# Patient Record
Sex: FEMALE | Race: WHITE | NOT HISPANIC OR LATINO | Employment: OTHER | URBAN - METROPOLITAN AREA
[De-identification: names, ages, dates, MRNs, and addresses within clinical notes are randomized per-mention and may not be internally consistent; named-entity substitution may affect disease eponyms.]

---

## 2023-04-18 ENCOUNTER — TELEPHONE (OUTPATIENT)
Dept: PRIMARY CARE | Facility: CLINIC | Age: 78
End: 2023-04-18
Payer: MEDICARE

## 2023-04-18 NOTE — TELEPHONE ENCOUNTER
Called and spoke to Sherri Hanson.  She stated she is seeing a different pcp now.  She will not be returning.

## 2023-05-15 DIAGNOSIS — I10 BENIGN ESSENTIAL HTN: Primary | ICD-10-CM

## 2023-05-15 RX ORDER — LISINOPRIL 20 MG/1
TABLET ORAL
Qty: 90 TABLET | Refills: 3 | Status: SHIPPED | OUTPATIENT
Start: 2023-05-15

## 2023-07-20 ENCOUNTER — PATIENT OUTREACH (OUTPATIENT)
Dept: CARE COORDINATION | Facility: CLINIC | Age: 78
End: 2023-07-20
Payer: MEDICARE

## 2023-08-07 DIAGNOSIS — F32.1 CURRENT MODERATE EPISODE OF MAJOR DEPRESSIVE DISORDER, UNSPECIFIED WHETHER RECURRENT (MULTI): Primary | ICD-10-CM

## 2023-08-20 RX ORDER — PAROXETINE 10 MG/1
10 TABLET, FILM COATED ORAL DAILY
Qty: 30 TABLET | Refills: 0 | Status: SHIPPED | OUTPATIENT
Start: 2023-08-20 | End: 2023-10-06 | Stop reason: SDUPTHER

## 2023-08-21 PROBLEM — M54.2 NECK PAIN: Status: ACTIVE | Noted: 2023-08-21

## 2023-08-21 PROBLEM — K21.9 ESOPHAGEAL REFLUX: Status: ACTIVE | Noted: 2023-08-21

## 2023-08-21 PROBLEM — E78.00 HYPERCHOLESTEROLEMIA: Status: ACTIVE | Noted: 2023-08-21

## 2023-08-21 PROBLEM — G47.00 INSOMNIA: Status: ACTIVE | Noted: 2023-08-21

## 2023-08-21 PROBLEM — M45.9 ANKYLOSING SPONDYLITIS (MULTI): Status: ACTIVE | Noted: 2023-08-21

## 2023-08-21 PROBLEM — K86.81 EXOCRINE PANCREATIC INSUFFICIENCY (HHS-HCC): Status: ACTIVE | Noted: 2023-08-21

## 2023-08-21 PROBLEM — I10 ESSENTIAL HYPERTENSION: Status: ACTIVE | Noted: 2023-08-21

## 2023-08-21 PROBLEM — M19.90 OSTEOARTHRITIS: Status: ACTIVE | Noted: 2023-08-21

## 2023-08-21 PROBLEM — I35.1 AORTIC REGURGITATION: Status: ACTIVE | Noted: 2023-08-21

## 2023-08-21 PROBLEM — D62 ANEMIA DUE TO ACUTE BLOOD LOSS: Status: ACTIVE | Noted: 2023-08-21

## 2023-08-21 PROBLEM — R73.9 HYPERGLYCEMIA: Status: ACTIVE | Noted: 2023-08-21

## 2023-08-21 PROBLEM — C44.90 SKIN CANCER: Status: ACTIVE | Noted: 2023-08-21

## 2023-08-21 PROBLEM — M54.12 CERVICAL RADICULOPATHY: Status: ACTIVE | Noted: 2023-08-21

## 2023-08-21 PROBLEM — R51.9 HEADACHE, CHRONIC DAILY: Status: ACTIVE | Noted: 2023-08-21

## 2023-08-21 PROBLEM — N39.0 UTI (URINARY TRACT INFECTION): Status: ACTIVE | Noted: 2023-08-21

## 2023-08-21 PROBLEM — E78.1 HYPERTRIGLYCERIDEMIA: Status: ACTIVE | Noted: 2023-08-21

## 2023-08-21 PROBLEM — I25.10 ARTERIOSCLEROSIS OF CORONARY ARTERY: Status: ACTIVE | Noted: 2023-08-21

## 2023-08-21 PROBLEM — E11.9 TYPE 2 DIABETES MELLITUS WITHOUT COMPLICATION (MULTI): Status: ACTIVE | Noted: 2023-08-21

## 2023-08-21 PROBLEM — D64.9 ANEMIA: Status: ACTIVE | Noted: 2023-08-21

## 2023-08-21 PROBLEM — N20.0 KIDNEY STONE: Status: ACTIVE | Noted: 2023-08-21

## 2023-08-21 PROBLEM — H81.11 BENIGN PAROXYSMAL POSITIONAL VERTIGO OF RIGHT EAR: Status: ACTIVE | Noted: 2023-08-21

## 2023-08-21 PROBLEM — F32.A DEPRESSION: Status: ACTIVE | Noted: 2023-08-21

## 2023-08-21 PROBLEM — F41.9 ANXIETY: Status: ACTIVE | Noted: 2023-08-21

## 2023-08-21 PROBLEM — M17.12 ARTHRITIS OF KNEE, LEFT: Status: ACTIVE | Noted: 2023-08-21

## 2023-08-21 PROBLEM — R03.0 ELEVATED BLOOD PRESSURE READING WITHOUT DIAGNOSIS OF HYPERTENSION: Status: ACTIVE | Noted: 2023-08-21

## 2023-08-21 PROBLEM — M25.511 PAIN IN RIGHT SHOULDER: Status: ACTIVE | Noted: 2023-08-21

## 2023-08-21 PROBLEM — M71.22 SYNOVIAL CYST OF LEFT POPLITEAL SPACE: Status: ACTIVE | Noted: 2023-08-21

## 2023-08-21 PROBLEM — U07.1 COVID-19: Status: ACTIVE | Noted: 2023-08-21

## 2023-08-21 PROBLEM — M54.16 LUMBAR RADICULOPATHY: Status: ACTIVE | Noted: 2023-08-21

## 2023-08-21 PROBLEM — M79.10 MUSCLE PAIN: Status: ACTIVE | Noted: 2023-08-21

## 2023-08-21 PROBLEM — T14.8XXA MUSCLE STRAIN: Status: ACTIVE | Noted: 2023-08-21

## 2023-08-21 PROBLEM — I72.8 SPLENIC ARTERY ANEURYSM (CMS-HCC): Status: ACTIVE | Noted: 2023-08-21

## 2023-08-21 PROBLEM — R92.8 ABNORMAL MAMMOGRAM: Status: ACTIVE | Noted: 2023-08-21

## 2023-08-21 RX ORDER — LISINOPRIL 10 MG/1
10 TABLET ORAL DAILY
COMMUNITY
End: 2023-10-06 | Stop reason: ALTCHOICE

## 2023-08-21 RX ORDER — BLOOD-GLUCOSE METER
EACH MISCELLANEOUS 3 TIMES DAILY
COMMUNITY

## 2023-08-21 RX ORDER — OXYCODONE AND ACETAMINOPHEN 5; 325 MG/1; MG/1
1 TABLET ORAL 4 TIMES DAILY PRN
COMMUNITY
Start: 2023-07-08 | End: 2023-10-06 | Stop reason: ALTCHOICE

## 2023-08-21 RX ORDER — FOLIC ACID 1 MG/1
1 TABLET ORAL DAILY
COMMUNITY
Start: 2021-12-07 | End: 2024-02-09 | Stop reason: ALTCHOICE

## 2023-08-21 RX ORDER — VIT C/E/ZN/COPPR/LUTEIN/ZEAXAN 250MG-90MG
1 CAPSULE ORAL DAILY
COMMUNITY
End: 2024-02-09 | Stop reason: ALTCHOICE

## 2023-08-21 RX ORDER — LANOLIN ALCOHOL/MO/W.PET/CERES
1 CREAM (GRAM) TOPICAL DAILY
COMMUNITY

## 2023-08-21 RX ORDER — HYDROCODONE BITARTRATE AND ACETAMINOPHEN 5; 325 MG/1; MG/1
1 TABLET ORAL
COMMUNITY
Start: 2023-02-17 | End: 2024-02-09 | Stop reason: ALTCHOICE

## 2023-08-21 RX ORDER — MIRTAZAPINE 15 MG/1
15 TABLET, FILM COATED ORAL NIGHTLY
COMMUNITY
End: 2023-10-02 | Stop reason: SDUPTHER

## 2023-08-21 RX ORDER — ZINC GLUCONATE 50 MG
1 TABLET ORAL DAILY
COMMUNITY

## 2023-08-21 RX ORDER — FLUTICASONE PROPIONATE 50 MCG
1 SPRAY, SUSPENSION (ML) NASAL 2 TIMES DAILY
COMMUNITY
Start: 2021-05-04

## 2023-08-21 RX ORDER — LANCETS 33 GAUGE
EACH MISCELLANEOUS 3 TIMES DAILY
COMMUNITY
Start: 2023-07-21

## 2023-08-21 RX ORDER — TRAZODONE HYDROCHLORIDE 50 MG/1
1-2 TABLET ORAL NIGHTLY
COMMUNITY
Start: 2022-07-11 | End: 2024-02-09 | Stop reason: ALTCHOICE

## 2023-08-21 RX ORDER — ASCORBIC ACID 500 MG
TABLET ORAL
COMMUNITY
Start: 2024-02-27

## 2023-08-21 RX ORDER — ALBUTEROL SULFATE 90 UG/1
2 AEROSOL, METERED RESPIRATORY (INHALATION) EVERY 6 HOURS PRN
COMMUNITY
Start: 2022-07-23 | End: 2023-10-06 | Stop reason: ALTCHOICE

## 2023-08-21 RX ORDER — MELOXICAM 15 MG/1
15 TABLET ORAL DAILY
COMMUNITY
Start: 2020-01-16 | End: 2023-10-06 | Stop reason: ALTCHOICE

## 2023-08-21 RX ORDER — METFORMIN HYDROCHLORIDE 500 MG/1
1 TABLET ORAL DAILY
COMMUNITY

## 2023-09-12 LAB
ALANINE AMINOTRANSFERASE (SGPT) (U/L) IN SER/PLAS: 44 U/L (ref 7–45)
ALBUMIN (G/DL) IN SER/PLAS: 4.5 G/DL (ref 3.4–5)
ALBUMIN (MG/L) IN URINE: 315.7 MG/L
ALBUMIN/CREATININE (UG/MG) IN URINE: 326.1 UG/MG CRT (ref 0–30)
ALKALINE PHOSPHATASE (U/L) IN SER/PLAS: 84 U/L (ref 33–136)
ANION GAP IN SER/PLAS: 17 MMOL/L (ref 10–20)
APPEARANCE, URINE: ABNORMAL
ASPARTATE AMINOTRANSFERASE (SGOT) (U/L) IN SER/PLAS: 24 U/L (ref 9–39)
BACTERIA, URINE: ABNORMAL /HPF
BASOPHILS (10*3/UL) IN BLOOD BY AUTOMATED COUNT: 0.04 X10E9/L (ref 0–0.1)
BASOPHILS/100 LEUKOCYTES IN BLOOD BY AUTOMATED COUNT: 0.7 % (ref 0–2)
BILIRUBIN TOTAL (MG/DL) IN SER/PLAS: 0.5 MG/DL (ref 0–1.2)
BILIRUBIN, URINE: NEGATIVE
BLOOD, URINE: ABNORMAL
C REACTIVE PROTEIN (MG/L) IN SER/PLAS BY HIGH SENSIT: 3.8 MG/L
CALCIUM (MG/DL) IN SER/PLAS: 9.9 MG/DL (ref 8.6–10.6)
CARBON DIOXIDE, TOTAL (MMOL/L) IN SER/PLAS: 29 MMOL/L (ref 21–32)
CHLORIDE (MMOL/L) IN SER/PLAS: 103 MMOL/L (ref 98–107)
CHOLESTEROL (MG/DL) IN SER/PLAS: 191 MG/DL (ref 0–199)
CHOLESTEROL IN HDL (MG/DL) IN SER/PLAS: 37.9 MG/DL
CHOLESTEROL/HDL RATIO: 5
COLOR, URINE: YELLOW
CREATININE (MG/DL) IN SER/PLAS: 0.8 MG/DL (ref 0.5–1.05)
CREATININE (MG/DL) IN URINE: 96.8 MG/DL (ref 20–320)
EOSINOPHILS (10*3/UL) IN BLOOD BY AUTOMATED COUNT: 0.16 X10E9/L (ref 0–0.4)
EOSINOPHILS/100 LEUKOCYTES IN BLOOD BY AUTOMATED COUNT: 2.8 % (ref 0–6)
ERYTHROCYTE DISTRIBUTION WIDTH (RATIO) BY AUTOMATED COUNT: 14.1 % (ref 11.5–14.5)
ERYTHROCYTE MEAN CORPUSCULAR HEMOGLOBIN CONCENTRATION (G/DL) BY AUTOMATED: 34.3 G/DL (ref 32–36)
ERYTHROCYTE MEAN CORPUSCULAR VOLUME (FL) BY AUTOMATED COUNT: 99 FL (ref 80–100)
ERYTHROCYTES (10*6/UL) IN BLOOD BY AUTOMATED COUNT: 3.91 X10E12/L (ref 4–5.2)
ESTIMATED AVERAGE GLUCOSE FOR HBA1C: 134 MG/DL
FIBRIN D-DIMER (NG/ML FEU) IN PLATELET POOR PLASMA: 2670 NG/ML FEU
GFR FEMALE: 75 ML/MIN/1.73M2
GLUCOSE (MG/DL) IN SER/PLAS: 101 MG/DL (ref 74–99)
GLUCOSE, URINE: NEGATIVE MG/DL
HEMATOCRIT (%) IN BLOOD BY AUTOMATED COUNT: 38.8 % (ref 36–46)
HEMOGLOBIN (G/DL) IN BLOOD: 13.3 G/DL (ref 12–16)
HEMOGLOBIN A1C/HEMOGLOBIN TOTAL IN BLOOD: 6.3 %
HEPATITIS B VIRUS SURFACE AG PRESENCE IN SERUM: NONREACTIVE
HEPATITIS C VIRUS AB PRESENCE IN SERUM: NONREACTIVE
IMMATURE GRANULOCYTES/100 LEUKOCYTES IN BLOOD BY AUTOMATED COUNT: 0.9 % (ref 0–0.9)
KETONES, URINE: NEGATIVE MG/DL
LDL: 74 MG/DL (ref 0–99)
LEUKOCYTE ESTERASE, URINE: ABNORMAL
LEUKOCYTES (10*3/UL) IN BLOOD BY AUTOMATED COUNT: 5.7 X10E9/L (ref 4.4–11.3)
LYMPHOCYTES (10*3/UL) IN BLOOD BY AUTOMATED COUNT: 2.44 X10E9/L (ref 0.8–3)
LYMPHOCYTES/100 LEUKOCYTES IN BLOOD BY AUTOMATED COUNT: 43.2 % (ref 13–44)
MONOCYTES (10*3/UL) IN BLOOD BY AUTOMATED COUNT: 0.38 X10E9/L (ref 0.05–0.8)
MONOCYTES/100 LEUKOCYTES IN BLOOD BY AUTOMATED COUNT: 6.7 % (ref 2–10)
MUCUS, URINE: ABNORMAL /LPF
NEUTROPHILS (10*3/UL) IN BLOOD BY AUTOMATED COUNT: 2.58 X10E9/L (ref 1.6–5.5)
NEUTROPHILS/100 LEUKOCYTES IN BLOOD BY AUTOMATED COUNT: 45.7 % (ref 40–80)
NITRITE, URINE: NEGATIVE
NON HDL CHOLESTEROL: 153 MG/DL
NRBC (PER 100 WBCS) BY AUTOMATED COUNT: 0 /100 WBC (ref 0–0)
PH, URINE: 5 (ref 5–8)
PLATELETS (10*3/UL) IN BLOOD AUTOMATED COUNT: 199 X10E9/L (ref 150–450)
POTASSIUM (MMOL/L) IN SER/PLAS: 5.5 MMOL/L (ref 3.5–5.3)
PROTEIN TOTAL: 6.9 G/DL (ref 6.4–8.2)
PROTEIN, URINE: ABNORMAL MG/DL
RBC, URINE: 37 /HPF (ref 0–5)
RENAL EPITHELIAL CELLS, URINE: <1 /HPF
SODIUM (MMOL/L) IN SER/PLAS: 143 MMOL/L (ref 136–145)
SPECIFIC GRAVITY, URINE: 1.02 (ref 1–1.03)
SQUAMOUS EPITHELIAL CELLS, URINE: 8 /HPF
TRIGLYCERIDE (MG/DL) IN SER/PLAS: 394 MG/DL (ref 0–149)
UREA NITROGEN (MG/DL) IN SER/PLAS: 28 MG/DL (ref 6–23)
UROBILINOGEN, URINE: <2 MG/DL (ref 0–1.9)
VLDL: 79 MG/DL (ref 0–40)
WBC, URINE: 149 /HPF (ref 0–5)

## 2023-10-02 DIAGNOSIS — F32.5 MAJOR DEPRESSIVE DISORDER IN FULL REMISSION, UNSPECIFIED WHETHER RECURRENT (CMS-HCC): Primary | ICD-10-CM

## 2023-10-02 RX ORDER — MIRTAZAPINE 15 MG/1
15 TABLET, FILM COATED ORAL NIGHTLY
Qty: 90 TABLET | Refills: 1 | Status: SHIPPED | OUTPATIENT
Start: 2023-10-02 | End: 2024-02-09 | Stop reason: ALTCHOICE

## 2023-10-06 ENCOUNTER — OFFICE VISIT (OUTPATIENT)
Dept: PRIMARY CARE | Facility: CLINIC | Age: 78
End: 2023-10-06
Payer: MEDICARE

## 2023-10-06 VITALS
SYSTOLIC BLOOD PRESSURE: 100 MMHG | DIASTOLIC BLOOD PRESSURE: 72 MMHG | HEART RATE: 70 BPM | BODY MASS INDEX: 27.82 KG/M2 | HEIGHT: 63 IN | WEIGHT: 157 LBS | RESPIRATION RATE: 18 BRPM | OXYGEN SATURATION: 93 %

## 2023-10-06 DIAGNOSIS — F32.1 CURRENT MODERATE EPISODE OF MAJOR DEPRESSIVE DISORDER, UNSPECIFIED WHETHER RECURRENT (MULTI): ICD-10-CM

## 2023-10-06 DIAGNOSIS — I10 ESSENTIAL HYPERTENSION: Primary | ICD-10-CM

## 2023-10-06 DIAGNOSIS — E11.9 CONTROLLED TYPE 2 DIABETES MELLITUS WITHOUT COMPLICATION, WITHOUT LONG-TERM CURRENT USE OF INSULIN (MULTI): ICD-10-CM

## 2023-10-06 DIAGNOSIS — E11.9 TYPE 2 DIABETES MELLITUS WITHOUT COMPLICATION, WITHOUT LONG-TERM CURRENT USE OF INSULIN (MULTI): ICD-10-CM

## 2023-10-06 LAB — POC HEMOGLOBIN A1C: 6.7 % (ref 4.2–6.5)

## 2023-10-06 PROCEDURE — 1159F MED LIST DOCD IN RCRD: CPT | Performed by: FAMILY MEDICINE

## 2023-10-06 PROCEDURE — 99214 OFFICE O/P EST MOD 30 MIN: CPT | Performed by: FAMILY MEDICINE

## 2023-10-06 PROCEDURE — 83036 HEMOGLOBIN GLYCOSYLATED A1C: CPT | Performed by: FAMILY MEDICINE

## 2023-10-06 PROCEDURE — 3078F DIAST BP <80 MM HG: CPT | Performed by: FAMILY MEDICINE

## 2023-10-06 PROCEDURE — 1126F AMNT PAIN NOTED NONE PRSNT: CPT | Performed by: FAMILY MEDICINE

## 2023-10-06 PROCEDURE — 1036F TOBACCO NON-USER: CPT | Performed by: FAMILY MEDICINE

## 2023-10-06 PROCEDURE — 3074F SYST BP LT 130 MM HG: CPT | Performed by: FAMILY MEDICINE

## 2023-10-06 PROCEDURE — 1160F RVW MEDS BY RX/DR IN RCRD: CPT | Performed by: FAMILY MEDICINE

## 2023-10-06 RX ORDER — PIOGLITAZONEHYDROCHLORIDE 15 MG/1
15 TABLET ORAL DAILY
Qty: 90 TABLET | Refills: 3 | Status: SHIPPED | OUTPATIENT
Start: 2023-10-06 | End: 2024-03-12 | Stop reason: SDUPTHER

## 2023-10-06 RX ORDER — PAROXETINE 10 MG/1
10 TABLET, FILM COATED ORAL DAILY
Qty: 90 TABLET | Refills: 3 | Status: SHIPPED | OUTPATIENT
Start: 2023-10-06 | End: 2024-01-15 | Stop reason: SDUPTHER

## 2023-10-06 ASSESSMENT — PATIENT HEALTH QUESTIONNAIRE - PHQ9
SUM OF ALL RESPONSES TO PHQ9 QUESTIONS 1 & 2: 0
2. FEELING DOWN, DEPRESSED OR HOPELESS: NOT AT ALL
1. LITTLE INTEREST OR PLEASURE IN DOING THINGS: NOT AT ALL

## 2023-10-06 ASSESSMENT — PAIN SCALES - GENERAL: PAINLEVEL: 0-NO PAIN

## 2023-10-06 NOTE — PROGRESS NOTES
Subjective     This is a 79 y/o WF here for f/u NIDDM, HTN, and elevated cholesterol. SHe denies CP, SOB, or edema. SHe is watching carbs. Her BS has been in the 120s. SHe does not check BP at home. SHe sees the eye doctor, kenny Reis. She saw [facility] and she had labs that show her D-dimer is elevated.        Active Ambulatory Problems     Diagnosis Date Noted    Abnormal mammogram 08/21/2023    Anemia 08/21/2023    Anemia due to acute blood loss 08/21/2023    Ankylosing spondylitis (CMS/HCC) 08/21/2023    Anxiety 08/21/2023    Aortic regurgitation 08/21/2023    Arteriosclerosis of coronary artery 08/21/2023    Arthritis of knee, left 08/21/2023    Essential hypertension 08/21/2023    Depression 08/21/2023    Elevated blood pressure reading without diagnosis of hypertension 08/21/2023    Esophageal reflux 08/21/2023    Exocrine pancreatic insufficiency 08/21/2023    Headache, chronic daily 08/21/2023    Kidney stone 08/21/2023    Insomnia 08/21/2023    Hypertriglyceridemia 08/21/2023    Hypercholesterolemia 08/21/2023    Muscle pain 08/21/2023    Muscle strain 08/21/2023    Pain in right shoulder 08/21/2023    Osteoarthritis 08/21/2023    Neck pain 08/21/2023    Skin cancer 08/21/2023    Lumbar radiculopathy 08/21/2023    Cervical radiculopathy 08/21/2023    Splenic artery aneurysm (CMS/HCC) 08/21/2023    Synovial cyst of left popliteal space 08/21/2023    Type 2 diabetes mellitus without complication (CMS/[facility]) 08/21/2023    Hyperglycemia 08/21/2023    Benign paroxysmal positional vertigo of right ear 08/21/2023     Resolved Ambulatory Problems     Diagnosis Date Noted    No Resolved Ambulatory Problems     Past Medical History:   Diagnosis Date    Body mass index (BMI) 27.0-27.9, adult 12/07/2021    Effusion, left knee 02/11/2019    Irritable bowel syndrome with diarrhea 01/16/2020    Other conditions influencing health status 06/08/2020    Other hemorrhoids 06/28/2017    Other nonspecific abnormal  "finding of lung field 2019    Pain in left knee 2019    Pain in unspecified knee 2019    Personal history of other diseases of the circulatory system 2017    Personal history of other diseases of the musculoskeletal system and connective tissue 2015    Personal history of other specified conditions 2020    Personal history of other specified conditions 2021    Personal history of other specified conditions 2014    Personal history of other specified conditions 2013    Unspecified injury of muscle(s) and tendon(s) of the rotator cuff of right shoulder, initial encounter 10/11/2016       Past Surgical History:   Procedure Laterality Date    HAND SURGERY  2014    Hand Surgery                                                                                                                                                          MR HEAD ANGIO WO IV CONTRAST  4/15/2023    MR HEAD ANGIO WO IV CONTRAST GEA MRI    MR NECK ANGIO WO IV CONTRAST  4/15/2023    MR NECK ANGIO WO IV CONTRAST GE[facility] MRI    OTHER SURGICAL HISTORY  2022    Shoulder replacement       No relevant family history has been documented for this patient.    She indicated that her mother is . She indicated that her father is .      Social History     Tobacco Use   Smoking Status Never   Smokeless Tobacco Never           Vitals:    10/06/23 1208   BP: 100/72   Pulse: 70   Resp: 18   SpO2: 93%    Body mass index is 27.81 kg/m².    Vitals:    10/06/23 1208   BP: 100/72   Pulse: 70   Resp: 18   SpO2: 93%   Weight: 71.2 kg (157 lb)   Height: 1.6 m (5' 3\")   PainSc: 0-No pain     Body mass index is 27.81 kg/m².    Objective   Lungs: CTA  Heart RRR  Abd: NABS, soft, NT  Barefoot extremities - no c,c,e, good pulses, no foot abnormalities      Assessment/Plan   Diagnoses and all orders for this visit:  Essential hypertension  Type 2 diabetes mellitus without complication, without long-term " current use of insulin (CMS/[facility])  -     POCT glycosylated hemoglobin (Hb A1C) manually resulted  Depression, unspecified depression type  Current moderate episode of major depressive disorder, unspecified whether recurrent (CMS/[facility])  -     PARoxetine (Paxil) 10 mg tablet; Take 1 tablet (10 mg) by mouth once daily.  Controlled type 2 diabetes mellitus without complication, without long-term current use of insulin (CMS/[facility])  -     pioglitazone (Actos) 15 mg tablet; Take 1 tablet (15 mg) by mouth once daily.  Other orders  -     Follow Up In Primary Care - Established; Future       Lab Results   Component Value Date    HGBA1C 6.7 (A) 10/06/2023

## 2023-10-08 PROBLEM — I25.10 ARTERIOSCLEROSIS OF CORONARY ARTERY: Status: RESOLVED | Noted: 2023-08-21 | Resolved: 2023-10-08

## 2023-10-08 PROBLEM — R73.9 HYPERGLYCEMIA: Status: RESOLVED | Noted: 2023-08-21 | Resolved: 2023-10-08

## 2023-10-08 PROBLEM — R03.0 ELEVATED BLOOD PRESSURE READING WITHOUT DIAGNOSIS OF HYPERTENSION: Status: RESOLVED | Noted: 2023-08-21 | Resolved: 2023-10-08

## 2023-11-07 ENCOUNTER — OFFICE VISIT (OUTPATIENT)
Dept: ORTHOPEDIC SURGERY | Facility: CLINIC | Age: 78
End: 2023-11-07
Payer: MEDICARE

## 2023-11-07 ENCOUNTER — HOSPITAL ENCOUNTER (OUTPATIENT)
Dept: RADIOLOGY | Facility: HOSPITAL | Age: 78
Discharge: HOME | End: 2023-11-07
Payer: MEDICARE

## 2023-11-07 DIAGNOSIS — M17.12 ARTHRITIS OF KNEE, LEFT: Primary | ICD-10-CM

## 2023-11-07 DIAGNOSIS — M17.12 ARTHRITIS OF KNEE, LEFT: ICD-10-CM

## 2023-11-07 PROCEDURE — 1160F RVW MEDS BY RX/DR IN RCRD: CPT | Performed by: ORTHOPAEDIC SURGERY

## 2023-11-07 PROCEDURE — 20610 DRAIN/INJ JOINT/BURSA W/O US: CPT | Performed by: ORTHOPAEDIC SURGERY

## 2023-11-07 PROCEDURE — 1159F MED LIST DOCD IN RCRD: CPT | Performed by: ORTHOPAEDIC SURGERY

## 2023-11-07 PROCEDURE — 3074F SYST BP LT 130 MM HG: CPT | Performed by: ORTHOPAEDIC SURGERY

## 2023-11-07 PROCEDURE — 73564 X-RAY EXAM KNEE 4 OR MORE: CPT | Mod: LT,FY

## 2023-11-07 PROCEDURE — 3078F DIAST BP <80 MM HG: CPT | Performed by: ORTHOPAEDIC SURGERY

## 2023-11-07 PROCEDURE — 73564 X-RAY EXAM KNEE 4 OR MORE: CPT | Mod: LEFT SIDE | Performed by: RADIOLOGY

## 2023-11-07 PROCEDURE — 99204 OFFICE O/P NEW MOD 45 MIN: CPT | Performed by: ORTHOPAEDIC SURGERY

## 2023-11-07 PROCEDURE — 1126F AMNT PAIN NOTED NONE PRSNT: CPT | Performed by: ORTHOPAEDIC SURGERY

## 2023-11-07 PROCEDURE — 1036F TOBACCO NON-USER: CPT | Performed by: ORTHOPAEDIC SURGERY

## 2023-11-07 NOTE — PROGRESS NOTES
This is a consultation from Dr. Rosita Chandler MD for   Chief Complaint   Patient presents with    Left Knee - Pain       This is a 78 y.o. female who presents for evaluation of left knee pain.  Patient states has had left knee pain for several years, she had left knee arthroscopy about 4 years ago.  She did well for a while but then her pain came back and gradually got worse.  She complains of sharp stable pain over the medial and anterior knee worse with walking proving with rest.  She has had injections in the past nothing recently.  She takes over-the-counter ibuprofen which is helpful.  No numbness or tingling no fevers no chills no shooting pain down the leg.  Her chronic issues been exacerbated last 1 to 2 months    Physical Exam    There has been no interval change in this patient's past medical, surgical, medications, allergies, family history or social history since the most recent visit to a provider within our department. 14 point review of systems was performed, reviewed, and negative except for pertinent positives documented in the history of present illness.     Constitutional: well developed, well nourished female in no acute distress  Psychiatric: normal mood, appropriate affect  Eyes: sclera anicteric  HENT: normocephalic/atraumatic  CV: regular rate and rhythm   Respiratory: non labored breathing  Integumentary: no rash  Neurological: moves all extremities    Left knee exam: skin intact no lacerations or abrations.  1+ effusion.  Tender medial joint line. negative log roll negative patellar grind. ROM 0-120. stable to varus and valgus stress at 0 and 30 degrees. negative lachman negative posterior drawer negative danny. 5/5 ehl/fhl/gs/ta. silt s/s/sp/dp/t. 2+ dp/pt        Xrays were ordered by me, they were reviewed and independently interpreted by me today, they show severe degenerative disease bone-on-bone arthritis the medial compartment and patellofemoral compartment    Procedure  Note:    Diagnosis: left knee arthritis  Procedure: left knee injection    Verbal consent was obtained from the patient, risks benefits and alternatives were discussed. We discussed the risks and benefits and potential morbidity related to the treatment, and to the prescription medication administered in the injectionA timeout was performed, and the correct patient and site of injection were identified and verified. The lateral side of the knee was sterilized with Betadine, and anesthetized with ethyl chloride spray. The left knee was injected with 1ml kenalog and 4ml lidocaine in the usual fashion. A sterile Band-Aid was placed. The patient tolerated the procedure well with no immediate complications. Standard post injection precautions and instructions were given.        Procedures      Impression/Plan: This is a 78 y.o. female with left knee arthritis.  I had an in depth discussion with the patient regarding treatment options for arthritis and their relative risks and benefits. We reviewed surgical and nonsurgical option for treatment. Treatments include anti inflammatory medications, physical therapy, weight loss, activity modification, use of assistive devices, injection therapies. We discussed current prescriptions and risks and benefits of continuation of prescription medication as apporpriate. We discussed that arthritis is often progressive over time, an in end stage arthritis surgical interventions can be considered, including arthroplasty. All questions were answered and the patient voiced their understanding.  We will see her back.    BMI Readings from Last 1 Encounters:   10/06/23 27.81 kg/m²      Lab Results   Component Value Date    CREATININE 0.80 09/12/2023     Tobacco Use: Low Risk  (11/7/2023)    Patient History     Smoking Tobacco Use: Never     Smokeless Tobacco Use: Never     Passive Exposure: Not on file      MELD 3.0: 11 at 4/16/2023  5:06 AM  MELD-Na: 8 at 4/16/2023  5:06 AM  Calculated  "from:  Serum Creatinine: 0.75 mg/dL (Using min of 1 mg/dL) at 4/16/2023  5:06 AM  Serum Sodium: 134 mmol/L at 4/16/2023  5:06 AM  Total Bilirubin: 0.9 mg/dL (Using min of 1 mg/dL) at 4/15/2023  1:33 PM  Serum Albumin: 3.8 g/dL (Using max of 3.5 g/dL) at 4/16/2023  5:06 AM  INR(ratio): 1.2 at 4/15/2023  1:33 PM  Age at listing (hypothetical): 77 years  Sex: Female at 4/16/2023  5:06 AM       Lab Results   Component Value Date    HGBA1C 6.7 (A) 10/06/2023     No results found for: \"STAPHMRSASCR\"  "

## 2023-11-07 NOTE — LETTER
November 7, 2023     Rosita Chandler MD  [address]  [address]  [facility] [address]    Patient: Sherri Najera   YOB: 1945   Date of Visit: 11/7/2023       Dear Dr. Rosita Chandler MD:    Thank you for referring Sherri Najera to me for evaluation. Below are my notes for this consultation.  If you have questions, please do not hesitate to call me. I look forward to following your patient along with you.       Sincerely,     Isaac Dumont MD      CC: No Recipients  ______________________________________________________________________________________    This is a consultation from Dr. Rosita Chandler MD for   Chief Complaint   Patient presents with   • Left Knee - Pain       This is a 78 y.o. female who presents for evaluation of left knee pain.  Patient states has had left knee pain for several years, she had left knee arthroscopy about 4 years ago.  She did well for a while but then her pain came back and gradually got worse.  She complains of sharp stable pain over the medial and anterior knee worse with walking proving with rest.  She has had injections in the past nothing recently.  She takes over-the-counter ibuprofen which is helpful.  No numbness or tingling no fevers no chills no shooting pain down the leg.  Her chronic issues been exacerbated last 1 to 2 months    Physical Exam    There has been no interval change in this patient's past medical, surgical, medications, allergies, family history or social history since the most recent visit to a provider within our department. 14 point review of systems was performed, reviewed, and negative except for pertinent positives documented in the history of present illness.     Constitutional: well developed, well nourished female in no acute distress  Psychiatric: normal mood, appropriate affect  Eyes: sclera anicteric  HENT: normocephalic/atraumatic  CV: regular rate and rhythm   Respiratory: non labored breathing  Integumentary: no  rash  Neurological: moves all extremities    Left knee exam: skin intact no lacerations or abrations.  1+ effusion.  Tender medial joint line. negative log roll negative patellar grind. ROM 0-120. stable to varus and valgus stress at 0 and 30 degrees. negative lachman negative posterior drawer negative danny. 5/5 ehl/fhl/gs/ta. silt s/s/sp/dp/t. 2+ dp/pt        Xrays were ordered by me, they were reviewed and independently interpreted by me today, they show severe degenerative disease bone-on-bone arthritis the medial compartment and patellofemoral compartment    Procedure Note:    Diagnosis: left knee arthritis  Procedure: left knee injection    Verbal consent was obtained from the patient, risks benefits and alternatives were discussed. We discussed the risks and benefits and potential morbidity related to the treatment, and to the prescription medication administered in the injectionA timeout was performed, and the correct patient and site of injection were identified and verified. The lateral side of the knee was sterilized with Betadine, and anesthetized with ethyl chloride spray. The left knee was injected with 1ml kenalog and 4ml lidocaine in the usual fashion. A sterile Band-Aid was placed. The patient tolerated the procedure well with no immediate complications. Standard post injection precautions and instructions were given.        Procedures      Impression/Plan: This is a 78 y.o. female with left knee arthritis.  I had an in depth discussion with the patient regarding treatment options for arthritis and their relative risks and benefits. We reviewed surgical and nonsurgical option for treatment. Treatments include anti inflammatory medications, physical therapy, weight loss, activity modification, use of assistive devices, injection therapies. We discussed current prescriptions and risks and benefits of continuation of prescription medication as apporpriate. We discussed that arthritis is often  "progressive over time, an in end stage arthritis surgical interventions can be considered, including arthroplasty. All questions were answered and the patient voiced their understanding.  We will see her back.    BMI Readings from Last 1 Encounters:   10/06/23 27.81 kg/m²      Lab Results   Component Value Date    CREATININE 0.80 09/12/2023     Tobacco Use: Low Risk  (11/7/2023)    Patient History    • Smoking Tobacco Use: Never    • Smokeless Tobacco Use: Never    • Passive Exposure: Not on file      MELD 3.0: 11 at 4/16/2023  5:06 AM  MELD-Na: 8 at 4/16/2023  5:06 AM  Calculated from:  Serum Creatinine: 0.75 mg/dL (Using min of 1 mg/dL) at 4/16/2023  5:06 AM  Serum Sodium: 134 mmol/L at 4/16/2023  5:06 AM  Total Bilirubin: 0.9 mg/dL (Using min of 1 mg/dL) at 4/15/2023  1:33 PM  Serum Albumin: 3.8 g/dL (Using max of 3.5 g/dL) at 4/16/2023  5:06 AM  INR(ratio): 1.2 at 4/15/2023  1:33 PM  Age at listing (hypothetical): 77 years  Sex: Female at 4/16/2023  5:06 AM       Lab Results   Component Value Date    HGBA1C 6.7 (A) 10/06/2023     No results found for: \"STAPHMRSASCR\""

## 2023-11-21 ENCOUNTER — OFFICE VISIT (OUTPATIENT)
Dept: ORTHOPEDIC SURGERY | Facility: CLINIC | Age: 78
End: 2023-11-21
Payer: MEDICARE

## 2023-11-21 DIAGNOSIS — M17.11 OSTEOARTHRITIS OF RIGHT KNEE, UNSPECIFIED OSTEOARTHRITIS TYPE: ICD-10-CM

## 2023-11-21 PROCEDURE — 1036F TOBACCO NON-USER: CPT

## 2023-11-21 PROCEDURE — 1159F MED LIST DOCD IN RCRD: CPT

## 2023-11-21 PROCEDURE — 1160F RVW MEDS BY RX/DR IN RCRD: CPT

## 2023-11-21 PROCEDURE — 99212 OFFICE O/P EST SF 10 MIN: CPT

## 2023-11-21 PROCEDURE — 1126F AMNT PAIN NOTED NONE PRSNT: CPT

## 2023-11-21 ASSESSMENT — PAIN - FUNCTIONAL ASSESSMENT: PAIN_FUNCTIONAL_ASSESSMENT: 0-10

## 2023-11-21 ASSESSMENT — PAIN SCALES - GENERAL: PAINLEVEL_OUTOF10: 2

## 2023-11-21 NOTE — PROGRESS NOTES
HPI  Sherri Najera is a 78 y.o. female in office today for follow up of side: left knee pain.  she had an injection with Dr Dumont 2 weeks ago.  Injection only helped for a few day.  She is now having issues with the knee giving out and buckling.  She has been taking ibuprofen, she has a walker at home as well.      Physical Exam  Constitutional: well developed, well nourished female in no acute distress  Psychiatric: normal mood, appropriate affect  Eyes: sclera anicteric  HENT: normocephalic/atraumatic  CV: regular rate and rhythm   Respiratory: non labored breathing  Integumentary: no rash  Neurological: moves all extremities    Left Knee Exam     Tenderness   The patient is experiencing tenderness in the medial joint line and lateral joint line (posterior knee).    Range of Motion   Extension:  0   Flexion:  120     Tests   Varus: negative Valgus: negative    Other   Swelling: none            Imaging/Lab:  X-rays were taken 11/7/23 which were reviewed by myself and read by radiology and show advanced left knee osteoarthritis, worst medially and progressive since prior imaging from 2019. No acute fractures of dislocation    Assessment  Assessment: left knee osteoarthritis    Plan  Plan:  History, physical exam, and imaging were reviewed with patient. Again discussion with the patient regarding treatment options for arthritis and their relative risks and benefits. We reviewed surgical and nonsurgical option for treatment. Treatments include anti inflammatory medications, physical therapy, weight loss, activity modification, use of assistive devices, injection therapies, and bracing for the instability.  Since she got an injection 2 weeks ago, it will be 2-3 months before she can have the knee replaced.  Although she is not happy about it, realizes options are limited and none of them are a immediate fix.  Follow Up: Patient to schedule with Dr Dumont and discuss knee replacement.    All questions were answered  for the patient prior to end of exam and patient addressed their understanding.    [place] PA-C  11/21/23

## 2023-12-08 ENCOUNTER — OFFICE VISIT (OUTPATIENT)
Dept: ORTHOPEDIC SURGERY | Facility: CLINIC | Age: 78
End: 2023-12-08
Payer: MEDICARE

## 2023-12-08 DIAGNOSIS — M17.12 ARTHRITIS OF KNEE, LEFT: Primary | ICD-10-CM

## 2023-12-08 PROCEDURE — 1126F AMNT PAIN NOTED NONE PRSNT: CPT | Performed by: ORTHOPAEDIC SURGERY

## 2023-12-08 PROCEDURE — 99215 OFFICE O/P EST HI 40 MIN: CPT | Performed by: ORTHOPAEDIC SURGERY

## 2023-12-08 PROCEDURE — 1036F TOBACCO NON-USER: CPT | Performed by: ORTHOPAEDIC SURGERY

## 2023-12-08 PROCEDURE — 1160F RVW MEDS BY RX/DR IN RCRD: CPT | Performed by: ORTHOPAEDIC SURGERY

## 2023-12-08 PROCEDURE — 1159F MED LIST DOCD IN RCRD: CPT | Performed by: ORTHOPAEDIC SURGERY

## 2023-12-08 NOTE — PROGRESS NOTES
This is a consultation from Dr. Rosita Chandler MD for   Chief Complaint   Patient presents with    Left Knee - Pain     SURGERY CONSULT        This is a 78 y.o. female who presents for follow-up for her left knee.  Patient had injection several months ago, it gave her some relief but her pain returned and now it is getting much worse.  She had a twisting injury to her knee which significantly exacerbated.  She is experience severe exacerbation of the last several weeks including increasing pain and instability of the left knee sharp pain over the medial and anterior knee.  It is getting more difficult for her to walk.  She had extensive trial of nonsurgical management occluding use of anti-inflammatories physical therapy activity modification use of assistive devices cortisone injections.  Despite that she has severe and worsening pain which impacts her quality of life and activities of daily living she like to consider total knee    Physical Exam    There has been no interval change in this patient's past medical, surgical, medications, allergies, family history or social history since the most recent visit to a provider within our department. 14 point review of systems was performed, reviewed, and negative except for pertinent positives documented in the history of present illness.     Constitutional: well developed, well nourished female in no acute distress  Psychiatric: normal mood, appropriate affect  Eyes: sclera anicteric  HENT: normocephalic/atraumatic  CV: regular rate and rhythm   Respiratory: non labored breathing  Integumentary: no rash  Neurological: moves all extremities    Left knee exam: skin intact no lacerations or abrations.  1+ effusion.  Tender medial lateral joint line. negative log roll negative patellar grind. ROM 5-100 stable to varus and valgus stress at 0 and 30 degrees. negative lachman negative posterior drawer negative dnany. 5/5 ehl/fhl/gs/ta. silt s/s/sp/dp/t. 2+  dp/pt        Xrays were ordered by me, they were reviewed and independently interpreted by me today, they show severe degenerative disease bone-on-bone arthritis subchondral sclerosis osteophyte formation Kellgren-Blair grade 4    Procedures      Impression/Plan: This is a 78 y.o. female with severe end-stage degenerative disease left knee that is failed nonoperative management.  Patient elected to proceed with left total knee.    I had an extensive discussion with the patient regarding her condition and possible treatment options. Nonsurgical treatment for left knee arthritis includes activity modification, weight loss, use of a cane or other assistive device, pain medications and nonsteroidal anti-inflammatory medications, joint injections, and physical therapy. The patient has attempted non-surgical treatment for this condition for greater than 6 months and it has failed. We discussed the risks benefits and alternatives of total knee arthroplasty. Benefits of joint replacement include: Relief of pain, improvement of function, and improved quality of life. Alternatives include observation and watchful waiting, and the nonsurgical modalities noted above.   We discussed the risks of complications as well as the risks of morbidity and mortality related to surgical treatment with total joint replacement. We reviewed the fact that total joint replacement is major elective surgery with significant associated surgical and procedural risk factors as detailed below.   Risks include: Pain, blood loss, damage to nearby anatomical structures including but not limited to nerves or blood vessels muscles tendons and bone, failure surgery to ameliorate symptoms, persistence of pain surrounding the affected joint, mechanical failure of the prosthesis including but not limited to loosening of the prosthesis from bone ,breakage of the prosthesis and dislocation of the prosthesis, change in length or appearance of a limb,  infection possibly necessitating further surgery, removal of the prosthesis, or limb amputation, the need for additional surgery for other reasons, blood clots, amputation, and death. No guarantees were implied or given.  All questions were answered and the patient voiced their understanding.   Discussed with the patient that during total knee arthroplasty prosthetic resurfacing of the patella may or may not be performed at the discretion of thesurgeon during surgery. In the event that prosthetic patellar resurfacing is not performed, nonprosthetic arthroplasty will be performed with removal of bone spurs, circumferential synovectomy and electrocautery. Patient was informed that anterior knee pain and patellofemoral crepitus can potentially occur after knee surgery with or without prosthetic patellar resurfacing.  A complete set of surgical instructions was given to the patient. The patient was educated regarding preoperative nutrition, preparation of their home for postoperative rehabilitation, choosing a care partner, medical and dental clearance, the cessation of medications that can cause bleeding or presenting to risk for infection, chlorhexidine bath, nasal swab and decontamination, post operative follow up, and need for medical equipment. Patient was also educated regarding the possibility of same day surgery and related criteria and protocols. The patient will attend our joint class further education, and thereafter they will return for a preoperative visit. A presurgery education booklet was also given to the patient.     surgical plan: Left total knee  implants: DePuy  approach: Standard  DVT ppx aspirin  drugs to stop none  allergy to abx none  post operative abx: ancef +/- vanc (per protocol)  special clearance needed none    BMI Readings from Last 1 Encounters:   10/06/23 27.81 kg/m²      Lab Results   Component Value Date    CREATININE 0.80 09/12/2023     Tobacco Use: Low Risk  (11/21/2023)    Patient  "History     Smoking Tobacco Use: Never     Smokeless Tobacco Use: Never     Passive Exposure: Not on file      MELD 3.0: 11 at 4/16/2023  5:06 AM  MELD-Na: 8 at 4/16/2023  5:06 AM  Calculated from:  Serum Creatinine: 0.75 mg/dL (Using min of 1 mg/dL) at 4/16/2023  5:06 AM  Serum Sodium: 134 mmol/L at 4/16/2023  5:06 AM  Total Bilirubin: 0.9 mg/dL (Using min of 1 mg/dL) at 4/15/2023  1:33 PM  Serum Albumin: 3.8 g/dL (Using max of 3.5 g/dL) at 4/16/2023  5:06 AM  INR(ratio): 1.2 at 4/15/2023  1:33 PM  Age at listing (hypothetical): 77 years  Sex: Female at 4/16/2023  5:06 AM       Lab Results   Component Value Date    HGBA1C 6.7 (A) 10/06/2023     No results found for: \"STAPHMRSASCR\"  "

## 2023-12-12 RX ORDER — TRIAMCINOLONE ACETONIDE 40 MG/ML
40 INJECTION, SUSPENSION INTRA-ARTICULAR; INTRAMUSCULAR ONCE
Status: COMPLETED | OUTPATIENT
Start: 2023-12-12 | End: 2023-12-12

## 2023-12-12 RX ADMIN — TRIAMCINOLONE ACETONIDE 40 MG: 40 INJECTION, SUSPENSION INTRA-ARTICULAR; INTRAMUSCULAR at 12:46

## 2023-12-14 NOTE — PROGRESS NOTES
Patient did not receive the knee brace that was ordered for her, she stated that it was to much for her to wear

## 2024-01-08 ENCOUNTER — PREP FOR PROCEDURE (OUTPATIENT)
Dept: ORTHOPEDIC SURGERY | Facility: CLINIC | Age: 79
End: 2024-01-08
Payer: MEDICARE

## 2024-01-08 ENCOUNTER — TELEPHONE (OUTPATIENT)
Dept: ORTHOPEDIC SURGERY | Facility: CLINIC | Age: 79
End: 2024-01-08
Payer: MEDICARE

## 2024-01-08 DIAGNOSIS — M17.12 ARTHRITIS OF LEFT KNEE: ICD-10-CM

## 2024-01-08 NOTE — TELEPHONE ENCOUNTER
2/26/24 lt tka  Patient is suppose to be scheduled for surgery and she's not able to schedule PAT because she is not on the schedule yet.

## 2024-01-11 PROBLEM — M17.12 ARTHRITIS OF LEFT KNEE: Status: ACTIVE | Noted: 2024-01-08

## 2024-01-12 ENCOUNTER — APPOINTMENT (OUTPATIENT)
Dept: PRIMARY CARE | Facility: CLINIC | Age: 79
End: 2024-01-12
Payer: MEDICARE

## 2024-01-15 DIAGNOSIS — F32.1 CURRENT MODERATE EPISODE OF MAJOR DEPRESSIVE DISORDER, UNSPECIFIED WHETHER RECURRENT (MULTI): ICD-10-CM

## 2024-01-15 RX ORDER — PAROXETINE 10 MG/1
10 TABLET, FILM COATED ORAL DAILY
Qty: 90 TABLET | Refills: 0 | Status: SHIPPED | OUTPATIENT
Start: 2024-01-15 | End: 2024-04-23 | Stop reason: WASHOUT

## 2024-01-15 NOTE — TELEPHONE ENCOUNTER
90 d supply     Refill:   PARoxetine (Paxil) 10 mg tablet Take 1 tablet (10 mg) by mouth once daily.     Pharm: [facility]

## 2024-01-15 NOTE — TELEPHONE ENCOUNTER
Requested Prescriptions     Pending Prescriptions Disp Refills    PARoxetine (Paxil) 10 mg tablet 90 tablet 3     Sig: Take 1 tablet (10 mg) by mouth once daily.     Tim ariza is [facility]

## 2024-01-19 NOTE — TELEPHONE ENCOUNTER
Sherri called as she had requested for her medication refill be sent to [facility] not [facility]. She is asking for this to be corrected.

## 2024-01-19 NOTE — TELEPHONE ENCOUNTER
PATIENT INFORMED THAT WHEN LOOKED UP [facility] IS [facility]. THIS WAS ALSO NOTED IN ORIGINAL REFILL MESSAGE.

## 2024-02-07 ENCOUNTER — TELEPHONE (OUTPATIENT)
Dept: PRIMARY CARE | Facility: CLINIC | Age: 79
End: 2024-02-07
Payer: MEDICARE

## 2024-02-07 DIAGNOSIS — E78.5 HYPERLIPIDEMIA, UNSPECIFIED HYPERLIPIDEMIA TYPE: ICD-10-CM

## 2024-02-07 RX ORDER — ATORVASTATIN CALCIUM 80 MG/1
80 TABLET, FILM COATED ORAL DAILY
Qty: 30 TABLET | Refills: 0 | Status: ON HOLD | OUTPATIENT
Start: 2024-02-07 | End: 2024-02-27

## 2024-02-07 NOTE — TELEPHONE ENCOUNTER
Refill:  atorvastatin (Lipitor) 80 mg tablet TAKE 1 TABLET DAILY       Pharm:   Mail away - [facility]

## 2024-02-08 ENCOUNTER — EDUCATION (OUTPATIENT)
Dept: ORTHOPEDIC SURGERY | Facility: CLINIC | Age: 79
End: 2024-02-08
Payer: MEDICARE

## 2024-02-08 NOTE — GROUP NOTE
In addition to the group class activities, Sherri Najera had the following lab work completed:  No orders of the defined types were placed in this encounter.      A new History and Physical was not completed.    This class lasted approximately 1 hour and had 11 participants. The nurse instructor covered the following topics:  Overview of joint replacement surgery.  Instructions for at-home preparation for surgery (included below).  Expectations before and after surgery including hospital stay.  Discharge planning and home care options.  Physical therapy overview and review of at-home exercises.    Information for Surgery or Hospital Stay  For morning surgery, do not eat or drink after midnight. This includes water, mints, and chewing gum.  For afternoon surgery, you may have a clear liquid breakfast before 6 A.M. Clear liquids are anything you can see through, like apple juice, cranberry juice, tea or black coffee without cream. Do not eat or drink after 6 A.M. This includes water.  Wear loose fitting clothes--something that can be slipped on and off easily over a dressing.  Bring a walker or crutches with you to the hospital on the day of surgery.  Please inform the office if you have any symptoms of illness or fever prior to surgery.  You need to have transportation home when discharged, as you will be medicated.  STOP any aspirin or anti-inflammatory products (Aleve, Advil, etc.) 5-7 days before surgery.  You may use Tylenol or Extra Strength Tylenol.  STOP any vitamins or herbal products 10 days before surgery.

## 2024-02-09 ENCOUNTER — PRE-ADMISSION TESTING (OUTPATIENT)
Dept: PREADMISSION TESTING | Facility: HOSPITAL | Age: 79
End: 2024-02-09
Payer: MEDICARE

## 2024-02-09 VITALS
OXYGEN SATURATION: 95 % | HEIGHT: 62 IN | RESPIRATION RATE: 18 BRPM | BODY MASS INDEX: 29.01 KG/M2 | WEIGHT: 157.63 LBS | HEART RATE: 88 BPM | SYSTOLIC BLOOD PRESSURE: 115 MMHG | DIASTOLIC BLOOD PRESSURE: 72 MMHG | TEMPERATURE: 97 F

## 2024-02-09 DIAGNOSIS — M17.12 ARTHRITIS OF LEFT KNEE: ICD-10-CM

## 2024-02-09 DIAGNOSIS — Z01.818 PREOPERATIVE CLEARANCE: Primary | ICD-10-CM

## 2024-02-09 DIAGNOSIS — R79.9 ABNORMAL FINDING OF BLOOD CHEMISTRY, UNSPECIFIED: ICD-10-CM

## 2024-02-09 LAB
ANION GAP SERPL CALC-SCNC: 13 MMOL/L (ref 10–20)
APPEARANCE UR: ABNORMAL
BACTERIA #/AREA URNS AUTO: ABNORMAL /HPF
BASOPHILS # BLD AUTO: 0.03 X10*3/UL (ref 0–0.1)
BASOPHILS NFR BLD AUTO: 0.5 %
BILIRUB UR STRIP.AUTO-MCNC: NEGATIVE MG/DL
BUN SERPL-MCNC: 22 MG/DL (ref 6–23)
CALCIUM SERPL-MCNC: 9.7 MG/DL (ref 8.6–10.3)
CHLORIDE SERPL-SCNC: 106 MMOL/L (ref 98–107)
CO2 SERPL-SCNC: 30 MMOL/L (ref 21–32)
COLOR UR: YELLOW
CREAT SERPL-MCNC: 0.63 MG/DL (ref 0.5–1.05)
EGFRCR SERPLBLD CKD-EPI 2021: >90 ML/MIN/1.73M*2
EOSINOPHIL # BLD AUTO: 0.18 X10*3/UL (ref 0–0.4)
EOSINOPHIL NFR BLD AUTO: 3.1 %
ERYTHROCYTE [DISTWIDTH] IN BLOOD BY AUTOMATED COUNT: 13.4 % (ref 11.5–14.5)
GLUCOSE SERPL-MCNC: 110 MG/DL (ref 74–99)
GLUCOSE UR STRIP.AUTO-MCNC: NEGATIVE MG/DL
GRAN CASTS #/AREA UR COMP ASSIST: ABNORMAL /LPF
HCT VFR BLD AUTO: 38.2 % (ref 36–46)
HGB BLD-MCNC: 12.1 G/DL (ref 12–16)
HOLD SPECIMEN: NORMAL
HYALINE CASTS #/AREA URNS AUTO: ABNORMAL /LPF
IMM GRANULOCYTES # BLD AUTO: 0.02 X10*3/UL (ref 0–0.5)
IMM GRANULOCYTES NFR BLD AUTO: 0.3 % (ref 0–0.9)
KETONES UR STRIP.AUTO-MCNC: NEGATIVE MG/DL
LEUKOCYTE ESTERASE UR QL STRIP.AUTO: ABNORMAL
LYMPHOCYTES # BLD AUTO: 2.27 X10*3/UL (ref 0.8–3)
LYMPHOCYTES NFR BLD AUTO: 38.7 %
MCH RBC QN AUTO: 29.4 PG (ref 26–34)
MCHC RBC AUTO-ENTMCNC: 31.7 G/DL (ref 32–36)
MCV RBC AUTO: 93 FL (ref 80–100)
MONOCYTES # BLD AUTO: 0.49 X10*3/UL (ref 0.05–0.8)
MONOCYTES NFR BLD AUTO: 8.3 %
MUCOUS THREADS #/AREA URNS AUTO: ABNORMAL /LPF
NEUTROPHILS # BLD AUTO: 2.88 X10*3/UL (ref 1.6–5.5)
NEUTROPHILS NFR BLD AUTO: 49.1 %
NITRITE UR QL STRIP.AUTO: NEGATIVE
NRBC BLD-RTO: 0 /100 WBCS (ref 0–0)
PH UR STRIP.AUTO: 5 [PH]
PLATELET # BLD AUTO: 210 X10*3/UL (ref 150–450)
POTASSIUM SERPL-SCNC: 4.5 MMOL/L (ref 3.5–5.3)
PROT UR STRIP.AUTO-MCNC: ABNORMAL MG/DL
RBC # BLD AUTO: 4.12 X10*6/UL (ref 4–5.2)
RBC # UR STRIP.AUTO: ABNORMAL /UL
RBC #/AREA URNS AUTO: >20 /HPF
SODIUM SERPL-SCNC: 144 MMOL/L (ref 136–145)
SP GR UR STRIP.AUTO: 1.02
SQUAMOUS #/AREA URNS AUTO: ABNORMAL /HPF
UROBILINOGEN UR STRIP.AUTO-MCNC: <2 MG/DL
WBC # BLD AUTO: 5.9 X10*3/UL (ref 4.4–11.3)
WBC #/AREA URNS AUTO: >50 /HPF

## 2024-02-09 PROCEDURE — 93010 ELECTROCARDIOGRAM REPORT: CPT | Performed by: INTERNAL MEDICINE

## 2024-02-09 PROCEDURE — 81003 URINALYSIS AUTO W/O SCOPE: CPT

## 2024-02-09 PROCEDURE — 83036 HEMOGLOBIN GLYCOSYLATED A1C: CPT | Mod: GEALAB

## 2024-02-09 PROCEDURE — 87081 CULTURE SCREEN ONLY: CPT | Mod: 59,GEALAB

## 2024-02-09 PROCEDURE — 87086 URINE CULTURE/COLONY COUNT: CPT | Mod: GEALAB

## 2024-02-09 PROCEDURE — 84132 ASSAY OF SERUM POTASSIUM: CPT

## 2024-02-09 PROCEDURE — 99205 OFFICE O/P NEW HI 60 MIN: CPT | Performed by: NURSE PRACTITIONER

## 2024-02-09 PROCEDURE — 93005 ELECTROCARDIOGRAM TRACING: CPT

## 2024-02-09 PROCEDURE — 36415 COLL VENOUS BLD VENIPUNCTURE: CPT

## 2024-02-09 PROCEDURE — 85025 COMPLETE CBC W/AUTO DIFF WBC: CPT

## 2024-02-09 RX ORDER — MIRTAZAPINE 15 MG/1
1 TABLET, FILM COATED ORAL NIGHTLY
COMMUNITY
End: 2024-03-19 | Stop reason: SDUPTHER

## 2024-02-09 RX ORDER — BUTYROSPERMUM PARKII(SHEA BUTTER), SIMMONDSIA CHINENSIS (JOJOBA) SEED OIL, ALOE BARBADENSIS LEAF EXTRACT .01; 1; 3.5 G/100G; G/100G; G/100G
1 LIQUID TOPICAL 2 TIMES DAILY
COMMUNITY

## 2024-02-09 ASSESSMENT — PAIN SCALES - GENERAL: PAINLEVEL_OUTOF10: 4

## 2024-02-09 ASSESSMENT — CHADS2 SCORE
HYPERTENSION: YES
DIABETES: YES
CHADS2 SCORE: 3
AGE GREATER THAN OR EQUAL TO 75: YES
CHF: NO
PRIOR STROKE OR TIA OR THROMBOEMBOLISM: NO

## 2024-02-09 ASSESSMENT — LIFESTYLE VARIABLES: SMOKING_STATUS: NONSMOKER

## 2024-02-09 ASSESSMENT — ENCOUNTER SYMPTOMS
SINUS CONGESTION: 1
CONSTITUTIONAL NEGATIVE: 1
CARDIOVASCULAR NEGATIVE: 1
NECK NEGATIVE: 1
RESPIRATORY NEGATIVE: 1
NEUROLOGICAL NEGATIVE: 1
ENDOCRINE NEGATIVE: 1
GASTROINTESTINAL NEGATIVE: 1

## 2024-02-09 ASSESSMENT — PAIN - FUNCTIONAL ASSESSMENT: PAIN_FUNCTIONAL_ASSESSMENT: 0-10

## 2024-02-09 NOTE — PREPROCEDURE INSTRUCTIONS
Medication List            Accurate as of February 9, 2024 10:11 AM. Always use your most recent med list.                atorvastatin 80 mg tablet  Commonly known as: Lipitor  Take 1 tablet (80 mg) by mouth once daily.  Medication Adjustments for Surgery: Take morning of surgery with sip of water, no other fluids     Flonase Allergy Relief 50 mcg/actuation nasal spray  Generic drug: fluticasone  Medication Adjustments for Surgery: Take morning of surgery with sip of water, no other fluids     lisinopril 20 mg tablet  TAKE 1 TABLET DAILY AS DIRECTED  Medication Adjustments for Surgery: Other (Comment)  Notes to patient: Do not take day of surgery     metFORMIN 500 mg tablet  Commonly known as: Glucophage  Medication Adjustments for Surgery: Other (Comment)  Notes to patient: Do not take day of surgery     mirtazapine 15 mg tablet  Commonly known as: Remeron  Medication Adjustments for Surgery: Continue until night before surgery     MULTIVITAMIN ORAL  Medication Adjustments for Surgery: Stop 7 days before surgery     OneTouch Delica Plus Lancet 33 gauge misc  Generic drug: lancets  Medication Adjustments for Surgery: Other (Comment)     OneTouch Verio test strips strip  Generic drug: blood sugar diagnostic  Medication Adjustments for Surgery: Other (Comment)     PARoxetine 10 mg tablet  Commonly known as: Paxil  Take 1 tablet (10 mg) by mouth once daily.  Medication Adjustments for Surgery: Take morning of surgery with sip of water, no other fluids     pioglitazone 15 mg tablet  Commonly known as: Actos  Take 1 tablet (15 mg) by mouth once daily.  Medication Adjustments for Surgery: Other (Comment)  Notes to patient: Do not take day of surgery     saccharomyces boulardii 250 mg capsule  Commonly known as: Florastor     Vitamin B-12 1,000 mcg tablet  Generic drug: cyanocobalamin  Medication Adjustments for Surgery: Stop 7 days before surgery     Vitamin C 500 mg tablet  Generic drug: ascorbic acid  Medication  Adjustments for Surgery: Stop 7 days before surgery     zinc gluconate 50 mg tablet  Medication Adjustments for Surgery: Stop 7 days before surgery              SURGERY PRE-OPERATIVE INSTRUCTIONS    *You will receive a phone call the day before your procedure  after 2pm, (or the Friday before your surgery if scheduled on a Monday.) Generally the hospital will be calling you with this information after that time.    *You are not to eat after midnight the night before the surgery. You may have 8oz of a clear liquid up until 2 hours prior to arriving to the hospital. The exception is with medications you were instructed to take day of surgery.    *You may take tylenol for pain/discomfort as needed.     *Stop taking all aspirin products, ibuprofen (motrin/advil), naproxen (aleve/naprosyn) for one week prior to surgery.    *Stop taking all vitamins and supplements one week prior to surgery.     *You should not have alcoholic beverages for 24 hours before surgery.     *You should not smoke 24 hours prior to surgery.     *To help prevent surgical infections bathe/shower with Dial soap the evening before surgery.    *You can wear deodorant but no lotion, powder, or perfume/cologne. You should remove all make-up and nail polish at home.    *If you wear glasses, please bring a case for the glasses with you.    *You will be asked to remove dentures and contacts.     *Please leave all valuables at home.    *You should wear loose, comfortable clothing that will accommodate bandages and/or casts.    *You should notify your doctor of any change in your condition (fever, cold, rash, etc). Surgery may need to be re-scheduled until a time you are in better health.    *A responsible adult is required to accompany you to and from the hospital if you are receiving anesthesia or a sedative. Patients are not permitted to drive for 24 hours after anesthesia.     *You can use the [place] parking if you wish.     *If you have any further  questions please call [facility] [phone].    CHG BODY WASH INSTRUCTIONS    *Begin using your CHG soap five days prior to your scheduled surgery.  Allow the CHG soap to sit on skin for 3 minutes. Do not wash with regular soap after you have used the CHG soap. Pat yourself dry with a clean, fresh towel.    *Wash your face with normal soap and water. Apply the CHG solution to a clean, wet washcloth. Firmly lather your entire body from the neck down. Do not use on your face.     *Do not apply powders, deodorants, or lotions after using CHG wash.    *Dress in clean, freshly laundered night clothes.    *Be sure to sleep with clean, freshly laundered sheets.     *Be aware CHG wash may cause stains on fabrics. Rinse your washcloth and other linens that come in contact with CHG completely. Use non-chlorine detergents to launder items used.     *The morning of surgery is the fifth day, repeat the CHG wash and wear fresh laundered clothes.     *If you have any questions about the CHG soap, call [phone].       MOUTHRINSE INSTRUCTIONS    *CHG oral rinse is used to kill a bacteria in the mouth known as Staphylococcus aureus. This reduces the risks of surgical site infections.     *Using dental rinse: use the CHG oral rinse after you brush your teeth the night before and the morning of the surgery. Follow all directions on your prescription label.     *Use 1 capful (15ml), swish and gargle for at least 30 seconds. Do not swallow. Spit rinse out.     *Do not rinse mouth with water, eat or drink after using CHG mouth rinse.     *Possible side effects: CHG rinse will stick to plaque on teeth. Brush and floss just before use. Teeth brushing will help to avoid staining of plaque during use.    *Any questions, please call [phone].                    NPO Instructions:        Additional Instructions:

## 2024-02-09 NOTE — H&P (VIEW-ONLY)
CPM/PAT Evaluation       Name: Sherri Najera (Sherri Najera)  /Age: 1945/78 y.o.     Visit Type:   In-Person       Chief Complaint: Left Knee Osteoarthritis    HPI  Patient is a 78-year-old female with a past medical history significant for HTN, HLD, hearing loss wears hearing aids, diabetes type 2, GERD, nephrolithiasis, IBS, anxiety, depression, and left knee osteoarthritis.  Patient is being evaluated in [facility] in anticipation of a left total knee arthroscopy plasty with Dr. Dumont on 2024.  Past Medical History:   Diagnosis Date    Aneurysm, splenic artery (CMS/HCC)     Ankylosing spondylitis (CMS/HCC)     Anxiety     Arthritis     Body mass index (BMI) 27.0-27.9, adult 2021    BMI 27.0-27.9,adult    BPPV (benign paroxysmal positional vertigo)     Cervical radiculitis     Depression     Diabetes mellitus (CMS/HCC)     Effusion, left knee 2019    Effusion of left knee    Exocrine pancreatic insufficiency     GERD (gastroesophageal reflux disease)     Hearing aid worn     HL (hearing loss)     HLD (hyperlipidemia)     Hypertension     Irritable bowel syndrome with diarrhea 2020    Irritable bowel syndrome with diarrhea    Kidney stone     Nephrolithiasis     Other conditions influencing health status 2020    History of chronic diarrhea    Other hemorrhoids 2017    Internal hemorrhoid    Other nonspecific abnormal finding of lung field 2019    Ground glass opacity present on imaging of lung    Pain in left knee 2019    Left knee pain    Pain in unspecified knee 2019    Knee pain    Personal history of other diseases of the circulatory system 2017    History of hypertension    Personal history of other diseases of the musculoskeletal system and connective tissue 2015    History of low back pain    Personal history of other specified conditions 2020    History of fatigue    Personal history of other specified conditions 2021     History of diarrhea    Personal history of other specified conditions 03/07/2014    History of chest pain    Personal history of other specified conditions 01/31/2013    History of headache    Skin cancer     Unspecified injury of muscle(s) and tendon(s) of the rotator cuff of right shoulder, initial encounter 10/11/2016    Injury of right rotator cuff    Wears glasses        Past Surgical History:   Procedure Laterality Date    CARPAL TUNNEL RELEASE Bilateral     HAND SURGERY Left 03/07/2014    Hand Surgery                                                                                                                                                          MR HEAD ANGIO WO IV CONTRAST  04/15/2023    MR HEAD ANGIO WO IV CONTRAST [facility] MRI    MR NECK ANGIO WO IV CONTRAST  04/15/2023    MR NECK ANGIO WO IV CONTRAST [facility] MRI    OTHER SURGICAL HISTORY Bilateral 07/11/2022    Shoulder replacement       Patient Sexual activity questions deferred to the physician.    Family History   Problem Relation Name Age of Onset    Diabetes Mother      Asthma Mother      Other (CRF) Mother      Heart failure Father         Allergies   Allergen Reactions    Fenofibrate Unknown    Sulfa (Sulfonamide Antibiotics) Rash       Prior to Admission medications    Medication Sig Start Date End Date Taking? Authorizing Provider   ascorbic acid (Vitamin C) 500 mg tablet as directed Orally   Yes Historical Provider, MD   atorvastatin (Lipitor) 80 mg tablet Take 1 tablet (80 mg) by mouth once daily. 2/7/24  Yes José Miguel Mena DO   cyanocobalamin (Vitamin B-12) 1,000 mcg tablet Take 1 tablet (1,000 mcg) by mouth once daily.   Yes Historical Provider, MD   fluticasone (Flonase Allergy Relief) 50 mcg/actuation nasal spray Administer 1 spray into each nostril 2 times a day. 5/4/21  Yes Historical Provider, MD   lisinopril 20 mg tablet TAKE 1 TABLET DAILY AS DIRECTED 5/15/23  Yes Sue Zarate MD   metFORMIN (Glucophage) 500 mg tablet Take 1 tablet  (500 mg) by mouth once daily. TAKE WITH A MEAL   Yes Historical Provider, MD   mirtazapine (Remeron) 15 mg tablet Take 1 tablet (15 mg) by mouth once daily at bedtime.   Yes Historical Provider, MD   MULTIVITAMIN ORAL Take 1 tablet by mouth once daily.   Yes Historical Provider, MD   OneTouch Delica Plus Lancet 33 gauge misc 3 times a day. 7/21/23  Yes Historical Provider, MD   OneTouch Verio test strips strip 3 times a day. BEFORE MEALS AND AT BEDTIME. - USE 1 STRIP TO CHECK BLOOD SUGAR.   Yes Historical Provider, MD   PARoxetine (Paxil) 10 mg tablet Take 1 tablet (10 mg) by mouth once daily. 1/15/24 1/14/25 Yes José Miguel Mena,    pioglitazone (Actos) 15 mg tablet Take 1 tablet (15 mg) by mouth once daily. 10/6/23  Yes Rosita Chandler MD   saccharomyces boulardii (Florastor) 250 mg capsule Take 1 capsule (250 mg) by mouth 2 times a day.   Yes Historical Provider, MD   zinc gluconate 50 mg tablet Take 1 tablet (50 mg) by mouth once daily.   Yes Historical Provider, MD   atorvastatin (Lipitor) 80 mg tablet TAKE 1 TABLET DAILY 3/29/23 2/7/24  Sue Zarate MD   cholecalciferol (Vitamin D-3) 25 MCG (1000 UT) capsule Take 1 capsule (25 mcg) by mouth once daily.  2/9/24  Historical Provider, MD   folic acid (Folvite) 1 mg tablet Take 1 tablet (1 mg) by mouth once daily. 12/7/21 2/9/24  Historical Provider, MD   HYDROcodone-acetaminophen (Norco) 5-325 mg tablet Take 1 tablet by mouth. TAKE EVERY 4-6 HOURS PRN 2/17/23 2/9/24  Historical Provider, MD   mirtazapine (Remeron) 15 mg tablet Take 1 tablet (15 mg) by mouth once daily at bedtime. 10/2/23 2/9/24  Rosita Chandler MD   traZODone (Desyrel) 50 mg tablet Take 1-2 tablets ( mg) by mouth once daily at bedtime. 7/11/22 2/9/24  Historical Provider, MD [facility] ROS:   Constitutional:   neg    Neuro/Psych:   neg    Eyes:    use of corrective lenses  Ears:    hearing loss   hearing aides  Nose:    sinus congestion  Mouth:   neg    Throat:   neg    Neck:   neg     Cardio:   neg    Respiratory:   neg    Endocrine:   neg    GI:   neg    :   neg    Musculoskeletal:    Knee pain 5/10  Hematologic:   neg    Skin:  neg        Physical Exam  Vitals reviewed.   HENT:      Head: Normocephalic.      Nose: Nose normal.      Mouth/Throat:      Mouth: Mucous membranes are moist.   Eyes:      Pupils: Pupils are equal, round, and reactive to light.   Cardiovascular:      Rate and Rhythm: Normal rate.      Pulses: Normal pulses.      Heart sounds: Murmur heard.   Pulmonary:      Effort: Pulmonary effort is normal.   Abdominal:      Palpations: Abdomen is soft.   Musculoskeletal:      Cervical back: Normal range of motion.      Comments: Generalized arthritis pain   Skin:     General: Skin is warm.   Neurological:      General: No focal deficit present.      Mental Status: She is alert.   Psychiatric:         Mood and Affect: Mood normal.          Airway    Visit Vitals  /72   Pulse 88   Temp 36.1 °C (97 °F)   Resp 18       DASI Risk Score    No data to display       Caprini DVT Assessment      Flowsheet Row Most Recent Value   DVT Score 7   Current Status Major surgery planned, including arthroscopic and laproscopic (1-2 hours)   History Prior major surgery   Age Over 75 years   BMI 30 or less          Modified Frailty Index      Flowsheet Row Most Recent Value   Modified Frailty Index Calculator .1818          CHADS2 Stroke Risk  Current as of 28 minutes ago        N/A 3 - 100%: High Risk   2 - 3%: Medium Risk   0 - 2%: Low Risk     Last Change: N/A          This score determines the patient's risk of having a stroke if the patient has atrial fibrillation.        This score is not applicable to this patient. Components are not calculated.          Revised Cardiac Risk Index      Flowsheet Row Most Recent Value   Revised Cardiac Risk Calculator 0          Apfel Simplified Score      Flowsheet Row Most Recent Value   Apfel Simplified Score Calculator 3          Risk Analysis Index  Results This Encounter    No data found in the last 1 encounters.       Stop Bang Score      Flowsheet Row Most Recent Value   Do you snore loudly? 1   Do you often feel tired or fatigued after your sleep? 0   Has anyone ever observed you stop breathing in your sleep? 0   Do you have or are you being treated for high blood pressure? 1   Recent BMI (Calculated) 27.8   Is BMI greater than 35 kg/m2? 0=No   Age older than 50 years old? 1=Yes   Is your neck circumference greater than 17 inches (Male) or 16 inches (Female)? 0   Gender - Male 0=No   STOP-BANG Total Score 3            Assessment and Plan:     Anesthesia:  The patient denies problems with anesthesia in the past such as PONV, prolonged sedation, awareness, dental damage, aspiration, cardiac arrest, difficult intubation, or unexpected hospital admissions.     Neuro:   The patient has no neurological diagnoses, however, the patient is at increased risk for postoperative delirium secondary to age 65 or older. The patient is at increased risk for perioperative stroke secondary to hypertension , increased age, hyperlipidemia, female gender, general anesthesia.    HEENT/Airway  No diagnoses, significant findings on chart review, clinical presentation, or evaluation.    Cardiovascular  The patient is scheduled for non-cardiac surgery associated with elevated risk.  The patient has no major cardiac contraindications to non- cardiac surgery.  RCRI  The patient meets 0-1 RCRI criteria and therefore has a less than 1% risk of major adverse cardiac complications.  METS  The patient's functional capacity capacity is greater than 4 METS.  EKG  The patient has no EKG or echocardiographic changes concerning for myocardial ischemia.   Heart Failure  The patient has no known history of heart failure.  Additionally, the patient reports no symptoms of heart failure and demonstrates no signs of heart failure.  Hypertension Evaluation  The patient has a known history of  hypertension that is controlled.  Patient's hypertension is most consistent with stage 1.  Heart Rhythm Evaluation  The patient has no history of arrhythmias.  Heart Valve Evaluation  The patient has no known history of valvular heart disease.  However, the patient has symptoms or physical exam findings consistent with valvular heart disease.  An echocardiogram is ordered for further characterization.  CARDS EVAL  The patient is not followed by cardiology.  The patient has a 30-day risk for MACE of 1 predictor, 6.0% risk for cardiac death, nonfatal myocardial infarction, and nonfatal cardiac arrest.  SUSANNE score which indicates a 0.2% risk of intraoperative or 30-day postoperative.    Pulmonary   The patient is at increased risk of perioperative pulmonary complications secondary to advanced age greater than 60.  The patient has a stop bang score of 3, which places patient at intermediate risk for having JUSTINA.    ARISCAT 19, low, 1.6% risk of in-hospital postoperative pulmonary complications  PRODIGY 12, intermediate risk of respiratory depression episode.    Hematology  No diagnoses or significant findings on chart review or clinical presentation and evaluation.  Antiplatelet management   The patient is not currently receiving antiplatelet therapy.  Anticoagulation management  The patient is not currently receiving anticoagulation therapy.  Caprini score 7, high risk of perioperative VTE  Transfusion Evaluation  N/A    Gastrointestinal  The patient has diagnoses or significant findings on chart review or clinical presentation and evaluation significant for GERD.  Eat 10- 0,  self-perceived oropharyngeal dysphagia scale (0-40)     Genitourinary  The patient has diagnoses or significant findings on chart review or clinical presentation and evaluation significant for nephrolithiasis    Renal  No renal diagnoses or significant findings on chart review or clinical presentation and evaluation. The patient has specific risk  factors associated with increased risk of perioperative renal complications due to age greater than 55, hypertension, diabetes mellitus.    Musculoskeletal  The patient has diagnoses or significant findings on chart review or clinical presentation and evaluation significant for osteoarthritis    Endocrine  Diabetes Evaluation  The patient has history of diabetes mellitus controlled by medication  Thyroid Disease Evaluation  The patient has no history of thyroid disease.    ID  No diagnoses or significant findings on chart review or clinical presentation and evaluation., MRSA screening obtained. Prescriptions given for Hibiclens and Peridex.    -Preoperative medication instructions were provided and reviewed with the patient.  Any additional testing or evaluation was explained to the patient.  NPO Instructions were discussed, and the patient's questions were answered prior to conclusion of this encounter     Pt was referred to [facility] for preop evaluation.  Pt was evaluated by Leticia Franco ([facility]) pt ok to proceed with surgery from cardiac standpoint.

## 2024-02-10 LAB
EST. AVERAGE GLUCOSE BLD GHB EST-MCNC: 128 MG/DL
HBA1C MFR BLD: 6.1 %

## 2024-02-11 LAB
BACTERIA UR CULT: NO GROWTH
STAPHYLOCOCCUS SPEC CULT: NORMAL

## 2024-02-12 ENCOUNTER — APPOINTMENT (OUTPATIENT)
Dept: PREADMISSION TESTING | Facility: HOSPITAL | Age: 79
End: 2024-02-12
Payer: MEDICARE

## 2024-02-14 LAB
ATRIAL RATE: 83 BPM
P AXIS: 59 DEGREES
P OFFSET: 192 MS
P ONSET: 140 MS
PR INTERVAL: 154 MS
Q ONSET: 217 MS
QRS COUNT: 13 BEATS
QRS DURATION: 78 MS
QT INTERVAL: 352 MS
QTC CALCULATION(BAZETT): 413 MS
QTC FREDERICIA: 392 MS
R AXIS: -15 DEGREES
T AXIS: 58 DEGREES
T OFFSET: 393 MS
VENTRICULAR RATE: 83 BPM

## 2024-02-15 RX ORDER — CHLORHEXIDINE GLUCONATE ORAL RINSE 1.2 MG/ML
15 SOLUTION DENTAL DAILY
Qty: 473 ML | Refills: 0 | Status: SHIPPED | OUTPATIENT
Start: 2024-02-15 | End: 2024-02-27 | Stop reason: HOSPADM

## 2024-02-16 ENCOUNTER — OFFICE VISIT (OUTPATIENT)
Dept: ORTHOPEDIC SURGERY | Facility: CLINIC | Age: 79
End: 2024-02-16
Payer: MEDICARE

## 2024-02-16 ENCOUNTER — HOSPITAL ENCOUNTER (OUTPATIENT)
Dept: RADIOLOGY | Facility: HOSPITAL | Age: 79
Discharge: HOME | End: 2024-02-16
Payer: MEDICARE

## 2024-02-16 DIAGNOSIS — M17.12 ARTHRITIS OF KNEE, LEFT: ICD-10-CM

## 2024-02-16 DIAGNOSIS — M17.12 ARTHRITIS OF KNEE, LEFT: Primary | ICD-10-CM

## 2024-02-16 PROCEDURE — 99213 OFFICE O/P EST LOW 20 MIN: CPT | Performed by: ORTHOPAEDIC SURGERY

## 2024-02-16 PROCEDURE — 1160F RVW MEDS BY RX/DR IN RCRD: CPT | Performed by: ORTHOPAEDIC SURGERY

## 2024-02-16 PROCEDURE — 77073 BONE LENGTH STUDIES: CPT

## 2024-02-16 PROCEDURE — 1126F AMNT PAIN NOTED NONE PRSNT: CPT | Performed by: ORTHOPAEDIC SURGERY

## 2024-02-16 PROCEDURE — 1036F TOBACCO NON-USER: CPT | Performed by: ORTHOPAEDIC SURGERY

## 2024-02-16 PROCEDURE — 1159F MED LIST DOCD IN RCRD: CPT | Performed by: ORTHOPAEDIC SURGERY

## 2024-02-16 NOTE — PROGRESS NOTES
Chief Complaint   Patient presents with    Left Knee - Follow-up     PRE OP LT TKA         This is a 78 y.o. year old female who presents for preoperative visit for her left total knee.  Patient has severe degenerative disease of the affected joint. The patient complains of severe pain in the area, the pain is gradually getting worse. She has failed extensive nonsurgical treatment including anti-inflammatories physical therapy use of assistive devices activity modification cortisone injections. Despite this interventions the patient is worsening pain which impacts her quality of life and activities of daily living and they would like to proceed with joint replacement.    Physical Exam    There has been no interval change in this patient's past medical, surgical, medications, allergies, family history or social history since the most recent visit to a provider within our department. 14 point review of systems was performed, reviewed, and negative except for pertinent positives documented in the history of present illness.     Constitutional: well developed, well nourished female in no acute distress  Psychiatric: normal mood, appropriate affect  Eyes: sclera anicteric  HENT: normocephalic/atraumatic  CV: regular rate and rhythm   Respiratory: non labored breathing  Integumentary: no rash  Neurological: moves all extremities    Pre-Admission Testing on 02/09/2024   Component Date Value Ref Range Status    Staph/MRSA Screen Culture 02/09/2024 No Staphylococcus aureus isolated   Final    Ventricular Rate 02/09/2024 83  BPM Final    Atrial Rate 02/09/2024 83  BPM Final    NH Interval 02/09/2024 154  ms Final    QRS Duration 02/09/2024 78  ms Final    QT Interval 02/09/2024 352  ms Final    QTC Calculation(Bazett) 02/09/2024 413  ms Final    P Axis 02/09/2024 59  degrees Final    R Axis 02/09/2024 -15  degrees Final    T Axis 02/09/2024 58  degrees Final    QRS Count 02/09/2024 13  beats Final    Q Onset 02/09/2024 217   ms Final    P Onset 02/09/2024 140  ms Final    P Offset 02/09/2024 192  ms Final    T Offset 02/09/2024 393  ms Final    QTC Fredericia 02/09/2024 392  ms Final    WBC 02/09/2024 5.9  4.4 - 11.3 x10*3/uL Final    nRBC 02/09/2024 0.0  0.0 - 0.0 /100 WBCs Final    RBC 02/09/2024 4.12  4.00 - 5.20 x10*6/uL Final    Hemoglobin 02/09/2024 12.1  12.0 - 16.0 g/dL Final    Hematocrit 02/09/2024 38.2  36.0 - 46.0 % Final    MCV 02/09/2024 93  80 - 100 fL Final    MCH 02/09/2024 29.4  26.0 - 34.0 pg Final    MCHC 02/09/2024 31.7 (L)  32.0 - 36.0 g/dL Final    RDW 02/09/2024 13.4  11.5 - 14.5 % Final    Platelets 02/09/2024 210  150 - 450 x10*3/uL Final    Neutrophils % 02/09/2024 49.1  40.0 - 80.0 % Final    Immature Granulocytes %, Automated 02/09/2024 0.3  0.0 - 0.9 % Final    Immature Granulocyte Count (IG) includes promyelocytes, myelocytes and metamyelocytes but does not include bands. Percent differential counts (%) should be interpreted in the context of the absolute cell counts (cells/UL).    Lymphocytes % 02/09/2024 38.7  13.0 - 44.0 % Final    Monocytes % 02/09/2024 8.3  2.0 - 10.0 % Final    Eosinophils % 02/09/2024 3.1  0.0 - 6.0 % Final    Basophils % 02/09/2024 0.5  0.0 - 2.0 % Final    Neutrophils Absolute 02/09/2024 2.88  1.60 - 5.50 x10*3/uL Final    Percent differential counts (%) should be interpreted in the context of the absolute cell counts (cells/uL).    Immature Granulocytes Absolute, Au* 02/09/2024 0.02  0.00 - 0.50 x10*3/uL Final    Lymphocytes Absolute 02/09/2024 2.27  0.80 - 3.00 x10*3/uL Final    Monocytes Absolute 02/09/2024 0.49  0.05 - 0.80 x10*3/uL Final    Eosinophils Absolute 02/09/2024 0.18  0.00 - 0.40 x10*3/uL Final    Basophils Absolute 02/09/2024 0.03  0.00 - 0.10 x10*3/uL Final    Glucose 02/09/2024 110 (H)  74 - 99 mg/dL Final    Sodium 02/09/2024 144  136 - 145 mmol/L Final    Potassium 02/09/2024 4.5  3.5 - 5.3 mmol/L Final    Chloride 02/09/2024 106  98 - 107 mmol/L Final     Bicarbonate 02/09/2024 30  21 - 32 mmol/L Final    Anion Gap 02/09/2024 13  10 - 20 mmol/L Final    Urea Nitrogen 02/09/2024 22  6 - 23 mg/dL Final    Creatinine 02/09/2024 0.63  0.50 - 1.05 mg/dL Final    eGFR 02/09/2024 >90  >60 mL/min/1.73m*2 Final    Calculations of estimated GFR are performed using the 2021 CKD-EPI Study Refit equation without the race variable for the IDMS-Traceable creatinine methods.  https://jasn.asnjournals.org/content/early/2021/09/22/ASN.8357494952    Calcium 02/09/2024 9.7  8.6 - 10.3 mg/dL Final    Hemoglobin A1C 02/09/2024 6.1 (H)  see below % Final    Estimated Average Glucose 02/09/2024 128  Not Established mg/dL Final    Color, Urine 02/09/2024 Yellow  Straw, Yellow Final    Appearance, Urine 02/09/2024 Hazy (N)  Clear Final    Specific Gravity, Urine 02/09/2024 1.021  1.005 - 1.035 Final    pH, Urine 02/09/2024 5.0  5.0, 5.5, 6.0, 6.5, 7.0, 7.5, 8.0 Final    Protein, Urine 02/09/2024 100 (2+) (N)  NEGATIVE mg/dL Final    Glucose, Urine 02/09/2024 NEGATIVE  NEGATIVE mg/dL Final    Blood, Urine 02/09/2024 MODERATE (2+) (A)  NEGATIVE Final    Ketones, Urine 02/09/2024 NEGATIVE  NEGATIVE mg/dL Final    Bilirubin, Urine 02/09/2024 NEGATIVE  NEGATIVE Final    Urobilinogen, Urine 02/09/2024 <2.0  <2.0 mg/dL Final    Nitrite, Urine 02/09/2024 NEGATIVE  NEGATIVE Final    Leukocyte Esterase, Urine 02/09/2024 MODERATE (2+) (A)  NEGATIVE Final    Extra Tube 02/09/2024 Hold for add-ons.   Final    Auto resulted.    WBC, Urine 02/09/2024 >50 (A)  1-5, NONE /HPF Final    RBC, Urine 02/09/2024 >20 (A)  NONE, 1-2, 3-5 /HPF Final    Squamous Epithelial Cells, Urine 02/09/2024 1-9 (SPARSE)  Reference range not established. /HPF Final    Bacteria, Urine 02/09/2024 1+ (A)  NONE SEEN /HPF Final    Mucus, Urine 02/09/2024 2+  Reference range not established. /LPF Final    Hyaline Casts, Urine 02/09/2024 1+ (A)  NONE /LPF Final    Fine Granular Casts, Urine 02/09/2024 1+ (A)  NONE /LPF Final    Urine  Culture 02/09/2024 No growth   Final         Left knee exam: skin intact no lacerations or abrations.  1+ effusion.  Tender medial joint line. negative log roll negative patellar grind. ROM 0-120. stable to varus and valgus stress at 0 and 30 degrees. negative lachman negative posterior drawer negative danny. 5/5 ehl/fhl/gs/ta. silt s/s/sp/dp/t. 2+ dp/pt        Preoperative labs reviewed, no findings which would preclude surgery    Impression plan: This is a 78 y.o. yo femalewith severe end-stage degenerative disease of the left knee that has failed nonoperative management.  Once again I discussed with the patient in detail the risks benefits and alternatives of total joint replacement. For the full details of that discussion see my previous note. The patient has obtained appropriate medical and dental clearance, and her labs have been reviewed. We will plan to proceed with surgery.    BMI Readings from Last 1 Encounters:   02/09/24 28.83 kg/m²     Lab Results   Component Value Date    CREATININE 0.63 02/09/2024     Tobacco Use: Low Risk  (2/16/2024)    Patient History     Smoking Tobacco Use: Never     Smokeless Tobacco Use: Never     Passive Exposure: Not on file      MELD 3.0: 11 at 4/16/2023  5:06 AM  MELD-Na: 8 at 4/16/2023  5:06 AM  Calculated from:  Serum Creatinine: 0.75 mg/dL (Using min of 1 mg/dL) at 4/16/2023  5:06 AM  Serum Sodium: 134 mmol/L at 4/16/2023  5:06 AM  Total Bilirubin: 0.9 mg/dL (Using min of 1 mg/dL) at 4/15/2023  1:33 PM  Serum Albumin: 3.8 g/dL (Using max of 3.5 g/dL) at 4/16/2023  5:06 AM  INR(ratio): 1.2 at 4/15/2023  1:33 PM  Age at listing (hypothetical): 77 years  Sex: Female at 4/16/2023  5:06 AM       Lab Results   Component Value Date    HGBA1C 6.1 (H) 02/09/2024     Lab Results   Component Value Date    STAPHMRSASCR No Staphylococcus aureus isolated 02/09/2024

## 2024-02-16 NOTE — H&P (VIEW-ONLY)
Chief Complaint   Patient presents with    Left Knee - Follow-up     PRE OP LT TKA         This is a 78 y.o. year old female who presents for preoperative visit for her left total knee.  Patient has severe degenerative disease of the affected joint. The patient complains of severe pain in the area, the pain is gradually getting worse. She has failed extensive nonsurgical treatment including anti-inflammatories physical therapy use of assistive devices activity modification cortisone injections. Despite this interventions the patient is worsening pain which impacts her quality of life and activities of daily living and they would like to proceed with joint replacement.    Physical Exam    There has been no interval change in this patient's past medical, surgical, medications, allergies, family history or social history since the most recent visit to a provider within our department. 14 point review of systems was performed, reviewed, and negative except for pertinent positives documented in the history of present illness.     Constitutional: well developed, well nourished female in no acute distress  Psychiatric: normal mood, appropriate affect  Eyes: sclera anicteric  HENT: normocephalic/atraumatic  CV: regular rate and rhythm   Respiratory: non labored breathing  Integumentary: no rash  Neurological: moves all extremities    Pre-Admission Testing on 02/09/2024   Component Date Value Ref Range Status    Staph/MRSA Screen Culture 02/09/2024 No Staphylococcus aureus isolated   Final    Ventricular Rate 02/09/2024 83  BPM Final    Atrial Rate 02/09/2024 83  BPM Final    VA Interval 02/09/2024 154  ms Final    QRS Duration 02/09/2024 78  ms Final    QT Interval 02/09/2024 352  ms Final    QTC Calculation(Bazett) 02/09/2024 413  ms Final    P Axis 02/09/2024 59  degrees Final    R Axis 02/09/2024 -15  degrees Final    T Axis 02/09/2024 58  degrees Final    QRS Count 02/09/2024 13  beats Final    Q Onset 02/09/2024 217   ms Final    P Onset 02/09/2024 140  ms Final    P Offset 02/09/2024 192  ms Final    T Offset 02/09/2024 393  ms Final    QTC Fredericia 02/09/2024 392  ms Final    WBC 02/09/2024 5.9  4.4 - 11.3 x10*3/uL Final    nRBC 02/09/2024 0.0  0.0 - 0.0 /100 WBCs Final    RBC 02/09/2024 4.12  4.00 - 5.20 x10*6/uL Final    Hemoglobin 02/09/2024 12.1  12.0 - 16.0 g/dL Final    Hematocrit 02/09/2024 38.2  36.0 - 46.0 % Final    MCV 02/09/2024 93  80 - 100 fL Final    MCH 02/09/2024 29.4  26.0 - 34.0 pg Final    MCHC 02/09/2024 31.7 (L)  32.0 - 36.0 g/dL Final    RDW 02/09/2024 13.4  11.5 - 14.5 % Final    Platelets 02/09/2024 210  150 - 450 x10*3/uL Final    Neutrophils % 02/09/2024 49.1  40.0 - 80.0 % Final    Immature Granulocytes %, Automated 02/09/2024 0.3  0.0 - 0.9 % Final    Immature Granulocyte Count (IG) includes promyelocytes, myelocytes and metamyelocytes but does not include bands. Percent differential counts (%) should be interpreted in the context of the absolute cell counts (cells/UL).    Lymphocytes % 02/09/2024 38.7  13.0 - 44.0 % Final    Monocytes % 02/09/2024 8.3  2.0 - 10.0 % Final    Eosinophils % 02/09/2024 3.1  0.0 - 6.0 % Final    Basophils % 02/09/2024 0.5  0.0 - 2.0 % Final    Neutrophils Absolute 02/09/2024 2.88  1.60 - 5.50 x10*3/uL Final    Percent differential counts (%) should be interpreted in the context of the absolute cell counts (cells/uL).    Immature Granulocytes Absolute, Au* 02/09/2024 0.02  0.00 - 0.50 x10*3/uL Final    Lymphocytes Absolute 02/09/2024 2.27  0.80 - 3.00 x10*3/uL Final    Monocytes Absolute 02/09/2024 0.49  0.05 - 0.80 x10*3/uL Final    Eosinophils Absolute 02/09/2024 0.18  0.00 - 0.40 x10*3/uL Final    Basophils Absolute 02/09/2024 0.03  0.00 - 0.10 x10*3/uL Final    Glucose 02/09/2024 110 (H)  74 - 99 mg/dL Final    Sodium 02/09/2024 144  136 - 145 mmol/L Final    Potassium 02/09/2024 4.5  3.5 - 5.3 mmol/L Final    Chloride 02/09/2024 106  98 - 107 mmol/L Final     Bicarbonate 02/09/2024 30  21 - 32 mmol/L Final    Anion Gap 02/09/2024 13  10 - 20 mmol/L Final    Urea Nitrogen 02/09/2024 22  6 - 23 mg/dL Final    Creatinine 02/09/2024 0.63  0.50 - 1.05 mg/dL Final    eGFR 02/09/2024 >90  >60 mL/min/1.73m*2 Final    Calculations of estimated GFR are performed using the 2021 CKD-EPI Study Refit equation without the race variable for the IDMS-Traceable creatinine methods.  https://jasn.asnjournals.org/content/early/2021/09/22/ASN.0277227784    Calcium 02/09/2024 9.7  8.6 - 10.3 mg/dL Final    Hemoglobin A1C 02/09/2024 6.1 (H)  see below % Final    Estimated Average Glucose 02/09/2024 128  Not Established mg/dL Final    Color, Urine 02/09/2024 Yellow  Straw, Yellow Final    Appearance, Urine 02/09/2024 Hazy (N)  Clear Final    Specific Gravity, Urine 02/09/2024 1.021  1.005 - 1.035 Final    pH, Urine 02/09/2024 5.0  5.0, 5.5, 6.0, 6.5, 7.0, 7.5, 8.0 Final    Protein, Urine 02/09/2024 100 (2+) (N)  NEGATIVE mg/dL Final    Glucose, Urine 02/09/2024 NEGATIVE  NEGATIVE mg/dL Final    Blood, Urine 02/09/2024 MODERATE (2+) (A)  NEGATIVE Final    Ketones, Urine 02/09/2024 NEGATIVE  NEGATIVE mg/dL Final    Bilirubin, Urine 02/09/2024 NEGATIVE  NEGATIVE Final    Urobilinogen, Urine 02/09/2024 <2.0  <2.0 mg/dL Final    Nitrite, Urine 02/09/2024 NEGATIVE  NEGATIVE Final    Leukocyte Esterase, Urine 02/09/2024 MODERATE (2+) (A)  NEGATIVE Final    Extra Tube 02/09/2024 Hold for add-ons.   Final    Auto resulted.    WBC, Urine 02/09/2024 >50 (A)  1-5, NONE /HPF Final    RBC, Urine 02/09/2024 >20 (A)  NONE, 1-2, 3-5 /HPF Final    Squamous Epithelial Cells, Urine 02/09/2024 1-9 (SPARSE)  Reference range not established. /HPF Final    Bacteria, Urine 02/09/2024 1+ (A)  NONE SEEN /HPF Final    Mucus, Urine 02/09/2024 2+  Reference range not established. /LPF Final    Hyaline Casts, Urine 02/09/2024 1+ (A)  NONE /LPF Final    Fine Granular Casts, Urine 02/09/2024 1+ (A)  NONE /LPF Final    Urine  Culture 02/09/2024 No growth   Final         Left knee exam: skin intact no lacerations or abrations.  1+ effusion.  Tender medial joint line. negative log roll negative patellar grind. ROM 0-120. stable to varus and valgus stress at 0 and 30 degrees. negative lachman negative posterior drawer negative danny. 5/5 ehl/fhl/gs/ta. silt s/s/sp/dp/t. 2+ dp/pt        Preoperative labs reviewed, no findings which would preclude surgery    Impression plan: This is a 78 y.o. yo femalewith severe end-stage degenerative disease of the left knee that has failed nonoperative management.  Once again I discussed with the patient in detail the risks benefits and alternatives of total joint replacement. For the full details of that discussion see my previous note. The patient has obtained appropriate medical and dental clearance, and her labs have been reviewed. We will plan to proceed with surgery.    BMI Readings from Last 1 Encounters:   02/09/24 28.83 kg/m²     Lab Results   Component Value Date    CREATININE 0.63 02/09/2024     Tobacco Use: Low Risk  (2/16/2024)    Patient History     Smoking Tobacco Use: Never     Smokeless Tobacco Use: Never     Passive Exposure: Not on file      MELD 3.0: 11 at 4/16/2023  5:06 AM  MELD-Na: 8 at 4/16/2023  5:06 AM  Calculated from:  Serum Creatinine: 0.75 mg/dL (Using min of 1 mg/dL) at 4/16/2023  5:06 AM  Serum Sodium: 134 mmol/L at 4/16/2023  5:06 AM  Total Bilirubin: 0.9 mg/dL (Using min of 1 mg/dL) at 4/15/2023  1:33 PM  Serum Albumin: 3.8 g/dL (Using max of 3.5 g/dL) at 4/16/2023  5:06 AM  INR(ratio): 1.2 at 4/15/2023  1:33 PM  Age at listing (hypothetical): 77 years  Sex: Female at 4/16/2023  5:06 AM       Lab Results   Component Value Date    HGBA1C 6.1 (H) 02/09/2024     Lab Results   Component Value Date    STAPHMRSASCR No Staphylococcus aureus isolated 02/09/2024

## 2024-02-21 ENCOUNTER — ANESTHESIA EVENT (OUTPATIENT)
Dept: OPERATING ROOM | Facility: HOSPITAL | Age: 79
End: 2024-02-21
Payer: MEDICARE

## 2024-02-22 ENCOUNTER — OFFICE VISIT (OUTPATIENT)
Dept: CARDIOLOGY | Facility: HOSPITAL | Age: 79
End: 2024-02-22
Payer: MEDICARE

## 2024-02-22 VITALS
DIASTOLIC BLOOD PRESSURE: 73 MMHG | SYSTOLIC BLOOD PRESSURE: 147 MMHG | HEART RATE: 84 BPM | OXYGEN SATURATION: 95 % | WEIGHT: 154.98 LBS | BODY MASS INDEX: 28.35 KG/M2

## 2024-02-22 DIAGNOSIS — Z01.818 PRE-OPERATIVE CLEARANCE: Primary | ICD-10-CM

## 2024-02-22 PROCEDURE — 3078F DIAST BP <80 MM HG: CPT | Performed by: NURSE PRACTITIONER

## 2024-02-22 PROCEDURE — 1160F RVW MEDS BY RX/DR IN RCRD: CPT | Performed by: NURSE PRACTITIONER

## 2024-02-22 PROCEDURE — 99204 OFFICE O/P NEW MOD 45 MIN: CPT | Performed by: NURSE PRACTITIONER

## 2024-02-22 PROCEDURE — 3077F SYST BP >= 140 MM HG: CPT | Performed by: NURSE PRACTITIONER

## 2024-02-22 PROCEDURE — 99214 OFFICE O/P EST MOD 30 MIN: CPT | Performed by: NURSE PRACTITIONER

## 2024-02-22 PROCEDURE — 1126F AMNT PAIN NOTED NONE PRSNT: CPT | Performed by: NURSE PRACTITIONER

## 2024-02-22 PROCEDURE — 1159F MED LIST DOCD IN RCRD: CPT | Performed by: NURSE PRACTITIONER

## 2024-02-22 PROCEDURE — 1036F TOBACCO NON-USER: CPT | Performed by: NURSE PRACTITIONER

## 2024-02-22 RX ORDER — CEFAZOLIN SODIUM 2 G/50ML
2 SOLUTION INTRAVENOUS ONCE
Status: CANCELLED | OUTPATIENT
Start: 2024-02-22 | End: 2024-02-22

## 2024-02-22 RX ORDER — TRANEXAMIC ACID 100 MG/ML
1000 INJECTION, SOLUTION INTRAVENOUS ONCE
Status: CANCELLED | OUTPATIENT
Start: 2024-02-22 | End: 2024-02-22

## 2024-02-22 ASSESSMENT — ENCOUNTER SYMPTOMS: DEPRESSION: 0

## 2024-02-22 NOTE — PROGRESS NOTES
Referred by Dr. Dumont for Pre-op Clearance     History Of Present Illness:    Sherri Najera is a 78 y.o. female from home with h/o type 2 DM, hypertension, hld, IBS, nephrolithiasis, depression, and anxiety presenting today to the [facility] for pre-operative cardiac evaluation prior to upcoming left knee replacement surgery with Dr. Dumont. She denies having symptoms of chest pain or pressure, SOB, or dizziness.  She is able to climb a flight of stairs without concerning anginal symptoms.       Past Medical History:  She has a past medical history of Aneurysm, splenic artery (CMS/HCC), Ankylosing spondylitis (CMS/HCC), Anxiety, Arthritis, Body mass index (BMI) 27.0-27.9, adult (12/07/2021), BPPV (benign paroxysmal positional vertigo), Cervical radiculitis, Depression, Diabetes mellitus (CMS/HCC), Effusion, left knee (02/11/2019), Exocrine pancreatic insufficiency, GERD (gastroesophageal reflux disease), Hearing aid worn, HL (hearing loss), HLD (hyperlipidemia), Hypertension, Irritable bowel syndrome with diarrhea (01/16/2020), Kidney stone, Nephrolithiasis, Other conditions influencing health status (06/08/2020), Other hemorrhoids (06/28/2017), Other nonspecific abnormal finding of lung field (03/26/2019), Pain in left knee (04/30/2019), Pain in unspecified knee (02/11/2019), Personal history of other diseases of the circulatory system (08/12/2017), Personal history of other diseases of the musculoskeletal system and connective tissue (05/21/2015), Personal history of other specified conditions (11/03/2020), Personal history of other specified conditions (05/04/2021), Personal history of other specified conditions (03/07/2014), Personal history of other specified conditions (01/31/2013), Skin cancer, Unspecified injury of muscle(s) and tendon(s) of the rotator cuff of right shoulder, initial encounter (10/11/2016), and Wears glasses.    Past Surgical History:  She has a past  surgical history that includes Hand surgery (Left, 03/07/2014); Other surgical history (Bilateral, 07/11/2022); MR angio head wo IV contrast (04/15/2023); MR angio neck wo IV contrast (04/15/2023); and Carpal tunnel release (Bilateral).      Social History:  She reports that she has never smoked. She has never used smokeless tobacco. She reports that she does not drink alcohol and does not use drugs.    Family History:  Family History   Problem Relation Name Age of Onset    Diabetes Mother      Asthma Mother      Other (CRF) Mother      Heart failure Father          Allergies:  Fenofibrate and Sulfa (sulfonamide antibiotics)    Outpatient Medications:  Current Outpatient Medications   Medication Instructions    ascorbic acid (Vitamin C) 500 mg tablet as directed Orally    atorvastatin (LIPITOR) 80 mg, oral, Daily    chlorhexidine (Peridex) 0.12 % solution 15 mL, Mouth/Throat, Daily, Swish and spit one capful the night before surgery and morning  of surgery    cyanocobalamin (Vitamin B-12) 1,000 mcg tablet 1 tablet, oral, Daily    fluticasone (Flonase Allergy Relief) 50 mcg/actuation nasal spray 1 spray, Each Nostril, 2 times daily    lisinopril 20 mg tablet TAKE 1 TABLET DAILY AS DIRECTED    metFORMIN (GLUCOPHAGE) 500 mg, oral, Daily, TAKE WITH A MEAL     mirtazapine (REMERON) 15 mg, oral, Nightly    MULTIVITAMIN ORAL 1 tablet, oral, Daily    OneTouch Delica Plus Lancet 33 gauge misc 3 times daily    OneTouch Verio test strips strip 3 times daily, BEFORE MEALS AND AT BEDTIME. - USE 1 STRIP TO CHECK BLOOD SUGAR.     PARoxetine (PAXIL) 10 mg, oral, Daily    pioglitazone (ACTOS) 15 mg, oral, Daily    saccharomyces boulardii (FLORASTOR) 250 mg, oral, 2 times daily    zinc gluconate 50 mg tablet 1 tablet, oral, Daily        Last Recorded Vitals:  Vitals:    02/22/24 0906   BP: 147/73   Pulse: 84   SpO2: 95%   Weight: 70.3 kg (154 lb 15.7 oz)       Physical Exam:  Constitutional: Pleasant, Awake/Alert/Oriented to person  place and time. No distress  Head: Atraumatic, Normocephalic  Eyes: EOMI. IONA  Neck: Enlarged neck circumference, No JVD  Cardiovascular: Regular rate and rhythm, S1, S2. No extra heart sounds or murmurs  Respiratory: Clear to auscultation bilaterally. No wheezing, rales or rhonchi. Good chest wall expansion  Abdomen: Soft, Nontender, Obese. Bowel sounds appreciated  Musculoskeletal: ROM intact. Muscle strength grossly intact upper and lower extremities 5/5.   Neurological: CNII-XII intact. Sensation grossly intact  Extremities: Warm and dry. No acute rashes and lesions  Psychiatric: Appropriate mood and affect         Last Labs:  CBC -  Lab Results   Component Value Date    WBC 5.9 02/09/2024    HGB 12.1 02/09/2024    HCT 38.2 02/09/2024    MCV 93 02/09/2024     02/09/2024       CMP -  Lab Results   Component Value Date    CALCIUM 9.7 02/09/2024    PHOS 3.3 04/16/2023    PROT 6.9 09/12/2023    ALBUMIN 4.5 09/12/2023    AST 24 09/12/2023    ALT 44 09/12/2023    ALKPHOS 84 09/12/2023    BILITOT 0.5 09/12/2023       LIPID PANEL -   Lab Results   Component Value Date    CHOL 191 09/12/2023    TRIG 394 (H) 09/12/2023    HDL 37.9 (A) 09/12/2023    CHHDL 5.0 09/12/2023    LDLF 74 09/12/2023    VLDL 79 (H) 09/12/2023    NHDL 153 09/12/2023       RENAL FUNCTION PANEL -   Lab Results   Component Value Date    GLUCOSE 110 (H) 02/09/2024     02/09/2024    K 4.5 02/09/2024     02/09/2024    CO2 30 02/09/2024    ANIONGAP 13 02/09/2024    BUN 22 02/09/2024    CREATININE 0.63 02/09/2024    CALCIUM 9.7 02/09/2024    PHOS 3.3 04/16/2023    ALBUMIN 4.5 09/12/2023        Lab Results   Component Value Date    BNP 57 04/16/2023    HGBA1C 6.1 (H) 02/09/2024    HGBA1C 6.7 (A) 10/06/2023       Last Cardiology Tests:  ECG:  ECG 12 Lead 02/09/2024    NSR, HR 83bpm    Cardiac Imaging:  3/26/2019 CCS:  IMPRESSION:  Coronary artery calcium score of 0.     Partially visualized ground-glass opacity in the right lung  as  described.    Lab review: I have personally reviewed the laboratory result(s)   Diagnostic review: I have personally reviewed the result(s) of the CCS and ECG     Assessment/Plan   Very pleasant 78 y.o. female from home with h/o type 2 DM, hypertension, hld, IBS, nephrolithiasis, depression, and anxiety presenting today to the [facility] for pre-operative cardiac evaluation prior to upcoming left knee replacement surgery with Dr. Dumont.    Plan:  -May proceed to the OR with mild risk for perioperative cardiovascular event.   -Follow up with PCP and with cardiology as needed in the future.       ANJANA Murcia-CNP

## 2024-02-23 ENCOUNTER — TELEPHONE (OUTPATIENT)
Dept: INPATIENT UNIT | Facility: HOSPITAL | Age: 79
End: 2024-02-23
Payer: MEDICARE

## 2024-02-26 ENCOUNTER — APPOINTMENT (OUTPATIENT)
Dept: RADIOLOGY | Facility: HOSPITAL | Age: 79
End: 2024-02-26
Payer: MEDICARE

## 2024-02-26 ENCOUNTER — HOME HEALTH ADMISSION (OUTPATIENT)
Dept: HOME HEALTH SERVICES | Facility: HOME HEALTH | Age: 79
End: 2024-02-26
Payer: MEDICARE

## 2024-02-26 ENCOUNTER — HOSPITAL ENCOUNTER (OUTPATIENT)
Facility: HOSPITAL | Age: 79
Discharge: HOME HEALTH | End: 2024-02-27
Attending: ORTHOPAEDIC SURGERY | Admitting: ORTHOPAEDIC SURGERY
Payer: MEDICARE

## 2024-02-26 ENCOUNTER — ANESTHESIA (OUTPATIENT)
Dept: OPERATING ROOM | Facility: HOSPITAL | Age: 79
End: 2024-02-26
Payer: MEDICARE

## 2024-02-26 DIAGNOSIS — M17.12 OSTEOARTHRITIS OF LEFT KNEE, UNSPECIFIED OSTEOARTHRITIS TYPE: Primary | ICD-10-CM

## 2024-02-26 DIAGNOSIS — M17.12 ARTHRITIS OF LEFT KNEE: ICD-10-CM

## 2024-02-26 LAB
GLUCOSE BLD MANUAL STRIP-MCNC: 185 MG/DL (ref 74–99)
GLUCOSE BLD MANUAL STRIP-MCNC: 193 MG/DL (ref 74–99)
GLUCOSE BLD MANUAL STRIP-MCNC: 98 MG/DL (ref 74–99)

## 2024-02-26 PROCEDURE — 2720000007 HC OR 272 NO HCPCS: Performed by: ORTHOPAEDIC SURGERY

## 2024-02-26 PROCEDURE — 96365 THER/PROPH/DIAG IV INF INIT: CPT | Mod: 59

## 2024-02-26 PROCEDURE — 7100000002 HC RECOVERY ROOM TIME - EACH INCREMENTAL 1 MINUTE: Performed by: ORTHOPAEDIC SURGERY

## 2024-02-26 PROCEDURE — 2500000004 HC RX 250 GENERAL PHARMACY W/ HCPCS (ALT 636 FOR OP/ED): Performed by: ORTHOPAEDIC SURGERY

## 2024-02-26 PROCEDURE — 73560 X-RAY EXAM OF KNEE 1 OR 2: CPT | Mod: LEFT SIDE | Performed by: RADIOLOGY

## 2024-02-26 PROCEDURE — 2500000005 HC RX 250 GENERAL PHARMACY W/O HCPCS: Performed by: NURSE ANESTHETIST, CERTIFIED REGISTERED

## 2024-02-26 PROCEDURE — 27447 TOTAL KNEE ARTHROPLASTY: CPT | Performed by: ORTHOPAEDIC SURGERY

## 2024-02-26 PROCEDURE — A27447 PR TOTAL KNEE ARTHROPLASTY: Performed by: NURSE ANESTHETIST, CERTIFIED REGISTERED

## 2024-02-26 PROCEDURE — A4217 STERILE WATER/SALINE, 500 ML: HCPCS | Performed by: ORTHOPAEDIC SURGERY

## 2024-02-26 PROCEDURE — 88311 DECALCIFY TISSUE: CPT | Performed by: PATHOLOGY

## 2024-02-26 PROCEDURE — 7100000011 HC EXTENDED STAY RECOVERY HOURLY - NURSING UNIT

## 2024-02-26 PROCEDURE — A4216 STERILE WATER/SALINE, 10 ML: HCPCS | Performed by: ORTHOPAEDIC SURGERY

## 2024-02-26 PROCEDURE — 3600000018 HC OR TIME - INITIAL BASE CHARGE - PROCEDURE LEVEL SIX: Performed by: ORTHOPAEDIC SURGERY

## 2024-02-26 PROCEDURE — 2500000004 HC RX 250 GENERAL PHARMACY W/ HCPCS (ALT 636 FOR OP/ED): Performed by: NURSE ANESTHETIST, CERTIFIED REGISTERED

## 2024-02-26 PROCEDURE — C1776 JOINT DEVICE (IMPLANTABLE): HCPCS | Performed by: ORTHOPAEDIC SURGERY

## 2024-02-26 PROCEDURE — 2500000004 HC RX 250 GENERAL PHARMACY W/ HCPCS (ALT 636 FOR OP/ED): Performed by: ANESTHESIOLOGY

## 2024-02-26 PROCEDURE — 82947 ASSAY GLUCOSE BLOOD QUANT: CPT

## 2024-02-26 PROCEDURE — 2500000001 HC RX 250 WO HCPCS SELF ADMINISTERED DRUGS (ALT 637 FOR MEDICARE OP): Performed by: NURSE ANESTHETIST, CERTIFIED REGISTERED

## 2024-02-26 PROCEDURE — 3700000002 HC GENERAL ANESTHESIA TIME - EACH INCREMENTAL 1 MINUTE: Performed by: ORTHOPAEDIC SURGERY

## 2024-02-26 PROCEDURE — 3700000001 HC GENERAL ANESTHESIA TIME - INITIAL BASE CHARGE: Performed by: ORTHOPAEDIC SURGERY

## 2024-02-26 PROCEDURE — 88305 TISSUE EXAM BY PATHOLOGIST: CPT | Performed by: PATHOLOGY

## 2024-02-26 PROCEDURE — 88305 TISSUE EXAM BY PATHOLOGIST: CPT | Mod: TC,GEALAB | Performed by: ORTHOPAEDIC SURGERY

## 2024-02-26 PROCEDURE — 7100000001 HC RECOVERY ROOM TIME - INITIAL BASE CHARGE: Performed by: ORTHOPAEDIC SURGERY

## 2024-02-26 PROCEDURE — 73560 X-RAY EXAM OF KNEE 1 OR 2: CPT | Mod: LT

## 2024-02-26 PROCEDURE — 2500000001 HC RX 250 WO HCPCS SELF ADMINISTERED DRUGS (ALT 637 FOR MEDICARE OP): Performed by: NURSE PRACTITIONER

## 2024-02-26 PROCEDURE — C1713 ANCHOR/SCREW BN/BN,TIS/BN: HCPCS | Performed by: ORTHOPAEDIC SURGERY

## 2024-02-26 PROCEDURE — 2500000005 HC RX 250 GENERAL PHARMACY W/O HCPCS: Performed by: NURSE PRACTITIONER

## 2024-02-26 PROCEDURE — 64447 NJX AA&/STRD FEMORAL NRV IMG: CPT | Performed by: NURSE ANESTHETIST, CERTIFIED REGISTERED

## 2024-02-26 PROCEDURE — 99222 1ST HOSP IP/OBS MODERATE 55: CPT | Performed by: INTERNAL MEDICINE

## 2024-02-26 PROCEDURE — 2780000003 HC OR 278 NO HCPCS: Performed by: ORTHOPAEDIC SURGERY

## 2024-02-26 PROCEDURE — 2500000004 HC RX 250 GENERAL PHARMACY W/ HCPCS (ALT 636 FOR OP/ED): Performed by: NURSE PRACTITIONER

## 2024-02-26 PROCEDURE — 3600000017 HC OR TIME - EACH INCREMENTAL 1 MINUTE - PROCEDURE LEVEL SIX: Performed by: ORTHOPAEDIC SURGERY

## 2024-02-26 DEVICE — ATTUNE KNEE SYSTEM FEMORAL POSTERIOR STABILIZED SIZE 5 LEFT CEMENTED
Type: IMPLANTABLE DEVICE | Site: KNEE | Status: FUNCTIONAL
Brand: ATTUNE

## 2024-02-26 DEVICE — CEMENT, BONE, SIMPLEX P, RADIOPAQUE, FULL DOSE, 40 GM: Type: IMPLANTABLE DEVICE | Site: KNEE | Status: FUNCTIONAL

## 2024-02-26 DEVICE — ATTUNE KNEE SYSTEM TIBIAL BASE ROTATING PLATFORM SIZE 4 CEMENTED
Type: IMPLANTABLE DEVICE | Site: KNEE | Status: FUNCTIONAL
Brand: ATTUNE

## 2024-02-26 DEVICE — ATTUNE KNEE SYSTEM TIBIAL INSERT ROTATING PLATFORM POSTERIOR STABILIZED 5 6MM AOX
Type: IMPLANTABLE DEVICE | Site: KNEE | Status: FUNCTIONAL
Brand: ATTUNE

## 2024-02-26 RX ORDER — NAPROXEN 500 MG/1
500 TABLET ORAL
Qty: 60 TABLET | Refills: 0 | Status: SHIPPED | OUTPATIENT
Start: 2024-02-26 | End: 2024-03-28

## 2024-02-26 RX ORDER — DOCUSATE SODIUM 100 MG/1
100 CAPSULE, LIQUID FILLED ORAL 2 TIMES DAILY
Qty: 20 CAPSULE | Refills: 0 | Status: SHIPPED | OUTPATIENT
Start: 2024-02-26 | End: 2024-03-08

## 2024-02-26 RX ORDER — MORPHINE SULFATE 10 MG/ML
INJECTION, SOLUTION INTRAMUSCULAR; INTRAVENOUS AS NEEDED
Status: DISCONTINUED | OUTPATIENT
Start: 2024-02-26 | End: 2024-02-26 | Stop reason: HOSPADM

## 2024-02-26 RX ORDER — CEFAZOLIN SODIUM 2 G/100ML
2 INJECTION, SOLUTION INTRAVENOUS EVERY 8 HOURS
Status: DISPENSED | OUTPATIENT
Start: 2024-02-26 | End: 2024-02-27

## 2024-02-26 RX ORDER — ONDANSETRON HYDROCHLORIDE 2 MG/ML
4 INJECTION, SOLUTION INTRAVENOUS ONCE AS NEEDED
Status: DISCONTINUED | OUTPATIENT
Start: 2024-02-26 | End: 2024-02-26 | Stop reason: HOSPADM

## 2024-02-26 RX ORDER — SODIUM CHLORIDE, SODIUM LACTATE, POTASSIUM CHLORIDE, CALCIUM CHLORIDE 600; 310; 30; 20 MG/100ML; MG/100ML; MG/100ML; MG/100ML
100 INJECTION, SOLUTION INTRAVENOUS CONTINUOUS
Status: DISCONTINUED | OUTPATIENT
Start: 2024-02-26 | End: 2024-02-27 | Stop reason: HOSPADM

## 2024-02-26 RX ORDER — OXYCODONE HYDROCHLORIDE 5 MG/1
5 TABLET ORAL EVERY 4 HOURS PRN
Status: DISCONTINUED | OUTPATIENT
Start: 2024-02-26 | End: 2024-02-26 | Stop reason: HOSPADM

## 2024-02-26 RX ORDER — OXYCODONE HYDROCHLORIDE 5 MG/1
5 TABLET ORAL EVERY 4 HOURS PRN
Status: DISCONTINUED | OUTPATIENT
Start: 2024-02-26 | End: 2024-02-27 | Stop reason: HOSPADM

## 2024-02-26 RX ORDER — ASPIRIN 81 MG/1
81 TABLET ORAL 2 TIMES DAILY
Status: DISCONTINUED | OUTPATIENT
Start: 2024-02-27 | End: 2024-02-27 | Stop reason: HOSPADM

## 2024-02-26 RX ORDER — ATORVASTATIN CALCIUM 80 MG/1
80 TABLET, FILM COATED ORAL DAILY
Status: DISCONTINUED | OUTPATIENT
Start: 2024-02-27 | End: 2024-02-27 | Stop reason: HOSPADM

## 2024-02-26 RX ORDER — NAPROXEN SODIUM 220 MG/1
81 TABLET, FILM COATED ORAL 2 TIMES DAILY
Qty: 56 TABLET | Refills: 0 | Status: SHIPPED | OUTPATIENT
Start: 2024-02-26 | End: 2024-03-26

## 2024-02-26 RX ORDER — DOCUSATE SODIUM 100 MG/1
100 CAPSULE, LIQUID FILLED ORAL 2 TIMES DAILY
Status: DISCONTINUED | OUTPATIENT
Start: 2024-02-26 | End: 2024-02-27 | Stop reason: HOSPADM

## 2024-02-26 RX ORDER — FLUTICASONE PROPIONATE 50 MCG
1 SPRAY, SUSPENSION (ML) NASAL 2 TIMES DAILY
Status: DISCONTINUED | OUTPATIENT
Start: 2024-02-26 | End: 2024-02-27 | Stop reason: HOSPADM

## 2024-02-26 RX ORDER — PROPOFOL 10 MG/ML
INJECTION, EMULSION INTRAVENOUS AS NEEDED
Status: DISCONTINUED | OUTPATIENT
Start: 2024-02-26 | End: 2024-02-27

## 2024-02-26 RX ORDER — ONDANSETRON 4 MG/1
4 TABLET, FILM COATED ORAL EVERY 8 HOURS PRN
Status: DISCONTINUED | OUTPATIENT
Start: 2024-02-26 | End: 2024-02-27 | Stop reason: HOSPADM

## 2024-02-26 RX ORDER — LISINOPRIL 20 MG/1
20 TABLET ORAL DAILY
Status: DISCONTINUED | OUTPATIENT
Start: 2024-02-27 | End: 2024-02-27 | Stop reason: HOSPADM

## 2024-02-26 RX ORDER — DEXAMETHASONE SODIUM PHOSPHATE 4 MG/ML
INJECTION, SOLUTION INTRA-ARTICULAR; INTRALESIONAL; INTRAMUSCULAR; INTRAVENOUS; SOFT TISSUE AS NEEDED
Status: DISCONTINUED | OUTPATIENT
Start: 2024-02-26 | End: 2024-02-27

## 2024-02-26 RX ORDER — METFORMIN HYDROCHLORIDE 500 MG/1
500 TABLET ORAL DAILY
Status: DISCONTINUED | OUTPATIENT
Start: 2024-02-27 | End: 2024-02-27 | Stop reason: HOSPADM

## 2024-02-26 RX ORDER — FENTANYL CITRATE 50 UG/ML
INJECTION, SOLUTION INTRAMUSCULAR; INTRAVENOUS AS NEEDED
Status: DISCONTINUED | OUTPATIENT
Start: 2024-02-26 | End: 2024-02-27

## 2024-02-26 RX ORDER — OXYCODONE HYDROCHLORIDE 10 MG/1
10 TABLET ORAL EVERY 4 HOURS PRN
Status: DISCONTINUED | OUTPATIENT
Start: 2024-02-26 | End: 2024-02-27 | Stop reason: HOSPADM

## 2024-02-26 RX ORDER — PIOGLITAZONEHYDROCHLORIDE 30 MG/1
15 TABLET ORAL DAILY
Status: DISCONTINUED | OUTPATIENT
Start: 2024-02-27 | End: 2024-02-27 | Stop reason: HOSPADM

## 2024-02-26 RX ORDER — MIDAZOLAM HYDROCHLORIDE 1 MG/ML
INJECTION INTRAMUSCULAR; INTRAVENOUS AS NEEDED
Status: DISCONTINUED | OUTPATIENT
Start: 2024-02-26 | End: 2024-02-27

## 2024-02-26 RX ORDER — POLYETHYLENE GLYCOL 3350 17 G/17G
17 POWDER, FOR SOLUTION ORAL DAILY
Status: DISCONTINUED | OUTPATIENT
Start: 2024-02-26 | End: 2024-02-27 | Stop reason: HOSPADM

## 2024-02-26 RX ORDER — ONDANSETRON HYDROCHLORIDE 2 MG/ML
4 INJECTION, SOLUTION INTRAVENOUS EVERY 8 HOURS PRN
Status: DISCONTINUED | OUTPATIENT
Start: 2024-02-26 | End: 2024-02-27 | Stop reason: HOSPADM

## 2024-02-26 RX ORDER — PROPOFOL 10 MG/ML
INJECTION, EMULSION INTRAVENOUS CONTINUOUS PRN
Status: DISCONTINUED | OUTPATIENT
Start: 2024-02-26 | End: 2024-02-27

## 2024-02-26 RX ORDER — CELECOXIB 400 MG/1
400 CAPSULE ORAL ONCE
Status: COMPLETED | OUTPATIENT
Start: 2024-02-26 | End: 2024-02-26

## 2024-02-26 RX ORDER — MIRTAZAPINE 15 MG/1
15 TABLET, FILM COATED ORAL NIGHTLY PRN
Status: DISCONTINUED | OUTPATIENT
Start: 2024-02-26 | End: 2024-02-27 | Stop reason: HOSPADM

## 2024-02-26 RX ORDER — ACETAMINOPHEN 325 MG/1
975 TABLET ORAL ONCE
Status: COMPLETED | OUTPATIENT
Start: 2024-02-26 | End: 2024-02-26

## 2024-02-26 RX ORDER — TRANEXAMIC ACID 10 MG/ML
INJECTION, SOLUTION INTRAVENOUS AS NEEDED
Status: DISCONTINUED | OUTPATIENT
Start: 2024-02-26 | End: 2024-02-27

## 2024-02-26 RX ORDER — NALOXONE HYDROCHLORIDE 0.4 MG/ML
0.2 INJECTION, SOLUTION INTRAMUSCULAR; INTRAVENOUS; SUBCUTANEOUS EVERY 5 MIN PRN
Status: DISCONTINUED | OUTPATIENT
Start: 2024-02-26 | End: 2024-02-27 | Stop reason: HOSPADM

## 2024-02-26 RX ORDER — ESMOLOL HYDROCHLORIDE 10 MG/ML
INJECTION INTRAVENOUS AS NEEDED
Status: DISCONTINUED | OUTPATIENT
Start: 2024-02-26 | End: 2024-02-27

## 2024-02-26 RX ORDER — CEFADROXIL 500 MG/1
500 CAPSULE ORAL 2 TIMES DAILY
Qty: 14 CAPSULE | Refills: 0 | Status: SHIPPED | OUTPATIENT
Start: 2024-02-26 | End: 2024-03-05

## 2024-02-26 RX ORDER — ROPIVACAINE HYDROCHLORIDE 5 MG/ML
INJECTION, SOLUTION EPIDURAL; INFILTRATION; PERINEURAL AS NEEDED
Status: DISCONTINUED | OUTPATIENT
Start: 2024-02-26 | End: 2024-02-26 | Stop reason: HOSPADM

## 2024-02-26 RX ORDER — SODIUM CHLORIDE 9 MG/ML
INJECTION, SOLUTION INTRAMUSCULAR; INTRAVENOUS; SUBCUTANEOUS AS NEEDED
Status: DISCONTINUED | OUTPATIENT
Start: 2024-02-26 | End: 2024-02-26 | Stop reason: HOSPADM

## 2024-02-26 RX ORDER — ROCURONIUM BROMIDE 10 MG/ML
INJECTION, SOLUTION INTRAVENOUS AS NEEDED
Status: DISCONTINUED | OUTPATIENT
Start: 2024-02-26 | End: 2024-02-27

## 2024-02-26 RX ORDER — PAROXETINE HYDROCHLORIDE 20 MG/1
10 TABLET, FILM COATED ORAL DAILY
Status: DISCONTINUED | OUTPATIENT
Start: 2024-02-27 | End: 2024-02-27 | Stop reason: HOSPADM

## 2024-02-26 RX ORDER — OXYCODONE AND ACETAMINOPHEN 5; 325 MG/1; MG/1
1 TABLET ORAL EVERY 4 HOURS PRN
Qty: 36 TABLET | Refills: 0 | Status: SHIPPED | OUTPATIENT
Start: 2024-02-26 | End: 2024-03-04

## 2024-02-26 RX ORDER — KETOROLAC TROMETHAMINE 30 MG/ML
INJECTION, SOLUTION INTRAMUSCULAR; INTRAVENOUS AS NEEDED
Status: DISCONTINUED | OUTPATIENT
Start: 2024-02-26 | End: 2024-02-26 | Stop reason: HOSPADM

## 2024-02-26 RX ORDER — SODIUM CHLORIDE, SODIUM LACTATE, POTASSIUM CHLORIDE, CALCIUM CHLORIDE 600; 310; 30; 20 MG/100ML; MG/100ML; MG/100ML; MG/100ML
100 INJECTION, SOLUTION INTRAVENOUS CONTINUOUS
Status: DISCONTINUED | OUTPATIENT
Start: 2024-02-26 | End: 2024-02-26 | Stop reason: HOSPADM

## 2024-02-26 RX ORDER — HYDROMORPHONE HYDROCHLORIDE 2 MG/ML
INJECTION, SOLUTION INTRAMUSCULAR; INTRAVENOUS; SUBCUTANEOUS AS NEEDED
Status: DISCONTINUED | OUTPATIENT
Start: 2024-02-26 | End: 2024-02-27

## 2024-02-26 RX ORDER — EPINEPHRINE 1 MG/ML
INJECTION INTRAMUSCULAR; INTRAVENOUS; SUBCUTANEOUS AS NEEDED
Status: DISCONTINUED | OUTPATIENT
Start: 2024-02-26 | End: 2024-02-26 | Stop reason: HOSPADM

## 2024-02-26 RX ORDER — ACETAMINOPHEN 325 MG/1
650 TABLET ORAL EVERY 6 HOURS SCHEDULED
Status: DISCONTINUED | OUTPATIENT
Start: 2024-02-26 | End: 2024-02-27 | Stop reason: HOSPADM

## 2024-02-26 RX ORDER — SODIUM CHLORIDE 0.9 G/100ML
IRRIGANT IRRIGATION AS NEEDED
Status: DISCONTINUED | OUTPATIENT
Start: 2024-02-26 | End: 2024-02-26 | Stop reason: HOSPADM

## 2024-02-26 RX ORDER — ONDANSETRON HYDROCHLORIDE 2 MG/ML
INJECTION, SOLUTION INTRAVENOUS AS NEEDED
Status: DISCONTINUED | OUTPATIENT
Start: 2024-02-26 | End: 2024-02-27

## 2024-02-26 RX ORDER — LIDOCAINE HYDROCHLORIDE 40 MG/ML
SOLUTION TOPICAL AS NEEDED
Status: DISCONTINUED | OUTPATIENT
Start: 2024-02-26 | End: 2024-02-27

## 2024-02-26 RX ORDER — LABETALOL HYDROCHLORIDE 5 MG/ML
INJECTION, SOLUTION INTRAVENOUS AS NEEDED
Status: DISCONTINUED | OUTPATIENT
Start: 2024-02-26 | End: 2024-02-27

## 2024-02-26 RX ORDER — LIDOCAINE HYDROCHLORIDE 10 MG/ML
INJECTION, SOLUTION EPIDURAL; INFILTRATION; INTRACAUDAL; PERINEURAL AS NEEDED
Status: DISCONTINUED | OUTPATIENT
Start: 2024-02-26 | End: 2024-02-27

## 2024-02-26 RX ORDER — DROPERIDOL 2.5 MG/ML
0.62 INJECTION, SOLUTION INTRAMUSCULAR; INTRAVENOUS ONCE AS NEEDED
Status: DISCONTINUED | OUTPATIENT
Start: 2024-02-26 | End: 2024-02-26 | Stop reason: HOSPADM

## 2024-02-26 RX ORDER — CEFAZOLIN 1 G/1
INJECTION, POWDER, FOR SOLUTION INTRAVENOUS AS NEEDED
Status: DISCONTINUED | OUTPATIENT
Start: 2024-02-26 | End: 2024-02-27

## 2024-02-26 RX ADMIN — LABETALOL HYDROCHLORIDE 5 MG: 5 INJECTION, SOLUTION INTRAVENOUS at 16:00

## 2024-02-26 RX ADMIN — HYDROMORPHONE HYDROCHLORIDE 0.4 MG: 2 INJECTION, SOLUTION INTRAMUSCULAR; INTRAVENOUS; SUBCUTANEOUS at 15:21

## 2024-02-26 RX ADMIN — ONDANSETRON 4 MG: 2 INJECTION INTRAMUSCULAR; INTRAVENOUS at 16:13

## 2024-02-26 RX ADMIN — ROCURONIUM BROMIDE 20 MG: 10 INJECTION, SOLUTION INTRAVENOUS at 14:58

## 2024-02-26 RX ADMIN — DEXAMETHASONE SODIUM PHOSPHATE 4 MG: 4 INJECTION INTRA-ARTICULAR; INTRALESIONAL; INTRAMUSCULAR; INTRAVENOUS; SOFT TISSUE at 14:49

## 2024-02-26 RX ADMIN — SODIUM CHLORIDE, POTASSIUM CHLORIDE, SODIUM LACTATE AND CALCIUM CHLORIDE 100 ML/HR: 600; 310; 30; 20 INJECTION, SOLUTION INTRAVENOUS at 12:14

## 2024-02-26 RX ADMIN — SODIUM CHLORIDE, POTASSIUM CHLORIDE, SODIUM LACTATE AND CALCIUM CHLORIDE: 600; 310; 30; 20 INJECTION, SOLUTION INTRAVENOUS at 15:53

## 2024-02-26 RX ADMIN — MIRTAZAPINE 15 MG: 15 TABLET, FILM COATED ORAL at 21:47

## 2024-02-26 RX ADMIN — ROCURONIUM BROMIDE 50 MG: 10 INJECTION, SOLUTION INTRAVENOUS at 14:35

## 2024-02-26 RX ADMIN — ESMOLOL HYDROCHLORIDE 50 MG: 100 INJECTION, SOLUTION INTRAVENOUS at 15:02

## 2024-02-26 RX ADMIN — PROPOFOL 25 MCG/KG/MIN: 10 INJECTION, EMULSION INTRAVENOUS at 14:50

## 2024-02-26 RX ADMIN — CEFAZOLIN 2 G: 1 INJECTION, POWDER, FOR SOLUTION INTRAMUSCULAR; INTRAVENOUS at 14:23

## 2024-02-26 RX ADMIN — TRANEXAMIC ACID 1000 MG: 10 INJECTION, SOLUTION INTRAVENOUS at 15:53

## 2024-02-26 RX ADMIN — LABETALOL HYDROCHLORIDE 10 MG: 5 INJECTION, SOLUTION INTRAVENOUS at 16:37

## 2024-02-26 RX ADMIN — POVIDONE-IODINE 1 APPLICATION: 5 SOLUTION TOPICAL at 12:19

## 2024-02-26 RX ADMIN — FENTANYL CITRATE 50 MCG: 50 INJECTION, SOLUTION INTRAMUSCULAR; INTRAVENOUS at 14:35

## 2024-02-26 RX ADMIN — LIDOCAINE HYDROCHLORIDE 4 ML: 40 SOLUTION TOPICAL at 14:43

## 2024-02-26 RX ADMIN — OXYCODONE HYDROCHLORIDE 10 MG: 10 TABLET ORAL at 20:28

## 2024-02-26 RX ADMIN — HYDROMORPHONE HYDROCHLORIDE 0.25 MG: 1 INJECTION, SOLUTION INTRAMUSCULAR; INTRAVENOUS; SUBCUTANEOUS at 17:20

## 2024-02-26 RX ADMIN — DOCUSATE SODIUM 100 MG: 100 CAPSULE, LIQUID FILLED ORAL at 21:41

## 2024-02-26 RX ADMIN — PROPOFOL 150 MG: 10 INJECTION, EMULSION INTRAVENOUS at 14:35

## 2024-02-26 RX ADMIN — LABETALOL HYDROCHLORIDE 5 MG: 5 INJECTION, SOLUTION INTRAVENOUS at 16:07

## 2024-02-26 RX ADMIN — SODIUM CHLORIDE, POTASSIUM CHLORIDE, SODIUM LACTATE AND CALCIUM CHLORIDE 100 ML/HR: 600; 310; 30; 20 INJECTION, SOLUTION INTRAVENOUS at 20:00

## 2024-02-26 RX ADMIN — HYDROMORPHONE HYDROCHLORIDE 0.25 MG: 1 INJECTION, SOLUTION INTRAMUSCULAR; INTRAVENOUS; SUBCUTANEOUS at 17:47

## 2024-02-26 RX ADMIN — FENTANYL CITRATE 50 MCG: 50 INJECTION, SOLUTION INTRAMUSCULAR; INTRAVENOUS at 14:13

## 2024-02-26 RX ADMIN — TRANEXAMIC ACID 1000 MG: 10 INJECTION, SOLUTION INTRAVENOUS at 14:46

## 2024-02-26 RX ADMIN — LIDOCAINE HYDROCHLORIDE 50 MG: 10 INJECTION, SOLUTION EPIDURAL; INFILTRATION; INTRACAUDAL; PERINEURAL at 14:35

## 2024-02-26 RX ADMIN — ACETAMINOPHEN 975 MG: 325 TABLET ORAL at 12:16

## 2024-02-26 RX ADMIN — MIDAZOLAM HYDROCHLORIDE 1 MG: 1 INJECTION, SOLUTION INTRAMUSCULAR; INTRAVENOUS at 14:10

## 2024-02-26 RX ADMIN — CELECOXIB 400 MG: 400 CAPSULE ORAL at 12:16

## 2024-02-26 RX ADMIN — SUGAMMADEX 200 MG: 100 INJECTION, SOLUTION INTRAVENOUS at 16:23

## 2024-02-26 RX ADMIN — ACETAMINOPHEN 650 MG: 325 TABLET ORAL at 21:45

## 2024-02-26 RX ADMIN — HYDROMORPHONE HYDROCHLORIDE 0.5 MG: 1 INJECTION, SOLUTION INTRAMUSCULAR; INTRAVENOUS; SUBCUTANEOUS at 18:09

## 2024-02-26 RX ADMIN — HYDROMORPHONE HYDROCHLORIDE 0.4 MG: 2 INJECTION, SOLUTION INTRAMUSCULAR; INTRAVENOUS; SUBCUTANEOUS at 15:02

## 2024-02-26 RX ADMIN — CEFAZOLIN SODIUM 2 G: 2 INJECTION, SOLUTION INTRAVENOUS at 21:40

## 2024-02-26 SDOH — HEALTH STABILITY: MENTAL HEALTH: CURRENT SMOKER: 0

## 2024-02-26 SDOH — SOCIAL STABILITY: SOCIAL INSECURITY: ARE THERE ANY APPARENT SIGNS OF INJURIES/BEHAVIORS THAT COULD BE RELATED TO ABUSE/NEGLECT?: NO

## 2024-02-26 SDOH — SOCIAL STABILITY: SOCIAL INSECURITY: WERE YOU ABLE TO COMPLETE ALL THE BEHAVIORAL HEALTH SCREENINGS?: YES

## 2024-02-26 SDOH — SOCIAL STABILITY: SOCIAL INSECURITY: DO YOU FEEL ANYONE HAS EXPLOITED OR TAKEN ADVANTAGE OF YOU FINANCIALLY OR OF YOUR PERSONAL PROPERTY?: NO

## 2024-02-26 SDOH — SOCIAL STABILITY: SOCIAL INSECURITY: DO YOU FEEL UNSAFE GOING BACK TO THE PLACE WHERE YOU ARE LIVING?: NO

## 2024-02-26 SDOH — SOCIAL STABILITY: SOCIAL INSECURITY: HAVE YOU HAD THOUGHTS OF HARMING ANYONE ELSE?: NO

## 2024-02-26 SDOH — SOCIAL STABILITY: SOCIAL INSECURITY: DOES ANYONE TRY TO KEEP YOU FROM HAVING/CONTACTING OTHER FRIENDS OR DOING THINGS OUTSIDE YOUR HOME?: NO

## 2024-02-26 SDOH — SOCIAL STABILITY: SOCIAL INSECURITY: ABUSE: ADULT

## 2024-02-26 SDOH — SOCIAL STABILITY: SOCIAL INSECURITY: ARE YOU OR HAVE YOU BEEN THREATENED OR ABUSED PHYSICALLY, EMOTIONALLY, OR SEXUALLY BY ANYONE?: NO

## 2024-02-26 SDOH — SOCIAL STABILITY: SOCIAL INSECURITY: HAS ANYONE EVER THREATENED TO HURT YOUR FAMILY OR YOUR PETS?: NO

## 2024-02-26 ASSESSMENT — ACTIVITIES OF DAILY LIVING (ADL)
BATHING: INDEPENDENT
PATIENT'S MEMORY ADEQUATE TO SAFELY COMPLETE DAILY ACTIVITIES?: YES
ADEQUATE_TO_COMPLETE_ADL: YES
WALKS IN HOME: INDEPENDENT
LACK_OF_TRANSPORTATION: NO
TOILETING: INDEPENDENT
HEARING - LEFT EAR: FUNCTIONAL
GROOMING: INDEPENDENT
FEEDING YOURSELF: INDEPENDENT
HEARING - RIGHT EAR: FUNCTIONAL
JUDGMENT_ADEQUATE_SAFELY_COMPLETE_DAILY_ACTIVITIES: YES
DRESSING YOURSELF: INDEPENDENT

## 2024-02-26 ASSESSMENT — COGNITIVE AND FUNCTIONAL STATUS - GENERAL
DAILY ACTIVITIY SCORE: 23
DAILY ACTIVITIY SCORE: 23
MOVING TO AND FROM BED TO CHAIR: A LITTLE
MOBILITY SCORE: 18
MOBILITY SCORE: 18
MOVING FROM LYING ON BACK TO SITTING ON SIDE OF FLAT BED WITH BEDRAILS: A LITTLE
TURNING FROM BACK TO SIDE WHILE IN FLAT BAD: A LITTLE
DRESSING REGULAR LOWER BODY CLOTHING: A LITTLE
CLIMB 3 TO 5 STEPS WITH RAILING: A LITTLE
MOVING TO AND FROM BED TO CHAIR: A LITTLE
STANDING UP FROM CHAIR USING ARMS: A LITTLE
CLIMB 3 TO 5 STEPS WITH RAILING: A LITTLE
WALKING IN HOSPITAL ROOM: A LITTLE
WALKING IN HOSPITAL ROOM: A LITTLE
DRESSING REGULAR LOWER BODY CLOTHING: A LITTLE
PATIENT BASELINE BEDBOUND: NO
TURNING FROM BACK TO SIDE WHILE IN FLAT BAD: A LITTLE
STANDING UP FROM CHAIR USING ARMS: A LITTLE
MOVING FROM LYING ON BACK TO SITTING ON SIDE OF FLAT BED WITH BEDRAILS: A LITTLE

## 2024-02-26 ASSESSMENT — PAIN SCALES - GENERAL
PAINLEVEL_OUTOF10: 3
PAINLEVEL_OUTOF10: 5 - MODERATE PAIN
PAINLEVEL_OUTOF10: 3
PAINLEVEL_OUTOF10: 7
PAINLEVEL_OUTOF10: 7

## 2024-02-26 ASSESSMENT — LIFESTYLE VARIABLES
HOW MANY STANDARD DRINKS CONTAINING ALCOHOL DO YOU HAVE ON A TYPICAL DAY: 1 OR 2
AUDIT-C TOTAL SCORE: 1
AUDIT-C TOTAL SCORE: 1
SKIP TO QUESTIONS 9-10: 1
HOW OFTEN DO YOU HAVE A DRINK CONTAINING ALCOHOL: MONTHLY OR LESS
HOW OFTEN DO YOU HAVE 6 OR MORE DRINKS ON ONE OCCASION: NEVER

## 2024-02-26 ASSESSMENT — PAIN - FUNCTIONAL ASSESSMENT
PAIN_FUNCTIONAL_ASSESSMENT: 0-10
PAIN_FUNCTIONAL_ASSESSMENT: UNABLE TO SELF-REPORT
PAIN_FUNCTIONAL_ASSESSMENT: 0-10

## 2024-02-26 ASSESSMENT — PAIN DESCRIPTION - LOCATION: LOCATION: KNEE

## 2024-02-26 ASSESSMENT — COLUMBIA-SUICIDE SEVERITY RATING SCALE - C-SSRS
6. HAVE YOU EVER DONE ANYTHING, STARTED TO DO ANYTHING, OR PREPARED TO DO ANYTHING TO END YOUR LIFE?: NO
2. HAVE YOU ACTUALLY HAD ANY THOUGHTS OF KILLING YOURSELF?: NO
1. IN THE PAST MONTH, HAVE YOU WISHED YOU WERE DEAD OR WISHED YOU COULD GO TO SLEEP AND NOT WAKE UP?: NO

## 2024-02-26 ASSESSMENT — PAIN DESCRIPTION - ORIENTATION: ORIENTATION: RIGHT

## 2024-02-26 ASSESSMENT — PAIN DESCRIPTION - DESCRIPTORS: DESCRIPTORS: ACHING

## 2024-02-26 NOTE — DISCHARGE INSTR - ACTIVITY
Light activity. Frequent rest with leg elevated and Ice. Continue exercises to increase range of motion. Work on getting the knee straight.   Total Knee Replacement Precautions for 6 weeks: NO running, NO jumping, NO squatting, NO kneeling, NO twisting, NO crossing on operative knee.   Continue to wear compression stockings everyday. May take stockings off at night to sleep but reapply each morning for the next 2 weeks until seen by your surgeon at your follow up appt.   Do not remove Mepilex AG dressing from the knee  until follow up visit in 2 weeks with surgeon. If dressing gets saturated and leaks notify the surgeon.   Call the office if having increased redness, pain, swelling or drainage at incision or if you have fever or chills.

## 2024-02-26 NOTE — ANESTHESIA PROCEDURE NOTES
Airway  Date/Time: 2/26/2024 2:42 PM  Urgency: elective    Airway not difficult    Staffing  Performed: BALA   Authorized by: CECILIO Coker    Performed by: CECILIO Coker  Patient location during procedure: OR    Indications and Patient Condition  Indications for airway management: anesthesia  Spontaneous Ventilation: absent  Sedation level: deep  Preoxygenated: yes  Patient position: sniffing  Mask difficulty assessment: 1 - vent by mask    Final Airway Details  Final airway type: endotracheal airway      Successful airway: ETT  Cuffed: yes   Successful intubation technique: video laryngoscopy  Facilitating devices/methods: intubating stylet  Endotracheal tube insertion site: oral  Blade size: #3  ETT size (mm): 7.0  Cormack-Lehane Classification: grade I - full view of glottis  Number of attempts at approach: 1

## 2024-02-26 NOTE — BRIEF OP NOTE
Date: 2024  OR Location: [facility] OR    Name: Sherri Najera, : 1945, Age: 78 y.o., MRN: [id number], Sex: female    Diagnosis  Pre-op Diagnosis     * Arthritis of left knee [M17.12] Post-op Diagnosis     * Arthritis of left knee [M17.12]     Procedures  ARTHROPLASTY TOTAL KNEE  82732 - [place] ARTHRP KNE CONDYLE&PLATU MEDIAL&LAT COMPARTMENTS      Surgeons      * Isaac Dumont - Primary    Resident/Fellow/Other Assistant:  Surgeon(s) and Role:    Procedure Summary  Anesthesia: General  ASA: II  Anesthesia Staff: CRNA: Stefan Garvin, APRN-CRNA; Sanya Denise APRN-CRNA  SRNA: May Kvngbinh  Estimated Blood Loss: 50mL  Intra-op Medications:   Administrations occurring from 1330 to 1530 on 24:   Medication Name Total Dose   ropivacaine (Naropin) injection 30 mL   EPINEPHrine (Adrenalin) injection 0.2 mg   sodium chloride (PF) 0.9% solution 20 mL   ketorolac (Toradol) injection 30 mg   morphine injection 5 mg   sodium chloride 0.9 % irrigation solution 3,000 mL              Anesthesia Record               Intraprocedure I/O Totals          Intake    Tranexamic Acid 0.00 mL    The total shown is the total volume documented since Anesthesia Start was filed.    Propofol Drip 0.00 mL    The total shown is the total volume documented since Anesthesia Start was filed.    lactated Ringer's infusion 1000.00 mL    Total Intake 1000 mL          Specimen:   ID Type Source Tests Collected by Time   1 : KNEE CONTENTS Tissue KNEE ARTHROPLASTY LEFT SURGICAL PATHOLOGY EXAM Isaac Dumont MD 2024 1508        Staff:   Circulator: Alba Yates RN  Relief Scrub: Missy Fields  Scrub Person: Blu Ramirez          Findings: L knee OA    Complications:  None; patient tolerated the procedure well.     Disposition: PACU - hemodynamically stable.  Condition: stable  Specimens Collected:   ID Type Source Tests Collected by Time   1 : KNEE CONTENTS Tissue KNEE ARTHROPLASTY LEFT SURGICAL PATHOLOGY EXAM Isaac HINOJOSA  MD Tim 2/26/2024 1508     Attending Attestation:     Isaac Dumont  Phone Number: [phone]

## 2024-02-26 NOTE — OP NOTE
ARTHROPLASTY TOTAL KNEE (L) Operative Note     Date: 2024  OR Location: [facility] OR    Name: Sherri Najera, : 1945, Age: 78 y.o., MRN: [id number], Sex: female    Diagnosis  Pre-op Diagnosis     * Arthritis of left knee [M17.12] Post-op Diagnosis     * Arthritis of left knee [M17.12]     Procedures  ARTHROPLASTY TOTAL KNEE  97666 - [place] ARTHRP KNE CONDYLE&PLATU MEDIAL&LAT COMPARTMENTS      Surgeons      * Isaac Dumont - Primary    Resident/Fellow/Other Assistant:  Surgeon(s) and Role:    Procedure Summary  Anesthesia: General  ASA: II  Anesthesia Staff: CRNA: Stefan Garvin, APRN-CRNA; Sanya Denise APRN-CRNA  SRNA: May Schmitt  Estimated Blood Loss: 50 mL  Intra-op Medications:   Administrations occurring from 1330 to 1530 on 24:   Medication Name Total Dose   ropivacaine (Naropin) injection 30 mL   EPINEPHrine (Adrenalin) injection 0.2 mg   sodium chloride (PF) 0.9% solution 20 mL   ketorolac (Toradol) injection 30 mg   morphine injection 5 mg   sodium chloride 0.9 % irrigation solution 3,000 mL              Anesthesia Record               Intraprocedure I/O Totals          Intake    Tranexamic Acid 0.00 mL    The total shown is the total volume documented since Anesthesia Start was filed.    Propofol Drip 0.00 mL    The total shown is the total volume documented since Anesthesia Start was filed.    lactated Ringer's infusion 1000.00 mL    Total Intake 1000 mL          Specimen:   ID Type Source Tests Collected by Time   1 : KNEE CONTENTS Tissue KNEE ARTHROPLASTY LEFT SURGICAL PATHOLOGY EXAM Isaac Dumont MD 2024 1508        Staff:   Circulator: Alba Yates RN  Relief Scrub: Missy Fields  Scrub Person: Blu Ramirez         Drains and/or Catheters: * None in log *    Tourniquet Times:   * Missing tourniquet times found for documented tourniquets in lo *     Implants:  Implants       Type Name Action Serial No.      Implant CEMENT, BONE, SIMPLEX P, RADIOPAQUE, FULL  DOSE, 40 GM - [id number] Implanted      Implant CEMENT, BONE, SIMPLEX P, RADIOPAQUE, FULL DOSE, 40 GM - [id number] Implanted      Joint Knee TIBAL BASE, ATTUNE, ROTATING PLATFORM, BERNARD, SZ 4 - [id number] Implanted      Joint Knee FEMORAL, ATTUNE PS, BERNARD, SZ 5, LT - [id number] Implanted      Joint Knee INSERT, ATTUNE PS RP, SZ 5, 6MM - [id number] Implanted               Findings: severe djd    Indications: Sherri Najera is an 78 y.o. female who is having surgery for Arthritis of left knee [M17.12].     The patient was seen in the preoperative area. The risks, benefits, complications, treatment options, non-operative alternatives, expected recovery and outcomes were discussed with the patient. The possibilities of reaction to medication, pulmonary aspiration, injury to surrounding structures, bleeding, recurrent infection, the need for additional procedures, failure to diagnose a condition, and creating a complication requiring transfusion or operation were discussed with the patient. The patient concurred with the proposed plan, giving informed consent.  The site of surgery was properly noted/marked if necessary per policy. The patient has been actively warmed in preoperative area. Preoperative antibiotics have been ordered and given within 1 hours of incision. Venous thrombosis prophylaxis have been ordered including bilateral sequential compression devices and chemical prophylaxis    Procedure Details: Statement of medical necessity:    This is a 78 y.o. female with severe degenerative disease of the knee that has failed non operative management. the risks, benefits and alternatives of total hip arthroplasty were discussed with the patient and they signed informed consent.       Description of procedure:   The patient was brought to the operating room and placed on the operating table in the supine position. All bony prominences were well padded. Appropriate preoperative antibiotics were administered. Anesthesia was  induced by the anesthesia team and a time out was performed. The patient was identified by name and medical record number and the laterality and the site of surgery were confirmed by all present.     Under pneumatic tourniquet control, a longitudinal anterior skin incision was made with medial parapatellar arthrotomy. Hemostasis was obtained with Bovie electrocautery. Comprehensive exposure the knee was carried out for total knee arthroplasty. The patella was subluxed laterally and the knee placed in a flexed position.      Using intramedullary alignment, the distal femur was cut in a 5° valgus position. The appropriate-sized femoral component was selected based upon intraoperative measurements of the sagittal dimension of the femur. The distal femoral cutting guide was placed perpendicular to Whitesides line and parallel with the transepicondylar axis, with  3° of external rotation based upon the posterior condylar axis. The distal femur was then finished to accept a posterior stabilized prosthesis.     Attention was then directed to the proximal tibia. Meniscal remnants were excised. Using extramedullary alignment, the proximal tibia was cut perpendicular to its longitudinal axis with a 3° posterior slope. Osteophytes were excised from the posterior femur, medial and lateral femur, and circumferentially about the tibia to avoid soft tissue impingement. The posterior capsule, medial lateral soft tissue structures were injected using combination of Duramorph, Ropivicaine, toradol and epinephrine.Flexion and extension gaps were assessed.     Flexion and extension gaps were equal and rectangular. No ligamentous release was necessary for appropriate balance.     Trial components were placed. Knee achieved full extension and flexion with excellent varus and valgus stability throughout range of motion. The patella tracked centrally. Tibial component rotation was marked, and the tibia finished in the usual fashion to  accept an appropriately sized tibial component.     bone surfaces prepared with pulsatile saline irrigation. Final implants were inserted using Simplex cement. Cement was pressurized and excess cement was cleared. Cement was further pressurized with a trial articular surface in place while the knee was in full extension.     The patella was assessed and its surfaces judged appropriate for non prosthetic patellar arthroplasty. Non prosthetic patellar arthroplasty was performed while cement was hardening, with excision of synovium from the periphery of the patella and circumferential electrocautery and excision of ostephytes.      After hardening of cement, mechanics of the knee were again assessed. The final tibial articular surface trial was accepted for final implant sizing.     After irrigation, the definitive tibial articular surface was inserted into the tibial tray and locking mechanism engaged.     Joint was irrigated with dilute betadaine solution, followed by normal saline.   The arthrotomy was reapproximated using #1 poly and #2 quill sutures. Subcutaneous layer was closed with 2-0 poly, subcuticular layer with 3-0 v-lock, then perineo mesh and glue dressing was placed followed by mepilex, cast padding, and ace wrap.     Patient was awoken from anesthesia and brought to the pacu in stable position.     Post operative plan: patient may bear weight as tolerated, anterior hip precautions, antibiotics and dvt prophylaxis per protocol, standard post operative supportive care    Statement of staff presence: I was present and scrubbed for all critical portions of the procedure and was immediately available during closing      Complications:  None; patient tolerated the procedure well.    Disposition: PACU - hemodynamically stable.  Condition: stable         Additional Details: none    Attending Attestation: I was present and scrubbed for the key portions of the procedure.    Isaac Dumont  Phone Number:  [phone]

## 2024-02-26 NOTE — ANESTHESIA PREPROCEDURE EVALUATION
Patient: Sherri Hanson Stone    Procedure Information       Date/Time: 02/26/24 1330    Procedure: ARTHROPLASTY TOTAL KNEE (Left: Knee)    Location: [facility] / [facility]    Surgeons: Isaac Dumont MD            Relevant Problems   Anesthesia   (-) PONV (postoperative nausea and vomiting)      Cardiovascular   (+) Aortic regurgitation   (+) Essential hypertension   (+) Hypercholesterolemia   (+) Hypertriglyceridemia      Endocrine   (+) Controlled diabetes mellitus type II without complication (CMS/HCC)      GI   (+) Esophageal reflux      /Renal   (+) Kidney stone      Neuro/Psych   (+) Anxiety   (+) Cervical radiculopathy   (+) Depression   (+) Headache, chronic daily   (+) Lumbar radiculopathy      Hematology   (+) Anemia   (+) Anemia due to acute blood loss      Musculoskeletal   (+) Osteoarthritis      Other   (+) Ankylosing spondylitis (CMS/HCC)   (+) Arthritis of knee, left   (+) Arthritis of left knee       Clinical information reviewed:   Tobacco  Allergies  Meds   Med Hx  Surg Hx  OB Status  Fam Hx  Soc   Hx        NPO Detail:  NPO/Void Status  Date of Last Liquid: 02/25/24  Time of Last Liquid: 2100  Date of Last Solid: 02/25/24  Time of Last Solid: 2100  Last Intake Type: Clear fluids         Physical Exam    Airway  Mallampati: II  TM distance: >3 FB  Neck ROM: full     Cardiovascular - normal exam     Dental    Pulmonary - normal exam     Abdominal            Anesthesia Plan    History of general anesthesia?: yes  History of complications of general anesthesia?: no    ASA 2     regional, MAC and spinal     The patient is not a current smoker.    intravenous induction   Anesthetic plan and risks discussed with patient.  Use of blood products discussed with patient who.    Plan discussed with CRNA.

## 2024-02-26 NOTE — ANESTHESIA PROCEDURE NOTES
Peripheral Block    Patient location during procedure: pre-op  Start time: 2/26/2024 12:50 PM  End time: 2/26/2024 1:01 PM  Reason for block: at surgeon's request and post-op pain management  Staffing  Performed: attending   Authorized by: Rickie Willis DO    Performed by: Rickie Willis DO  Preanesthetic Checklist  Completed: patient identified, IV checked, site marked, risks and benefits discussed, surgical consent, monitors and equipment checked, pre-op evaluation and timeout performed   Timeout performed at:   Peripheral Block  Patient position: laying flat  Prep: ChloraPrep  Patient monitoring: heart rate  Block type: adductor canal  Injection technique: single-shot  Guidance: ultrasound guided  Local infiltration: ropivacaine  Needle  Needle type: pencil-tip   Needle gauge: 22 G  Needle localization: ultrasound guidance  Assessment  Injection assessment: local visualized surrounding nerve on ultrasound, incremental injection, no paresthesia on injection and negative aspiration for heme  Paresthesia pain: immediately resolved  Heart rate change: no  Slow fractionated injection: yes

## 2024-02-27 ENCOUNTER — PHARMACY VISIT (OUTPATIENT)
Dept: PHARMACY | Facility: CLINIC | Age: 79
End: 2024-02-27
Payer: MEDICARE

## 2024-02-27 ENCOUNTER — DOCUMENTATION (OUTPATIENT)
Dept: HOME HEALTH SERVICES | Facility: HOME HEALTH | Age: 79
End: 2024-02-27
Payer: MEDICARE

## 2024-02-27 VITALS
OXYGEN SATURATION: 91 % | WEIGHT: 154.32 LBS | TEMPERATURE: 97.4 F | RESPIRATION RATE: 16 BRPM | BODY MASS INDEX: 28.4 KG/M2 | SYSTOLIC BLOOD PRESSURE: 145 MMHG | DIASTOLIC BLOOD PRESSURE: 75 MMHG | HEART RATE: 99 BPM | HEIGHT: 62 IN

## 2024-02-27 DIAGNOSIS — E78.5 HYPERLIPIDEMIA, UNSPECIFIED HYPERLIPIDEMIA TYPE: ICD-10-CM

## 2024-02-27 LAB
ANION GAP SERPL CALC-SCNC: 14 MMOL/L (ref 10–20)
BUN SERPL-MCNC: 19 MG/DL (ref 6–23)
CALCIUM SERPL-MCNC: 8.7 MG/DL (ref 8.6–10.3)
CHLORIDE SERPL-SCNC: 104 MMOL/L (ref 98–107)
CO2 SERPL-SCNC: 25 MMOL/L (ref 21–32)
CREAT SERPL-MCNC: 0.69 MG/DL (ref 0.5–1.05)
EGFRCR SERPLBLD CKD-EPI 2021: 89 ML/MIN/1.73M*2
ERYTHROCYTE [DISTWIDTH] IN BLOOD BY AUTOMATED COUNT: 13.1 % (ref 11.5–14.5)
GLUCOSE SERPL-MCNC: 170 MG/DL (ref 74–99)
HCT VFR BLD AUTO: 33.8 % (ref 36–46)
HGB BLD-MCNC: 10.7 G/DL (ref 12–16)
MCH RBC QN AUTO: 29.9 PG (ref 26–34)
MCHC RBC AUTO-ENTMCNC: 31.7 G/DL (ref 32–36)
MCV RBC AUTO: 94 FL (ref 80–100)
NRBC BLD-RTO: 0 /100 WBCS (ref 0–0)
PLATELET # BLD AUTO: 176 X10*3/UL (ref 150–450)
POTASSIUM SERPL-SCNC: 3.8 MMOL/L (ref 3.5–5.3)
RBC # BLD AUTO: 3.58 X10*6/UL (ref 4–5.2)
SODIUM SERPL-SCNC: 139 MMOL/L (ref 136–145)
WBC # BLD AUTO: 10.3 X10*3/UL (ref 4.4–11.3)

## 2024-02-27 PROCEDURE — 97161 PT EVAL LOW COMPLEX 20 MIN: CPT | Mod: GP

## 2024-02-27 PROCEDURE — 85027 COMPLETE CBC AUTOMATED: CPT | Performed by: NURSE PRACTITIONER

## 2024-02-27 PROCEDURE — 97116 GAIT TRAINING THERAPY: CPT | Mod: GP

## 2024-02-27 PROCEDURE — 99231 SBSQ HOSP IP/OBS SF/LOW 25: CPT | Performed by: NURSE PRACTITIONER

## 2024-02-27 PROCEDURE — 2500000004 HC RX 250 GENERAL PHARMACY W/ HCPCS (ALT 636 FOR OP/ED): Performed by: NURSE PRACTITIONER

## 2024-02-27 PROCEDURE — 2500000001 HC RX 250 WO HCPCS SELF ADMINISTERED DRUGS (ALT 637 FOR MEDICARE OP): Performed by: NURSE PRACTITIONER

## 2024-02-27 PROCEDURE — 80048 BASIC METABOLIC PNL TOTAL CA: CPT | Performed by: NURSE PRACTITIONER

## 2024-02-27 PROCEDURE — 2500000002 HC RX 250 W HCPCS SELF ADMINISTERED DRUGS (ALT 637 FOR MEDICARE OP, ALT 636 FOR OP/ED): Performed by: NURSE PRACTITIONER

## 2024-02-27 PROCEDURE — 2500000002 HC RX 250 W HCPCS SELF ADMINISTERED DRUGS (ALT 637 FOR MEDICARE OP, ALT 636 FOR OP/ED): Performed by: INTERNAL MEDICINE

## 2024-02-27 PROCEDURE — 97110 THERAPEUTIC EXERCISES: CPT | Mod: GP

## 2024-02-27 PROCEDURE — RXMED WILLOW AMBULATORY MEDICATION CHARGE

## 2024-02-27 PROCEDURE — 7100000011 HC EXTENDED STAY RECOVERY HOURLY - NURSING UNIT

## 2024-02-27 PROCEDURE — 36415 COLL VENOUS BLD VENIPUNCTURE: CPT | Performed by: NURSE PRACTITIONER

## 2024-02-27 RX ORDER — ATORVASTATIN CALCIUM 80 MG/1
80 TABLET, FILM COATED ORAL DAILY
Qty: 30 TABLET | Refills: 0 | Status: SHIPPED | OUTPATIENT
Start: 2024-02-27 | End: 2024-03-26

## 2024-02-27 RX ORDER — INSULIN LISPRO 100 [IU]/ML
0-10 INJECTION, SOLUTION INTRAVENOUS; SUBCUTANEOUS
Status: DISCONTINUED | OUTPATIENT
Start: 2024-02-27 | End: 2024-02-27 | Stop reason: HOSPADM

## 2024-02-27 RX ORDER — DEXTROSE 50 % IN WATER (D50W) INTRAVENOUS SYRINGE
25
Status: DISCONTINUED | OUTPATIENT
Start: 2024-02-27 | End: 2024-02-27 | Stop reason: HOSPADM

## 2024-02-27 RX ORDER — ATORVASTATIN CALCIUM 80 MG/1
80 TABLET, FILM COATED ORAL DAILY
Status: CANCELLED | OUTPATIENT
Start: 2024-02-27

## 2024-02-27 RX ORDER — DEXTROSE MONOHYDRATE 100 MG/ML
0.3 INJECTION, SOLUTION INTRAVENOUS ONCE AS NEEDED
Status: DISCONTINUED | OUTPATIENT
Start: 2024-02-27 | End: 2024-02-27 | Stop reason: HOSPADM

## 2024-02-27 RX ADMIN — FLUTICASONE PROPIONATE 1 SPRAY: 50 SPRAY, METERED NASAL at 08:29

## 2024-02-27 RX ADMIN — OXYCODONE HYDROCHLORIDE 10 MG: 10 TABLET ORAL at 00:30

## 2024-02-27 RX ADMIN — PIOGLITAZONE HYDROCHLORIDE 15 MG: 30 TABLET ORAL at 08:35

## 2024-02-27 RX ADMIN — OXYCODONE HYDROCHLORIDE 10 MG: 10 TABLET ORAL at 08:33

## 2024-02-27 RX ADMIN — SODIUM CHLORIDE, POTASSIUM CHLORIDE, SODIUM LACTATE AND CALCIUM CHLORIDE 100 ML/HR: 600; 310; 30; 20 INJECTION, SOLUTION INTRAVENOUS at 03:39

## 2024-02-27 RX ADMIN — OXYCODONE HYDROCHLORIDE 5 MG: 5 TABLET ORAL at 04:32

## 2024-02-27 RX ADMIN — ACETAMINOPHEN 650 MG: 325 TABLET ORAL at 04:32

## 2024-02-27 RX ADMIN — ASPIRIN 81 MG: 81 TABLET, COATED ORAL at 08:26

## 2024-02-27 RX ADMIN — METFORMIN HYDROCHLORIDE 500 MG: 500 TABLET ORAL at 08:26

## 2024-02-27 RX ADMIN — DOCUSATE SODIUM 100 MG: 100 CAPSULE, LIQUID FILLED ORAL at 08:26

## 2024-02-27 RX ADMIN — POLYETHYLENE GLYCOL 3350 17 G: 17 POWDER, FOR SOLUTION ORAL at 08:26

## 2024-02-27 RX ADMIN — ATORVASTATIN CALCIUM 80 MG: 80 TABLET, FILM COATED ORAL at 08:26

## 2024-02-27 RX ADMIN — LISINOPRIL 20 MG: 20 TABLET ORAL at 08:26

## 2024-02-27 RX ADMIN — PAROXETINE HYDROCHLORIDE 10 MG: 20 TABLET, FILM COATED ORAL at 08:26

## 2024-02-27 RX ADMIN — INSULIN LISPRO 2 UNITS: 100 INJECTION, SOLUTION INTRAVENOUS; SUBCUTANEOUS at 08:27

## 2024-02-27 ASSESSMENT — ENCOUNTER SYMPTOMS
EYES NEGATIVE: 1
HEMATOLOGIC/LYMPHATIC NEGATIVE: 1
ALLERGIC/IMMUNOLOGIC NEGATIVE: 1
NEUROLOGICAL NEGATIVE: 1
CARDIOVASCULAR NEGATIVE: 1
GASTROINTESTINAL NEGATIVE: 1
CONSTITUTIONAL NEGATIVE: 1
RESPIRATORY NEGATIVE: 1
PSYCHIATRIC NEGATIVE: 1
ENDOCRINE COMMENTS: PT IS DIABETIC

## 2024-02-27 ASSESSMENT — COGNITIVE AND FUNCTIONAL STATUS - GENERAL
MOVING FROM LYING ON BACK TO SITTING ON SIDE OF FLAT BED WITH BEDRAILS: A LITTLE
STANDING UP FROM CHAIR USING ARMS: A LITTLE
CLIMB 3 TO 5 STEPS WITH RAILING: A LITTLE
DRESSING REGULAR LOWER BODY CLOTHING: A LITTLE
STANDING UP FROM CHAIR USING ARMS: A LITTLE
TURNING FROM BACK TO SIDE WHILE IN FLAT BAD: A LITTLE
WALKING IN HOSPITAL ROOM: A LITTLE
MOVING TO AND FROM BED TO CHAIR: A LITTLE
MOVING FROM LYING ON BACK TO SITTING ON SIDE OF FLAT BED WITH BEDRAILS: A LITTLE
CLIMB 3 TO 5 STEPS WITH RAILING: A LITTLE
MOVING TO AND FROM BED TO CHAIR: A LITTLE
MOBILITY SCORE: 18
WALKING IN HOSPITAL ROOM: A LITTLE
TURNING FROM BACK TO SIDE WHILE IN FLAT BAD: A LITTLE

## 2024-02-27 ASSESSMENT — PAIN SCALES - GENERAL
PAINLEVEL_OUTOF10: 7
PAINLEVEL_OUTOF10: 6
PAINLEVEL_OUTOF10: 4
PAINLEVEL_OUTOF10: 4
PAINLEVEL_OUTOF10: 6

## 2024-02-27 ASSESSMENT — PAIN - FUNCTIONAL ASSESSMENT
PAIN_FUNCTIONAL_ASSESSMENT: 0-10

## 2024-02-27 ASSESSMENT — PAIN DESCRIPTION - ORIENTATION
ORIENTATION: LEFT
ORIENTATION: LEFT

## 2024-02-27 ASSESSMENT — ACTIVITIES OF DAILY LIVING (ADL)
LACK_OF_TRANSPORTATION: NO
ADL_ASSISTANCE: INDEPENDENT

## 2024-02-27 ASSESSMENT — PAIN DESCRIPTION - LOCATION
LOCATION: KNEE

## 2024-02-27 ASSESSMENT — PAIN DESCRIPTION - DESCRIPTORS
DESCRIPTORS: ACHING
DESCRIPTORS: ACHING

## 2024-02-27 NOTE — CONSULTS
Consults    Reason For Consult  Medical management    History Of Present Illness  Sherri Najera is a 78 y.o. female who was admitted earlier today after undergoing left TKA for left knee OA. Her surgery went well and there have been no immediate post procedure complications. We have been consulted to help with the management of medical issues. She has no complaints at this time, other than mild post op knee pain, and denies having any fever, chills, chest pain, cough or SOB.     Past Medical History  She has a past medical history of Aneurysm, splenic artery (CMS/[facility]), Ankylosing spondylitis (CMS/[facility]), Anxiety, Arthritis, Body mass index (BMI) 27.0-27.9, adult (12/07/2021), BPPV (benign paroxysmal positional vertigo), Cervical radiculitis, Depression, Diabetes mellitus (CMS/[facility]), Effusion, left knee (02/11/2019), Exocrine pancreatic insufficiency, GERD (gastroesophageal reflux disease), Hearing aid worn, HL (hearing loss), HLD (hyperlipidemia), Hypertension, Irritable bowel syndrome with diarrhea (01/16/2020), Kidney stone, Nephrolithiasis, Other conditions influencing health status (06/08/2020), Other hemorrhoids (06/28/2017), Other nonspecific abnormal finding of lung field (03/26/2019), Pain in left knee (04/30/2019), Pain in unspecified knee (02/11/2019), Personal history of other diseases of the circulatory system (08/12/2017), Personal history of other diseases of the musculoskeletal system and connective tissue (05/21/2015), Personal history of other specified conditions (11/03/2020), Personal history of other specified conditions (05/04/2021), Personal history of other specified conditions (03/07/2014), Personal history of other specified conditions (01/31/2013), Skin cancer, Unspecified injury of muscle(s) and tendon(s) of the rotator cuff of right shoulder, initial encounter (10/11/2016), and Wears glasses.    Past Surgical History  She has a past surgical history that includes Hand surgery (Left,  03/07/2014); Other surgical history (Bilateral, 07/11/2022); MR angio head wo IV contrast (04/15/2023); MR angio neck wo IV contrast (04/15/2023); and Carpal tunnel release (Bilateral).     Social History  She reports that she has never smoked. She has never used smokeless tobacco. She reports that she does not drink alcohol and does not use drugs.    Family History  Family History   Problem Relation Name Age of Onset    Diabetes Mother      Asthma Mother      Other (CRF) Mother      Heart failure Father          Allergies  Fenofibrate and Sulfa (sulfonamide antibiotics)    Review of Systems   Constitutional: Negative.    HENT: Negative.     Eyes: Negative.    Respiratory: Negative.     Cardiovascular: Negative.    Gastrointestinal: Negative.    Endocrine:        Pt is diabetic    Genitourinary: Negative.    Musculoskeletal:         See HPI   Skin: Negative.    Allergic/Immunologic: Negative.    Neurological: Negative.    Hematological: Negative.    Psychiatric/Behavioral: Negative.          Physical Exam  Constitutional:       General: She is not in acute distress.     Appearance: Normal appearance. She is not ill-appearing, toxic-appearing or diaphoretic.   HENT:      Head: Normocephalic and atraumatic.      Nose: Nose normal.      Mouth/Throat:      Mouth: Mucous membranes are moist.      Pharynx: Oropharynx is clear. No oropharyngeal exudate or posterior oropharyngeal erythema.   Eyes:      General: No scleral icterus.        Right eye: No discharge.         Left eye: No discharge.      Extraocular Movements: Extraocular movements intact.      Conjunctiva/sclera: Conjunctivae normal.   Cardiovascular:      Rate and Rhythm: Normal rate and regular rhythm.      Heart sounds: Murmur heard.      Comments: Soft systolic murmur heard in aortic area  Pulmonary:      Breath sounds: Normal breath sounds. No wheezing, rhonchi or rales.   Abdominal:      General: Bowel sounds are normal. There is no distension.       Palpations: Abdomen is soft. There is no mass.      Tenderness: There is no abdominal tenderness. There is no right CVA tenderness, left CVA tenderness, guarding or rebound.   Musculoskeletal:      Cervical back: Neck supple.      Right lower leg: No edema.      Left lower leg: No edema.      Comments: Left knee with wound dressing intact, clean and dry   Lymphadenopathy:      Cervical: No cervical adenopathy.   Skin:     General: Skin is warm and dry.      Capillary Refill: Capillary refill takes less than 2 seconds.      Findings: No lesion or rash.   Neurological:      General: No focal deficit present.      Mental Status: She is alert and oriented to person, place, and time.   Psychiatric:         Mood and Affect: Mood normal.         Behavior: Behavior normal.          Last Recorded Vitals  /75   Pulse 91   Temp 36.3 °C (97.3 °F)   Resp 16   Wt 70 kg (154 lb 5.2 oz)   SpO2 93%     Relevant Results  XR LEFT KNEE:    IMPRESSION:  Anatomic alignment status post left total knee arthroplasty.     Assessment/Plan   Left knee OA  S/P Left TKA  Post op wound management per primary service  Pain control  PT/OT  Incentive spirometry  Follow up on am labs  DVT prophylaxis    Type II DM  Well controlled with last HgA1c being ~6, per pt  Resume home oral glucose lowering meds (pioglitazone and metformin)  Accu chedks and will add ISS coverage as needed    HTN  Resume Lisinopril and monitor    HLD  Resume Atorvastatin  FLP as outpatient with PCP    Thank you for the consult.     Margarito Lowry MD

## 2024-02-27 NOTE — HH CARE COORDINATION
Home Care received a Referral for Physical Therapy. We have processed the referral for a Start of Care on 02/28.     If you have any questions or concerns, please feel free to contact us at [phone]. Follow the prompts, enter your five digit zip code, and you will be directed to your care team on [facility].

## 2024-02-27 NOTE — PROGRESS NOTES
78 y.o. female s/p Procedure(s) (LRB):  ARTHROPLASTY TOTAL KNEE (Left)    Patient seen and examined  Pain controlled  No acute events      Physical exam  Left LE  5/5 df/pf ankle/great toe  [place] s/s/sp/dp/t  2+ dp pulse    Post op XR reviewed, expected post operative changes    78 y.o. female s/p Procedure(s) (LRB):  ARTHROPLASTY TOTAL KNEE (Left)  - WBAT left LE  - dvt ppx  - ancef x 24h  - discharge after clears PT/Medicine

## 2024-02-27 NOTE — CARE PLAN
The patient's goals for the shift include    Problem: Pain - Adult  Goal: Verbalizes/displays adequate comfort level or baseline comfort level  Outcome: Progressing       The clinical goals for the shift include pain control    Over the shift, the patient did not make progress toward the following goals. Barriers to progression include pain. Recommendations to address these barriers include pain meds.

## 2024-02-27 NOTE — DISCHARGE SUMMARY
Discharge Diagnosis  Arthritis of left knee    Issues Requiring Follow-Up  Post operative follow up    Test Results Pending At Discharge  Pending Labs       Order Current Status    Surgical Pathology Exam Collected (02/26/24 9362)          Hospital Course  78 year old female who underwent a left total knee arthroplasty.  No intraoperative complications and she was discharged to PACU in stable condition.  She was admitted to the floor overnight for continuous monitoring, pain control, and continued PT.  She is doing well, up ad ernestina, planning for DC home today    Pertinent Physical Exam At Time of Discharge  Physical Exam  Vitals reviewed.   HENT:      Head: Normocephalic and atraumatic.   Eyes:      Extraocular Movements: Extraocular movements intact.      Conjunctiva/sclera: Conjunctivae normal.      Pupils: Pupils are equal, round, and reactive to light.   Cardiovascular:      Rate and Rhythm: Normal rate and regular rhythm.      Pulses: Normal pulses.   Pulmonary:      Breath sounds: Normal breath sounds.   Abdominal:      General: Bowel sounds are normal.      Palpations: Abdomen is soft.   Musculoskeletal:      Comments: Left knee dressing C/D/I, calf soft and compressible, leg and foot warm and well perfused, SILT S/S/SPN/DPN distributions, 4/5 LLE   Skin:     General: Skin is warm and dry.      Capillary Refill: Capillary refill takes less than 2 seconds.   Neurological:      General: No focal deficit present.      Mental Status: She is alert and oriented to person, place, and time.       Home Medications     Medication List      START taking these medications     aspirin 81 mg chewable tablet; Chew 1 tablet (81 mg) 2 times a day for   28 days.   cefadroxil 500 mg capsule; Commonly known as: Duricef; Take 1 capsule   (500 mg) by mouth 2 times a day for 7 days.   docusate sodium 100 mg capsule; Commonly known as: Colace; Take 1   capsule (100 mg) by mouth 2 times a day for 10 days.   naproxen 500 mg tablet;  Commonly known as: Naprosyn; Take 1 tablet (500   mg) by mouth 2 times a day with meals.   oxyCODONE-acetaminophen 5-325 mg tablet; Commonly known as: Percocet;   Take 1 tablet by mouth every 4 hours if needed for severe pain (7 - 10)   for up to 6 days.     CONTINUE taking these medications     atorvastatin 80 mg tablet; Commonly known as: Lipitor; TAKE 1 TABLET   ONCE DAILY   Flonase Allergy Relief 50 mcg/actuation nasal spray; Generic drug:   fluticasone   lisinopril 20 mg tablet; TAKE 1 TABLET DAILY AS DIRECTED   metFORMIN 500 mg tablet; Commonly known as: Glucophage   mirtazapine 15 mg tablet; Commonly known as: Remeron   MULTIVITAMIN ORAL   OneTouch Delica Plus Lancet 33 gauge misc; Generic drug: lancets   OneTouch Verio test strips strip; Generic drug: blood sugar diagnostic   PARoxetine 10 mg tablet; Commonly known as: Paxil; Take 1 tablet (10 mg)   by mouth once daily.   pioglitazone 15 mg tablet; Commonly known as: Actos; Take 1 tablet (15   mg) by mouth once daily.   saccharomyces boulardii 250 mg capsule; Commonly known as: Florastor   Vitamin B-12 1,000 mcg tablet; Generic drug: cyanocobalamin   Vitamin C 500 mg tablet; Generic drug: ascorbic acid   zinc gluconate 50 mg tablet     STOP taking these medications     chlorhexidine 0.12 % solution; Commonly known as: Peridex       Outpatient Follow-Up  Future Appointments   Date Time Provider Department Center   3/15/2024 11:45 AM Isaac Dumont MD [id number] [facility]   4/4/2024 11:45 AM Rosita Chandler MD [id number] [facility]       Kathie Benavidez, APRN-CNP

## 2024-02-27 NOTE — PROGRESS NOTES
Physical Therapy    Physical Therapy Evaluation & Treatment    Patient Name: Sherri Najera  MRN: [id number]  Today's Date: 2/27/2024   Time Calculation  Start Time: 0906  Stop Time: 0947  Time Calculation (min): 41 min    Assessment/Plan   PT Assessment  PT Assessment Results: Decreased strength, Decreased range of motion, Decreased endurance, Impaired balance, Decreased mobility  Rehab Prognosis: Excellent  Evaluation/Treatment Tolerance: Patient tolerated treatment well  Medical Staff Made Aware: Yes  Strengths: Ability to acquire knowledge, Housing layout, Rehab experience, Support of Caregivers  End of Session Communication: Bedside nurse, Care Coordinator  Assessment Comment: Patient tolerated mobility well, feels confident with d/c plan and HEP. Good family support and home set up  End of Session Patient Position: Up in chair, Alarm off, not on at start of session (B LE elevated, ice to L knee, no alarm necessary per nsg.)      PT Plan  Treatment/Interventions: Transfer training, Gait training, Stair training, Balance training, Neuromuscular re-education, Strengthening, Endurance training, Range of motion, Therapeutic exercise  PT Plan: PT Eval only  PT Eval Only Reason: Safe to return home  PT Frequency: 2 times per week  PT Discharge Recommendations: Low intensity level of continued care  Equipment Recommended upon Discharge: Wheeled walker (owns)  PT Recommended Transfer Status: Assist x1  PT - OK to Discharge: Yes      Subjective     General Visit Information:  General  Reason for Referral: Impaired functional mobility; L TKA  Referred By: Isaac Dumont  Past Medical History Relevant to Rehab: HTN, HLD, DM2, GERD, anxiety  Family/Caregiver Present: No  Prior to Session Communication: Bedside nurse  Patient Position Received: Up in chair, Alarm off, not on at start of session  General Comment: Patient pleasant, cooperative and agreeable to PT eval  Home Living:  Home Living  Type of Home: House  Lives  With: Spouse  Home Adaptive Equipment: Walker rolling or standard, Cane, Reacher  Home Layout: Two level, Able to live on main level with bedroom/bathroom, Stairs to alternate level with rails  Alternate Level Stairs-Rails: Right  Alternate Level Stairs-Number of Steps: 12  Home Access: Stairs to enter with rails  Entrance Stairs-Rails: Right (grab bar L)  Entrance Stairs-Number of Steps: 3  Bathroom Shower/Tub: Walk-in shower  Bathroom Equipment: Shower chair with back  Home Living Comments: Patient plans to stay on first floor initially  Prior Level of Function:  Prior Function Per Pt/Caregiver Report  Level of [place]: Independent with ADLs and functional transfers, Independent with homemaking with ambulation  ADL Assistance: Independent  Homemaking Assistance: Independent  Ambulatory Assistance: Independent  Precautions:  Precautions  LE Weight Bearing Status: Weight Bearing as Tolerated  Post-Surgical Precautions: Left total knee precautions    Objective   Pain:  Pain Assessment  Pain Assessment: 0-10  Pain Score: 6  Pain Type: Surgical pain  Pain Location: Knee  Pain Orientation: Left  Pain Interventions: Repositioned, Cold applied  Cognition:  Cognition  Overall Cognitive Status: Within Functional Limits  Orientation Level: Oriented X4    General Assessments:     Activity Tolerance  Endurance: Tolerates 30 min exercise with multiple rests    Sensation  Light Touch: No apparent deficits    Strength  Strength Comments: L hip 4/5, L knee 3+/5, L ankle 4/5; R LE 4+/5        Coordination  Movements are Fluid and Coordinated: Yes    Postural Control  Postural Control: Within Functional Limits    Static Sitting Balance  Static Sitting-Balance Support: No upper extremity supported, Feet supported  Static Sitting-Level of Assistance: Independent    Static Standing Balance  Static Standing-Balance Support: Bilateral upper extremity supported  Static Standing-Level of Assistance: Close supervision  Functional  Assessments:       Bed Mobility  Bed Mobility: No (Patient reports no assist required this morning to sit up at EOB)    Transfers  Transfer: Yes  Transfer 1  Transfer From 1: Sit to, Stand to  Transfer to 1: Sit, Stand  Technique 1: Sit to stand, Stand to sit  Transfer Device 1: Walker  Transfer Level of Assistance 1: Close supervision  Trials/Comments 1: verbal cues for hand placement    Ambulation/Gait Training  Ambulation/Gait Training Performed: Yes  Ambulation/Gait Training 1  Surface 1: Level tile  Device 1: Rolling walker  Assistance 1: Close supervision  Quality of Gait 1:  (verbal cues for L heel strike with knee extension in stance and L knee flexion during swing)  Comments/Distance (ft) 1: 15' x 5    Stairs  Stairs: Yes  Stairs  Rails 1: Bilateral  Assistance 1: Close supervision  Comment/Number of Steps 1: 4 (step to pattern with verbal cues for sequencing)    Treatments:   Patient encouraged to complete supine ther ex 3x/day. Reviewed 15  reps each: ankle pumps, quad sets, SLR, hip abd/add, heel slides, SAQ. Patient advised home care will advance ther ex as tolerated   Encouraged to take short duration ambulation bouts hourly during waking hours with the use of 2WW, focusing on gait pattern.   Educated on the importance of avoiding remaining in one position for extended period of time. Encouraged pain and edema control with use of ice, elevation and activity pacing. Patient verbalized understanding.   Reiterated no pillows/towels etc under the knee   Reviewed TKA precautions   Home going packet reviewed and provided to patient. Patient verbalized understanding.     Reviewed car transfer, patient verbalized understanding. Recommended pushing seat back as far as possible, don't use door as support, recline seat to provide increased space for hip mobility. Sit first and then swivel into vehicle. Plans to d/c into a small SUV. Completed transfer in car simulator in gym supervision level      Bed  Mobility  Bed Mobility: No (Patient reports no assist required this morning to sit up at EOB)    Ambulation/Gait Training  Ambulation/Gait Training Performed: Yes  Ambulation/Gait Training 1  Surface 1: Level tile  Device 1: Rolling walker  Assistance 1: Close supervision  Quality of Gait 1:  (verbal cues for L heel strike with knee extension in stance and L knee flexion during swing)  Comments/Distance (ft) 1: 15' x 5  Transfers  Transfer: Yes  Transfer 1  Transfer From 1: Sit to, Stand to  Transfer to 1: Sit, Stand  Technique 1: Sit to stand, Stand to sit  Transfer Device 1: Walker  Transfer Level of Assistance 1: Close supervision  Trials/Comments 1: verbal cues for hand placement    Stairs  Stairs: Yes  Stairs  Rails 1: Bilateral  Assistance 1: Close supervision  Comment/Number of Steps 1: 4 (step to pattern with verbal cues for sequencing)  Outcome Measures:  [facility] Basic Mobility  Turning from your back to your side while in a flat bed without using bedrails: A little  Moving from lying on your back to sitting on the side of a flat bed without using bedrails: A little  Moving to and from bed to chair (including a wheelchair): A little  Standing up from a chair using your arms (e.g. wheelchair or bedside chair): A little  To walk in hospital room: A little  Climbing 3-5 steps with railing: A little  Basic Mobility - Total Score: 18    Encounter Problems         Encounter Problems (Resolved)       Mobility       Patient will demonstrate good understanding of bed mobility, transfers, ambulation, stair negotiation and HEP for safe home going.   (Met)       Start:  02/27/24    Expected End:  02/27/24    Resolved:  02/27/24                Education Documentation  Handouts, taught by Leigh Ann Palomino PT at 2/27/2024 10:13 AM.  Learner: Patient  Readiness: Acceptance  Method: Explanation, Handout  Response: Verbalizes Understanding, Demonstrated Understanding    Precautions, taught by Leigh Ann Palomino PT at 2/27/2024  10:13 AM.  Learner: Patient  Readiness: Acceptance  Method: Explanation, Handout  Response: Verbalizes Understanding, Demonstrated Understanding    Body Mechanics, taught by Leigh Ann Palomino PT at 2/27/2024 10:13 AM.  Learner: Patient  Readiness: Acceptance  Method: Explanation, Handout  Response: Verbalizes Understanding, Demonstrated Understanding    Home Exercise Program, taught by Leigh Ann Palomino PT at 2/27/2024 10:13 AM.  Learner: Patient  Readiness: Acceptance  Method: Explanation, Handout  Response: Verbalizes Understanding, Demonstrated Understanding    Mobility Training, taught by Leigh Ann Palomino PT at 2/27/2024 10:13 AM.  Learner: Patient  Readiness: Acceptance  Method: Explanation, Handout  Response: Verbalizes Understanding, Demonstrated Understanding    Education Comments  No comments found.

## 2024-02-27 NOTE — ANESTHESIA POSTPROCEDURE EVALUATION
Patient: Sherri Najera    Procedure Summary       Date: 02/26/24 Room / Location: [facility] OR 01 / [facility]    Anesthesia Start: 1408 Anesthesia Stop: 1651    Procedure: ARTHROPLASTY TOTAL KNEE (Left: Knee) Diagnosis:       Arthritis of left knee      (Arthritis of left knee [M17.12])    Surgeons: Isaac Dumont MD Responsible Provider: CECILIO Coker    Anesthesia Type: regional, MAC, spinal ASA Status: 2            Anesthesia Type: regional, MAC, spinal    Vitals Value Taken Time   /60 02/26/24 1900   Temp 36.3 °C (97.3 °F) 02/26/24 1645   Pulse 93 02/26/24 1914   Resp 11 02/26/24 1900   SpO2 95 % 02/26/24 1914   Vitals shown include unvalidated device data.    Anesthesia Post Evaluation    Patient location during evaluation: PACU  Patient participation: complete - patient participated  Level of consciousness: awake  Pain management: adequate  Multimodal analgesia pain management approach  Airway patency: patent  Two or more strategies used to mitigate risk of obstructive sleep apnea  Cardiovascular status: acceptable  Respiratory status: acceptable  Hydration status: acceptable  Postoperative Nausea and Vomiting: none        No notable events documented.

## 2024-02-27 NOTE — PROGRESS NOTES
02/27/24 1001   Discharge Planning   Living Arrangements Spouse/significant other   Support Systems Spouse/significant other;Family members;Friends/neighbors   Assistance Needed Patient is A&O X3, on room air, independent with ADLs and uses no DME at baseline. Patient has walker to use post-op. Patient was driving prior to surgery. Patient voices no other DC needs other than new [facility] that referral was sent for prior to surgery.   Type of Residence Private residence   Number of Stairs to Enter Residence 3   Number of Stairs Within Residence 0  (Has stairs but stays on first floor)   Who is requesting discharge planning? Provider   Home or Post Acute Services In home services   Type of Home Care Services Home PT   Patient expects to be discharged to: Home with new [facility]   Does the patient need discharge transport arranged? No   Financial Resource Strain   How hard is it for you to pay for the very basics like food, housing, medical care, and heating? Not hard   Housing Stability   In the last 12 months, was there a time when you were not able to pay the mortgage or rent on time? N   In the last 12 months, how many places have you lived? 1   In the last 12 months, was there a time when you did not have a steady place to sleep or slept in a shelter (including now)? N   Transportation Needs   In the past 12 months, has lack of transportation kept you from medical appointments or from getting medications? no   In the past 12 months, has lack of transportation kept you from meetings, work, or from getting things needed for daily living? No   Patient Choice   Provider Choice list and CMS website (https://medicare.gov/care-compare#search) for post-acute Quality and Resource Measure Data were provided and reviewed with: Patient   Patient / Family choosing to utilize agency / facility established prior to hospitalization No

## 2024-02-28 ENCOUNTER — HOME CARE VISIT (OUTPATIENT)
Dept: HOME HEALTH SERVICES | Facility: HOME HEALTH | Age: 79
End: 2024-02-28
Payer: MEDICARE

## 2024-02-28 VITALS
TEMPERATURE: 98.3 F | DIASTOLIC BLOOD PRESSURE: 66 MMHG | HEART RATE: 86 BPM | RESPIRATION RATE: 18 BRPM | OXYGEN SATURATION: 92 % | SYSTOLIC BLOOD PRESSURE: 146 MMHG

## 2024-02-28 PROCEDURE — 169592 NO-PAY CLAIM PROCEDURE

## 2024-02-28 PROCEDURE — G0151 HHCP-SERV OF PT,EA 15 MIN: HCPCS | Mod: HHH

## 2024-02-28 PROCEDURE — 0023 HH SOC

## 2024-02-28 PROCEDURE — 1090000001 HH PPS REVENUE CREDIT

## 2024-02-28 PROCEDURE — 1090000002 HH PPS REVENUE DEBIT

## 2024-02-28 ASSESSMENT — ENCOUNTER SYMPTOMS
PERSON REPORTING PAIN: PATIENT
PAIN LOCATION: LEFT KNEE
PAIN LOCATION - PAIN SEVERITY: 1/10
PAIN LOCATION - PAIN FREQUENCY: CONSTANT
HIGHEST PAIN SEVERITY IN PAST 24 HOURS: 9/10
PAIN LOCATION - PAIN QUALITY: ACHING, SHOOTING
PAIN: 1
SUBJECTIVE PAIN PROGRESSION: WAXING AND WANING
LOWEST PAIN SEVERITY IN PAST 24 HOURS: 1/10

## 2024-02-29 PROCEDURE — 1090000001 HH PPS REVENUE CREDIT

## 2024-02-29 PROCEDURE — 1090000002 HH PPS REVENUE DEBIT

## 2024-02-29 SDOH — HEALTH STABILITY: PHYSICAL HEALTH
EXERCISE COMMENTS: PT INSTRUCTED PATIENT IN EXERCISES PER TKA PROTOCOL 1X10:  - ANKLE PUMPS  - GLUT SETS  - QUAD SETS  - HEELSLIDES  - HIP ABDUCTION WITH ACTIVE ASSIST  - SAQ WITH ACTIVE ASSIST  - LAQ  - SLR WITH ACTIVE ASSIST

## 2024-02-29 ASSESSMENT — ACTIVITIES OF DAILY LIVING (ADL)
ENTERING_EXITING_HOME: MODERATE ASSIST
OASIS_M1830: 05
AMBULATION ASSISTANCE ON FLAT SURFACES: 1

## 2024-02-29 ASSESSMENT — ENCOUNTER SYMPTOMS: OCCASIONAL FEELINGS OF UNSTEADINESS: 1

## 2024-03-01 ENCOUNTER — HOME CARE VISIT (OUTPATIENT)
Dept: HOME HEALTH SERVICES | Facility: HOME HEALTH | Age: 79
End: 2024-03-01
Payer: MEDICARE

## 2024-03-01 VITALS
OXYGEN SATURATION: 100 % | HEART RATE: 88 BPM | DIASTOLIC BLOOD PRESSURE: 88 MMHG | RESPIRATION RATE: 18 BRPM | SYSTOLIC BLOOD PRESSURE: 150 MMHG | TEMPERATURE: 98.1 F

## 2024-03-01 DIAGNOSIS — M17.12 UNILATERAL PRIMARY OSTEOARTHRITIS, LEFT KNEE: ICD-10-CM

## 2024-03-01 DIAGNOSIS — J44.9 CHRONIC OBSTRUCTIVE PULMONARY DISEASE (MULTI): Primary | ICD-10-CM

## 2024-03-01 PROCEDURE — 1090000002 HH PPS REVENUE DEBIT

## 2024-03-01 PROCEDURE — 1090000001 HH PPS REVENUE CREDIT

## 2024-03-01 PROCEDURE — G0151 HHCP-SERV OF PT,EA 15 MIN: HCPCS | Mod: HHH

## 2024-03-01 SDOH — HEALTH STABILITY: PHYSICAL HEALTH
EXERCISE COMMENTS: PT INSTRUCTED PATIENT IN EXERCISES PER TKA PROTOCOL 1X10:  - ANKLE PUMPS  - GLUT SETS  - QUAD SETS  - HEELSLIDES  - HIP ABDUCTION  - SAQ  - LAQ  - SLR  - SEATED KNEE FLEXION STRETCH

## 2024-03-01 ASSESSMENT — ENCOUNTER SYMPTOMS
LOWEST PAIN SEVERITY IN PAST 24 HOURS: 2/10
PERSON REPORTING PAIN: PATIENT
PAIN: 1
HIGHEST PAIN SEVERITY IN PAST 24 HOURS: 8/10

## 2024-03-02 PROCEDURE — 1090000002 HH PPS REVENUE DEBIT

## 2024-03-02 PROCEDURE — 1090000001 HH PPS REVENUE CREDIT

## 2024-03-03 PROCEDURE — 1090000002 HH PPS REVENUE DEBIT

## 2024-03-03 PROCEDURE — 1090000001 HH PPS REVENUE CREDIT

## 2024-03-04 ENCOUNTER — TELEPHONE (OUTPATIENT)
Dept: INPATIENT UNIT | Facility: HOSPITAL | Age: 79
End: 2024-03-04
Payer: MEDICARE

## 2024-03-04 ENCOUNTER — HOME CARE VISIT (OUTPATIENT)
Dept: HOME HEALTH SERVICES | Facility: HOME HEALTH | Age: 79
End: 2024-03-04
Payer: MEDICARE

## 2024-03-04 VITALS
TEMPERATURE: 98.7 F | DIASTOLIC BLOOD PRESSURE: 86 MMHG | SYSTOLIC BLOOD PRESSURE: 144 MMHG | OXYGEN SATURATION: 97 % | RESPIRATION RATE: 16 BRPM | HEART RATE: 76 BPM

## 2024-03-04 PROCEDURE — G0151 HHCP-SERV OF PT,EA 15 MIN: HCPCS | Mod: HHH

## 2024-03-04 PROCEDURE — 1090000002 HH PPS REVENUE DEBIT

## 2024-03-04 PROCEDURE — 1090000001 HH PPS REVENUE CREDIT

## 2024-03-04 ASSESSMENT — ENCOUNTER SYMPTOMS
PAIN LOCATION - PAIN SEVERITY: 2/10
LOWEST PAIN SEVERITY IN PAST 24 HOURS: 2/10
PAIN LOCATION - PAIN FREQUENCY: CONSTANT
PAIN LOCATION: LEFT KNEE
PERSON REPORTING PAIN: PATIENT
PAIN LOCATION - RELIEVING FACTORS: ICE, MEDICATION
PAIN: 1
PAIN LOCATION - PAIN QUALITY: ACHY
HIGHEST PAIN SEVERITY IN PAST 24 HOURS: 8/10

## 2024-03-05 PROCEDURE — G0180 MD CERTIFICATION HHA PATIENT: HCPCS | Performed by: ORTHOPAEDIC SURGERY

## 2024-03-05 PROCEDURE — 1090000001 HH PPS REVENUE CREDIT

## 2024-03-05 PROCEDURE — 1090000002 HH PPS REVENUE DEBIT

## 2024-03-06 ENCOUNTER — HOME CARE VISIT (OUTPATIENT)
Dept: HOME HEALTH SERVICES | Facility: HOME HEALTH | Age: 79
End: 2024-03-06
Payer: MEDICARE

## 2024-03-06 PROCEDURE — 1090000002 HH PPS REVENUE DEBIT

## 2024-03-06 PROCEDURE — G0151 HHCP-SERV OF PT,EA 15 MIN: HCPCS | Mod: HHH

## 2024-03-06 PROCEDURE — 1090000001 HH PPS REVENUE CREDIT

## 2024-03-06 SDOH — HEALTH STABILITY: PHYSICAL HEALTH
EXERCISE COMMENTS: PT INSTRUCTED PATIENT IN EXERCISES PER TKA PROTOCOL 1X15:  - ANKLE PUMPS  - GLUT SETS  - QUAD SETS  - HEELSLIDES  - HIP ABDUCTION  - SAQ  - LAQ  - SLR  - SEATED KNEE FLEXION STRETCH  - SEATED KNEE EXTENSION STRETCH  - STANDING KNEE EXTENSION  - STAND

## 2024-03-06 SDOH — HEALTH STABILITY: PHYSICAL HEALTH: EXERCISE COMMENTS: ING KNEE FLEXION  - STANDING PF/DF

## 2024-03-06 ASSESSMENT — ENCOUNTER SYMPTOMS
PERSON REPORTING PAIN: PATIENT
SUBJECTIVE PAIN PROGRESSION: WAXING AND WANING
PAIN LOCATION: LEFT KNEE
PAIN LOCATION - EXACERBATING FACTORS: EXERCISES
PAIN: 1
HIGHEST PAIN SEVERITY IN PAST 24 HOURS: 7/10
PAIN LOCATION - PAIN SEVERITY: 2/10
PAIN LOCATION - PAIN QUALITY: ACHING
LOWEST PAIN SEVERITY IN PAST 24 HOURS: 0/10

## 2024-03-07 LAB
LABORATORY COMMENT REPORT: NORMAL
PATH REPORT.FINAL DX SPEC: NORMAL
PATH REPORT.GROSS SPEC: NORMAL
PATH REPORT.RELEVANT HX SPEC: NORMAL
PATH REPORT.TOTAL CANCER: NORMAL

## 2024-03-07 PROCEDURE — 1090000002 HH PPS REVENUE DEBIT

## 2024-03-07 PROCEDURE — 1090000001 HH PPS REVENUE CREDIT

## 2024-03-08 PROCEDURE — 1090000001 HH PPS REVENUE CREDIT

## 2024-03-08 PROCEDURE — 1090000002 HH PPS REVENUE DEBIT

## 2024-03-09 PROCEDURE — 1090000002 HH PPS REVENUE DEBIT

## 2024-03-09 PROCEDURE — 1090000001 HH PPS REVENUE CREDIT

## 2024-03-10 PROCEDURE — 1090000002 HH PPS REVENUE DEBIT

## 2024-03-10 PROCEDURE — 1090000001 HH PPS REVENUE CREDIT

## 2024-03-11 ENCOUNTER — HOME CARE VISIT (OUTPATIENT)
Dept: HOME HEALTH SERVICES | Facility: HOME HEALTH | Age: 79
End: 2024-03-11
Payer: MEDICARE

## 2024-03-11 PROCEDURE — G0151 HHCP-SERV OF PT,EA 15 MIN: HCPCS | Mod: HHH

## 2024-03-11 PROCEDURE — 1090000002 HH PPS REVENUE DEBIT

## 2024-03-11 PROCEDURE — 1090000001 HH PPS REVENUE CREDIT

## 2024-03-11 SDOH — HEALTH STABILITY: PHYSICAL HEALTH
EXERCISE COMMENTS: ING KNEE FLEXION  - STANDING ALTERNATING HIP FLEXION  - STANDING PF/DF    PT INSTRUCTED PATIENT IN ADDITIONAL STANDING EXERCISES INCLUDING STANDING EXERCISES ON B LE'S TO FACILITATE IMPROVED STABILITY OF SURGICAL LE.

## 2024-03-11 ASSESSMENT — ENCOUNTER SYMPTOMS
PAIN LOCATION - EXACERBATING FACTORS: EXERCISES
PAIN LOCATION - PAIN SEVERITY: 2/10
SUBJECTIVE PAIN PROGRESSION: GRADUALLY IMPROVING
HIGHEST PAIN SEVERITY IN PAST 24 HOURS: 6/10
PAIN LOCATION - RELIEVING FACTORS: ICE, MEDICATION
PAIN: 1
LOWEST PAIN SEVERITY IN PAST 24 HOURS: 1/10
PAIN LOCATION: LEFT KNEE
PERSON REPORTING PAIN: PATIENT
PAIN LOCATION - PAIN QUALITY: SORE

## 2024-03-12 DIAGNOSIS — E11.9 CONTROLLED TYPE 2 DIABETES MELLITUS WITHOUT COMPLICATION, WITHOUT LONG-TERM CURRENT USE OF INSULIN (MULTI): ICD-10-CM

## 2024-03-12 PROCEDURE — 1090000001 HH PPS REVENUE CREDIT

## 2024-03-12 PROCEDURE — 1090000002 HH PPS REVENUE DEBIT

## 2024-03-12 RX ORDER — PIOGLITAZONEHYDROCHLORIDE 15 MG/1
15 TABLET ORAL DAILY
Qty: 90 TABLET | Refills: 3 | Status: SHIPPED | OUTPATIENT
Start: 2024-03-12 | End: 2024-03-12 | Stop reason: SDUPTHER

## 2024-03-12 RX ORDER — PIOGLITAZONEHYDROCHLORIDE 15 MG/1
15 TABLET ORAL DAILY
Qty: 90 TABLET | Refills: 0 | Status: SHIPPED | OUTPATIENT
Start: 2024-03-12 | End: 2024-03-19 | Stop reason: SDUPTHER

## 2024-03-13 ENCOUNTER — HOME CARE VISIT (OUTPATIENT)
Dept: HOME HEALTH SERVICES | Facility: HOME HEALTH | Age: 79
End: 2024-03-13
Payer: MEDICARE

## 2024-03-13 VITALS
SYSTOLIC BLOOD PRESSURE: 124 MMHG | TEMPERATURE: 98.4 F | OXYGEN SATURATION: 97 % | RESPIRATION RATE: 16 BRPM | HEART RATE: 93 BPM | DIASTOLIC BLOOD PRESSURE: 76 MMHG

## 2024-03-13 PROCEDURE — 1090000001 HH PPS REVENUE CREDIT

## 2024-03-13 PROCEDURE — 1090000002 HH PPS REVENUE DEBIT

## 2024-03-13 PROCEDURE — G0151 HHCP-SERV OF PT,EA 15 MIN: HCPCS | Mod: HHH

## 2024-03-13 ASSESSMENT — ENCOUNTER SYMPTOMS
SUBJECTIVE PAIN PROGRESSION: GRADUALLY IMPROVING
LOWEST PAIN SEVERITY IN PAST 24 HOURS: 1/10
PAIN: 1
PERSON REPORTING PAIN: PATIENT
HIGHEST PAIN SEVERITY IN PAST 24 HOURS: 6/10

## 2024-03-13 ASSESSMENT — ACTIVITIES OF DAILY LIVING (ADL)
HOME_HEALTH_OASIS: 01
OASIS_M1830: 01

## 2024-03-15 ENCOUNTER — OFFICE VISIT (OUTPATIENT)
Dept: ORTHOPEDIC SURGERY | Facility: CLINIC | Age: 79
End: 2024-03-15
Payer: MEDICARE

## 2024-03-15 ENCOUNTER — HOSPITAL ENCOUNTER (OUTPATIENT)
Dept: RADIOLOGY | Facility: HOSPITAL | Age: 79
Discharge: HOME | End: 2024-03-15
Payer: MEDICARE

## 2024-03-15 DIAGNOSIS — M17.12 ARTHRITIS OF KNEE, LEFT: ICD-10-CM

## 2024-03-15 DIAGNOSIS — M17.12 ARTHRITIS OF KNEE, LEFT: Primary | ICD-10-CM

## 2024-03-15 PROCEDURE — 73564 X-RAY EXAM KNEE 4 OR MORE: CPT | Mod: LT

## 2024-03-15 PROCEDURE — 1159F MED LIST DOCD IN RCRD: CPT | Performed by: ORTHOPAEDIC SURGERY

## 2024-03-15 PROCEDURE — 1036F TOBACCO NON-USER: CPT | Performed by: ORTHOPAEDIC SURGERY

## 2024-03-15 PROCEDURE — 99024 POSTOP FOLLOW-UP VISIT: CPT | Performed by: ORTHOPAEDIC SURGERY

## 2024-03-15 PROCEDURE — 1160F RVW MEDS BY RX/DR IN RCRD: CPT | Performed by: ORTHOPAEDIC SURGERY

## 2024-03-15 RX ORDER — OXYCODONE AND ACETAMINOPHEN 5; 325 MG/1; MG/1
1 TABLET ORAL EVERY 4 HOURS PRN
Qty: 36 TABLET | Refills: 0 | Status: SHIPPED | OUTPATIENT
Start: 2024-03-15 | End: 2024-03-22

## 2024-03-15 NOTE — PROGRESS NOTES
Chief Complaint   Patient presents with    Left Knee - Pain     2/26/24 LT TKA         This is a 78 y.o. female who is 2 weeks out from left total knee.  Pain is under control with current medications.  No drainage from her incision no fevers or chills.  Progressing well with physical therapy and appropriately improving in function.  No other new issues or symptoms.    Left knee examination: Surgical incision well approximated no erythema no drainage.  Stable to varus valgus stress range of motion 0-95.  Neurovascular tact distally    X-rays of the knee were reviewed independently interpreted by me today, the show stable total knee arthroplasty no fracture dislocation or loosening    Impression plan: 78 y.o. female 2 weeks out from left total knee.  We discussed continuing physical therapy and home exercise program. Pain medications to be used as needed. Assistive devices as needed per PT. We discussed DVT prophylaxis until 6 weeks. No dentist until 3 months and thereafter dental prophylaxis for life. Activity as tolerated weightbearing as tolerated. I have personally reviewed the OARRS report for the patient. This report is scanned into the electronic medical record. I have considered the risks of abuse, dependence, addiction and diversion. Follow up 4 weeks with xrays.  Pain 7 out of 10

## 2024-03-18 ENCOUNTER — EVALUATION (OUTPATIENT)
Dept: PHYSICAL THERAPY | Facility: CLINIC | Age: 79
End: 2024-03-18
Payer: MEDICARE

## 2024-03-18 DIAGNOSIS — M17.12 ARTHRITIS OF KNEE, LEFT: ICD-10-CM

## 2024-03-18 DIAGNOSIS — Z96.652 STATUS POST TOTAL LEFT KNEE REPLACEMENT: ICD-10-CM

## 2024-03-18 DIAGNOSIS — M25.562 LEFT KNEE PAIN: Primary | ICD-10-CM

## 2024-03-18 PROCEDURE — 97110 THERAPEUTIC EXERCISES: CPT | Mod: GP

## 2024-03-18 PROCEDURE — 97161 PT EVAL LOW COMPLEX 20 MIN: CPT | Mod: GP

## 2024-03-18 NOTE — PROGRESS NOTES
"Physical Therapy Evaluation     Patient Name: Sherri Najera \"Millicent\"  MRN: [id number]  Today's Date: 3/18/2024  Time Calculation  Start Time: 1217  Stop Time: 1303  Time Calculation (min): 46 min      Insurance:  Number of Treatments Authorized: 1/[place] ((NO REFERRAL IN SYSTEM) 96% [place], NO AUTH, AETNA)  Certification Period Start Date: 03/18/24  Certification Period End Date: 06/16/24    Current Problem:   1. Left knee pain  Follow Up In Physical Therapy      2. Arthritis of knee, left  Referral to Physical Therapy      3. Status post total left knee replacement  Follow Up In Physical Therapy          Precautions:  Precautions  Precautions Comment: Low fall risk    Reason for visit: L Knee pain s/p TKR on 2/26/24   Referred by: Dr. Dumont    Initial onset: 6-7 years ago, was going down stairs sideways and stepped on the bottom step and felt a pain, did not fall  had arthroscopic surgery but it did not resolve the issue.  Pain progressive worsened over the years. Had home PT after the surgery.    Limitation/Disability prior to surgery: Pain limited with amb distance to more than household and was avoiding stairs.  Was not using an assistive device.  Was limited with light house work.      Work, mechanical stresses: Retired   Leisure, mechanical stresses: Painting. Socialize with girls.   Functional disability since surgery: Slow careful with transfers.  Assist with bathing and don/doffing pants. Not doing any light housework. Amb with std cane and distance limited to house hold. Can do steps with step to pattern limited to x 1 a day.    Home environment: 3 steps to enter. Bedroom on 2nd floor - sleeping on recliner on 1st floor.   Available assist:  to assist 24/7.   Equipment/AD: std cane.  Walker.  Tub chair.   Constant symptoms: Left knee - \"entire thing\"  Intermittent symptoms: Left knee med/lat   Pain ranges from: 1-6/10  Curretn pain level today: 2/10 L knee  Pt describes pain as: " uncomfortable/ache, twinge/sharp    Objective Findings:  Outcome Measures:     Knee PROM - degrees.  Flex: R = 131, L = 94 ERP    Ext: R = 0-3 , L = 0-9 ERP  MMT:   Hip  Flex: R =4+/5, L = 3+/5    IR: R = 5/5, L = 4/5 limited by pain  ER:  R = 4+/5, L = 4+/5  knee   Flex: R = 5/5, L = 4-/5 limited by pain    Ext: R = 5/5, L = 3+/5 limited by pain     Treatment x 5 each:   - Seated Long Arc Quad    - Seated Quad Set  - Seated knee ext with self OP   - Standing Knee Flexion Stretch on Step    - Standing Knee Flexion   - Standing Heel Raise with Support    - Standing Hip Flexion    - Squat with Chair Touch    - Standing Hip Extension    - Standing Hip Abduction   Instructed pt to AVOID prolonged mid range knee flexion/propped with pillow under knee.   Educated pt on proper response to exercise, produce/increase - NW principle, and healing process.  Issued handout for HEP  HEP/med bridge:   Date: 03/18/2024. Prepared by: Gianni Mora  Exercises  - Seated Long Arc Quad  - 5-8 x daily - 7 x weekly - 1 sets - 10-20 reps  - Seated Quad Set  - 5-8 x daily - 7 x weekly - 1 sets - 10-20 reps  - Standing Knee Flexion Stretch on Step  - 3 x daily - 7 x weekly - 1 sets - 10-20 reps  - Standing Knee Flexion Strengthening at Chair  - 1 x daily - 3-4 x weekly - 2-3 sets - 10-20 reps  - Squat with Chair Support  - 1 x daily - 3-4 x weekly - 2-3 sets - 10-20 reps  - Standing Heel Raise with Support  - 1 x daily - 3-4 x weekly - 2-3 sets - 10-20 reps  - Standing Hip Flexion  - 1 x daily - 3-4 x weekly - 2-3 sets - 10-20 reps  - Squat with Chair Touch  - 1 x daily - 3-4 x weekly - 2-3 sets - 10-20 reps  - Standing Hip Extension  - 1 x daily - 3-4 x weekly - 2-3 sets - 10-20 reps  - Standing Hip Abduction  - 1 x daily - 3-4 x weekly - 2-3 sets - 10-20 reps    Assessment: Pt presents to PT with complaint of L knee pain s/p TKR on 2/26/24 by Dr. Dumont. Pt will benefit from skilled PT to address ROM/strength/functional limitations and  pain noted during evaluation today.    Plan of Care:  Problem List: Pain, Functional limitations, activity limitations, ROM limitations, Posture, Gait, Transfer, Activity Limitations, IADLS/ADLS/Self care  Goals:  STG:  Pain = 0-3/10 L knee by week 4  Pt will be able to amb house hold distances without assistive device with nil non antalgic gait pattern by week 4  Pt will be able to don/doff pants without assist by week 4  LTG:  L knee ROM >/= 0-5-120 by week 10  LEFS >/= 40/80 by week 10  L knee flex/ext with MMT >/= 4+/5 without limitation from pain by week 8  Pt will report >/= 80% improvement/progress towards PT goals by week 10  Pt will be able to go up/down full flight of stairs reciprocal without limitation from L knee pain by week 8  Pt will be bale to perform light house work at >/= 50% of prior level by week 10  Pt will be able to amb community distances without assistive device with nil non antalgic gait pattern by week 10  Planned interventions: Ther ex, Neuromuscular re-ed, ther act, Manual, Education, Home program, Estim, Hot therapy, Cold therapy, Vaso  The POC was created, discussed, and agreed on with the patient.

## 2024-03-19 ENCOUNTER — TELEPHONE (OUTPATIENT)
Dept: PRIMARY CARE | Facility: CLINIC | Age: 79
End: 2024-03-19

## 2024-03-19 DIAGNOSIS — E11.9 CONTROLLED TYPE 2 DIABETES MELLITUS WITHOUT COMPLICATION, WITHOUT LONG-TERM CURRENT USE OF INSULIN (MULTI): ICD-10-CM

## 2024-03-19 RX ORDER — PIOGLITAZONEHYDROCHLORIDE 15 MG/1
15 TABLET ORAL DAILY
Qty: 90 TABLET | Refills: 0 | Status: SHIPPED | OUTPATIENT
Start: 2024-03-19 | End: 2024-05-21 | Stop reason: SDUPTHER

## 2024-03-19 RX ORDER — MIRTAZAPINE 15 MG/1
15 TABLET, FILM COATED ORAL NIGHTLY
Qty: 90 TABLET | Refills: 0 | Status: SHIPPED | OUTPATIENT
Start: 2024-03-19

## 2024-03-19 NOTE — TELEPHONE ENCOUNTER
Refill:  pioglitazone (Actos) 15 mg tablet Take 1 tablet (15 mg) by mouth once daily.     PAHRM:  [facility]         Refill:  mirtazapine (Remeron) 15 mg tablet Take 1 tablet by mouth once daily at bedtime.     PHARM:   [facility]

## 2024-03-20 ENCOUNTER — TREATMENT (OUTPATIENT)
Dept: PHYSICAL THERAPY | Facility: CLINIC | Age: 79
End: 2024-03-20
Payer: MEDICARE

## 2024-03-20 DIAGNOSIS — Z96.652 STATUS POST TOTAL LEFT KNEE REPLACEMENT: ICD-10-CM

## 2024-03-20 DIAGNOSIS — M25.562 LEFT KNEE PAIN: ICD-10-CM

## 2024-03-20 PROCEDURE — 97110 THERAPEUTIC EXERCISES: CPT | Mod: GP

## 2024-03-20 NOTE — PROGRESS NOTES
Physical Therapy  Physical Therapy Treatment Note    Patient Name: Sherri Najera  MRN: [id number]  Today's Date: 3/20/24  Time Calculation  Start Time: 1202  Stop Time: 1247  Time Calculation (min): 45 min    Insurance:  Number of Treatments Authorized: 2/[place] ((NO REFERRAL IN SYSTEM) 96% [place], [place], NO AUTH, AETNA)  Certification Period Start Date: 03/18/24  Certification Period End Date: 06/16/24  Current Problem  1. Left knee pain  Follow Up In Physical Therapy      2. Status post total left knee replacement  Follow Up In Physical Therapy          Precautions  Precautions  Precautions Comment: Low fall risk    Subjective   General       Patient reports:  Was able to go into the bed last night and make it through the night.  Woke up a few times but it wasn't bad.  Hard to get comfortable on her back.  The cane is becoming more of annoyance but doesn't feel 100% confident to stop using yet.  HEP went ok did not cause increased pain.      Performing HEP?: Yes    Pain   2-3/10 L knee    Objective       Treatments:  Therex:   Scifit L1 man x 6 min   B heel raises x 1 min  B G/S stretch on incline board x 1 min   R/L lateral steps with yellow loop 1 min x 2   R, L F/B diagonal steps with yellow loop x 1 min each LE  L LAQ 1 min x 2   B HS curl machine 30#s 1 min x 2   Left lateral step up 4 inch 1 min x 2   L Semiloaded L knee flexion on chair x 10   Seated L QS x 1 min  Seated L knee ext with self OP x 1 min   Sit to stand from chair with 2 pads x 10    OP EDUCATION:     Eval/Date: 03/18/2024. Prepared by: Gianni Mora  Exercises  - Seated Long Arc Quad  - 5-8 x daily - 7 x weekly - 1 sets - 10-20 reps  - Seated Quad Set  - 5-8 x daily - 7 x weekly - 1 sets - 10-20 reps  - Standing Knee Flexion Stretch on Step  - 3 x daily - 7 x weekly - 1 sets - 10-20 reps  - Standing Knee Flexion Strengthening at Chair  - 1 x daily - 3-4 x weekly - 2-3 sets - 10-20 reps  - Squat with Chair Support  - 1 x daily - 3-4 x weekly  - 2-3 sets - 10-20 reps  - Standing Heel Raise with Support  - 1 x daily - 3-4 x weekly - 2-3 sets - 10-20 reps  - Standing Hip Flexion  - 1 x daily - 3-4 x weekly - 2-3 sets - 10-20 reps  - Squat with Chair Touch  - 1 x daily - 3-4 x weekly - 2-3 sets - 10-20 reps  - Standing Hip Extension  - 1 x daily - 3-4 x weekly - 2-3 sets - 10-20 reps  - Standing Hip Abduction  - 1 x daily - 3-4 x weekly - 2-3 sets - 10-20 reps    Assessment:  Pt tolerated therex well today, nil increased p[ain with strengthening.  Required verbal tactile  cues with HEP review, mostly with semiloaded knee ext procedures, had good carryover. Overall, progressing as expected.       Plan:  Focus on knee ext ROM and quad activation/LE strengthening   Wean from std cane as able.   OP PT Plan  Certification Period Start Date: 03/18/24  Certification Period End Date: 06/16/24  Number of Treatments Authorized: 2/[place] ((NO REFERRAL IN SYSTEM) 96% [place], [place], NO AUTH, AETNA)    Goals:       Gianni Mora, PT

## 2024-03-21 PROBLEM — M25.562 LEFT KNEE PAIN: Status: ACTIVE | Noted: 2024-03-21

## 2024-03-21 PROBLEM — Z96.652 STATUS POST TOTAL LEFT KNEE REPLACEMENT: Status: ACTIVE | Noted: 2024-03-21

## 2024-03-25 ENCOUNTER — TREATMENT (OUTPATIENT)
Dept: PHYSICAL THERAPY | Facility: CLINIC | Age: 79
End: 2024-03-25
Payer: MEDICARE

## 2024-03-25 DIAGNOSIS — M25.562 LEFT KNEE PAIN: ICD-10-CM

## 2024-03-25 DIAGNOSIS — M17.12 ARTHRITIS OF KNEE, LEFT: ICD-10-CM

## 2024-03-25 DIAGNOSIS — Z96.652 STATUS POST TOTAL LEFT KNEE REPLACEMENT: Primary | ICD-10-CM

## 2024-03-25 PROCEDURE — 97110 THERAPEUTIC EXERCISES: CPT | Mod: GP,CQ

## 2024-03-25 PROCEDURE — 97140 MANUAL THERAPY 1/> REGIONS: CPT | Mod: GP,CQ

## 2024-03-25 PROCEDURE — 97112 NEUROMUSCULAR REEDUCATION: CPT | Mod: CQ,GP

## 2024-03-25 NOTE — PROGRESS NOTES
Physical Therapy Treatment    Patient Name: Sherri Najera  MRN: [id number]  Today's Date: 3/25/2024  Time Calculation  Start Time: 1144  Stop Time: 1244  Time Calculation (min): 60 min   ,      Current Problem  1. Status post total left knee replacement  Follow Up In Physical Therapy      2. Left knee pain  Follow Up In Physical Therapy      3. Arthritis of knee, left            Insurance:  Number of Treatments Authorized: 3/[place] ((NO REFERRAL IN SYSTEM) 96% [place], [place] AUTH, AETNA)  Certification Period Start Date: 03/18/24  Certification Period End Date: 06/16/24      Subjective   General  General Comment: Patient has not been using her SPC in the house.  she takes it our doors for safety.  Her knee fells tight in the morning. Getting our of a chair and stair climbing are still challenging.  She feels as thoughs she still babies her L leg.    Performing HEP?: Yes    Precautions  Precautions  Precautions Comment: Low fall risk  Pain       Objective   L knee AROM 3 - 115  Lacks L TKE in stance    Treatments:  Therex:   Scifit L2 man x 6 min   B heel raises on incline board x 10  B G/S stretch on incline board x 1 min   Alternating foot taps on 8 in step 2 - 1 - 0 UE assist x 1 MIN  Dynamics Marches, Retro Walking, Butt Kicks, Tin Soldiers 40 ft x 1  Sit to Stand from chair with 2 pads x 5  Sit to Stand from chair with 1 pad x 10  B HS curl machine 35#s x 1 MIN x 2  Seated L LAQ 2# x 1 MIN  Lateral step up and over on 4 in step 1 MIN  FWD R/L Leading step up and downs x 1 MIN each  Seated Valslide L heel slides > knee ext with QS x 2 min  SL clamshell with YTB 2 x 10  Bridge with ADD x 10  MANUAL:   -L knee AROM F/E with OP   - PF mobs   - gentle leg pull                                       OP EDUCATION:  Outpatient Education  Education Comment: Access Code: [email or web address]  URL: [email or web address]  Date: 03/25/2024  Prepared by: Vesta Francis    Exercises  - Seated Knee Flexion Extension  AROM   - 1 x daily - 7 x weekly - 3 sets - 10 reps - 5 hold  - Walking March and butt kicks - 1 x daily - 7 x weekly - 3 sets - 10 reps  - Clam with Resistance  - 1 x daily - 7 x weekly - 3 sets - 10 reps    Assessment:  PT Assessment  Assessment Comment: Patient is making good progress with K TKA to date.  Progressed to increase balance/proprioception skills as well as increased WB L LE.  Encouraged patient to work on knee extension ROM.  Updated HEP.    Plan:  Continue with ROM, extension>flexion; quad strengthening  OP PT Plan  Certification Period Start Date: 03/18/24  Certification Period End Date: 06/16/24  Number of Treatments Authorized: 3/MN ((NO REFERRAL IN SYSTEM) 96[place], [place] AUTH, AETNA)    Goals:       Rola Francis, PTA

## 2024-03-26 DIAGNOSIS — E78.5 HYPERLIPIDEMIA, UNSPECIFIED HYPERLIPIDEMIA TYPE: ICD-10-CM

## 2024-03-26 RX ORDER — ATORVASTATIN CALCIUM 80 MG/1
80 TABLET, FILM COATED ORAL DAILY
Qty: 30 TABLET | Refills: 0 | Status: SHIPPED | OUTPATIENT
Start: 2024-03-26 | End: 2024-05-03 | Stop reason: SDUPTHER

## 2024-03-28 ENCOUNTER — TREATMENT (OUTPATIENT)
Dept: PHYSICAL THERAPY | Facility: CLINIC | Age: 79
End: 2024-03-28
Payer: MEDICARE

## 2024-03-28 DIAGNOSIS — M25.562 LEFT KNEE PAIN: ICD-10-CM

## 2024-03-28 DIAGNOSIS — Z96.652 STATUS POST TOTAL LEFT KNEE REPLACEMENT: ICD-10-CM

## 2024-03-28 PROCEDURE — 97110 THERAPEUTIC EXERCISES: CPT | Mod: GP

## 2024-03-28 PROCEDURE — 97140 MANUAL THERAPY 1/> REGIONS: CPT | Mod: GP

## 2024-03-28 ASSESSMENT — PAIN SCALES - GENERAL: PAINLEVEL_OUTOF10: 2

## 2024-03-28 ASSESSMENT — PAIN - FUNCTIONAL ASSESSMENT: PAIN_FUNCTIONAL_ASSESSMENT: 0-10

## 2024-03-28 NOTE — PROGRESS NOTES
"  Physical Therapy Treatment    Patient Name: Sherri Najera  MRN: [id number]  Today's Date: 3/28/2024  Time Calculation  Start Time: 1140  Stop Time: 1225  Time Calculation (min): 45 min   ,      Current Problem  1. Left knee pain  Follow Up In Physical Therapy      2. Status post total left knee replacement  Follow Up In Physical Therapy          Insurance:  Number of Treatments Authorized: 4/MN ((NO REFERRAL IN SYSTEM) 96[place], [place] AUTH, AETNA)  Certification Period Start Date: 03/18/24  Certification Period End Date: 06/16/24      Subjective   General  General Comment: Pt reports feeling discomfort walking into the clinic today. Pt reports feeling difficulty with getting up from a sitting position. Reports using SPC every now and then    Performing HEP?: Yes    Precautions  Precautions  Precautions Comment: Low fall risk  Pain  Pain Assessment: 0-10  Pain Score: 2  Pain Location: Knee  Pain Orientation: Left  Pain Descriptors:  (Sore, Aching)    Objective       Treatments:  Therapeutic Exercise  Therapeutic Exercise Activity 1: SciFit L2 Man x 6 MIN  Therapeutic Exercise Activity 2: Incline g/s stretch x 1 MIN  Therapeutic Exercise Activity 3: B heel raises on incline board x 1 MIN  Therapeutic Exercise Activity 4: Lateral Step up and over on 6 in step x 1 MIN  Therapeutic Exercise Activity 5: Sit to stand from chair no pad x 10 - struggles with eccentric control LLE  Therapeutic Exercise Activity 6: L Eccentric Step downs 4 in step x 10  Therapeutic Exercise Activity 7: B HS curl machine 40#s 10 x 2  Therapeutic Exercise Activity 8: Seated L LAQ 2.5# x 1 MIN  Therapeutic Exercise Activity 9: R SL YTB L Clam shells x 1 MIN x 2  Therapeutic Exercise Activity 10: Bridge with Ball squeeze x 10  Therapeutic Exercise Activity 11: QS 5\" hold x 10  Therapeutic Exercise Activity 12: SLR with QS x 10 x 2         Manual Therapy  Manual Therapy Activity 1: L knee AROM F/E with OP  Manual Therapy Activity 2: PF " mobs  Manual Therapy Activity 3: gentle leg pull                 Participated in performance of tx and documentation under the direct supervision of CI, student Dora Loya SPTA     OP EDUCATION:  Outpatient Education  Education Comment: Continue with current HEP    Assessment:  PT Assessment  Assessment Comment: Patient demonstrating increased tolerance to therex with progressions given today. Still struggling with eccentric control of LLE. Overall progressing towards goals.    Plan:  OP PT Plan  Certification Period Start Date: 03/18/24  Certification Period End Date: 06/16/24  Number of Treatments Authorized: 4/MN ((NO REFERRAL IN SYSTEM) 96% [place], [place] AUTH, AETNA)    Goals:       DORA LOYA, S-PTA

## 2024-04-01 ENCOUNTER — TREATMENT (OUTPATIENT)
Dept: PHYSICAL THERAPY | Facility: CLINIC | Age: 79
End: 2024-04-01
Payer: MEDICARE

## 2024-04-01 DIAGNOSIS — M25.562 LEFT KNEE PAIN: ICD-10-CM

## 2024-04-01 DIAGNOSIS — Z96.652 STATUS POST TOTAL LEFT KNEE REPLACEMENT: ICD-10-CM

## 2024-04-01 PROCEDURE — 97110 THERAPEUTIC EXERCISES: CPT | Mod: GP

## 2024-04-01 NOTE — PROGRESS NOTES
Physical Therapy RECHECK/Treatment    Patient Name: Sherri Najera  MRN: [id number]  Today's Date: 4/1/2024  Time Calculation  Start Time: 1120  Stop Time: 1203  Time Calculation (min): 43 min  PT Therapeutic Procedures Time Entry  Therapeutic Exercise Time Entry: 39,      Current Problem  1. Left knee pain  Follow Up In Physical Therapy      2. Status post total left knee replacement  Follow Up In Physical Therapy          Insurance:  Number of Treatments Authorized: 5/MN  Certification Period Start Date: 03/18/24  Certification Period End Date: 06/16/24      Subjective   General  [place] ok after the last session. Still concerned with pain getting out of a siting position.  Careful with twisting motions, walking. Overall feels she is progressing well, able to amb without the cane and pain is improving.     Performing HEP?: Yes 2-3 x a day ROM     Precautions  Precautions  Precautions Comment: Low fall risk  Pain  2/10 L Knee medial - uncomfortable    Objective   Knee ROM - Nil/Min/Mod/Max limit or degrees. R, L   Flex: L = 115 ERP    Ext: L = 0-5 ERP    Treatments:  Therapeutic Exercise  SciFit L3 Multipeaks x 6 MIN  Dynamics: High knees, Butt kicks, hip openers, hip closers, forward lunge x 2, heel walk, toe walk x 40 feet each  R/L lateral steps with yellow loop x 30ft each direction  B heel raises on incline board x 1 MIN  Incline g/s stretch x 1 MIN  8 inch fwd step up x 10   8 inch L lateral step down, R leading x 10   L HS curl machine 30#s 1 min x 2   Left LAQ 2#s 1 min x 2   Standing L knee ext with self OP with heel on chair x 5      OP EDUCATION:  4/1/24: Fwd step up[ and eccentric lateral step down x 1 a day 10-20 reps  instructed pt to increase knee ext with self OP to x 5 a day     Assessment:  Pt is progressing well towards goals set at initial evals shown by subjective and objective findings during recheck today. She had good tolerance to all strengthening exercises, reported decreasing pain with  repetition of step ups.  Flexion ROM has improved since eval but pt continues to lack extension as compared to the uninvolved knee  Plan:  F/U with HEP progressions and knee ext with self OP frequency  Progress strength/function as tolerated   OP PT Plan  Certification Period Start Date: 03/18/24  Certification Period End Date: 06/16/24  Number of Treatments Authorized: 5/MN      Gianni Mora, PT

## 2024-04-02 PROCEDURE — 87086 URINE CULTURE/COLONY COUNT: CPT

## 2024-04-03 ENCOUNTER — LAB REQUISITION (OUTPATIENT)
Dept: LAB | Facility: LAB | Age: 79
End: 2024-04-03
Payer: MEDICARE

## 2024-04-03 DIAGNOSIS — R31.9 HEMATURIA, UNSPECIFIED: ICD-10-CM

## 2024-04-04 ENCOUNTER — APPOINTMENT (OUTPATIENT)
Dept: PRIMARY CARE | Facility: CLINIC | Age: 79
End: 2024-04-04
Payer: MEDICARE

## 2024-04-04 ENCOUNTER — APPOINTMENT (OUTPATIENT)
Dept: PHYSICAL THERAPY | Facility: CLINIC | Age: 79
End: 2024-04-04
Payer: MEDICARE

## 2024-04-04 LAB — BACTERIA UR CULT: NORMAL

## 2024-04-08 ENCOUNTER — TREATMENT (OUTPATIENT)
Dept: PHYSICAL THERAPY | Facility: CLINIC | Age: 79
End: 2024-04-08
Payer: MEDICARE

## 2024-04-08 DIAGNOSIS — Z96.652 STATUS POST TOTAL LEFT KNEE REPLACEMENT: ICD-10-CM

## 2024-04-08 DIAGNOSIS — M25.562 LEFT KNEE PAIN: ICD-10-CM

## 2024-04-08 PROCEDURE — 97110 THERAPEUTIC EXERCISES: CPT | Mod: GP

## 2024-04-08 NOTE — PROGRESS NOTES
Physical Therapy Treatment    Patient Name: Sherri Najera  MRN: [id number]  Today's Date: 4/8/2024  Time Calculation  Start Time: 1106  Stop Time: 1149  Time Calculation (min): 43 min  PT Therapeutic Procedures Time Entry  Therapeutic Exercise Time Entry: 41,      Current Problem  1. Left knee pain  Follow Up In Physical Therapy      2. Status post total left knee replacement  Follow Up In Physical Therapy          Insurance:  Number of Treatments Authorized: 6/MN  Certification Period Start Date: 03/18/24  Certification Period End Date: 06/16/24      Subjective   General  Had to cancel last session due to sinus infection but feeling better now.  Feels like her progress slowed a bit because she missed therapy. Pain is better getting out of the sitting position.  Pain seems to be better in the morning but worse throughout the day on the inside of the knee.     Performing HEP?: Yes 2-3 x a day ROM tried step exercise which is going well.      Precautions  Precautions  Precautions Comment: Low fall risk  Pain  2-3/10 L Knee medial - uncomfortable    Objective     Treatments:  Therapeutic Exercise  SciFit L1 Multipeaks x 6 MIN  Semilaoded L knee ext with self OP x 2 min  Standing Left knee ext with self OP, heel on chair x 10  B heel raises on incline board x 1 MIN x 2   Incline g/s stretch x 1 MIN   L HS curl machine 30#s 1 min x 2   Left LAQ 4#s 1 min x 2   R/L lateral steps with green loop x 40ft each direction    OP EDUCATION:  4/8/24: Verbally modified HEP to lateral step up     4/1/24: Fwd step up and eccentric lateral step down x 1 a day 10-20 reps instructed pt to increase knee ext with self OP to x 5 a day     Assessment:  Pt's strengthen is slowly improving, able to achieve more eccentric control with step up/downs with nil c/o of increase in knee pain.  Technique with knee ext procedure was corrected with good carryover. Tolerated the session well, denied increased pain leaving PT/  Plan:  F/U with HEP  progressions and knee ext with self OP frequency  Progress strength/function as tolerated   OP PT Plan  Certification Period Start Date: 03/18/24  Certification Period End Date: 06/16/24  Number of Treatments Authorized: 6/MN      Gianni Mora, PT

## 2024-04-10 ENCOUNTER — HOSPITAL ENCOUNTER (OUTPATIENT)
Dept: RADIOLOGY | Facility: HOSPITAL | Age: 79
Discharge: HOME | End: 2024-04-10
Payer: MEDICARE

## 2024-04-10 DIAGNOSIS — N20.0 CALCULUS OF KIDNEY: ICD-10-CM

## 2024-04-10 PROCEDURE — 74018 RADEX ABDOMEN 1 VIEW: CPT | Performed by: RADIOLOGY

## 2024-04-10 PROCEDURE — 74018 RADEX ABDOMEN 1 VIEW: CPT

## 2024-04-11 ENCOUNTER — TREATMENT (OUTPATIENT)
Dept: PHYSICAL THERAPY | Facility: CLINIC | Age: 79
End: 2024-04-11
Payer: MEDICARE

## 2024-04-11 DIAGNOSIS — M25.562 LEFT KNEE PAIN: ICD-10-CM

## 2024-04-11 DIAGNOSIS — Z96.652 STATUS POST TOTAL LEFT KNEE REPLACEMENT: ICD-10-CM

## 2024-04-11 PROCEDURE — 97110 THERAPEUTIC EXERCISES: CPT | Mod: GP

## 2024-04-11 PROCEDURE — 97112 NEUROMUSCULAR REEDUCATION: CPT | Mod: GP

## 2024-04-11 NOTE — PROGRESS NOTES
Physical Therapy Treatment    Patient Name: Sherri Najera  MRN: [id number]  Today's Date: 4/11/2024  Time Calculation  Start Time: 1051  Stop Time: 1134  Time Calculation (min): 43 min  PT Therapeutic Procedures Time Entry  Neuromuscular Re-Education Time Entry: 11  Therapeutic Exercise Time Entry: 30,      Current Problem  1. Left knee pain  Follow Up In Physical Therapy      2. Status post total left knee replacement  Follow Up In Physical Therapy          Insurance:  Number of Treatments Authorized: 7/MN  Certification Period Start Date: 03/18/24  Certification Period End Date: 06/16/24      Subjective   General  Nothing new to report.  Been out and about a lot.  Pushing the knee straight more often. Seems like the knee is just slowly getting better.     Performing HEP?: Yes     Precautions  Precautions  Precautions Comment: Low fall risk  Pain  0/10 L medial knee     Objective     Treatments:  Therapeutic Exercise  SciFit L3 Manual x 6 MIN  Standing Left knee ext with self OP, heel on chair x2 min - cues to increase force/achieve end range   B heel raises x 1 MIN  Incline g/s stretch x 1 MIN   Left lateral step up, 8 inch step  10 x 3     NM re-ed:  Left hip hinge 1 min x 2   Tiltbaor #1/2 weight shifts/balance x 1 min each position/activity  Arx balance beam R, L tandem x 1 min each  Arx R/L lateral steps x 2 min     Therex:  L HS curl machine 30#s 1 min x 2   Left LAQ 5#s 1 min x 2     OP EDUCATION:  4/8/24: Verbally modified HEP to lateral step up     4/1/24: Fwd step up and eccentric lateral step down x 1 a day 10-20 reps instructed pt to increase knee ext with self OP to x 5 a day     Assessment:  Pt's strengthen is slowly improving, able to achieve more eccentric control with step up/downs with nil c/o of increase in knee pain.  Technique with knee ext procedure was corrected with good carryover. Tolerated the session well, denied increased pain leaving PT.    Plan:  F/U with HEP progressions and knee  ext with self OP frequency  Progress strength/function as tolerated   OP PT Plan  Certification Period Start Date: 03/18/24  Certification Period End Date: 06/16/24  Number of Treatments Authorized: 7/MN      Gianni Mora, PT

## 2024-04-12 ENCOUNTER — OFFICE VISIT (OUTPATIENT)
Dept: ORTHOPEDIC SURGERY | Facility: CLINIC | Age: 79
End: 2024-04-12
Payer: MEDICARE

## 2024-04-12 ENCOUNTER — HOSPITAL ENCOUNTER (OUTPATIENT)
Dept: RADIOLOGY | Facility: HOSPITAL | Age: 79
Discharge: HOME | End: 2024-04-12
Payer: MEDICARE

## 2024-04-12 DIAGNOSIS — M17.12 ARTHRITIS OF KNEE, LEFT: Primary | ICD-10-CM

## 2024-04-12 DIAGNOSIS — M17.12 ARTHRITIS OF KNEE, LEFT: ICD-10-CM

## 2024-04-12 PROCEDURE — 99024 POSTOP FOLLOW-UP VISIT: CPT | Performed by: ORTHOPAEDIC SURGERY

## 2024-04-12 PROCEDURE — 1160F RVW MEDS BY RX/DR IN RCRD: CPT | Performed by: ORTHOPAEDIC SURGERY

## 2024-04-12 PROCEDURE — 73564 X-RAY EXAM KNEE 4 OR MORE: CPT | Mod: LT

## 2024-04-12 PROCEDURE — 1159F MED LIST DOCD IN RCRD: CPT | Performed by: ORTHOPAEDIC SURGERY

## 2024-04-12 PROCEDURE — 1036F TOBACCO NON-USER: CPT | Performed by: ORTHOPAEDIC SURGERY

## 2024-04-12 NOTE — PROGRESS NOTES
Chief Complaint   Patient presents with    Left Knee - Post-op     2/26/24 LT TKA           This is a 78 y.o. female who is 6 weeks out from left total knee.  No drainage from her incision no fevers or chills.  Progressing well with physical therapy and appropriately improving in function.  No other new issues or symptoms.    Left knee examination: Surgical incision well healed no erythema no drainage.  Stable to varus valgus stress range of motion 3-1 20.  Neurovascular tact distally    X-rays of the knee were reviewed independently interpreted by me today, the show stable total knee arthroplasty no fracture dislocation or loosening    Impression plan: 78 y.o. female 6 weeks out from left total knee.  We discussed continuing physical therapy and home exercise program. Pain medications to be used as needed. Assistive devices as needed per PT. We discussed DVT prophylaxis until 6 weeks. No dentist until 3 months and thereafter dental prophylaxis for life. Activity as tolerated weightbearing as tolerated. I have personally reviewed the OARRS report for the patient. This report is scanned into the electronic medical record. I have considered the risks of abuse, dependence, addiction and diversion. Follow up 3 months with xrays.

## 2024-04-18 PROBLEM — M79.10 MUSCLE PAIN: Status: RESOLVED | Noted: 2023-08-21 | Resolved: 2024-04-18

## 2024-04-18 PROBLEM — M54.2 NECK PAIN: Status: RESOLVED | Noted: 2023-08-21 | Resolved: 2024-04-18

## 2024-04-18 PROBLEM — M25.562 LEFT KNEE PAIN: Status: RESOLVED | Noted: 2024-03-21 | Resolved: 2024-04-18

## 2024-04-18 PROBLEM — T14.8XXA MUSCLE STRAIN: Status: RESOLVED | Noted: 2023-08-21 | Resolved: 2024-04-18

## 2024-04-18 NOTE — PROGRESS NOTES
"Subjective   Patient ID: Millicent Najera is a 78 y.o. female who presents for New Patient Visit (Medication review for diabetes, hypertension, and hyperlipidemia. ).    HPI   Sherri Hanson presents as a new patient for med review. She was previously a patient of Dr. Chandler.  Her past medical history, family history and social history reviewed and documented.  She lives with her . She had a knee replacement 6 weeks ago.  Has a history of aortic regurgitation but has had no surveillance recently.  She had a coronary calcium score in 2019 and it was 0. Has been a long time since she has had a mammogram.  She has a history of skin cancer and has not had a skin survey recently.   She has a history of a kidney stone and sees Dr. Matson regularly.  She is part of a COVID study and is getting regular blood work done.  She is neck scheduled to have this done in August.  # 1.  She has type 2 diabetes and takes metformin 500 mg once daily and Actos 15 mg daily.  She is checking her blood sugars at home. Her most recent A1c is 6.1 in February. She could not tolerate higher doses of metformin  # 2. She has hypercholesterolemia and takes atorvastatin 80 mg daily.  # 3. She also has hypertension and takes lisinopril 20 mg daily.  # 4. She has depression and  Paxil 20 mg takes Remeron 15 mg nightly.  # 5. She has chronic sinus issues and uses Flonase.  She had 2 sinus infections earlier this year.  She has chronic issues and has never been on antihistamines before.    Review of Systems  She denies headache or dizziness.  No chest tightness, palpitations, orthopnea or pedal edema.  She denies shortness of breath or dyspnea on exertion.  She denies numbness or tingling.    Objective   /78   Pulse 74   Ht 1.6 m (5' 3\")   Wt 69.4 kg (153 lb)   SpO2 96%   BMI 27.10 kg/m²     Physical Exam  She is alert and in no acute distress.  Nose is congested with clear rhinorrhea.  Throat is noninjected and without exudate.  Neck is " supple without adenopathy.  No JVD.  Lungs are clear to auscultation.  Heart is regular in rhythm and rate.  No murmurs or gallops.  Extremities are without edema.    Assessment/Plan   Diagnoses and all orders for this visit:  Controlled type 2 diabetes mellitus without complication, without long-term current use of insulin (Multi)  Hypercholesterolemia  Essential hypertension  Depression, unspecified depression type  Nonrheumatic aortic valve insufficiency  -     Transthoracic Echo (TTE) Complete; Future  -     perflutren lipid microspheres (Definity) injection 0.5-10 mL of dilution  -     sulfur hexafluoride microsphr (Lumason) injection 24.28 mg  -     perflutren protein A microsphere (Optison) injection 0.5 mL  Skin cancer  -     Referral to Dermatology  Chronic rhinitis  Encounter for screening mammogram for malignant neoplasm of breast  -     BI mammo bilateral screening tomosynthesis; Future  Other orders  -     Follow Up In Primary Care - Medicare Annual; Future

## 2024-04-19 ENCOUNTER — TREATMENT (OUTPATIENT)
Dept: PHYSICAL THERAPY | Facility: CLINIC | Age: 79
End: 2024-04-19
Payer: MEDICARE

## 2024-04-19 DIAGNOSIS — Z96.652 STATUS POST TOTAL LEFT KNEE REPLACEMENT: Primary | ICD-10-CM

## 2024-04-19 DIAGNOSIS — M25.562 LEFT KNEE PAIN: ICD-10-CM

## 2024-04-19 PROCEDURE — 97110 THERAPEUTIC EXERCISES: CPT | Mod: GP

## 2024-04-19 NOTE — PROGRESS NOTES
Physical Therapy RECHECK/Treatment    Patient Name: Sherri Najera  MRN: [id number]  Today's Date: 4/19/2024  Time Calculation  Start Time: 1358  Stop Time: 1449  Time Calculation (min): 51 min  PT Therapeutic Procedures Time Entry  Therapeutic Exercise Time Entry: 45,      Current Problem  1. Status post total left knee replacement        2. Left knee pain              Insurance:  Number of Treatments Authorized: 8/MN  Certification Period Start Date: 03/18/24  Certification Period End Date: 06/16/24      Subjective   General  Feels tight when she pushes it straight but not too painful.  Getting up from prolonged sitting with be uncomfortable.  Nothing new to report.  Been out and about a lot.  Pushing the knee straight more often. Seems like the knee is just slowly getting better. Feels like she has progressed 70-80% toward her PT goals.  Feels like she needs to gain more strength.  Feesl like she fatigues quickly with prolonged walking.     Performing HEP?: Yes     Precautions  Precautions  Precautions Comment: Low fall risk  Pain  0/10 L medial knee     Objective   LEFS = 34/80  Knee PROM - Nil/Min/Mod/Max limit or degrees. R, L   Flex: L = 124, ERP    Ext: L = 0-3, ERP  Gait: nil deviations, no assistive device    Treatments:  Therapeutic Exercise  SciFit L3 Manual x 6 MIN  Standing Left knee ext with self OP, heel on chair x 2 min - cues to increase force/achieve end range   B heel raises x 1 MIN  Incline g/s stretch x 1 MIN   R Lateral step up 8 inch step up x 10   L lateral step up, 8 inch step  10 x 3  Left LAQ 5#s 15 x 2   L HS curl machine 20# 1 min x 2   L 3 TG Squats Left S/L squats 1 min x 3   R/L lateral steps with green loop x 40ft each direction    OP EDUCATION:  4/8/24: Verbally modified HEP to lateral step up     4/1/24: Fwd step up and eccentric lateral step down x 1 a day 10-20 reps instructed pt to increase knee ext with self OP to x 5 a day     Assessment:  Pt has made good progress toward  PT goals since starting PT as shown by subjective and objective findings above. Pt tolerated progressions strengthening well, limited by fatigue not pain.  Pt will continue to benefit from skilled PT to fully achieve all goals set at PT.    Plan:  Continue with PT x 1 a week for 4 more weeks  Continue to focus on ROM procedures x 3-5 a day  Progress strengthening and endurance/walking  OP PT Plan  Certification Period Start Date: 03/18/24  Certification Period End Date: 06/16/24  Number of Treatments Authorized: 8/MN      Gianni Mora, PT

## 2024-04-23 ENCOUNTER — APPOINTMENT (OUTPATIENT)
Dept: PHYSICAL THERAPY | Facility: CLINIC | Age: 79
End: 2024-04-23
Payer: MEDICARE

## 2024-04-23 ENCOUNTER — OFFICE VISIT (OUTPATIENT)
Dept: PRIMARY CARE | Facility: CLINIC | Age: 79
End: 2024-04-23
Payer: MEDICARE

## 2024-04-23 ENCOUNTER — TELEPHONE (OUTPATIENT)
Dept: PRIMARY CARE | Facility: CLINIC | Age: 79
End: 2024-04-23

## 2024-04-23 VITALS
WEIGHT: 153 LBS | BODY MASS INDEX: 27.11 KG/M2 | DIASTOLIC BLOOD PRESSURE: 78 MMHG | HEART RATE: 74 BPM | HEIGHT: 63 IN | OXYGEN SATURATION: 96 % | SYSTOLIC BLOOD PRESSURE: 138 MMHG

## 2024-04-23 DIAGNOSIS — J31.0 CHRONIC RHINITIS: ICD-10-CM

## 2024-04-23 DIAGNOSIS — Z12.31 ENCOUNTER FOR SCREENING MAMMOGRAM FOR MALIGNANT NEOPLASM OF BREAST: ICD-10-CM

## 2024-04-23 DIAGNOSIS — E11.9 CONTROLLED TYPE 2 DIABETES MELLITUS WITHOUT COMPLICATION, WITHOUT LONG-TERM CURRENT USE OF INSULIN (MULTI): Primary | ICD-10-CM

## 2024-04-23 DIAGNOSIS — I35.1 NONRHEUMATIC AORTIC VALVE INSUFFICIENCY: ICD-10-CM

## 2024-04-23 DIAGNOSIS — F32.A DEPRESSION, UNSPECIFIED DEPRESSION TYPE: ICD-10-CM

## 2024-04-23 DIAGNOSIS — I10 ESSENTIAL HYPERTENSION: ICD-10-CM

## 2024-04-23 DIAGNOSIS — C44.90 SKIN CANCER: ICD-10-CM

## 2024-04-23 DIAGNOSIS — E78.00 HYPERCHOLESTEROLEMIA: ICD-10-CM

## 2024-04-23 DIAGNOSIS — F32.1 CURRENT MODERATE EPISODE OF MAJOR DEPRESSIVE DISORDER, UNSPECIFIED WHETHER RECURRENT (MULTI): ICD-10-CM

## 2024-04-23 PROBLEM — D64.9 ANEMIA: Status: RESOLVED | Noted: 2023-08-21 | Resolved: 2024-04-23

## 2024-04-23 PROBLEM — M71.22 SYNOVIAL CYST OF LEFT POPLITEAL SPACE: Status: RESOLVED | Noted: 2023-08-21 | Resolved: 2024-04-23

## 2024-04-23 PROBLEM — K21.9 ESOPHAGEAL REFLUX: Status: RESOLVED | Noted: 2023-08-21 | Resolved: 2024-04-23

## 2024-04-23 PROBLEM — M17.12 ARTHRITIS OF KNEE, LEFT: Status: RESOLVED | Noted: 2023-08-21 | Resolved: 2024-04-23

## 2024-04-23 PROBLEM — D62 ANEMIA DUE TO ACUTE BLOOD LOSS: Status: RESOLVED | Noted: 2023-08-21 | Resolved: 2024-04-23

## 2024-04-23 PROBLEM — K86.81 EXOCRINE PANCREATIC INSUFFICIENCY (HHS-HCC): Status: RESOLVED | Noted: 2023-08-21 | Resolved: 2024-04-23

## 2024-04-23 PROBLEM — R92.8 ABNORMAL MAMMOGRAM: Status: RESOLVED | Noted: 2023-08-21 | Resolved: 2024-04-23

## 2024-04-23 PROCEDURE — 99214 OFFICE O/P EST MOD 30 MIN: CPT | Performed by: FAMILY MEDICINE

## 2024-04-23 PROCEDURE — 1036F TOBACCO NON-USER: CPT | Performed by: FAMILY MEDICINE

## 2024-04-23 PROCEDURE — 3075F SYST BP GE 130 - 139MM HG: CPT | Performed by: FAMILY MEDICINE

## 2024-04-23 PROCEDURE — 1126F AMNT PAIN NOTED NONE PRSNT: CPT | Performed by: FAMILY MEDICINE

## 2024-04-23 PROCEDURE — 3078F DIAST BP <80 MM HG: CPT | Performed by: FAMILY MEDICINE

## 2024-04-23 PROCEDURE — 1160F RVW MEDS BY RX/DR IN RCRD: CPT | Performed by: FAMILY MEDICINE

## 2024-04-23 PROCEDURE — 1159F MED LIST DOCD IN RCRD: CPT | Performed by: FAMILY MEDICINE

## 2024-04-23 RX ORDER — PAROXETINE 10 MG/1
10 TABLET, FILM COATED ORAL DAILY
Qty: 90 TABLET | Refills: 0 | Status: SHIPPED | OUTPATIENT
Start: 2024-04-23 | End: 2024-05-21 | Stop reason: SDUPTHER

## 2024-04-23 ASSESSMENT — PATIENT HEALTH QUESTIONNAIRE - PHQ9
SUM OF ALL RESPONSES TO PHQ9 QUESTIONS 1 AND 2: 0
2. FEELING DOWN, DEPRESSED OR HOPELESS: NOT AT ALL
1. LITTLE INTEREST OR PLEASURE IN DOING THINGS: NOT AT ALL

## 2024-04-23 ASSESSMENT — PAIN SCALES - GENERAL: PAINLEVEL: 0-NO PAIN

## 2024-04-29 ENCOUNTER — TREATMENT (OUTPATIENT)
Dept: PHYSICAL THERAPY | Facility: CLINIC | Age: 79
End: 2024-04-29
Payer: MEDICARE

## 2024-04-29 DIAGNOSIS — Z96.652 STATUS POST TOTAL LEFT KNEE REPLACEMENT: ICD-10-CM

## 2024-04-29 DIAGNOSIS — M25.562 LEFT KNEE PAIN: ICD-10-CM

## 2024-04-29 PROCEDURE — 97110 THERAPEUTIC EXERCISES: CPT | Mod: GP

## 2024-04-29 NOTE — PROGRESS NOTES
Physical Therapy RECHECK/Treatment    Patient Name: Sherri Najera  MRN: [id number]  Today's Date: 4/29/2024  Time Calculation  Start Time: 1334  Stop Time: 1427  Time Calculation (min): 53 min  PT Therapeutic Procedures Time Entry  Manual Therapy Time Entry: 6  Therapeutic Exercise Time Entry: 41,      Current Problem  1. Left knee pain  Follow Up In Physical Therapy      2. Status post total left knee replacement  Follow Up In Physical Therapy            Insurance:  Number of Treatments Authorized: 9/MN  Certification Period Start Date: 03/18/24  Certification Period End Date: 06/16/24      Subjective   General  Doing the stretches multiple time each day.  Gets pain on either side of her knee that does not seem to be linked to any particular exercise or position.  Seems random.  Feels better after the exercise. Continues to feel like she fatigues quickly.  Also still has discomofrt getting out of a prolonged sitting position.       Performing HEP?: Yes     Precautions  Precautions  Precautions Comment: Low fall risk    Pain  L medial knee - bothersome, did not rate.     Objective   Left knee flexion = 126 - ERP   Left knee ext = 0-2 - ERP    Treatments:    Therapeutic Exercise  SciFit L3 Manual x 6 MIN  Standing Left knee ext with self OP, heel on chair x 15 - cues to increase force/achieve end range   Left knee semi loaded flexion on chair x 15  B heel raises x 1 MIN  Incline g/s stretch x 1 MIN   L 5 TG Squats R/L S/L squats 1 min x 2 - each LE   R, L hip hinge x 1 min x 2 each LE   R, L Standing HS curl 5#s 1 min x 2 each LE  R, L LAQ 5#s 15 x 2     Manual:  Left knee flexion mobs  Left knee ext mobs     OP EDUCATION:  4/8/24: Verbally modified HEP to lateral step up     4/1/24: Fwd step up and eccentric lateral step down x 1 a day 10-20 reps instructed pt to increase knee ext with self OP to x 5 a day     Assessment:  Pt has goo HEP compliance but continues to require cues to achieve end range with ROM  procedures.  Tolerated mobs well, denied lasting pain leaving PT.  And had good tolerance to strengthening, able to complete with increased load/resistance with good technique and denied increased pain.      Plan:  Continue to focus on ROM procedures x 3-5 a day  Progress strengthening and endurance/walking  OP PT Plan  Certification Period Start Date: 03/18/24  Certification Period End Date: 06/16/24  Number of Treatments Authorized: 9/MN      Gianni Mora, PT

## 2024-04-30 ENCOUNTER — HOSPITAL ENCOUNTER (OUTPATIENT)
Dept: RADIOLOGY | Facility: CLINIC | Age: 79
Discharge: HOME | End: 2024-04-30
Payer: MEDICARE

## 2024-04-30 VITALS — WEIGHT: 153 LBS | HEIGHT: 65 IN | BODY MASS INDEX: 25.49 KG/M2

## 2024-04-30 DIAGNOSIS — Z12.31 ENCOUNTER FOR SCREENING MAMMOGRAM FOR MALIGNANT NEOPLASM OF BREAST: ICD-10-CM

## 2024-04-30 PROCEDURE — 77067 SCR MAMMO BI INCL CAD: CPT | Performed by: RADIOLOGY

## 2024-04-30 PROCEDURE — 77067 SCR MAMMO BI INCL CAD: CPT

## 2024-04-30 PROCEDURE — 77063 BREAST TOMOSYNTHESIS BI: CPT | Performed by: RADIOLOGY

## 2024-05-01 ENCOUNTER — TREATMENT (OUTPATIENT)
Dept: PHYSICAL THERAPY | Facility: CLINIC | Age: 79
End: 2024-05-01
Payer: MEDICARE

## 2024-05-01 DIAGNOSIS — Z96.652 STATUS POST TOTAL LEFT KNEE REPLACEMENT: ICD-10-CM

## 2024-05-01 DIAGNOSIS — M25.562 LEFT KNEE PAIN: ICD-10-CM

## 2024-05-01 PROCEDURE — 97140 MANUAL THERAPY 1/> REGIONS: CPT | Mod: GP

## 2024-05-01 PROCEDURE — 97110 THERAPEUTIC EXERCISES: CPT | Mod: GP

## 2024-05-01 NOTE — PROGRESS NOTES
Physical Therapy RECHECK/Treatment    Patient Name: Sherri Najera  MRN: [id number]  Today's Date: 5/1/2024  Time Calculation  Start Time: 1121  Stop Time: 1203  Time Calculation (min): 42 min  PT Therapeutic Procedures Time Entry  Manual Therapy Time Entry: 9  Therapeutic Exercise Time Entry: 31,      Current Problem  1. Left knee pain  Follow Up In Physical Therapy      2. Status post total left knee replacement  Follow Up In Physical Therapy            Insurance:  Number of Treatments Authorized: 10/MN  Certification Period Start Date: 03/18/24  Certification Period End Date: 06/16/24      Subjective   General  Stretches are getting better.  Noticed some clicking sensations but not bad. Been feeling some old medial knee pain that was there prior to the surgery but is not only at a low level.  Doing a lot of cooking/standing for her family over the past few days.       Performing HEP?: Yes     Precautions  Precautions  Precautions Comment: Low fall risk    Pain  2-3/10 L medial knee     Treatments:  Therapeutic Exercise  SciFit L3 Manual x 6 MIN  Standing Left knee ext with self OP, heel on step  x 1 min - cues to increase force/achieve end range   Left knee semi loaded flexion on chair x 1 min   B heel raises x 1 MIN  Incline g/s stretch x 1 MIN   L HS stretch x 30 sec   NM re-ed:Dynamics: High knees, Butt kicks, forward lunge, tin soldiers x 40 feet each  Therex:  R/L lateral steps with yellow loop x 50 ft each direction  Left S/L leg press 13/10    Manual:  Left knee flexion mobs  Left knee ext mobs   Left kne PF mobns grade 2-3 in all direcitons    OP EDUCATION:  4/8/24: Verbally modified HEP to lateral step up     4/1/24: Fwd step up and eccentric lateral step down x 1 a day 10-20 reps instructed pt to increase knee ext with self OP to x 5 a day     Assessment:  Pt continues to progress well per subjective report.  Had good tolerance to therex, reports lasting reduction in pain with exercise.       Plan:  Continue to focus on ROM procedures x 3-5 a day  Progress strengthening and endurance/walking  OP PT Plan  Certification Period Start Date: 03/18/24  Certification Period End Date: 06/16/24  Number of Treatments Authorized: 10/MN      Gianni Mora, PT

## 2024-05-03 ENCOUNTER — TELEPHONE (OUTPATIENT)
Dept: PRIMARY CARE | Facility: CLINIC | Age: 79
End: 2024-05-03
Payer: MEDICARE

## 2024-05-03 DIAGNOSIS — E78.5 HYPERLIPIDEMIA, UNSPECIFIED HYPERLIPIDEMIA TYPE: ICD-10-CM

## 2024-05-03 RX ORDER — ATORVASTATIN CALCIUM 80 MG/1
80 TABLET, FILM COATED ORAL DAILY
Qty: 90 TABLET | Refills: 1 | Status: SHIPPED | OUTPATIENT
Start: 2024-05-03 | End: 2024-05-21 | Stop reason: SDUPTHER

## 2024-05-03 NOTE — TELEPHONE ENCOUNTER
Patient called [phone] she had a mammogram about a week ago she needs to have a Bi lateral U/S now    [facility] a [facility]

## 2024-05-08 ENCOUNTER — HOSPITAL ENCOUNTER (OUTPATIENT)
Dept: CARDIOLOGY | Facility: CLINIC | Age: 79
Discharge: HOME | End: 2024-05-08
Payer: MEDICARE

## 2024-05-08 DIAGNOSIS — I35.1 NONRHEUMATIC AORTIC VALVE INSUFFICIENCY: ICD-10-CM

## 2024-05-08 LAB
AORTIC VALVE PEAK VELOCITY: 1.55 M/S
AV PEAK GRADIENT: 9.6 MMHG
AVA (PEAK VEL): 2.17 CM2
EJECTION FRACTION APICAL 4 CHAMBER: 70.9
LEFT ATRIUM VOLUME AREA LENGTH INDEX BSA: 27.6 ML/M2
LEFT VENTRICLE INTERNAL DIMENSION DIASTOLE: 3.7 CM (ref 3.5–6)
LEFT VENTRICULAR OUTFLOW TRACT DIAMETER: 2 CM
LV EJECTION FRACTION BIPLANE: 70 %
MITRAL VALVE E/A RATIO: 0.94
MITRAL VALVE E/E' RATIO: 19.3
RIGHT VENTRICLE FREE WALL PEAK S': 15.8 CM/S
RIGHT VENTRICLE PEAK SYSTOLIC PRESSURE: 31.9 MMHG
TRICUSPID ANNULAR PLANE SYSTOLIC EXCURSION: 2.5 CM

## 2024-05-08 PROCEDURE — 93306 TTE W/DOPPLER COMPLETE: CPT

## 2024-05-08 PROCEDURE — 2500000004 HC RX 250 GENERAL PHARMACY W/ HCPCS (ALT 636 FOR OP/ED): Performed by: FAMILY MEDICINE

## 2024-05-08 RX ADMIN — PERFLUTREN 2 ML OF DILUTION: 6.52 INJECTION, SUSPENSION INTRAVENOUS at 11:49

## 2024-05-08 NOTE — RESULT ENCOUNTER NOTE
Your echocardiogram is essentially normal. There is minimal aortic regurgitation. The function is healthy.  No further evaluation is necessary

## 2024-05-10 ENCOUNTER — TREATMENT (OUTPATIENT)
Dept: PHYSICAL THERAPY | Facility: CLINIC | Age: 79
End: 2024-05-10
Payer: MEDICARE

## 2024-05-10 DIAGNOSIS — M25.562 LEFT KNEE PAIN: ICD-10-CM

## 2024-05-10 DIAGNOSIS — Z96.652 STATUS POST TOTAL LEFT KNEE REPLACEMENT: ICD-10-CM

## 2024-05-10 PROCEDURE — 97110 THERAPEUTIC EXERCISES: CPT | Mod: GP

## 2024-05-10 PROCEDURE — 97140 MANUAL THERAPY 1/> REGIONS: CPT | Mod: GP

## 2024-05-10 NOTE — PROGRESS NOTES
Physical Therapy RECHECK/Treatment    Patient Name: Sherri Najera  MRN: [id number]  Today's Date: 5/10/2024  Time Calculation  Start Time: 1047  Stop Time: 1137  Time Calculation (min): 50 min  PT Therapeutic Procedures Time Entry  Manual Therapy Time Entry: 9  Therapeutic Exercise Time Entry: 39,      Current Problem  1. Left knee pain  Follow Up In Physical Therapy      2. Status post total left knee replacement  Follow Up In Physical Therapy            Insurance:  Number of Treatments Authorized: 11/MN  Certification Period Start Date: 03/18/24  Certification Period End Date: 06/16/24      Subjective   General  No pull and tightness is going away.  Been doing HEP every day.  Always moving the leg.  She is pleased with her progress.  Feels like she can start working on things independently soon.  Next POV is in August.      Performing HEP?: Yes     Precautions  Precautions  Precautions Comment: Low fall risk    Pain  0/10 L medial knee - intermittent     Treatments:  Therapeutic Exercise  SciFit L3 Manual x 6 MIN  Standing Left knee ext with self OP, heel on step  x 1 min   Left knee semi loaded flexion on chair x 1 min   R/L lateral steps with yellow loop x 40 ft each direction  F/B diagonal steps with yellow loop x 40ft each direction  F/B monster walks with yellow loop x 40ft each direction  TG L 4 L S/L squats 1 min x 2  B HS curl 40#s 1 min x 2   L LAQ 5#s 1 min x 2     Manual:  Left knee flexion mobs  Left knee ext mobs   Left kne PF mobs grade 2-3 in all direcitons    OP EDUCATION:  4/8/24: Verbally modified HEP to lateral step up     4/1/24: Fwd step up and eccentric lateral step down x 1 a day 10-20 reps instructed pt to increase knee ext with self OP to x 5 a day     Assessment:  Pt is making good progress per subjective report. Tolerates therex wel, denies pain with strengthening exercises and was challenged by TG S/L squats. Continues to have min limit with ext/flexion range with mild end range  pain.  Was encouraged to continue to push into ERP to progress ROM.      Plan:  Continue to focus on ROM procedures x 3-5 a day  Progress strengthening and endurance/walking  OP PT Plan  Certification Period Start Date: 03/18/24  Certification Period End Date: 06/16/24  Number of Treatments Authorized: 11/MN      Gianni Mora, PT

## 2024-05-16 ENCOUNTER — TREATMENT (OUTPATIENT)
Dept: PHYSICAL THERAPY | Facility: CLINIC | Age: 79
End: 2024-05-16
Payer: MEDICARE

## 2024-05-16 DIAGNOSIS — M25.562 LEFT KNEE PAIN: ICD-10-CM

## 2024-05-16 DIAGNOSIS — Z96.652 STATUS POST TOTAL LEFT KNEE REPLACEMENT: ICD-10-CM

## 2024-05-16 PROCEDURE — 97110 THERAPEUTIC EXERCISES: CPT | Mod: GP

## 2024-05-16 NOTE — PROGRESS NOTES
Physical Therapy RECHECK/Treatment    Patient Name: Sherri Najera  MRN: [id number]  Today's Date: 5/17/2024  Time Calculation  Start Time: 1628  Stop Time: 1654  Time Calculation (min): 26 min  PT Therapeutic Procedures Time Entry  Therapeutic Exercise Time Entry: 24,      Current Problem  1. Left knee pain  Follow Up In Physical Therapy      2. Status post total left knee replacement  Follow Up In Physical Therapy            Insurance:  Number of Treatments Authorized: 12/MN  Certification Period Start Date: 03/18/24  Certification Period End Date: 06/16/24      Subjective   General  Things are about the same as last session.  Has good/bad days.       Performing HEP?: Yes     Precautions  Precautions  Precautions Comment: Low fall risk    Objective:  Left knee flexion = 125 - ERP   Left knee ext = 0 - ERP    Pain  L medial/lateral knee - did not rate    Treatments:  Therapeutic Exercise  SciFit L3 Manual x 6 MIN  Standing Left knee ext with self OP, heel on step  x 10  B heel raises x 1 min   TG L 7 squats 1 min x 3   R/L lateral steps with yellow loop x 40 ft each direction  F/B diagonal steps with yellow loop x 40ft each direction  F/B monster walks with yellow loop x 40ft each direction  Left knee flexion on chair x 10   Left knee ext with PT OP x 10    OP EDUCATION:  4/8/24: Verbally modified HEP to lateral step up     4/1/24: Fwd step up and eccentric lateral step down x 1 a day 10-20 reps instructed pt to increase knee ext with self OP to x 5 a day     Assessment:  Pt is pleased with her progress since starting PT.  She is in agreement with discharge to independent self management/progression with HEP. Strength/endurance and function is slowly progressing, Knee flexion has not improved in several weeks.  However, her knee ext is equivalent of uninvolved knee but with ERP, was encouraged to continue with ext procedures for the next 2 weeks to achieve full ext without pain. Pt demo'd  understanding.    Plan:  Discharge to HEP  OP PT Plan  Certification Period Start Date: 03/18/24  Certification Period End Date: 06/16/24  Number of Treatments Authorized: 12/MN      Gianni Mora, PT

## 2024-05-17 ENCOUNTER — HOSPITAL ENCOUNTER (OUTPATIENT)
Dept: RADIOLOGY | Facility: HOSPITAL | Age: 79
Discharge: HOME | End: 2024-05-17
Payer: MEDICARE

## 2024-05-17 DIAGNOSIS — R92.8 OTHER ABNORMAL AND INCONCLUSIVE FINDINGS ON DIAGNOSTIC IMAGING OF BREAST: ICD-10-CM

## 2024-05-17 PROCEDURE — 76642 ULTRASOUND BREAST LIMITED: CPT | Mod: 50

## 2024-05-17 PROCEDURE — 76642 ULTRASOUND BREAST LIMITED: CPT | Mod: BILATERAL PROCEDURE | Performed by: RADIOLOGY

## 2024-05-21 ENCOUNTER — TELEPHONE (OUTPATIENT)
Dept: PRIMARY CARE | Facility: CLINIC | Age: 79
End: 2024-05-21
Payer: MEDICARE

## 2024-05-21 DIAGNOSIS — F32.1 CURRENT MODERATE EPISODE OF MAJOR DEPRESSIVE DISORDER, UNSPECIFIED WHETHER RECURRENT (MULTI): ICD-10-CM

## 2024-05-21 DIAGNOSIS — E11.9 CONTROLLED TYPE 2 DIABETES MELLITUS WITHOUT COMPLICATION, WITHOUT LONG-TERM CURRENT USE OF INSULIN (MULTI): ICD-10-CM

## 2024-05-21 DIAGNOSIS — E78.5 HYPERLIPIDEMIA, UNSPECIFIED HYPERLIPIDEMIA TYPE: ICD-10-CM

## 2024-05-21 RX ORDER — PIOGLITAZONEHYDROCHLORIDE 15 MG/1
15 TABLET ORAL DAILY
Qty: 90 TABLET | Refills: 1 | Status: SHIPPED | OUTPATIENT
Start: 2024-05-21 | End: 2024-05-28 | Stop reason: SDUPTHER

## 2024-05-21 RX ORDER — PAROXETINE 10 MG/1
10 TABLET, FILM COATED ORAL DAILY
Qty: 90 TABLET | Refills: 1 | Status: SHIPPED | OUTPATIENT
Start: 2024-05-21 | End: 2024-05-28 | Stop reason: SDUPTHER

## 2024-05-21 RX ORDER — ATORVASTATIN CALCIUM 80 MG/1
80 TABLET, FILM COATED ORAL DAILY
Qty: 90 TABLET | Refills: 1 | Status: SHIPPED | OUTPATIENT
Start: 2024-05-21 | End: 2024-05-28 | Stop reason: SDUPTHER

## 2024-05-21 NOTE — TELEPHONE ENCOUNTER
Patient called RX line and requesting medication, Paraxetine 10 mg, Actos 15 mg, Atorvastatin 80 mg.  Going to [facility].    Contact:  [phone]

## 2024-05-28 ENCOUNTER — TELEPHONE (OUTPATIENT)
Dept: PRIMARY CARE | Facility: CLINIC | Age: 79
End: 2024-05-28
Payer: MEDICARE

## 2024-05-28 DIAGNOSIS — F32.1 CURRENT MODERATE EPISODE OF MAJOR DEPRESSIVE DISORDER, UNSPECIFIED WHETHER RECURRENT (MULTI): ICD-10-CM

## 2024-05-28 DIAGNOSIS — E78.5 HYPERLIPIDEMIA, UNSPECIFIED HYPERLIPIDEMIA TYPE: ICD-10-CM

## 2024-05-28 DIAGNOSIS — E11.9 CONTROLLED TYPE 2 DIABETES MELLITUS WITHOUT COMPLICATION, WITHOUT LONG-TERM CURRENT USE OF INSULIN (MULTI): ICD-10-CM

## 2024-05-28 RX ORDER — ATORVASTATIN CALCIUM 80 MG/1
80 TABLET, FILM COATED ORAL DAILY
Qty: 90 TABLET | Refills: 1 | Status: SHIPPED | OUTPATIENT
Start: 2024-05-28

## 2024-05-28 RX ORDER — PAROXETINE 10 MG/1
10 TABLET, FILM COATED ORAL DAILY
Qty: 90 TABLET | Refills: 1 | Status: SHIPPED | OUTPATIENT
Start: 2024-05-28 | End: 2024-11-24

## 2024-05-28 RX ORDER — PIOGLITAZONEHYDROCHLORIDE 15 MG/1
15 TABLET ORAL DAILY
Qty: 90 TABLET | Refills: 1 | Status: SHIPPED | OUTPATIENT
Start: 2024-05-28

## 2024-05-28 NOTE — TELEPHONE ENCOUNTER
PT HAD CALLED TO GET  3 RX REFILLED TO [facility], THEY WERE CALLED TO WRONG PHARMACY . PLEASE CORRECT.  ADAMS CLIFFORD ACTOS

## 2024-06-18 DIAGNOSIS — E11.9 CONTROLLED TYPE 2 DIABETES MELLITUS WITHOUT COMPLICATION, WITHOUT LONG-TERM CURRENT USE OF INSULIN (MULTI): ICD-10-CM

## 2024-06-18 RX ORDER — MIRTAZAPINE 15 MG/1
15 TABLET, FILM COATED ORAL NIGHTLY
Qty: 90 TABLET | Refills: 0 | Status: SHIPPED | OUTPATIENT
Start: 2024-06-18

## 2024-06-19 ENCOUNTER — OFFICE VISIT (OUTPATIENT)
Dept: PRIMARY CARE | Facility: CLINIC | Age: 79
End: 2024-06-19
Payer: MEDICARE

## 2024-06-19 VITALS
SYSTOLIC BLOOD PRESSURE: 122 MMHG | OXYGEN SATURATION: 95 % | TEMPERATURE: 97.2 F | DIASTOLIC BLOOD PRESSURE: 80 MMHG | BODY MASS INDEX: 25.63 KG/M2 | HEART RATE: 85 BPM | WEIGHT: 154 LBS

## 2024-06-19 DIAGNOSIS — J01.40 ACUTE PANSINUSITIS, RECURRENCE NOT SPECIFIED: Primary | ICD-10-CM

## 2024-06-19 PROBLEM — Z86.79 HISTORY OF HYPERTENSION: Status: RESOLVED | Noted: 2024-06-19 | Resolved: 2024-06-19

## 2024-06-19 PROBLEM — N20.0 KIDNEY STONE: Status: RESOLVED | Noted: 2023-08-21 | Resolved: 2024-06-19

## 2024-06-19 PROBLEM — Z86.79 HISTORY OF HYPERTENSION: Status: ACTIVE | Noted: 2024-06-19

## 2024-06-19 PROBLEM — K52.9 CHRONIC DIARRHEA: Status: ACTIVE | Noted: 2024-06-19

## 2024-06-19 PROBLEM — E78.1 HYPERTRIGLYCERIDEMIA: Status: RESOLVED | Noted: 2023-08-21 | Resolved: 2024-06-19

## 2024-06-19 PROBLEM — M25.511 PAIN IN RIGHT SHOULDER: Status: RESOLVED | Noted: 2023-08-21 | Resolved: 2024-06-19

## 2024-06-19 PROBLEM — Z85.828 PERSONAL HISTORY OF OTHER MALIGNANT NEOPLASM OF SKIN: Status: ACTIVE | Noted: 2020-11-06

## 2024-06-19 PROBLEM — S46.009A INJURY OF TENDON OF ROTATOR CUFF: Status: RESOLVED | Noted: 2024-06-19 | Resolved: 2024-06-19

## 2024-06-19 PROBLEM — E66.3 OVERWEIGHT WITH BODY MASS INDEX (BMI) 25.0-29.9: Status: ACTIVE | Noted: 2024-06-19

## 2024-06-19 RX ORDER — AMOXICILLIN AND CLAVULANATE POTASSIUM 875; 125 MG/1; MG/1
875 TABLET, FILM COATED ORAL 2 TIMES DAILY
Qty: 14 TABLET | Refills: 0 | Status: SHIPPED | OUTPATIENT
Start: 2024-06-19 | End: 2024-06-26

## 2024-06-19 ASSESSMENT — PATIENT HEALTH QUESTIONNAIRE - PHQ9
2. FEELING DOWN, DEPRESSED OR HOPELESS: NOT AT ALL
SUM OF ALL RESPONSES TO PHQ9 QUESTIONS 1 AND 2: 0
1. LITTLE INTEREST OR PLEASURE IN DOING THINGS: NOT AT ALL

## 2024-06-19 ASSESSMENT — PAIN SCALES - GENERAL: PAINLEVEL: 7

## 2024-06-19 NOTE — PROGRESS NOTES
Subjective   Patient ID: Millicent Najera is a 79 y.o. female who presents for Headache, Earache, Cough, and Sinusitis (X 1 week).    HPI   Millicent is seen for c/o sinus congestion, facial pain and pressure (worse on left), ear-fullness, PND, dry cough for one week. Symptoms are worsening. Takes Claritin, Flonase, Tylenol which has not seemed to help much. No known sick contacts. Tolerating fluids. Denies chest pain, shortness of breath, fever, nausea, vomiting.     Review of Systems  All other systems have been reviewed and are negative except as noted in the HPI.     Objective   /80 (BP Location: Left arm, Patient Position: Sitting)   Pulse 85   Temp 36.2 °C (97.2 °F) (Temporal)   Wt 69.9 kg (154 lb)   SpO2 95%   BMI 25.63 kg/m²     Physical Exam  Vitals and nursing note reviewed.   Constitutional:       General: She is not in acute distress.  HENT:      Right Ear: Tympanic membrane and ear canal normal.      Left Ear: Ear canal normal. There is impacted cerumen.      Nose: Congestion present.      Right Sinus: Maxillary sinus tenderness and frontal sinus tenderness present.      Left Sinus: Maxillary sinus tenderness and frontal sinus tenderness present.      Mouth/Throat:      Pharynx: No oropharyngeal exudate or posterior oropharyngeal erythema.   Eyes:      Extraocular Movements: Extraocular movements intact.      Conjunctiva/sclera: Conjunctivae normal.   Cardiovascular:      Rate and Rhythm: Normal rate and regular rhythm.   Pulmonary:      Effort: Pulmonary effort is normal.      Breath sounds: Normal breath sounds.   Musculoskeletal:      Cervical back: No tenderness.   Lymphadenopathy:      Cervical: No cervical adenopathy.   Skin:     General: Skin is warm.   Neurological:      General: No focal deficit present.      Mental Status: She is alert.   Psychiatric:         Mood and Affect: Mood normal.       Assessment/Plan   Problem List Items Addressed This Visit    None  Visit Diagnoses          Codes    Acute pansinusitis, recurrence not specified    -  Primary  Augmentin as directed. Risks and benefits of medication discussed and prescribed.   OTC Tylenol/ibuprofen as directed for sinus pain.  Continue OTC Flonase and daily antihistamine as directed.  Increase fluids, rest, humidifier.  Follow-up with PCP if symptoms not improve within 7 to 10 days. J01.40    Relevant Medications    amoxicillin-pot clavulanate (Augmentin) 875-125 mg tablet

## 2024-06-29 ENCOUNTER — APPOINTMENT (OUTPATIENT)
Dept: RADIOLOGY | Facility: HOSPITAL | Age: 79
DRG: 659 | End: 2024-06-29
Payer: MEDICARE

## 2024-06-29 ENCOUNTER — HOSPITAL ENCOUNTER (INPATIENT)
Facility: HOSPITAL | Age: 79
LOS: 1 days | Discharge: HOME | DRG: 659 | End: 2024-06-30
Attending: STUDENT IN AN ORGANIZED HEALTH CARE EDUCATION/TRAINING PROGRAM | Admitting: INTERNAL MEDICINE
Payer: MEDICARE

## 2024-06-29 ENCOUNTER — PREP FOR PROCEDURE (OUTPATIENT)
Dept: UROLOGY | Facility: HOSPITAL | Age: 79
End: 2024-06-29

## 2024-06-29 ENCOUNTER — APPOINTMENT (OUTPATIENT)
Dept: CARDIOLOGY | Facility: HOSPITAL | Age: 79
DRG: 659 | End: 2024-06-29
Payer: MEDICARE

## 2024-06-29 DIAGNOSIS — J01.40 ACUTE PANSINUSITIS, RECURRENCE NOT SPECIFIED: ICD-10-CM

## 2024-06-29 DIAGNOSIS — N20.0 LEFT RENAL STONE: Primary | ICD-10-CM

## 2024-06-29 DIAGNOSIS — N20.0 NEPHROLITHIASIS: ICD-10-CM

## 2024-06-29 LAB
ALBUMIN SERPL BCP-MCNC: 4.3 G/DL (ref 3.4–5)
ALP SERPL-CCNC: 80 U/L (ref 33–136)
ALT SERPL W P-5'-P-CCNC: 38 U/L (ref 7–45)
ANION GAP SERPL CALC-SCNC: 15 MMOL/L (ref 10–20)
APPEARANCE UR: CLEAR
AST SERPL W P-5'-P-CCNC: 36 U/L (ref 9–39)
BACTERIA #/AREA URNS AUTO: ABNORMAL /HPF
BASOPHILS # BLD AUTO: 0.01 X10*3/UL (ref 0–0.1)
BASOPHILS NFR BLD AUTO: 0.6 %
BILIRUB SERPL-MCNC: 0.4 MG/DL (ref 0–1.2)
BILIRUB UR STRIP.AUTO-MCNC: NEGATIVE MG/DL
BUN SERPL-MCNC: 17 MG/DL (ref 6–23)
CALCIUM SERPL-MCNC: 9 MG/DL (ref 8.6–10.3)
CARDIAC TROPONIN I PNL SERPL HS: 5 NG/L (ref 0–13)
CHLORIDE SERPL-SCNC: 100 MMOL/L (ref 98–107)
CO2 SERPL-SCNC: 25 MMOL/L (ref 21–32)
COLOR UR: ABNORMAL
CREAT SERPL-MCNC: 0.7 MG/DL (ref 0.5–1.05)
EGFRCR SERPLBLD CKD-EPI 2021: 88 ML/MIN/1.73M*2
EOSINOPHIL # BLD AUTO: 0 X10*3/UL (ref 0–0.4)
EOSINOPHIL NFR BLD AUTO: 0 %
ERYTHROCYTE [DISTWIDTH] IN BLOOD BY AUTOMATED COUNT: 13.5 % (ref 11.5–14.5)
GLUCOSE BLD MANUAL STRIP-MCNC: 164 MG/DL (ref 74–99)
GLUCOSE SERPL-MCNC: 192 MG/DL (ref 74–99)
GLUCOSE UR STRIP.AUTO-MCNC: NORMAL MG/DL
HCT VFR BLD AUTO: 36.3 % (ref 36–46)
HGB BLD-MCNC: 11.5 G/DL (ref 12–16)
IMM GRANULOCYTES # BLD AUTO: 0.01 X10*3/UL (ref 0–0.5)
IMM GRANULOCYTES NFR BLD AUTO: 0.6 % (ref 0–0.9)
KETONES UR STRIP.AUTO-MCNC: NEGATIVE MG/DL
LACTATE SERPL-SCNC: 0.9 MMOL/L (ref 0.4–2)
LEUKOCYTE ESTERASE UR QL STRIP.AUTO: NEGATIVE
LIPASE SERPL-CCNC: 20 U/L (ref 9–82)
LYMPHOCYTES # BLD AUTO: 0.33 X10*3/UL (ref 0.8–3)
LYMPHOCYTES NFR BLD AUTO: 20.8 %
MCH RBC QN AUTO: 28.3 PG (ref 26–34)
MCHC RBC AUTO-ENTMCNC: 31.7 G/DL (ref 32–36)
MCV RBC AUTO: 89 FL (ref 80–100)
MONOCYTES # BLD AUTO: 0.26 X10*3/UL (ref 0.05–0.8)
MONOCYTES NFR BLD AUTO: 16.4 %
MUCOUS THREADS #/AREA URNS AUTO: ABNORMAL /LPF
NEUTROPHILS # BLD AUTO: 0.98 X10*3/UL (ref 1.6–5.5)
NEUTROPHILS NFR BLD AUTO: 61.6 %
NITRITE UR QL STRIP.AUTO: NEGATIVE
NRBC BLD-RTO: 0 /100 WBCS (ref 0–0)
OVALOCYTES BLD QL SMEAR: NORMAL
PH UR STRIP.AUTO: 6 [PH]
PLATELET # BLD AUTO: 125 X10*3/UL (ref 150–450)
POTASSIUM SERPL-SCNC: 3.7 MMOL/L (ref 3.5–5.3)
PROT SERPL-MCNC: 7.3 G/DL (ref 6.4–8.2)
PROT UR STRIP.AUTO-MCNC: ABNORMAL MG/DL
RBC # BLD AUTO: 4.06 X10*6/UL (ref 4–5.2)
RBC # UR STRIP.AUTO: ABNORMAL /UL
RBC #/AREA URNS AUTO: ABNORMAL /HPF
RBC MORPH BLD: NORMAL
SODIUM SERPL-SCNC: 136 MMOL/L (ref 136–145)
SP GR UR STRIP.AUTO: >1.05
SQUAMOUS #/AREA URNS AUTO: ABNORMAL /HPF
UROBILINOGEN UR STRIP.AUTO-MCNC: NORMAL MG/DL
WBC # BLD AUTO: 1.6 X10*3/UL (ref 4.4–11.3)
WBC #/AREA URNS AUTO: ABNORMAL /HPF
YEAST BUDDING #/AREA UR COMP ASSIST: PRESENT /HPF

## 2024-06-29 PROCEDURE — 72128 CT CHEST SPINE W/O DYE: CPT | Mod: RCN

## 2024-06-29 PROCEDURE — 96375 TX/PRO/DX INJ NEW DRUG ADDON: CPT | Mod: 59

## 2024-06-29 PROCEDURE — 72131 CT LUMBAR SPINE W/O DYE: CPT | Mod: RCN

## 2024-06-29 PROCEDURE — 2500000004 HC RX 250 GENERAL PHARMACY W/ HCPCS (ALT 636 FOR OP/ED): Performed by: INTERNAL MEDICINE

## 2024-06-29 PROCEDURE — G0378 HOSPITAL OBSERVATION PER HR: HCPCS

## 2024-06-29 PROCEDURE — 2550000001 HC RX 255 CONTRASTS: Performed by: STUDENT IN AN ORGANIZED HEALTH CARE EDUCATION/TRAINING PROGRAM

## 2024-06-29 PROCEDURE — 99285 EMERGENCY DEPT VISIT HI MDM: CPT | Mod: 25

## 2024-06-29 PROCEDURE — 2500000001 HC RX 250 WO HCPCS SELF ADMINISTERED DRUGS (ALT 637 FOR MEDICARE OP): Performed by: INTERNAL MEDICINE

## 2024-06-29 PROCEDURE — 87086 URINE CULTURE/COLONY COUNT: CPT | Mod: GEALAB | Performed by: STUDENT IN AN ORGANIZED HEALTH CARE EDUCATION/TRAINING PROGRAM

## 2024-06-29 PROCEDURE — 83605 ASSAY OF LACTIC ACID: CPT | Performed by: STUDENT IN AN ORGANIZED HEALTH CARE EDUCATION/TRAINING PROGRAM

## 2024-06-29 PROCEDURE — 2500000004 HC RX 250 GENERAL PHARMACY W/ HCPCS (ALT 636 FOR OP/ED): Performed by: NURSE PRACTITIONER

## 2024-06-29 PROCEDURE — 71275 CT ANGIOGRAPHY CHEST: CPT | Performed by: RADIOLOGY

## 2024-06-29 PROCEDURE — 2500000004 HC RX 250 GENERAL PHARMACY W/ HCPCS (ALT 636 FOR OP/ED): Performed by: STUDENT IN AN ORGANIZED HEALTH CARE EDUCATION/TRAINING PROGRAM

## 2024-06-29 PROCEDURE — 84075 ASSAY ALKALINE PHOSPHATASE: CPT | Performed by: STUDENT IN AN ORGANIZED HEALTH CARE EDUCATION/TRAINING PROGRAM

## 2024-06-29 PROCEDURE — 96365 THER/PROPH/DIAG IV INF INIT: CPT | Mod: 59

## 2024-06-29 PROCEDURE — 99223 1ST HOSP IP/OBS HIGH 75: CPT | Performed by: INTERNAL MEDICINE

## 2024-06-29 PROCEDURE — 72128 CT CHEST SPINE W/O DYE: CPT | Mod: RCN | Performed by: RADIOLOGY

## 2024-06-29 PROCEDURE — 84484 ASSAY OF TROPONIN QUANT: CPT | Performed by: STUDENT IN AN ORGANIZED HEALTH CARE EDUCATION/TRAINING PROGRAM

## 2024-06-29 PROCEDURE — 74174 CTA ABD&PLVS W/CONTRAST: CPT | Performed by: RADIOLOGY

## 2024-06-29 PROCEDURE — 2500000002 HC RX 250 W HCPCS SELF ADMINISTERED DRUGS (ALT 637 FOR MEDICARE OP, ALT 636 FOR OP/ED): Performed by: INTERNAL MEDICINE

## 2024-06-29 PROCEDURE — 72131 CT LUMBAR SPINE W/O DYE: CPT | Mod: RCN | Performed by: RADIOLOGY

## 2024-06-29 PROCEDURE — 83690 ASSAY OF LIPASE: CPT | Performed by: STUDENT IN AN ORGANIZED HEALTH CARE EDUCATION/TRAINING PROGRAM

## 2024-06-29 PROCEDURE — 82947 ASSAY GLUCOSE BLOOD QUANT: CPT

## 2024-06-29 PROCEDURE — 36415 COLL VENOUS BLD VENIPUNCTURE: CPT | Performed by: STUDENT IN AN ORGANIZED HEALTH CARE EDUCATION/TRAINING PROGRAM

## 2024-06-29 PROCEDURE — 71275 CT ANGIOGRAPHY CHEST: CPT

## 2024-06-29 PROCEDURE — 99222 1ST HOSP IP/OBS MODERATE 55: CPT | Performed by: STUDENT IN AN ORGANIZED HEALTH CARE EDUCATION/TRAINING PROGRAM

## 2024-06-29 PROCEDURE — 85025 COMPLETE CBC W/AUTO DIFF WBC: CPT | Performed by: STUDENT IN AN ORGANIZED HEALTH CARE EDUCATION/TRAINING PROGRAM

## 2024-06-29 PROCEDURE — 93005 ELECTROCARDIOGRAM TRACING: CPT

## 2024-06-29 PROCEDURE — 81001 URINALYSIS AUTO W/SCOPE: CPT | Performed by: STUDENT IN AN ORGANIZED HEALTH CARE EDUCATION/TRAINING PROGRAM

## 2024-06-29 RX ORDER — PAROXETINE HYDROCHLORIDE 20 MG/1
10 TABLET, FILM COATED ORAL DAILY
Status: DISCONTINUED | OUTPATIENT
Start: 2024-06-29 | End: 2024-06-30 | Stop reason: HOSPADM

## 2024-06-29 RX ORDER — TAMSULOSIN HYDROCHLORIDE 0.4 MG/1
0.4 CAPSULE ORAL DAILY
Status: DISCONTINUED | OUTPATIENT
Start: 2024-06-29 | End: 2024-06-30 | Stop reason: HOSPADM

## 2024-06-29 RX ORDER — ACETAMINOPHEN 325 MG/1
650 TABLET ORAL EVERY 6 HOURS PRN
Status: DISCONTINUED | OUTPATIENT
Start: 2024-06-29 | End: 2024-06-30 | Stop reason: HOSPADM

## 2024-06-29 RX ORDER — CEFTRIAXONE 1 G/50ML
1 INJECTION, SOLUTION INTRAVENOUS ONCE
Status: COMPLETED | OUTPATIENT
Start: 2024-06-29 | End: 2024-06-29

## 2024-06-29 RX ORDER — PIOGLITAZONEHYDROCHLORIDE 30 MG/1
15 TABLET ORAL DAILY
Status: DISCONTINUED | OUTPATIENT
Start: 2024-06-29 | End: 2024-06-30 | Stop reason: HOSPADM

## 2024-06-29 RX ORDER — ASCORBIC ACID 500 MG
500 TABLET ORAL DAILY
Status: DISCONTINUED | OUTPATIENT
Start: 2024-06-29 | End: 2024-06-30 | Stop reason: HOSPADM

## 2024-06-29 RX ORDER — CEFTRIAXONE 1 G/50ML
1 INJECTION, SOLUTION INTRAVENOUS EVERY 24 HOURS
Status: DISCONTINUED | OUTPATIENT
Start: 2024-06-30 | End: 2024-06-30 | Stop reason: HOSPADM

## 2024-06-29 RX ORDER — DEXTROSE 50 % IN WATER (D50W) INTRAVENOUS SYRINGE
25
Status: DISCONTINUED | OUTPATIENT
Start: 2024-06-29 | End: 2024-06-30 | Stop reason: HOSPADM

## 2024-06-29 RX ORDER — KETOROLAC TROMETHAMINE 15 MG/ML
15 INJECTION, SOLUTION INTRAMUSCULAR; INTRAVENOUS EVERY 6 HOURS PRN
Status: DISCONTINUED | OUTPATIENT
Start: 2024-06-29 | End: 2024-06-30 | Stop reason: HOSPADM

## 2024-06-29 RX ORDER — INSULIN LISPRO 100 [IU]/ML
0-5 INJECTION, SOLUTION INTRAVENOUS; SUBCUTANEOUS
Status: DISCONTINUED | OUTPATIENT
Start: 2024-06-29 | End: 2024-06-30 | Stop reason: HOSPADM

## 2024-06-29 RX ORDER — FLUCONAZOLE 150 MG/1
150 TABLET ORAL ONCE
Status: DISCONTINUED | OUTPATIENT
Start: 2024-06-29 | End: 2024-06-30 | Stop reason: HOSPADM

## 2024-06-29 RX ORDER — DEXTROSE 50 % IN WATER (D50W) INTRAVENOUS SYRINGE
12.5
Status: DISCONTINUED | OUTPATIENT
Start: 2024-06-29 | End: 2024-06-30 | Stop reason: HOSPADM

## 2024-06-29 RX ORDER — KETOROLAC TROMETHAMINE 15 MG/ML
15 INJECTION, SOLUTION INTRAMUSCULAR; INTRAVENOUS ONCE
Status: COMPLETED | OUTPATIENT
Start: 2024-06-29 | End: 2024-06-29

## 2024-06-29 RX ORDER — ONDANSETRON HYDROCHLORIDE 2 MG/ML
4 INJECTION, SOLUTION INTRAVENOUS EVERY 4 HOURS PRN
Status: DISCONTINUED | OUTPATIENT
Start: 2024-06-29 | End: 2024-06-30 | Stop reason: HOSPADM

## 2024-06-29 RX ORDER — MIRTAZAPINE 15 MG/1
15 TABLET, FILM COATED ORAL NIGHTLY
Status: DISCONTINUED | OUTPATIENT
Start: 2024-06-29 | End: 2024-06-30 | Stop reason: HOSPADM

## 2024-06-29 RX ORDER — TRAZODONE HYDROCHLORIDE 50 MG/1
50 TABLET ORAL NIGHTLY
Status: DISCONTINUED | OUTPATIENT
Start: 2024-06-29 | End: 2024-06-29

## 2024-06-29 RX ORDER — SODIUM CHLORIDE AND POTASSIUM CHLORIDE 150; 900 MG/100ML; MG/100ML
125 INJECTION, SOLUTION INTRAVENOUS CONTINUOUS
Status: DISCONTINUED | OUTPATIENT
Start: 2024-06-29 | End: 2024-06-30 | Stop reason: HOSPADM

## 2024-06-29 RX ORDER — LANOLIN ALCOHOL/MO/W.PET/CERES
1000 CREAM (GRAM) TOPICAL DAILY
Status: DISCONTINUED | OUTPATIENT
Start: 2024-06-29 | End: 2024-06-30 | Stop reason: HOSPADM

## 2024-06-29 RX ORDER — ATORVASTATIN CALCIUM 80 MG/1
80 TABLET, FILM COATED ORAL DAILY
Status: DISCONTINUED | OUTPATIENT
Start: 2024-06-29 | End: 2024-06-30 | Stop reason: HOSPADM

## 2024-06-29 RX ORDER — LISINOPRIL 20 MG/1
20 TABLET ORAL DAILY
Status: DISCONTINUED | OUTPATIENT
Start: 2024-06-29 | End: 2024-06-30 | Stop reason: HOSPADM

## 2024-06-29 RX ORDER — MORPHINE SULFATE 2 MG/ML
2 INJECTION, SOLUTION INTRAMUSCULAR; INTRAVENOUS EVERY 4 HOURS PRN
Status: DISCONTINUED | OUTPATIENT
Start: 2024-06-29 | End: 2024-06-29

## 2024-06-29 SDOH — SOCIAL STABILITY: SOCIAL INSECURITY: HAVE YOU HAD ANY THOUGHTS OF HARMING ANYONE ELSE?: NO

## 2024-06-29 SDOH — SOCIAL STABILITY: SOCIAL INSECURITY: ARE THERE ANY APPARENT SIGNS OF INJURIES/BEHAVIORS THAT COULD BE RELATED TO ABUSE/NEGLECT?: NO

## 2024-06-29 SDOH — SOCIAL STABILITY: SOCIAL INSECURITY: WERE YOU ABLE TO COMPLETE ALL THE BEHAVIORAL HEALTH SCREENINGS?: YES

## 2024-06-29 SDOH — SOCIAL STABILITY: SOCIAL INSECURITY: DO YOU FEEL ANYONE HAS EXPLOITED OR TAKEN ADVANTAGE OF YOU FINANCIALLY OR OF YOUR PERSONAL PROPERTY?: NO

## 2024-06-29 SDOH — SOCIAL STABILITY: SOCIAL INSECURITY: ARE YOU OR HAVE YOU BEEN THREATENED OR ABUSED PHYSICALLY, EMOTIONALLY, OR SEXUALLY BY ANYONE?: NO

## 2024-06-29 SDOH — SOCIAL STABILITY: SOCIAL INSECURITY: ABUSE: ADULT

## 2024-06-29 SDOH — SOCIAL STABILITY: SOCIAL INSECURITY: HAVE YOU HAD THOUGHTS OF HARMING ANYONE ELSE?: NO

## 2024-06-29 SDOH — SOCIAL STABILITY: SOCIAL INSECURITY: DO YOU FEEL UNSAFE GOING BACK TO THE PLACE WHERE YOU ARE LIVING?: NO

## 2024-06-29 SDOH — SOCIAL STABILITY: SOCIAL INSECURITY: HAS ANYONE EVER THREATENED TO HURT YOUR FAMILY OR YOUR PETS?: NO

## 2024-06-29 SDOH — SOCIAL STABILITY: SOCIAL INSECURITY: DOES ANYONE TRY TO KEEP YOU FROM HAVING/CONTACTING OTHER FRIENDS OR DOING THINGS OUTSIDE YOUR HOME?: NO

## 2024-06-29 ASSESSMENT — PAIN - FUNCTIONAL ASSESSMENT
PAIN_FUNCTIONAL_ASSESSMENT: 0-10

## 2024-06-29 ASSESSMENT — COGNITIVE AND FUNCTIONAL STATUS - GENERAL
PATIENT BASELINE BEDBOUND: NO
DAILY ACTIVITIY SCORE: 24
MOBILITY SCORE: 24
MOBILITY SCORE: 24
DAILY ACTIVITIY SCORE: 24

## 2024-06-29 ASSESSMENT — ACTIVITIES OF DAILY LIVING (ADL)
TOILETING: INDEPENDENT
JUDGMENT_ADEQUATE_SAFELY_COMPLETE_DAILY_ACTIVITIES: YES
PATIENT'S MEMORY ADEQUATE TO SAFELY COMPLETE DAILY ACTIVITIES?: YES
HEARING - RIGHT EAR: FUNCTIONAL
GROOMING: INDEPENDENT
BATHING: INDEPENDENT
DRESSING YOURSELF: INDEPENDENT
HEARING - LEFT EAR: FUNCTIONAL
FEEDING YOURSELF: INDEPENDENT
WALKS IN HOME: INDEPENDENT
ADEQUATE_TO_COMPLETE_ADL: YES
LACK_OF_TRANSPORTATION: NO

## 2024-06-29 ASSESSMENT — ENCOUNTER SYMPTOMS
CHILLS: 1
ARTHRALGIAS: 1
ABDOMINAL PAIN: 1
ABDOMINAL DISTENTION: 1
ENDOCRINE NEGATIVE: 1
CARDIOVASCULAR NEGATIVE: 1
SLEEP DISTURBANCE: 1
BACK PAIN: 1
WEAKNESS: 1
NAUSEA: 1
RESPIRATORY NEGATIVE: 1
APPETITE CHANGE: 1
EYES NEGATIVE: 1
HEMATOLOGIC/LYMPHATIC NEGATIVE: 1
ALLERGIC/IMMUNOLOGIC NEGATIVE: 1
FLANK PAIN: 1
DIZZINESS: 1

## 2024-06-29 ASSESSMENT — PAIN SCALES - GENERAL
PAINLEVEL_OUTOF10: 7
PAINLEVEL_OUTOF10: 8
PAINLEVEL_OUTOF10: 4
PAINLEVEL_OUTOF10: 5 - MODERATE PAIN
PAINLEVEL_OUTOF10: 1

## 2024-06-29 ASSESSMENT — LIFESTYLE VARIABLES
AUDIT-C TOTAL SCORE: 0
AUDIT-C TOTAL SCORE: 0
HOW OFTEN DO YOU HAVE A DRINK CONTAINING ALCOHOL: NEVER
HAVE PEOPLE ANNOYED YOU BY CRITICIZING YOUR DRINKING: NO
HOW MANY STANDARD DRINKS CONTAINING ALCOHOL DO YOU HAVE ON A TYPICAL DAY: PATIENT DOES NOT DRINK
HOW OFTEN DO YOU HAVE 6 OR MORE DRINKS ON ONE OCCASION: NEVER
EVER FELT BAD OR GUILTY ABOUT YOUR DRINKING: NO
EVER HAD A DRINK FIRST THING IN THE MORNING TO STEADY YOUR NERVES TO GET RID OF A HANGOVER: NO
HAVE YOU EVER FELT YOU SHOULD CUT DOWN ON YOUR DRINKING: NO
SKIP TO QUESTIONS 9-10: 1
TOTAL SCORE: 0

## 2024-06-29 ASSESSMENT — COLUMBIA-SUICIDE SEVERITY RATING SCALE - C-SSRS
1. IN THE PAST MONTH, HAVE YOU WISHED YOU WERE DEAD OR WISHED YOU COULD GO TO SLEEP AND NOT WAKE UP?: NO
6. HAVE YOU EVER DONE ANYTHING, STARTED TO DO ANYTHING, OR PREPARED TO DO ANYTHING TO END YOUR LIFE?: NO
2. HAVE YOU ACTUALLY HAD ANY THOUGHTS OF KILLING YOURSELF?: NO

## 2024-06-29 ASSESSMENT — PAIN DESCRIPTION - DESCRIPTORS: DESCRIPTORS: ACHING

## 2024-06-29 ASSESSMENT — PAIN DESCRIPTION - LOCATION
LOCATION: BACK
LOCATION: BACK

## 2024-06-29 ASSESSMENT — PATIENT HEALTH QUESTIONNAIRE - PHQ9
SUM OF ALL RESPONSES TO PHQ9 QUESTIONS 1 & 2: 0
1. LITTLE INTEREST OR PLEASURE IN DOING THINGS: NOT AT ALL
2. FEELING DOWN, DEPRESSED OR HOPELESS: NOT AT ALL

## 2024-06-29 ASSESSMENT — PAIN DESCRIPTION - PAIN TYPE: TYPE: ACUTE PAIN

## 2024-06-29 ASSESSMENT — PAIN DESCRIPTION - ORIENTATION: ORIENTATION: LEFT

## 2024-06-29 NOTE — ED TRIAGE NOTES
"Patient here for back pain Worsening over the last 24hrs. Hurts \"all over\" denies injury took 10mg Percocet at 0600. Ambulated to room with cane   "

## 2024-06-29 NOTE — H&P
"History Of Present Illness  Sherri Hanson Stone \"Millicent\" is a 79 y.o. female presenting with back pain. The pain started about 2 days ago, has been hitting her off and on, but mostly on now. Currently located in left lower back and flank, but has been in middle as well. She has some associated nausea, poor appetite and she has had chills. No dysuria, no hematuria. No vomiting, no diarrhea. Nothing really helps pain or makes it worse. She has a large left sided kidney stone, with hydronephrosis, is being admitted for symptom control and uro intervention     Past Medical History  Hypertension  DM  Depression  Nephrolithiasis  GERD  Skin cancer-basal cell  Hyperlipidemia  Splenic art aneur    Surgical History  Bilateral carpal tunnel release  Bilateral total shoulder arthroplasty  Colonoscopy  Right breast biopsy  Left hand surgery   Skin cancer removals  Social History  She reports that she has never smoked. She has never used smokeless tobacco. She reports that she does not drink alcohol and does not use drugs.    Family History  Family History   Problem Relation Name Age of Onset    Diabetes Mother      Asthma Mother      Other (CRF) Mother      Heart failure Father          Allergies  Fenofibrate and Sulfa (sulfonamide antibiotics)    Review of Systems   Constitutional:  Positive for appetite change and chills.   HENT: Negative.     Eyes: Negative.    Respiratory: Negative.     Cardiovascular: Negative.    Gastrointestinal:  Positive for abdominal distention, abdominal pain and nausea.   Endocrine: Negative.    Genitourinary:  Positive for flank pain.   Musculoskeletal:  Positive for arthralgias and back pain.   Skin: Negative.    Allergic/Immunologic: Negative.    Neurological:  Positive for dizziness and weakness.   Hematological: Negative.    Psychiatric/Behavioral:  Positive for sleep disturbance.         Physical Exam  Constitutional:       Appearance: Normal appearance.   HENT:      Head: Normocephalic.      " Nose: Nose normal.      Mouth/Throat:      Mouth: Mucous membranes are dry.   Eyes:      Extraocular Movements: Extraocular movements intact.      Pupils: Pupils are equal, round, and reactive to light.   Neck:      Comments: Supple, no jvd  Cardiovascular:      Pulses: Normal pulses.      Comments: Regular, with ectopy  1/6 low early systolic murmur  Pulmonary:      Effort: Pulmonary effort is normal.      Breath sounds: Normal breath sounds.   Abdominal:      Comments: Soft, but quite distended.   Diffusely tender to palpation. Some guarding   Very good bowel sounds   Musculoskeletal:      Comments: No clubbing or edema   Neurological:      General: No focal deficit present.      Mental Status: She is alert and oriented to person, place, and time.   Psychiatric:         Mood and Affect: Mood normal.         Behavior: Behavior normal.     Scheduled medications  ascorbic acid, 500 mg, oral, Daily  atorvastatin, 80 mg, oral, Daily  [START ON 6/30/2024] cefTRIAXone, 1 g, intravenous, q24h  cyanocobalamin, 1,000 mcg, oral, Daily  insulin lispro, 0-5 Units, subcutaneous, TID  lisinopril, 20 mg, oral, Daily  mirtazapine, 15 mg, oral, Nightly  PARoxetine, 10 mg, oral, Daily  pioglitazone, 15 mg, oral, Daily  tamsulosin, 0.4 mg, oral, Daily      Continuous medications  potassium chloride in 0.9%NaCl, 75 mL/hr, Last Rate: 75 mL/hr (06/29/24 1411)      PRN medications  PRN medications: acetaminophen, dextrose, dextrose, glucagon, glucagon, morphine, ondansetron  Results for orders placed or performed during the hospital encounter of 06/29/24 (from the past 24 hour(s))   CBC and Auto Differential   Result Value Ref Range    WBC 1.6 (L) 4.4 - 11.3 x10*3/uL    nRBC 0.0 0.0 - 0.0 /100 WBCs    RBC 4.06 4.00 - 5.20 x10*6/uL    Hemoglobin 11.5 (L) 12.0 - 16.0 g/dL    Hematocrit 36.3 36.0 - 46.0 %    MCV 89 80 - 100 fL    MCH 28.3 26.0 - 34.0 pg    MCHC 31.7 (L) 32.0 - 36.0 g/dL    RDW 13.5 11.5 - 14.5 %    Platelets 125 (L) 150 -  450 x10*3/uL    Neutrophils % 61.6 40.0 - 80.0 %    Immature Granulocytes %, Automated 0.6 0.0 - 0.9 %    Lymphocytes % 20.8 13.0 - 44.0 %    Monocytes % 16.4 2.0 - 10.0 %    Eosinophils % 0.0 0.0 - 6.0 %    Basophils % 0.6 0.0 - 2.0 %    Neutrophils Absolute 0.98 (L) 1.60 - 5.50 x10*3/uL    Immature Granulocytes Absolute, Automated 0.01 0.00 - 0.50 x10*3/uL    Lymphocytes Absolute 0.33 (L) 0.80 - 3.00 x10*3/uL    Monocytes Absolute 0.26 0.05 - 0.80 x10*3/uL    Eosinophils Absolute 0.00 0.00 - 0.40 x10*3/uL    Basophils Absolute 0.01 0.00 - 0.10 x10*3/uL   Comprehensive metabolic panel   Result Value Ref Range    Glucose 192 (H) 74 - 99 mg/dL    Sodium 136 136 - 145 mmol/L    Potassium 3.7 3.5 - 5.3 mmol/L    Chloride 100 98 - 107 mmol/L    Bicarbonate 25 21 - 32 mmol/L    Anion Gap 15 10 - 20 mmol/L    Urea Nitrogen 17 6 - 23 mg/dL    Creatinine 0.70 0.50 - 1.05 mg/dL    eGFR 88 >60 mL/min/1.73m*2    Calcium 9.0 8.6 - 10.3 mg/dL    Albumin 4.3 3.4 - 5.0 g/dL    Alkaline Phosphatase 80 33 - 136 U/L    Total Protein 7.3 6.4 - 8.2 g/dL    AST 36 9 - 39 U/L    Bilirubin, Total 0.4 0.0 - 1.2 mg/dL    ALT 38 7 - 45 U/L   Lactate   Result Value Ref Range    Lactate 0.9 0.4 - 2.0 mmol/L   Lipase   Result Value Ref Range    Lipase 20 9 - 82 U/L   Troponin I, High Sensitivity   Result Value Ref Range    Troponin I, High Sensitivity 5 0 - 13 ng/L   Morphology   Result Value Ref Range    RBC Morphology See Below     Ovalocytes Few    Urinalysis with Reflex Culture and Microscopic   Result Value Ref Range    Color, Urine Light-Yellow Light-Yellow, Yellow, Dark-Yellow    Appearance, Urine Clear Clear    Specific Gravity, Urine >1.050 (N) 1.005 - 1.035    pH, Urine 6.0 5.0, 5.5, 6.0, 6.5, 7.0, 7.5, 8.0    Protein, Urine 30 (1+) (A) NEGATIVE, 10 (TRACE), 20 (TRACE) mg/dL    Glucose, Urine Normal Normal mg/dL    Blood, Urine 0.06 (1+) (A) NEGATIVE    Ketones, Urine NEGATIVE NEGATIVE mg/dL    Bilirubin, Urine NEGATIVE NEGATIVE     "Urobilinogen, Urine Normal Normal mg/dL    Nitrite, Urine NEGATIVE NEGATIVE    Leukocyte Esterase, Urine NEGATIVE NEGATIVE   Urinalysis Microscopic   Result Value Ref Range    WBC, Urine 6-10 (A) 1-5, NONE /HPF    RBC, Urine 11-20 (A) NONE, 1-2, 3-5 /HPF    Squamous Epithelial Cells, Urine 10-25 (FEW) Reference range not established. /HPF    Bacteria, Urine 1+ (A) NONE SEEN /HPF    Budding Yeast, Urine PRESENT (A) NONE /HPF    Mucus, Urine 3+ Reference range not established. /LPF         Last Recorded Vitals  Blood pressure 128/76, pulse 80, temperature 36.4 °C (97.5 °F), resp. rate 18, height 1.575 m (5' 2\"), weight 66.7 kg (147 lb), SpO2 97%.    Relevant Result     Assessment/Plan     Left UP kidney stone with hydronephrosis. Suspect this is the etiology of her back pain. Does have hx of stones. Appreciate urology input. Doubt stone of this size will move much, plan is for stent placement  Possible uti. Low grade temp noted in ER. Continuing rocephin. Urine culture is pending  DM. Sugars run 115-125 usually. Continue usual meds and iss if needed  Hypertension. Continue meds, monitor.   Depression/anxiety. Recently started on remeron for sleep disturbance  Obesity       I spent 40 minutes in the professional and overall care of this patient.      Roselyn Jimenez MD    "

## 2024-06-29 NOTE — ED PROVIDER NOTES
"CC: Back Pain (Worsening over the last 24hrs. Hurts \"all over\" denies injury took 10mg Percocet at 0600. Ambulated to room with cane )     HPI:  Patient is a 79-year-old female with a history of non-insulin-dependent diabetes, hypertension, hyperlipidemia who presents to the emergency department with back pain.  Patient Dors is severe 8/9 out of 10 pain.  She has been taking Percocet at home but has been wearing off every 2 hours.  She took 2 Percocets at 6 in the morning.  She had 1 episode of emesis in triage which she thinks may be secondary to the Percocet.  She denies chest pain but endorses some abdominal discomfort.  No unilateral weakness or numbness.  No injury or trauma.  Reports chills.  But no fever.  Denies shortness of breath.  No urinary symptoms.    Records Reviewed:  Recent available ED and inpatient notes reviewed in EMR.    PMHx/PSHx:  Per HPI.   - has a past medical history of Aneurysm, splenic artery ([facility]), Ankylosing spondylitis (Multi), Anxiety, Arthritis, Body mass index (BMI) 27.0-27.9, adult, BPPV (benign paroxysmal positional vertigo), Cervical radiculitis, Depression, Diabetes mellitus (Multi), Effusion, left knee, Exocrine pancreatic insufficiency ([facility]), GERD (gastroesophageal reflux disease), Hearing aid worn, HL (hearing loss), HLD (hyperlipidemia), Hypertension, Injury of tendon of rotator cuff, Irritable bowel syndrome with diarrhea, Kidney stone, Kidney stone, Nephrolithiasis, Other conditions influencing health status, Other hemorrhoids, Other nonspecific abnormal finding of lung field, Pain in left knee, Pain in unspecified knee, Personal history of other diseases of the circulatory system, Personal history of other diseases of the musculoskeletal system and connective tissue, Personal history of other specified conditions, Personal history of other specified conditions, Personal history of other specified conditions, Personal history of other specified conditions, Skin " cancer, Unspecified injury of muscle(s) and tendon(s) of the rotator cuff of right shoulder, initial encounter, and Wears glasses.  - has a past surgical history that includes Hand surgery (Left, 03/07/2014); Other surgical history (Bilateral, 07/11/2022); MR angio head wo IV contrast (04/15/2023); MR angio neck wo IV contrast (04/15/2023); Carpal tunnel release (Bilateral); Colonoscopy (04/11/2018); and Breast biopsy (Right, 2004).  - has Ankylosing spondylitis (Multi); Anxiety; Aortic regurgitation; Essential hypertension; Depression; Headache, chronic daily; Insomnia; Hypercholesterolemia; Osteoarthritis; Skin cancer; Lumbar radiculopathy; Cervical radiculopathy; Splenic artery aneurysm (CMS-HCC); Controlled diabetes mellitus type II without complication (Multi); Benign paroxysmal positional vertigo of right ear; Status post total left knee replacement; Chronic diarrhea; Overweight with body mass index (BMI) 25.0-29.9; and Personal history of other malignant neoplasm of skin on their problem list.    Medications:  Reviewed in EMR. See EMR for complete list of medications and doses.    Allergies:  Fenofibrate and Sulfa (sulfonamide antibiotics)    Social History:  - Tobacco:  reports that she has never smoked. She has never used smokeless tobacco.   - Alcohol:  reports no history of alcohol use.   - Illicit Drugs:  reports no history of drug use.     ROS:  Per HPI.       ???????????????????????????????????????????????????????????????  Triage Vitals:  T 36 °C (96.8 °F)  HR 84  /85  RR 18  O2 95 % None (Room air)    Physical Exam  ???????????????????????????????????????????????????????????????  GEN: in acute distress  HEAD: atraumatic  NECK: no C-spine tenderness  CVS/CHEST: reg rate, nl rhythm  PULM: CTA b/l no wheezes, crackles, or rhonchi   GI: soft, mild tenderness to palpation, no rebound or guarding  BACK: no CVA tenderness, no significant vertebral point tenderness, no step-offs or deformities  EXT:  no LE edema, 2+ periph pulses in bilat radial and DP.  Negative straight leg raise.  NEURO: Awake and alert, Strength and sensation is equal in b/l upper and lower extremities, normal ambulation  SKIN: warm, dry, no rashes  PSYCH: AAOx3 answers questions appropriately    Assessment and Plan:  Patient is a 79-year-old female coming in with extreme back pain.  She has equal pulses in her lower extremities.  However she is very uncomfortable on assessment and keeps wiggling around in pain.  She did take 2 Percocet so she is a little sleepy with history taking.  She did vomit in triage but thought to be secondary to the nausea.  Current pain is a 4 therefore will hold pain medication at this time given that she took 2 Percocets prior to arrival.  Patient has not equal strength in her lower extremities.  She had no injury or trauma.  Given how uncomfortable patient is a CTA obtained to assess for aortic pathology as well as T and L-spine.  She does endorse most of her pain midline but has no significant tenderness with palpation or step-off or deformities.  Disposition pending workup at this time.  CT showed a large stone that is likely causing an intermittent obstruction with associated hydronephrosis and perinephric stranding.  Spoke to Dr. Leal who is on consultation and patient admitted to medicine.    ED Course:  ED Course as of 06/29/24 1118   Sat Jun 29, 2024   0941 Image reviewed - No dissection. Patient has a large left UPJ stone with hydronephrosis.  Urology consulted. [HD]   1116 EKG read by me reviewed by me is normal sinus rhythm at 87 bpm.  Left axis deviation.  No significant ST segment elevation or depression.  No T wave abnormalities. [HD]      ED Course User Index  [HD] Rita Connell DO         Diagnoses as of 06/29/24 1118   Nephrolithiasis       Social Determinants Limiting Care:  None identified    Disposition:  admitted    Rita Connell DO      Procedures ? [employer] last updated 6/29/2024  8:17 AM        Rita Connell DO  06/29/24 1119

## 2024-06-29 NOTE — CONSULTS
Consults    Reason For Consult  Left renal pelvis stone severe back pain    History Of Present Illness  Millicent Najera is a 79 y.o. female presenting with severe back pain on the left side for the last 2 days, up to 8-9 out of 10, requiring pain control every 2-4 hours.  She had 1 episode of emesis in her ER visit, she thinks it is related to the Percocet induced nausea.  Denies fever or chills but reports chills.  Denies shortness of breath.  No urinary symptoms.    In ED she appears to be in controlled discomfort.     Past Medical History  She has a past medical history of Aneurysm, splenic artery (CMS-HCC), Ankylosing spondylitis (Multi), Anxiety, Arthritis, Body mass index (BMI) 27.0-27.9, adult (12/07/2021), BPPV (benign paroxysmal positional vertigo), Cervical radiculitis, Depression, Diabetes mellitus (Multi), Effusion, left knee (02/11/2019), Exocrine pancreatic insufficiency ([facility]), GERD (gastroesophageal reflux disease), Hearing aid worn, HL (hearing loss), HLD (hyperlipidemia), Hypertension, Injury of tendon of rotator cuff (06/19/2024), Irritable bowel syndrome with diarrhea (01/16/2020), Kidney stone, Kidney stone (08/21/2023), Nephrolithiasis, Other conditions influencing health status (06/08/2020), Other hemorrhoids (06/28/2017), Other nonspecific abnormal finding of lung field (03/26/2019), Pain in left knee (04/30/2019), Pain in unspecified knee (02/11/2019), Personal history of other diseases of the circulatory system (08/12/2017), Personal history of other diseases of the musculoskeletal system and connective tissue (05/21/2015), Personal history of other specified conditions (11/03/2020), Personal history of other specified conditions (05/04/2021), Personal history of other specified conditions (03/07/2014), Personal history of other specified conditions (01/31/2013), Skin cancer, Unspecified injury of muscle(s) and tendon(s) of the rotator cuff of right shoulder, initial encounter  "(10/11/2016), and Wears glasses.    Surgical History  She has a past surgical history that includes Hand surgery (Left, 03/07/2014); Other surgical history (Bilateral, 07/11/2022); MR angio head wo IV contrast (04/15/2023); MR angio neck wo IV contrast (04/15/2023); Carpal tunnel release (Bilateral); Colonoscopy (04/11/2018); and Breast biopsy (Right, 2004).     Social History  She reports that she has never smoked. She has never used smokeless tobacco. She reports that she does not drink alcohol and does not use drugs.    Family History  Family History   Problem Relation Name Age of Onset    Diabetes Mother      Asthma Mother      Other (CRF) Mother      Heart failure Father          Allergies  Fenofibrate and Sulfa (sulfonamide antibiotics)    Review of Systems   A complete review of systems was performed. All systems are noted to be negative unless indicated in the history of present illness, impression, active problem list, or past histories.    Physical Exam  Constitutional:       General: She is not in acute distress.     Appearance: Normal appearance. She is normal weight. She is not toxic-appearing.   Cardiovascular:      Rate and Rhythm: Normal rate.   Pulmonary:      Effort: Pulmonary effort is normal.   Abdominal:      General: Abdomen is flat.      Palpations: Abdomen is soft.      Tenderness: There is no right CVA tenderness or left CVA tenderness.   Neurological:      Mental Status: She is alert.          Last Recorded Vitals  Blood pressure 128/76, pulse 80, temperature 36.4 °C (97.5 °F), resp. rate 18, height 1.575 m (5' 2\"), weight 66.7 kg (147 lb), SpO2 97%.    Relevant Results  Results for orders placed or performed during the hospital encounter of 06/29/24 (from the past 24 hour(s))   CBC and Auto Differential   Result Value Ref Range    WBC 1.6 (L) 4.4 - 11.3 x10*3/uL    nRBC 0.0 0.0 - 0.0 /100 WBCs    RBC 4.06 4.00 - 5.20 x10*6/uL    Hemoglobin 11.5 (L) 12.0 - 16.0 g/dL    Hematocrit 36.3 36.0 " - 46.0 %    MCV 89 80 - 100 fL    MCH 28.3 26.0 - 34.0 pg    MCHC 31.7 (L) 32.0 - 36.0 g/dL    RDW 13.5 11.5 - 14.5 %    Platelets 125 (L) 150 - 450 x10*3/uL    Neutrophils % 61.6 40.0 - 80.0 %    Immature Granulocytes %, Automated 0.6 0.0 - 0.9 %    Lymphocytes % 20.8 13.0 - 44.0 %    Monocytes % 16.4 2.0 - 10.0 %    Eosinophils % 0.0 0.0 - 6.0 %    Basophils % 0.6 0.0 - 2.0 %    Neutrophils Absolute 0.98 (L) 1.60 - 5.50 x10*3/uL    Immature Granulocytes Absolute, Automated 0.01 0.00 - 0.50 x10*3/uL    Lymphocytes Absolute 0.33 (L) 0.80 - 3.00 x10*3/uL    Monocytes Absolute 0.26 0.05 - 0.80 x10*3/uL    Eosinophils Absolute 0.00 0.00 - 0.40 x10*3/uL    Basophils Absolute 0.01 0.00 - 0.10 x10*3/uL   Comprehensive metabolic panel   Result Value Ref Range    Glucose 192 (H) 74 - 99 mg/dL    Sodium 136 136 - 145 mmol/L    Potassium 3.7 3.5 - 5.3 mmol/L    Chloride 100 98 - 107 mmol/L    Bicarbonate 25 21 - 32 mmol/L    Anion Gap 15 10 - 20 mmol/L    Urea Nitrogen 17 6 - 23 mg/dL    Creatinine 0.70 0.50 - 1.05 mg/dL    eGFR 88 >60 mL/min/1.73m*2    Calcium 9.0 8.6 - 10.3 mg/dL    Albumin 4.3 3.4 - 5.0 g/dL    Alkaline Phosphatase 80 33 - 136 U/L    Total Protein 7.3 6.4 - 8.2 g/dL    AST 36 9 - 39 U/L    Bilirubin, Total 0.4 0.0 - 1.2 mg/dL    ALT 38 7 - 45 U/L   Lactate   Result Value Ref Range    Lactate 0.9 0.4 - 2.0 mmol/L   Lipase   Result Value Ref Range    Lipase 20 9 - 82 U/L   Troponin I, High Sensitivity   Result Value Ref Range    Troponin I, High Sensitivity 5 0 - 13 ng/L   Morphology   Result Value Ref Range    RBC Morphology See Below     Ovalocytes Few    Urinalysis with Reflex Culture and Microscopic   Result Value Ref Range    Color, Urine Light-Yellow Light-Yellow, Yellow, Dark-Yellow    Appearance, Urine Clear Clear    Specific Gravity, Urine >1.050 (N) 1.005 - 1.035    pH, Urine 6.0 5.0, 5.5, 6.0, 6.5, 7.0, 7.5, 8.0    Protein, Urine 30 (1+) (A) NEGATIVE, 10 (TRACE), 20 (TRACE) mg/dL    Glucose, Urine  Normal Normal mg/dL    Blood, Urine 0.06 (1+) (A) NEGATIVE    Ketones, Urine NEGATIVE NEGATIVE mg/dL    Bilirubin, Urine NEGATIVE NEGATIVE    Urobilinogen, Urine Normal Normal mg/dL    Nitrite, Urine NEGATIVE NEGATIVE    Leukocyte Esterase, Urine NEGATIVE NEGATIVE   Urinalysis Microscopic   Result Value Ref Range    WBC, Urine 6-10 (A) 1-5, NONE /HPF    RBC, Urine 11-20 (A) NONE, 1-2, 3-5 /HPF    Squamous Epithelial Cells, Urine 10-25 (FEW) Reference range not established. /HPF    Bacteria, Urine 1+ (A) NONE SEEN /HPF    Budding Yeast, Urine PRESENT (A) NONE /HPF    Mucus, Urine 3+ Reference range not established. /LPF     CT angio chest abdomen pelvis    Result Date: 6/29/2024  Interpreted By:  Hermann Mathews, STUDY: CT ANGIO CHEST ABDOMEN PELVIS;  6/29/2024 9:55 am   INDICATION: Signs/Symptoms:r/o dissection. Worsening back pain.   COMPARISON: Renal colic CT of 01/07/2011.   ACCESSION NUMBER(S): [id number]   ORDERING CLINICIAN: VAIBHAV CONNELL   TECHNIQUE: Unenhanced axial images were obtained through the chest, abdomen and pelvis. Subsequently following intravenous administration of  75 mL Omnipaque 350, CTA of the chest, abdomen and pelvis was performed. Coronal and sagittal reformations were made from the axial data. Additional 3D volume rendered reformations were made on separate workstation.   FINDINGS: CHEST:   VESSELS: Unenhanced images through the chest show no evidence of aortic intramural hematoma.  Irregular atherosclerotic calcifications are present in the aorta and branch vessels.   There is no evidence of aortic aneurysm or dissection throughout the chest and abdomen.   There is a splenic artery aneurysm containing peripheral calcification located near the splenic hilum measuring 2 cm in diameter similar to prior.   No central pulmonary embolism is seen.   HEART: The heart is not significantly enlarged.  No pericardial effusion.   MEDIASTINUM AND JAMI: Multi station mediastinal small nonspecific  lymph nodes are not individually pathologically enlarged. No significantly enlarged hilar lymph nodes are seen.   LUNG/PLEURA/LARGE AIRWAYS: Mild multifocal predominantly dependent and basilar atelectasis is seen bilaterally. Small calcified granulomas are seen in the right upper lobe and right middle lobe.   No pleural effusion or pneumothorax is seen bilaterally.   CHEST WALL AND LOWER NECK: Nonspecific mildly enlarged bilateral axillary lymph nodes are present.   ABDOMEN:   LIVER: Within normal limits.   BILE DUCTS: Nondilated.   GALLBLADDER: The gallbladder is not distended and without calcified stones.   PANCREAS: There is nonspecific mild dilation of the main pancreatic duct measuring up to 4 mm in diameter in the ventral segment of the pancreatic head. No discrete pancreatic lesion or peripancreatic inflammation is identified.   SPLEEN: Within normal limits.   ADRENAL GLANDS: Within normal limits.   KIDNEYS AND URETERS: There is a left central renal large calculus measuring approximately 1.7 x 1.3 cm in axial dimension. There is moderate left hydronephrosis and mild dilation of the left proximal ureteral segment with left central renal and proximal periureteral fat stranding. While this large calculus is not clearly an obstructing position at the current time, it may be causing intermittent obstruction based upon the additional findings. Left renal varying sized cysts are present with the largest posterior interpolar partially exophytic cyst measuring 5 cm in diameter.   No right renal or ureteral calculi are seen. Several subcentimeter right renal cortical lesions also likely represent small cysts. No right hydroureteronephrosis.   Urinary bladder is minimally distended.   BOWEL: No bowel obstruction. Appendix is not clearly identified although no regional inflammation is seen. There is mild submucosal fat prominence of the cecum and ascending colon which may be related to chronic inflammation. Few scattered  small colonic diverticula are present without acute diverticulitis.   PERITONEUM/RETROPERITONEUM/LYMPH NODES: No ascites or free air, no fluid collection.   No retroperitoneal fluid collection or lymphadenopathy.       ABDOMINAL WALL: Small lobulated fat containing periumbilical hernia is seen without inflammation.   BONE AND SOFT TISSUE: Bilateral shoulder arthroplasty hardware is present, streak artifact from which obscures some regional visualization. Multilevel disc space narrowing and predominantly anterior endplate spurring is present throughout the thoracic and lumbar spine. Facet arthrosis is greatest in the mid-lower lumbar spine. Small vacuum discs are seen at L3-4 and L5-S1. A mild anterolisthesis of L4 relative to L5 more likely is chronic/degenerative.       No evidence of aortic dissection or aneurysm.   Left central renal 1.7 x 1.3 cm calculus. While this calculus is not currently in an obstructing position, it may be causing intermittent obstruction given the presence of moderate left hydronephrosis, mild left proximal ureteral dilation as well as left central renal and proximal periureteral fat stranding. Component of infection not excluded.   Mild dependent and basilar atelectasis bilaterally.   Nonspecific mildly enlarged bilateral axillary lymph nodes.   Approximate 2 cm splenic artery aneurysm with peripheral calcification near the splenic hilum similar to prior.       MACRO: None.   Signed by: Hermann Mathews 6/29/2024 10:17 AM Dictation workstation:   [id number]        Assessment/Plan   80 yo female with a 1.7cm left renal stone and severe persistent left flank pain, now here for management.    I personally reviewed the medical records of the patient including the note of the referring physician including the CT images as well as report.    I discussed with the patient and her family her options which include pain control first, followed by ESWL, stent insertion will rule out ureteroscopy  and laser stone fragmentation.  Considering the pain she is in  and the size of the stone which is 1.7 cm, I do not favor ESWL, and would instead place a stent at this point, stage the  ureteroscopy and fragmentation for 3 to 4 weeks later when the ureter is dilated and not, which will ensure more successful procedure and less pain because of the ureteral dilatation.    Patient is comfortable with this plan and would like this as soon as possible considering her significant pain.    Plan:  Will take to the operating room within 24 to 48 hours for left double-J stent insertion  Admitted in the meantime for pain control and IV antibiotics    I spent 60 minutes in the professional and overall care of this patient.

## 2024-06-30 ENCOUNTER — ANESTHESIA (OUTPATIENT)
Dept: INPATIENT UNIT | Facility: HOSPITAL | Age: 79
DRG: 659 | End: 2024-06-30
Payer: MEDICARE

## 2024-06-30 ENCOUNTER — ANESTHESIA EVENT (OUTPATIENT)
Dept: INPATIENT UNIT | Facility: HOSPITAL | Age: 79
DRG: 659 | End: 2024-06-30
Payer: MEDICARE

## 2024-06-30 ENCOUNTER — APPOINTMENT (OUTPATIENT)
Dept: RADIOLOGY | Facility: HOSPITAL | Age: 79
DRG: 659 | End: 2024-06-30
Payer: MEDICARE

## 2024-06-30 VITALS
RESPIRATION RATE: 16 BRPM | HEIGHT: 62 IN | OXYGEN SATURATION: 95 % | SYSTOLIC BLOOD PRESSURE: 148 MMHG | HEART RATE: 85 BPM | DIASTOLIC BLOOD PRESSURE: 71 MMHG | TEMPERATURE: 97.9 F | WEIGHT: 150.05 LBS | BODY MASS INDEX: 27.61 KG/M2

## 2024-06-30 LAB
ANION GAP SERPL CALC-SCNC: 12 MMOL/L (ref 10–20)
BASOPHILS # BLD AUTO: 0 X10*3/UL (ref 0–0.1)
BASOPHILS NFR BLD AUTO: 0 %
BUN SERPL-MCNC: 15 MG/DL (ref 6–23)
CALCIUM SERPL-MCNC: 8.3 MG/DL (ref 8.6–10.3)
CHLORIDE SERPL-SCNC: 102 MMOL/L (ref 98–107)
CO2 SERPL-SCNC: 27 MMOL/L (ref 21–32)
CREAT SERPL-MCNC: 0.59 MG/DL (ref 0.5–1.05)
EGFRCR SERPLBLD CKD-EPI 2021: >90 ML/MIN/1.73M*2
EOSINOPHIL # BLD AUTO: 0 X10*3/UL (ref 0–0.4)
EOSINOPHIL NFR BLD AUTO: 0 %
ERYTHROCYTE [DISTWIDTH] IN BLOOD BY AUTOMATED COUNT: 13.6 % (ref 11.5–14.5)
GLUCOSE BLD MANUAL STRIP-MCNC: 131 MG/DL (ref 74–99)
GLUCOSE SERPL-MCNC: 135 MG/DL (ref 74–99)
HCT VFR BLD AUTO: 32.8 % (ref 36–46)
HGB BLD-MCNC: 10.4 G/DL (ref 12–16)
IMM GRANULOCYTES # BLD AUTO: 0.01 X10*3/UL (ref 0–0.5)
IMM GRANULOCYTES NFR BLD AUTO: 0.9 % (ref 0–0.9)
LYMPHOCYTES # BLD AUTO: 0.27 X10*3/UL (ref 0.8–3)
LYMPHOCYTES NFR BLD AUTO: 25 %
MCH RBC QN AUTO: 28.1 PG (ref 26–34)
MCHC RBC AUTO-ENTMCNC: 31.7 G/DL (ref 32–36)
MCV RBC AUTO: 89 FL (ref 80–100)
MONOCYTES # BLD AUTO: 0.12 X10*3/UL (ref 0.05–0.8)
MONOCYTES NFR BLD AUTO: 11.1 %
NEUTROPHILS # BLD AUTO: 0.68 X10*3/UL (ref 1.6–5.5)
NEUTROPHILS NFR BLD AUTO: 63 %
NRBC BLD-RTO: 0 /100 WBCS (ref 0–0)
PLATELET # BLD AUTO: 98 X10*3/UL (ref 150–450)
POTASSIUM SERPL-SCNC: 4.3 MMOL/L (ref 3.5–5.3)
RBC # BLD AUTO: 3.7 X10*6/UL (ref 4–5.2)
RBC MORPH BLD: NORMAL
SODIUM SERPL-SCNC: 137 MMOL/L (ref 136–145)
WBC # BLD AUTO: 1.1 X10*3/UL (ref 4.4–11.3)

## 2024-06-30 PROCEDURE — 80048 BASIC METABOLIC PNL TOTAL CA: CPT | Performed by: INTERNAL MEDICINE

## 2024-06-30 PROCEDURE — 96376 TX/PRO/DX INJ SAME DRUG ADON: CPT | Mod: 59

## 2024-06-30 PROCEDURE — 2720000007 HC OR 272 NO HCPCS: Performed by: STUDENT IN AN ORGANIZED HEALTH CARE EDUCATION/TRAINING PROGRAM

## 2024-06-30 PROCEDURE — 99238 HOSP IP/OBS DSCHRG MGMT 30/<: CPT | Performed by: INTERNAL MEDICINE

## 2024-06-30 PROCEDURE — 82947 ASSAY GLUCOSE BLOOD QUANT: CPT

## 2024-06-30 PROCEDURE — BT1F1ZZ FLUOROSCOPY OF LEFT KIDNEY, URETER AND BLADDER USING LOW OSMOLAR CONTRAST: ICD-10-PCS | Performed by: STUDENT IN AN ORGANIZED HEALTH CARE EDUCATION/TRAINING PROGRAM

## 2024-06-30 PROCEDURE — 74420 UROGRAPHY RTRGR +-KUB: CPT

## 2024-06-30 PROCEDURE — 2500000001 HC RX 250 WO HCPCS SELF ADMINISTERED DRUGS (ALT 637 FOR MEDICARE OP): Performed by: INTERNAL MEDICINE

## 2024-06-30 PROCEDURE — 7100000001 HC RECOVERY ROOM TIME - INITIAL BASE CHARGE: Performed by: STUDENT IN AN ORGANIZED HEALTH CARE EDUCATION/TRAINING PROGRAM

## 2024-06-30 PROCEDURE — 74420 UROGRAPHY RTRGR +-KUB: CPT | Performed by: RADIOLOGY

## 2024-06-30 PROCEDURE — 7100000002 HC RECOVERY ROOM TIME - EACH INCREMENTAL 1 MINUTE: Performed by: STUDENT IN AN ORGANIZED HEALTH CARE EDUCATION/TRAINING PROGRAM

## 2024-06-30 PROCEDURE — 85025 COMPLETE CBC W/AUTO DIFF WBC: CPT | Performed by: INTERNAL MEDICINE

## 2024-06-30 PROCEDURE — 3600000003 HC OR TIME - INITIAL BASE CHARGE - PROCEDURE LEVEL THREE: Performed by: STUDENT IN AN ORGANIZED HEALTH CARE EDUCATION/TRAINING PROGRAM

## 2024-06-30 PROCEDURE — C2617 STENT, NON-COR, TEM W/O DEL: HCPCS | Performed by: STUDENT IN AN ORGANIZED HEALTH CARE EDUCATION/TRAINING PROGRAM

## 2024-06-30 PROCEDURE — G0378 HOSPITAL OBSERVATION PER HR: HCPCS

## 2024-06-30 PROCEDURE — 36415 COLL VENOUS BLD VENIPUNCTURE: CPT | Performed by: INTERNAL MEDICINE

## 2024-06-30 PROCEDURE — 2500000002 HC RX 250 W HCPCS SELF ADMINISTERED DRUGS (ALT 637 FOR MEDICARE OP, ALT 636 FOR OP/ED): Performed by: INTERNAL MEDICINE

## 2024-06-30 PROCEDURE — 2780000003 HC OR 278 NO HCPCS: Performed by: STUDENT IN AN ORGANIZED HEALTH CARE EDUCATION/TRAINING PROGRAM

## 2024-06-30 PROCEDURE — 0T778DZ DILATION OF LEFT URETER WITH INTRALUMINAL DEVICE, VIA NATURAL OR ARTIFICIAL OPENING ENDOSCOPIC: ICD-10-PCS | Performed by: STUDENT IN AN ORGANIZED HEALTH CARE EDUCATION/TRAINING PROGRAM

## 2024-06-30 PROCEDURE — 74420 UROGRAPHY RTRGR +-KUB: CPT | Performed by: STUDENT IN AN ORGANIZED HEALTH CARE EDUCATION/TRAINING PROGRAM

## 2024-06-30 PROCEDURE — 2500000004 HC RX 250 GENERAL PHARMACY W/ HCPCS (ALT 636 FOR OP/ED): Performed by: NURSE PRACTITIONER

## 2024-06-30 PROCEDURE — 2500000004 HC RX 250 GENERAL PHARMACY W/ HCPCS (ALT 636 FOR OP/ED): Performed by: INTERNAL MEDICINE

## 2024-06-30 PROCEDURE — 3600000008 HC OR TIME - EACH INCREMENTAL 1 MINUTE - PROCEDURE LEVEL THREE: Performed by: STUDENT IN AN ORGANIZED HEALTH CARE EDUCATION/TRAINING PROGRAM

## 2024-06-30 PROCEDURE — C1769 GUIDE WIRE: HCPCS | Performed by: STUDENT IN AN ORGANIZED HEALTH CARE EDUCATION/TRAINING PROGRAM

## 2024-06-30 PROCEDURE — 1100000001 HC PRIVATE ROOM DAILY

## 2024-06-30 PROCEDURE — 52332 CYSTOSCOPY AND TREATMENT: CPT | Performed by: STUDENT IN AN ORGANIZED HEALTH CARE EDUCATION/TRAINING PROGRAM

## 2024-06-30 PROCEDURE — 2500000005 HC RX 250 GENERAL PHARMACY W/O HCPCS: Performed by: STUDENT IN AN ORGANIZED HEALTH CARE EDUCATION/TRAINING PROGRAM

## 2024-06-30 DEVICE — STENT KIT, IMAJIN HYDRO, 6FR X 24CM, POSITIONER, NO GUIDEWIRE: Type: IMPLANTABLE DEVICE | Site: URETER | Status: FUNCTIONAL

## 2024-06-30 RX ORDER — LIDOCAINE HYDROCHLORIDE 20 MG/ML
JELLY TOPICAL AS NEEDED
Status: DISCONTINUED | OUTPATIENT
Start: 2024-06-30 | End: 2024-06-30 | Stop reason: HOSPADM

## 2024-06-30 RX ORDER — AMOXICILLIN AND CLAVULANATE POTASSIUM 875; 125 MG/1; MG/1
875 TABLET, FILM COATED ORAL 2 TIMES DAILY
Start: 2024-06-30

## 2024-06-30 ASSESSMENT — COGNITIVE AND FUNCTIONAL STATUS - GENERAL
MOBILITY SCORE: 24
DAILY ACTIVITIY SCORE: 24

## 2024-06-30 ASSESSMENT — PAIN - FUNCTIONAL ASSESSMENT
PAIN_FUNCTIONAL_ASSESSMENT: 0-10

## 2024-06-30 ASSESSMENT — PAIN SCALES - GENERAL
PAINLEVEL_OUTOF10: 6
PAINLEVEL_OUTOF10: 4
PAINLEVEL_OUTOF10: 6
PAINLEVEL_OUTOF10: 0 - NO PAIN
PAINLEVEL_OUTOF10: 4
PAINLEVEL_OUTOF10: 6
PAINLEVEL_OUTOF10: 0 - NO PAIN
PAINLEVEL_OUTOF10: 6
PAINLEVEL_OUTOF10: 5 - MODERATE PAIN

## 2024-06-30 NOTE — DISCHARGE SUMMARY
Discharge Diagnosis  Nephrolithiasis    Issues Requiring Follow-Up  Stent removal, possible lithotripsy    Discharge Meds     Your medication list        CONTINUE taking these medications        Instructions Last Dose Given Next Dose Due   amoxicillin-pot clavulanate 875-125 mg tablet  Commonly known as: Augmentin      Take 1 tablet (875 mg) by mouth 2 times a day.       atorvastatin 80 mg tablet  Commonly known as: Lipitor      Take 1 tablet (80 mg) by mouth once daily.       Flonase Allergy Relief 50 mcg/actuation nasal spray  Generic drug: fluticasone           lisinopril 20 mg tablet      TAKE 1 TABLET DAILY AS DIRECTED       metFORMIN 500 mg tablet  Commonly known as: Glucophage           mirtazapine 15 mg tablet  Commonly known as: Remeron      TAKE 1 TABLET BY MOUTH ONCE DAILY AT BEDTIME       MULTIVITAMIN ORAL           OneTouch Delica Plus Lancet 33 gauge misc  Generic drug: lancets           OneTouch Verio test strips strip  Generic drug: blood sugar diagnostic           PARoxetine 10 mg tablet  Commonly known as: Paxil      Take 1 tablet (10 mg) by mouth once daily.       pioglitazone 15 mg tablet  Commonly known as: Actos      Take 1 tablet (15 mg) by mouth once daily.       saccharomyces boulardii 250 mg capsule  Commonly known as: Florastor           Vitamin B-12 1,000 mcg tablet  Generic drug: cyanocobalamin           Vitamin C 500 mg tablet  Generic drug: ascorbic acid           zinc gluconate 50 mg tablet                     Where to Get Your Medications        Information about where to get these medications is not yet available    Ask your nurse or doctor about these medications  amoxicillin-pot clavulanate 875-125 mg tablet         Test Results Pending At Discharge  Pending Labs       Order Current Status    Extra Urine Gray Tube Collected (06/29/24 1008)    Urinalysis with Reflex Culture and Microscopic In process    Urine Culture In process            Hospital Course   Ms. Stone was admitted  after presenting with back pain and abdominal pain, she was found to have a  very large stone in her left UP junction . She was placed on fluids and antibiotics, Dr Leal was consulted. Today, he took her to the OR, placed a double J stent, without issue. She is to be discharged today and follow up with him in the office. He is recommending that she go home on flomax and a 7 day course of pyridium, these were called into her pharmacy.     Pertinent Physical Exam At Time of Discharge  stable    Outpatient Follow-Up  Future Appointments   Date Time Provider Department Center   7/2/2024 10:45 AM Yesi Lma MD [id number] [facility]   8/6/2024 10:30 AM Isaac Dumont MD [id number] [facility]   10/3/2024 10:30 AM Yesi Lam MD [id number] [facility]     Dr Leal 10-14 days    Roselyn Jimenez MD

## 2024-06-30 NOTE — OP NOTE
"Cystoscopy and Insertion of Ureteral Stent, Retrograde Pyelogram (L) Operative Note     Date: 2024  OR Location: [facility] OR    Name: Sherri Hanson Stone \"Millicent\", : 1945, Age: 79 y.o., MRN: [id number], Sex: female    Diagnosis  Pre-op Diagnosis     * Left renal stone [N20.0] Post-op Diagnosis     * Left renal stone [N20.0]     Procedures  Cystoscopy and Insertion of Ureteral Stent, Retrograde Pyelogram  40433 - [place] CYSTO W/INSERT URETERAL STENT    Left Retrograde Pyelogram 04547    Surgeons      * Elena Leal - Primary    Resident/Fellow/Other Assistant:  Surgeons and Role:  * No surgeons found with a matching role *    Procedure Summary  Anesthesia: Anesthesia type not filed in the log.  ASA: ASA status not filed in the log.  Anesthesia Staff: No anesthesia staff entered.  Estimated Blood Loss: 0 mL  Intra-op Medications:   Administrations occurring from 0830 to 0910 on 24:   Medication Name Total Dose   acetaminophen (Tylenol) tablet 650 mg Cannot be calculated   ascorbic acid (Vitamin C) tablet 500 mg Cannot be calculated   atorvastatin (Lipitor) tablet 80 mg Cannot be calculated   cefTRIAXone (Rocephin) IVPB 1 g Cannot be calculated   cyanocobalamin (Vitamin B-12) tablet 1,000 mcg Cannot be calculated   dextrose 50 % injection 12.5 g Cannot be calculated   dextrose 50 % injection 25 g Cannot be calculated   fluconazole (Diflucan) tablet 150 mg Cannot be calculated   glucagon (Glucagen) injection 1 mg Cannot be calculated   glucagon (Glucagen) injection 1 mg Cannot be calculated   HYDROmorphone (Dilaudid) injection 0.4 mg Cannot be calculated   insulin lispro (HumaLOG) injection 0-5 Units Cannot be calculated   ketorolac (Toradol) injection 15 mg Cannot be calculated   lisinopril tablet 20 mg Cannot be calculated   mirtazapine (Remeron) tablet 15 mg Cannot be calculated   ondansetron (Zofran) injection 4 mg Cannot be calculated   PARoxetine (Paxil) tablet 10 mg Cannot be calculated "   pioglitazone (Actos) tablet 15 mg Cannot be calculated   tamsulosin (Flomax) 24 hr capsule 0.4 mg Cannot be calculated              Anesthesia Record               Intraprocedure I/O Totals       None           Specimen: No specimens collected     Staff:   Circulator: Denis Saeed Person: Missy  MAKAYLAA: Fransisco         Drains and/or Catheters: * None in log *    Tourniquet Times:         Implants:  Implants       Type Name Action Serial No.      Implant STENT KIT, IMAJIN HYDRO, 6FR X 24CM, POSITIONER, NO GUIDEWIRE - [id number] Implanted               Findings: No hydronephrosis, large stone filling most of the renal pelvis    Indications: Millicent Najera is an 79 y.o. female who is having surgery for Left renal stone [N20.0]. Severe persistent pain for several days.    The patient was seen in the preoperative area. The risks, benefits, complications, treatment options, non-operative alternatives, expected recovery and outcomes were discussed with the patient. The possibilities of reaction to medication, pulmonary aspiration, injury to surrounding structures, bleeding, recurrent infection, the need for additional procedures, failure to diagnose a condition, and creating a complication requiring transfusion or operation were discussed with the patient. The patient concurred with the proposed plan, giving informed consent.  The site of surgery was properly noted/marked if necessary per policy. The patient has been actively warmed in preoperative area. Preoperative antibiotics have been ordered and given within 1 hours of incision. Venous thrombosis prophylaxis have been ordered including bilateral sequential compression devices    Procedure Details: Patient was consented and antibiotics were started on call to OR.  In the operating room, under local anesthesia using 2% lidocaine gel, with the patient in dorsolithotomy position, genitalia was prepped and draped in the usual manner.  #22 cystoscope was inserted down the  urethra and bladder, and cystoscopy revealed no stones or tumors. The ureteral orifices were identified in the normal orthotopic position. Through a ureteral catheter into the cystoscope, an Ultratrack wire was advanced into the left ureter up to the level of the kidney. The stone was seen in the topography of the left kidney. Then the ureteral catheter was advanced over the wire, which was pulled to allow for retrograde pyelogram.    Contrast was injected through the ureteral catheter visualizing the proximal ureter and the renal pelvis.  There was no hydronephrosis however the filling defect corresponding to the stone filled most of the renal pelvis and initially barely any contrast passed around the stone. Once we had more contrast filling the whole pelvis and calyces, we proceeded with next step.    Then a 6-24 Coloplast double-J stent was advanced over the wire, with the proximal curl of the stent visualized in the renal pelvis topography using fluoroscopy, while the distal curl was visualized in the bladder.    This concluded the procedure.    Patient tolerated the procedure well and was transferred to PACU in stable condition.     Complications:  None; patient tolerated the procedure well.    Disposition: PACU - hemodynamically stable.  Condition: stable         Additional Details: stone management to be deferred for another 3-4 weeks    Attending Attestation: I performed the procedure.    Elena Leal  Phone Number: [phone]

## 2024-06-30 NOTE — CARE PLAN
Problem: Pain  Goal: Walks with improved pain control throughout the shift  6/30/2024 0110 by Mery Perez RN  Outcome: Progressing     Problem: Pain  Goal: Takes deep breaths with improved pain control throughout the shift  6/30/2024 0110 by Mery Perez RN  Outcome: Progressing     Problem: Pain  Goal: Turns in bed with improved pain control throughout the shift  6/30/2024 0110 by Mery Perez RN  Outcome: Progressing     Problem: Pain  Goal: Free from opioid side effects throughout the shift  6/30/2024 0110 by Mery Perez RN  Outcome: Progressing   The patient's goals for the shift include      The clinical goals for the shift include pain control

## 2024-07-01 ENCOUNTER — DOCUMENTATION (OUTPATIENT)
Dept: PRIMARY CARE | Facility: CLINIC | Age: 79
End: 2024-07-01
Payer: MEDICARE

## 2024-07-01 ENCOUNTER — TELEPHONE (OUTPATIENT)
Dept: UROLOGY | Facility: CLINIC | Age: 79
End: 2024-07-01
Payer: MEDICARE

## 2024-07-01 ENCOUNTER — PATIENT OUTREACH (OUTPATIENT)
Dept: PRIMARY CARE | Facility: CLINIC | Age: 79
End: 2024-07-01
Payer: MEDICARE

## 2024-07-01 DIAGNOSIS — N20.0 LEFT RENAL STONE: ICD-10-CM

## 2024-07-01 LAB — BACTERIA UR CULT: NORMAL

## 2024-07-01 RX ORDER — OXYBUTYNIN CHLORIDE 5 MG/1
5 TABLET ORAL 3 TIMES DAILY
Qty: 90 TABLET | Refills: 0 | Status: SHIPPED | OUTPATIENT
Start: 2024-07-01 | End: 2024-07-31

## 2024-07-01 NOTE — PROGRESS NOTES
Discharge Facility: [facility]     Discharge Diagnosis:    Nephrolithiasis     Issues Requiring Follow-Up  Stent removal, possible lithotripsy      Admission Date: 6/29/2024   Discharge Date:  6/30/2024     PCP Appointment Date: 7/5/2024     Specialist Appointment Date:     Dr. Leal 7/8/2024     Hospital Encounter and Summary: Linked     See discharge assessment below for further details     Engagement  Call Start Time: 0947 (7/1/2024  9:47 AM)    Medications  Is the patient having any side effects they believe may be caused by any medication additions or changes?: No (7/1/2024  9:47 AM)  Does the patient have all medications ordered at discharge?: Yes (7/1/2024  9:47 AM)  Care Management Interventions: No intervention needed (7/1/2024  9:47 AM)  Prescription Comments: New sent in todayn Oxybutynin , patient picking up (7/1/2024  9:47 AM)  Is the patient taking all medications as directed (includes completed medication regime)?: -- (Patient states  has all meds) (7/1/2024  9:47 AM)  Care Management Interventions: Provided patient education (7/1/2024  9:47 AM)  Medication Comments: Patient has no questions or concerns (7/1/2024  9:47 AM)    Appointments  Does the patient have a primary care provider?: Yes (7/1/2024  9:47 AM)  Care Management Interventions: Verified appointment date/time/provider (7/5) (7/1/2024  9:47 AM)  Has the patient kept scheduled appointments due by today?: Yes (7/1/2024  9:47 AM)  Care Management Interventions: Advised patient to keep appointment (Dr. Leal 7/8) (7/1/2024  9:47 AM)    Self Management  What is the home health agency?: NA (7/1/2024  9:47 AM)  What Durable Medical Equipment (DME) was ordered?: NA (7/1/2024  9:47 AM)    Patient Teaching  Does the patient have access to their discharge instructions?: Yes (7/1/2024  9:47 AM)  Care Management Interventions: Reviewed instructions with patient (7/1/2024  9:47 AM)  What is the patient's perception of their health status since  discharge?: Improving (7/1/2024  9:47 AM)  Is the patient/caregiver able to teach back the hierarchy of who to call/visit for symptoms/problems? PCP, Specialist, Home Health nurse, Urgent Care, ED, 911: Yes (7/1/2024  9:47 AM)  Patient/Caregiver Education Comments: Patient aware to call providers for any questions concerns or change in condition . (7/1/2024  9:47 AM)

## 2024-07-01 NOTE — TELEPHONE ENCOUNTER
Patient called complaining of uncontrollable urination since the stent was placed over the weekend. I spoke with the provider and he is sending in Oxybutynin to help with the symptoms and we will schedule a follow up to go over next steps.

## 2024-07-02 ENCOUNTER — HOSPITAL ENCOUNTER (INPATIENT)
Facility: HOSPITAL | Age: 79
LOS: 4 days | Discharge: HOME | DRG: 659 | End: 2024-07-06
Attending: STUDENT IN AN ORGANIZED HEALTH CARE EDUCATION/TRAINING PROGRAM | Admitting: INTERNAL MEDICINE
Payer: MEDICARE

## 2024-07-02 ENCOUNTER — TELEPHONE (OUTPATIENT)
Dept: PRIMARY CARE | Facility: CLINIC | Age: 79
End: 2024-07-02

## 2024-07-02 ENCOUNTER — APPOINTMENT (OUTPATIENT)
Dept: PRIMARY CARE | Facility: CLINIC | Age: 79
End: 2024-07-02
Payer: MEDICARE

## 2024-07-02 ENCOUNTER — APPOINTMENT (OUTPATIENT)
Dept: RADIOLOGY | Facility: HOSPITAL | Age: 79
DRG: 659 | End: 2024-07-02
Payer: MEDICARE

## 2024-07-02 DIAGNOSIS — A04.72 CLOSTRIDIUM DIFFICILE COLITIS: ICD-10-CM

## 2024-07-02 DIAGNOSIS — N39.0 UTI (URINARY TRACT INFECTION), BACTERIAL: Primary | ICD-10-CM

## 2024-07-02 DIAGNOSIS — A49.9 UTI (URINARY TRACT INFECTION), BACTERIAL: Primary | ICD-10-CM

## 2024-07-02 DIAGNOSIS — D72.819 LEUKOPENIA, UNSPECIFIED TYPE: ICD-10-CM

## 2024-07-02 LAB
ALBUMIN SERPL BCP-MCNC: 3.8 G/DL (ref 3.4–5)
ANION GAP SERPL CALC-SCNC: 15 MMOL/L (ref 10–20)
APPEARANCE UR: ABNORMAL
BASOPHILS # BLD MANUAL: 0 X10*3/UL (ref 0–0.1)
BASOPHILS NFR BLD MANUAL: 0 %
BILIRUB UR STRIP.AUTO-MCNC: ABNORMAL MG/DL
BUN SERPL-MCNC: 19 MG/DL (ref 6–23)
CALCIUM SERPL-MCNC: 9.1 MG/DL (ref 8.6–10.3)
CHLORIDE SERPL-SCNC: 100 MMOL/L (ref 98–107)
CO2 SERPL-SCNC: 26 MMOL/L (ref 21–32)
COLOR UR: ABNORMAL
CREAT SERPL-MCNC: 1.03 MG/DL (ref 0.5–1.05)
EGFRCR SERPLBLD CKD-EPI 2021: 55 ML/MIN/1.73M*2
EOSINOPHIL # BLD MANUAL: 0 X10*3/UL (ref 0–0.4)
EOSINOPHIL NFR BLD MANUAL: 0 %
ERYTHROCYTE [DISTWIDTH] IN BLOOD BY AUTOMATED COUNT: 13.4 % (ref 11.5–14.5)
GLUCOSE SERPL-MCNC: 147 MG/DL (ref 74–99)
GLUCOSE UR STRIP.AUTO-MCNC: ABNORMAL MG/DL
HCT VFR BLD AUTO: 34.4 % (ref 36–46)
HGB BLD-MCNC: 11.2 G/DL (ref 12–16)
HYALINE CASTS #/AREA URNS AUTO: ABNORMAL /LPF
IMM GRANULOCYTES # BLD AUTO: 0.01 X10*3/UL (ref 0–0.5)
IMM GRANULOCYTES NFR BLD AUTO: 0.5 % (ref 0–0.9)
KETONES UR STRIP.AUTO-MCNC: NEGATIVE MG/DL
LEUKOCYTE ESTERASE UR QL STRIP.AUTO: ABNORMAL
LYMPHOCYTES # BLD MANUAL: 0.61 X10*3/UL (ref 0.8–3)
LYMPHOCYTES NFR BLD MANUAL: 32 %
MCH RBC QN AUTO: 28.1 PG (ref 26–34)
MCHC RBC AUTO-ENTMCNC: 32.6 G/DL (ref 32–36)
MCV RBC AUTO: 86 FL (ref 80–100)
MONOCYTES # BLD MANUAL: 0.19 X10*3/UL (ref 0.05–0.8)
MONOCYTES NFR BLD MANUAL: 10 %
MUCOUS THREADS #/AREA URNS AUTO: ABNORMAL /LPF
NEUTROPHILS # BLD MANUAL: 1.06 X10*3/UL (ref 1.6–5.5)
NEUTS BAND # BLD MANUAL: 0.11 X10*3/UL (ref 0–0.5)
NEUTS BAND NFR BLD MANUAL: 6 %
NEUTS SEG # BLD MANUAL: 0.95 X10*3/UL (ref 1.6–5)
NEUTS SEG NFR BLD MANUAL: 50 %
NITRITE UR QL STRIP.AUTO: ABNORMAL
NRBC BLD-RTO: 0 /100 WBCS (ref 0–0)
OVALOCYTES BLD QL SMEAR: ABNORMAL
PH UR STRIP.AUTO: 6.5 [PH]
PHOSPHATE SERPL-MCNC: 3.5 MG/DL (ref 2.5–4.9)
PLATELET # BLD AUTO: 147 X10*3/UL (ref 150–450)
POLYCHROMASIA BLD QL SMEAR: ABNORMAL
POTASSIUM SERPL-SCNC: 3.5 MMOL/L (ref 3.5–5.3)
PROT UR STRIP.AUTO-MCNC: ABNORMAL MG/DL
RBC # BLD AUTO: 3.98 X10*6/UL (ref 4–5.2)
RBC # UR STRIP.AUTO: ABNORMAL /UL
RBC #/AREA URNS AUTO: >20 /HPF
RBC MORPH BLD: ABNORMAL
SODIUM SERPL-SCNC: 137 MMOL/L (ref 136–145)
SP GR UR STRIP.AUTO: 1.02
SQUAMOUS #/AREA URNS AUTO: ABNORMAL /HPF
TOTAL CELLS COUNTED BLD: 100
UROBILINOGEN UR STRIP.AUTO-MCNC: ABNORMAL MG/DL
VARIANT LYMPHS # BLD MANUAL: 0.04 X10*3/UL (ref 0–0.3)
VARIANT LYMPHS NFR BLD: 2 %
WBC # BLD AUTO: 1.9 X10*3/UL (ref 4.4–11.3)
WBC #/AREA URNS AUTO: >50 /HPF

## 2024-07-02 PROCEDURE — 72158 MRI LUMBAR SPINE W/O & W/DYE: CPT

## 2024-07-02 PROCEDURE — 2550000001 HC RX 255 CONTRASTS: Performed by: STUDENT IN AN ORGANIZED HEALTH CARE EDUCATION/TRAINING PROGRAM

## 2024-07-02 PROCEDURE — 81001 URINALYSIS AUTO W/SCOPE: CPT | Performed by: STUDENT IN AN ORGANIZED HEALTH CARE EDUCATION/TRAINING PROGRAM

## 2024-07-02 PROCEDURE — 82565 ASSAY OF CREATININE: CPT | Performed by: STUDENT IN AN ORGANIZED HEALTH CARE EDUCATION/TRAINING PROGRAM

## 2024-07-02 PROCEDURE — 72158 MRI LUMBAR SPINE W/O & W/DYE: CPT | Performed by: STUDENT IN AN ORGANIZED HEALTH CARE EDUCATION/TRAINING PROGRAM

## 2024-07-02 PROCEDURE — A9575 INJ GADOTERATE MEGLUMI 0.1ML: HCPCS | Performed by: STUDENT IN AN ORGANIZED HEALTH CARE EDUCATION/TRAINING PROGRAM

## 2024-07-02 PROCEDURE — 74176 CT ABD & PELVIS W/O CONTRAST: CPT

## 2024-07-02 PROCEDURE — 2500000004 HC RX 250 GENERAL PHARMACY W/ HCPCS (ALT 636 FOR OP/ED): Performed by: STUDENT IN AN ORGANIZED HEALTH CARE EDUCATION/TRAINING PROGRAM

## 2024-07-02 PROCEDURE — 96361 HYDRATE IV INFUSION ADD-ON: CPT

## 2024-07-02 PROCEDURE — 85027 COMPLETE CBC AUTOMATED: CPT | Performed by: STUDENT IN AN ORGANIZED HEALTH CARE EDUCATION/TRAINING PROGRAM

## 2024-07-02 PROCEDURE — 96375 TX/PRO/DX INJ NEW DRUG ADDON: CPT

## 2024-07-02 PROCEDURE — 85007 BL SMEAR W/DIFF WBC COUNT: CPT | Performed by: STUDENT IN AN ORGANIZED HEALTH CARE EDUCATION/TRAINING PROGRAM

## 2024-07-02 PROCEDURE — 1100000001 HC PRIVATE ROOM DAILY

## 2024-07-02 PROCEDURE — 96365 THER/PROPH/DIAG IV INF INIT: CPT

## 2024-07-02 PROCEDURE — 36415 COLL VENOUS BLD VENIPUNCTURE: CPT | Performed by: STUDENT IN AN ORGANIZED HEALTH CARE EDUCATION/TRAINING PROGRAM

## 2024-07-02 PROCEDURE — 99285 EMERGENCY DEPT VISIT HI MDM: CPT | Mod: 25

## 2024-07-02 PROCEDURE — 87086 URINE CULTURE/COLONY COUNT: CPT | Mod: GEALAB | Performed by: STUDENT IN AN ORGANIZED HEALTH CARE EDUCATION/TRAINING PROGRAM

## 2024-07-02 PROCEDURE — 74176 CT ABD & PELVIS W/O CONTRAST: CPT | Mod: FOREIGN READ | Performed by: RADIOLOGY

## 2024-07-02 RX ORDER — CEFTRIAXONE 1 G/50ML
1 INJECTION, SOLUTION INTRAVENOUS DAILY
Status: DISCONTINUED | OUTPATIENT
Start: 2024-07-03 | End: 2024-07-04

## 2024-07-02 RX ORDER — LORAZEPAM 2 MG/ML
0.5 INJECTION INTRAMUSCULAR ONCE
Status: COMPLETED | OUTPATIENT
Start: 2024-07-02 | End: 2024-07-02

## 2024-07-02 RX ORDER — PAROXETINE HYDROCHLORIDE 20 MG/1
10 TABLET, FILM COATED ORAL DAILY
Status: DISCONTINUED | OUTPATIENT
Start: 2024-07-03 | End: 2024-07-06 | Stop reason: HOSPADM

## 2024-07-02 RX ORDER — MIRTAZAPINE 15 MG/1
15 TABLET, FILM COATED ORAL NIGHTLY
Status: DISCONTINUED | OUTPATIENT
Start: 2024-07-03 | End: 2024-07-06 | Stop reason: HOSPADM

## 2024-07-02 RX ORDER — ATORVASTATIN CALCIUM 80 MG/1
80 TABLET, FILM COATED ORAL DAILY
Status: DISCONTINUED | OUTPATIENT
Start: 2024-07-03 | End: 2024-07-06 | Stop reason: HOSPADM

## 2024-07-02 RX ORDER — LISINOPRIL 20 MG/1
20 TABLET ORAL DAILY
Status: DISCONTINUED | OUTPATIENT
Start: 2024-07-03 | End: 2024-07-06 | Stop reason: HOSPADM

## 2024-07-02 RX ORDER — ACETAMINOPHEN 160 MG/5ML
650 SOLUTION ORAL EVERY 4 HOURS PRN
Status: DISCONTINUED | OUTPATIENT
Start: 2024-07-02 | End: 2024-07-06 | Stop reason: HOSPADM

## 2024-07-02 RX ORDER — TAMSULOSIN HYDROCHLORIDE 0.4 MG/1
0.4 CAPSULE ORAL DAILY
Status: DISCONTINUED | OUTPATIENT
Start: 2024-07-03 | End: 2024-07-06 | Stop reason: HOSPADM

## 2024-07-02 RX ORDER — DEXTROSE 50 % IN WATER (D50W) INTRAVENOUS SYRINGE
12.5
Status: DISCONTINUED | OUTPATIENT
Start: 2024-07-02 | End: 2024-07-06 | Stop reason: HOSPADM

## 2024-07-02 RX ORDER — OXYBUTYNIN CHLORIDE 5 MG/1
5 TABLET ORAL 3 TIMES DAILY
Status: DISCONTINUED | OUTPATIENT
Start: 2024-07-03 | End: 2024-07-06 | Stop reason: HOSPADM

## 2024-07-02 RX ORDER — OXYCODONE HYDROCHLORIDE 5 MG/1
2.5 TABLET ORAL EVERY 6 HOURS PRN
Status: DISCONTINUED | OUTPATIENT
Start: 2024-07-02 | End: 2024-07-06 | Stop reason: HOSPADM

## 2024-07-02 RX ORDER — CEFTRIAXONE 2 G/50ML
2 INJECTION, SOLUTION INTRAVENOUS ONCE
Status: COMPLETED | OUTPATIENT
Start: 2024-07-02 | End: 2024-07-02

## 2024-07-02 RX ORDER — ONDANSETRON HYDROCHLORIDE 2 MG/ML
4 INJECTION, SOLUTION INTRAVENOUS ONCE
Status: COMPLETED | OUTPATIENT
Start: 2024-07-02 | End: 2024-07-02

## 2024-07-02 RX ORDER — GADOTERATE MEGLUMINE 376.9 MG/ML
0.2 INJECTION INTRAVENOUS
Status: COMPLETED | OUTPATIENT
Start: 2024-07-02 | End: 2024-07-02

## 2024-07-02 RX ORDER — ACETAMINOPHEN 325 MG/1
650 TABLET ORAL EVERY 4 HOURS PRN
Status: DISCONTINUED | OUTPATIENT
Start: 2024-07-02 | End: 2024-07-06 | Stop reason: HOSPADM

## 2024-07-02 RX ORDER — OXYCODONE HYDROCHLORIDE 5 MG/1
5 TABLET ORAL EVERY 6 HOURS PRN
Status: DISCONTINUED | OUTPATIENT
Start: 2024-07-02 | End: 2024-07-06 | Stop reason: HOSPADM

## 2024-07-02 RX ORDER — INSULIN LISPRO 100 [IU]/ML
0-10 INJECTION, SOLUTION INTRAVENOUS; SUBCUTANEOUS
Status: DISCONTINUED | OUTPATIENT
Start: 2024-07-03 | End: 2024-07-06 | Stop reason: HOSPADM

## 2024-07-02 RX ORDER — ONDANSETRON HYDROCHLORIDE 2 MG/ML
4 INJECTION, SOLUTION INTRAVENOUS EVERY 6 HOURS PRN
Status: DISCONTINUED | OUTPATIENT
Start: 2024-07-02 | End: 2024-07-06 | Stop reason: HOSPADM

## 2024-07-02 RX ORDER — DEXTROSE 50 % IN WATER (D50W) INTRAVENOUS SYRINGE
25
Status: DISCONTINUED | OUTPATIENT
Start: 2024-07-02 | End: 2024-07-06 | Stop reason: HOSPADM

## 2024-07-02 ASSESSMENT — LIFESTYLE VARIABLES
HAVE PEOPLE ANNOYED YOU BY CRITICIZING YOUR DRINKING: NO
HAVE YOU EVER FELT YOU SHOULD CUT DOWN ON YOUR DRINKING: NO
EVER HAD A DRINK FIRST THING IN THE MORNING TO STEADY YOUR NERVES TO GET RID OF A HANGOVER: NO
TOTAL SCORE: 0
EVER FELT BAD OR GUILTY ABOUT YOUR DRINKING: NO

## 2024-07-02 ASSESSMENT — COLUMBIA-SUICIDE SEVERITY RATING SCALE - C-SSRS
2. HAVE YOU ACTUALLY HAD ANY THOUGHTS OF KILLING YOURSELF?: NO
6. HAVE YOU EVER DONE ANYTHING, STARTED TO DO ANYTHING, OR PREPARED TO DO ANYTHING TO END YOUR LIFE?: NO
1. IN THE PAST MONTH, HAVE YOU WISHED YOU WERE DEAD OR WISHED YOU COULD GO TO SLEEP AND NOT WAKE UP?: NO

## 2024-07-02 ASSESSMENT — PAIN - FUNCTIONAL ASSESSMENT: PAIN_FUNCTIONAL_ASSESSMENT: 0-10

## 2024-07-02 ASSESSMENT — PAIN SCALES - GENERAL: PAINLEVEL_OUTOF10: 0 - NO PAIN

## 2024-07-02 NOTE — ED TRIAGE NOTES
Patient here for post op issue Had stent placed in ureter Sunday now concerned for new urinary and stool incontinence and weakness. Took a Percocet PTA

## 2024-07-02 NOTE — ED PROVIDER NOTES
CC: Post-op Problem (Had stent placed in ureter Sunday now concerned for new urinary and stool incontinence and weakness)     HPI:  Patient is a 79-year-old female who was seen and admitted to the hospital on Saturday with a large left UPJ stone that required a stent placement.  Patient was discharged Sunday night.  After discharge patient states she was in severe pain all night.  She states she could not get relief and was up and down all night.  No fevers or chills.  States that she was looking on her MyChart today and realized that she had a urinary tract infection and a yeast infection and does not believe she was treated for it.  States she was on the phone all morning trying to get a hold of the urologist with no relief.  She states she was very frustrated as she is trying to find answers as to what is going on.  However she states she return to the emergency department because she still in pain.  She is having diarrhea and urinary incontinence.  She was started on oxybutynin, Percocet and Flomax.  She took a Percocet prior to arrival and states her current pain is a 4 out of 5.  Patient states this is the same pain she was seen here with on Saturday but just not getting any better.    Records Reviewed:  Recent available ED and inpatient notes reviewed in EMR.    PMHx/PSHx:  Per HPI.   - has a past medical history of Aneurysm, splenic artery (CMS-HCC), Ankylosing spondylitis (Multi), Anxiety, Arthritis, Body mass index (BMI) 27.0-27.9, adult, BPPV (benign paroxysmal positional vertigo), Cervical radiculitis, Depression, Diabetes mellitus (Multi), Effusion, left knee, Exocrine pancreatic insufficiency ([facility]), GERD (gastroesophageal reflux disease), Hearing aid worn, HL (hearing loss), HLD (hyperlipidemia), Hypertension, Injury of tendon of rotator cuff, Irritable bowel syndrome with diarrhea, Kidney stone, Kidney stone, Nephrolithiasis, Other conditions influencing health status, Other hemorrhoids, Other  nonspecific abnormal finding of lung field, Pain in left knee, Pain in unspecified knee, Personal history of other diseases of the circulatory system, Personal history of other diseases of the musculoskeletal system and connective tissue, Personal history of other specified conditions, Personal history of other specified conditions, Personal history of other specified conditions, Personal history of other specified conditions, Skin cancer, Unspecified injury of muscle(s) and tendon(s) of the rotator cuff of right shoulder, initial encounter, and Wears glasses.  - has a past surgical history that includes Hand surgery (Left, 03/07/2014); Other surgical history (Bilateral, 07/11/2022); MR angio head wo IV contrast (04/15/2023); MR angio neck wo IV contrast (04/15/2023); Carpal tunnel release (Bilateral); Colonoscopy (04/11/2018); and Breast biopsy (Right, 2004).  - has Ankylosing spondylitis (Multi); Anxiety; Aortic regurgitation; Essential hypertension; Depression; Headache, chronic daily; Insomnia; Hypercholesterolemia; Osteoarthritis; Skin cancer; Lumbar radiculopathy; Cervical radiculopathy; Splenic artery aneurysm (CMS-HCC); Controlled diabetes mellitus type II without complication (Multi); Benign paroxysmal positional vertigo of right ear; Status post total left knee replacement; Chronic diarrhea; Overweight with body mass index (BMI) 25.0-29.9; Personal history of other malignant neoplasm of skin; Nephrolithiasis; and Left renal stone on their problem list.    Medications:  Reviewed in EMR. See EMR for complete list of medications and doses.    Allergies:  Fenofibrate and Sulfa (sulfonamide antibiotics)    Social History:  - Tobacco:  reports that she has never smoked. She has never used smokeless tobacco.   - Alcohol:  reports no history of alcohol use.   - Illicit Drugs:  reports no history of drug use.     ROS:  Per HPI.       ???????????????????????????????????????????????????????????????  Triage Vitals:  T  36.5 °C (97.7 °F)  HR 88  /55  RR 18  O2 97 % None (Room air)    Physical Exam  ???????????????????????????????????????????????????????????????  GEN: Uncomfortable appearing, no acute distress  HEAD: atraumatic  EYES: PERRL, EOMI, no scleral icterus  CVS/CHEST: reg rate, nl rhythm  PULM: CTA b/l no wheezes, crackles, or rhonchi   GI: Tenderness to palpation diffusely.  No rebound or guarding.  BACK: Positive bilateral CVA tenderness, worse on the left  EXT: no LE edema, 2+ periph pulses in bilat radial and DP   NEURO: Awake and alert, Strength and sensation is equal in b/l upper and lower extremities  SKIN: warm, dry  PSYCH: Frustrated and upset    Assessment and Plan:  Patient is a 79-year-old female that was recently admitted and had a stent placed on Sunday returning the emergency department pain, diarrhea and urinary incontinence.  Recently started on Flomax, oxybutynin and Percocet.  Will send the C. difficile given that she was also given antibiotics in the hospital.  I did discuss with patient that there is no evidence of a urinary tract infection in fact her culture was negative to date and she did have budding yeast in her urine but that does not correlate with a yeast infection.  Patient's not having symptoms of a yeast infection and therefore he does not require treatment.  Patient frustrated but expresses understanding.  Will recheck urine and a CT given that she still in pain.  Will obtain labs to ensure kidney function is stable.  Disposition pending workup at this time.  Given that CT was reassuring she is having urinary and fecal incontinence did obtain an MRI to rule out spinal pathology patient's symptoms.  Given the antibiotics during her last hospitalization C. difficile was sent but she has had no diarrhea while in the emergency department.  Urine does appear consistent with UTI today.  Signed out to oncoming physician pending MRI.    ED Course:  ED Course as of 07/02/24 2336   hailee Almanza  02, 2024   1629 DANY appreciated [HD]   1755 CT shows stable stent without evidence of hydronephrosis.  She has a pancytopenic and new lymphadenopathy therefore given the new onset urinary incontinence and fecal incontinence will obtain MRI to rule out metastatic lesion to spine.  Patient did drop to 79% after 0.5 mg of Dilaudid.  Therefore she was placed on oxygen.  Discussed unable to sedate for MRI however patient believes she will be able to lay still.  Disposition pending workup.  On my exam she does have some tenderness in the midline of the L-spine.  She does have slightly diminished gluteal tone but intact saddle sensation. [HD]      ED Course User Index  [HD] Rita Connell DO         Diagnoses as of 07/02/24 2336   UTI (urinary tract infection), bacterial       Social Determinants Limiting Care:  None identified    Disposition:  Signed out    Rita Connell DO      Procedures ? Smart[employer] last updated 7/2/2024 3:38 PM        Rita Connell DO  07/02/24 8227

## 2024-07-03 LAB
ALBUMIN SERPL BCP-MCNC: 3.5 G/DL (ref 3.4–5)
ANION GAP SERPL CALC-SCNC: 14 MMOL/L (ref 10–20)
BASOPHILS # BLD AUTO: 0 X10*3/UL (ref 0–0.1)
BASOPHILS NFR BLD AUTO: 0 %
BUN SERPL-MCNC: 20 MG/DL (ref 6–23)
CALCIUM SERPL-MCNC: 8.4 MG/DL (ref 8.6–10.3)
CHLORIDE SERPL-SCNC: 102 MMOL/L (ref 98–107)
CO2 SERPL-SCNC: 27 MMOL/L (ref 21–32)
CREAT SERPL-MCNC: 0.95 MG/DL (ref 0.5–1.05)
EGFRCR SERPLBLD CKD-EPI 2021: 61 ML/MIN/1.73M*2
EOSINOPHIL # BLD AUTO: 0.01 X10*3/UL (ref 0–0.4)
EOSINOPHIL NFR BLD AUTO: 0.6 %
ERYTHROCYTE [DISTWIDTH] IN BLOOD BY AUTOMATED COUNT: 13.7 % (ref 11.5–14.5)
GLUCOSE BLD MANUAL STRIP-MCNC: 102 MG/DL (ref 74–99)
GLUCOSE BLD MANUAL STRIP-MCNC: 122 MG/DL (ref 74–99)
GLUCOSE BLD MANUAL STRIP-MCNC: 129 MG/DL (ref 74–99)
GLUCOSE BLD MANUAL STRIP-MCNC: 133 MG/DL (ref 74–99)
GLUCOSE BLD MANUAL STRIP-MCNC: 154 MG/DL (ref 74–99)
GLUCOSE SERPL-MCNC: 101 MG/DL (ref 74–99)
HCT VFR BLD AUTO: 32.1 % (ref 36–46)
HGB BLD-MCNC: 10.3 G/DL (ref 12–16)
HOLD SPECIMEN: NORMAL
IMM GRANULOCYTES # BLD AUTO: 0 X10*3/UL (ref 0–0.5)
IMM GRANULOCYTES NFR BLD AUTO: 0 % (ref 0–0.9)
LYMPHOCYTES # BLD AUTO: 0.69 X10*3/UL (ref 0.8–3)
LYMPHOCYTES NFR BLD AUTO: 42.3 %
MAGNESIUM SERPL-MCNC: 1.66 MG/DL (ref 1.6–2.4)
MCH RBC QN AUTO: 28 PG (ref 26–34)
MCHC RBC AUTO-ENTMCNC: 32.1 G/DL (ref 32–36)
MCV RBC AUTO: 87 FL (ref 80–100)
MONOCYTES # BLD AUTO: 0.15 X10*3/UL (ref 0.05–0.8)
MONOCYTES NFR BLD AUTO: 9.2 %
NEUTROPHILS # BLD AUTO: 0.78 X10*3/UL (ref 1.6–5.5)
NEUTROPHILS NFR BLD AUTO: 47.9 %
NRBC BLD-RTO: 0 /100 WBCS (ref 0–0)
PHOSPHATE SERPL-MCNC: 4 MG/DL (ref 2.5–4.9)
PLATELET # BLD AUTO: 140 X10*3/UL (ref 150–450)
POTASSIUM SERPL-SCNC: 3 MMOL/L (ref 3.5–5.3)
RBC # BLD AUTO: 3.68 X10*6/UL (ref 4–5.2)
SODIUM SERPL-SCNC: 140 MMOL/L (ref 136–145)
WBC # BLD AUTO: 1.6 X10*3/UL (ref 4.4–11.3)

## 2024-07-03 PROCEDURE — 82947 ASSAY GLUCOSE BLOOD QUANT: CPT

## 2024-07-03 PROCEDURE — 2500000002 HC RX 250 W HCPCS SELF ADMINISTERED DRUGS (ALT 637 FOR MEDICARE OP, ALT 636 FOR OP/ED)

## 2024-07-03 PROCEDURE — 1100000001 HC PRIVATE ROOM DAILY

## 2024-07-03 PROCEDURE — 87493 C DIFF AMPLIFIED PROBE: CPT | Mod: GEALAB | Performed by: STUDENT IN AN ORGANIZED HEALTH CARE EDUCATION/TRAINING PROGRAM

## 2024-07-03 PROCEDURE — 36415 COLL VENOUS BLD VENIPUNCTURE: CPT

## 2024-07-03 PROCEDURE — 2500000004 HC RX 250 GENERAL PHARMACY W/ HCPCS (ALT 636 FOR OP/ED)

## 2024-07-03 PROCEDURE — 99222 1ST HOSP IP/OBS MODERATE 55: CPT

## 2024-07-03 PROCEDURE — 87324 CLOSTRIDIUM AG IA: CPT | Mod: GEALAB | Performed by: STUDENT IN AN ORGANIZED HEALTH CARE EDUCATION/TRAINING PROGRAM

## 2024-07-03 PROCEDURE — 2500000001 HC RX 250 WO HCPCS SELF ADMINISTERED DRUGS (ALT 637 FOR MEDICARE OP)

## 2024-07-03 PROCEDURE — 83735 ASSAY OF MAGNESIUM: CPT

## 2024-07-03 PROCEDURE — 80069 RENAL FUNCTION PANEL: CPT

## 2024-07-03 PROCEDURE — 85025 COMPLETE CBC W/AUTO DIFF WBC: CPT

## 2024-07-03 RX ORDER — OXYCODONE AND ACETAMINOPHEN 5; 325 MG/1; MG/1
1 TABLET ORAL EVERY 6 HOURS PRN
COMMUNITY
End: 2024-07-06 | Stop reason: HOSPADM

## 2024-07-03 RX ORDER — POTASSIUM CHLORIDE 20 MEQ/1
40 TABLET, EXTENDED RELEASE ORAL ONCE
Status: COMPLETED | OUTPATIENT
Start: 2024-07-03 | End: 2024-07-03

## 2024-07-03 RX ORDER — POTASSIUM CHLORIDE 1.5 G/1.58G
20 POWDER, FOR SOLUTION ORAL ONCE
Status: COMPLETED | OUTPATIENT
Start: 2024-07-03 | End: 2024-07-03

## 2024-07-03 RX ORDER — TAMSULOSIN HYDROCHLORIDE 0.4 MG/1
0.4 CAPSULE ORAL DAILY
COMMUNITY

## 2024-07-03 RX ORDER — PHENAZOPYRIDINE HYDROCHLORIDE 100 MG/1
95 TABLET, FILM COATED ORAL
Status: COMPLETED | OUTPATIENT
Start: 2024-07-03 | End: 2024-07-05

## 2024-07-03 RX ORDER — POTASSIUM CHLORIDE 20 MEQ/1
20 TABLET, EXTENDED RELEASE ORAL ONCE
Status: COMPLETED | OUTPATIENT
Start: 2024-07-03 | End: 2024-07-03

## 2024-07-03 RX ORDER — POTASSIUM CHLORIDE 14.9 MG/ML
20 INJECTION INTRAVENOUS
Status: DISCONTINUED | OUTPATIENT
Start: 2024-07-03 | End: 2024-07-03

## 2024-07-03 RX ORDER — ENOXAPARIN SODIUM 100 MG/ML
40 INJECTION SUBCUTANEOUS EVERY 24 HOURS
Status: DISCONTINUED | OUTPATIENT
Start: 2024-07-03 | End: 2024-07-06 | Stop reason: HOSPADM

## 2024-07-03 SDOH — SOCIAL STABILITY: SOCIAL INSECURITY: DO YOU FEEL ANYONE HAS EXPLOITED OR TAKEN ADVANTAGE OF YOU FINANCIALLY OR OF YOUR PERSONAL PROPERTY?: NO

## 2024-07-03 SDOH — HEALTH STABILITY: MENTAL HEALTH: HOW OFTEN DO YOU HAVE 6 OR MORE DRINKS ON ONE OCCASION?: NEVER

## 2024-07-03 SDOH — SOCIAL STABILITY: SOCIAL INSECURITY: DOES ANYONE TRY TO KEEP YOU FROM HAVING/CONTACTING OTHER FRIENDS OR DOING THINGS OUTSIDE YOUR HOME?: NO

## 2024-07-03 SDOH — HEALTH STABILITY: MENTAL HEALTH: HOW OFTEN DO YOU HAVE A DRINK CONTAINING ALCOHOL?: NEVER

## 2024-07-03 SDOH — SOCIAL STABILITY: SOCIAL INSECURITY: WERE YOU ABLE TO COMPLETE ALL THE BEHAVIORAL HEALTH SCREENINGS?: YES

## 2024-07-03 SDOH — SOCIAL STABILITY: SOCIAL INSECURITY: HAVE YOU HAD THOUGHTS OF HARMING ANYONE ELSE?: NO

## 2024-07-03 SDOH — SOCIAL STABILITY: SOCIAL INSECURITY: HAVE YOU HAD ANY THOUGHTS OF HARMING ANYONE ELSE?: NO

## 2024-07-03 SDOH — SOCIAL STABILITY: SOCIAL INSECURITY: ARE YOU OR HAVE YOU BEEN THREATENED OR ABUSED PHYSICALLY, EMOTIONALLY, OR SEXUALLY BY ANYONE?: NO

## 2024-07-03 SDOH — HEALTH STABILITY: MENTAL HEALTH: HOW MANY STANDARD DRINKS CONTAINING ALCOHOL DO YOU HAVE ON A TYPICAL DAY?: PATIENT DOES NOT DRINK

## 2024-07-03 SDOH — SOCIAL STABILITY: SOCIAL INSECURITY: ARE THERE ANY APPARENT SIGNS OF INJURIES/BEHAVIORS THAT COULD BE RELATED TO ABUSE/NEGLECT?: NO

## 2024-07-03 SDOH — SOCIAL STABILITY: SOCIAL INSECURITY: HAS ANYONE EVER THREATENED TO HURT YOUR FAMILY OR YOUR PETS?: NO

## 2024-07-03 SDOH — SOCIAL STABILITY: SOCIAL INSECURITY: ABUSE: ADULT

## 2024-07-03 SDOH — SOCIAL STABILITY: SOCIAL INSECURITY: DO YOU FEEL UNSAFE GOING BACK TO THE PLACE WHERE YOU ARE LIVING?: NO

## 2024-07-03 ASSESSMENT — LIFESTYLE VARIABLES
SKIP TO QUESTIONS 9-10: 1
HOW OFTEN DO YOU HAVE 6 OR MORE DRINKS ON ONE OCCASION: NEVER
HOW MANY STANDARD DRINKS CONTAINING ALCOHOL DO YOU HAVE ON A TYPICAL DAY: PATIENT DOES NOT DRINK
AUDIT-C TOTAL SCORE: 0
AUDIT-C TOTAL SCORE: 0
SKIP TO QUESTIONS 9-10: 1
AUDIT-C TOTAL SCORE: 0
AUDIT-C TOTAL SCORE: 0
HOW OFTEN DO YOU HAVE A DRINK CONTAINING ALCOHOL: NEVER
SKIP TO QUESTIONS 9-10: 1

## 2024-07-03 ASSESSMENT — ACTIVITIES OF DAILY LIVING (ADL)
TOILETING: INDEPENDENT
LACK_OF_TRANSPORTATION: NO
PATIENT'S MEMORY ADEQUATE TO SAFELY COMPLETE DAILY ACTIVITIES?: YES
HEARING - RIGHT EAR: FUNCTIONAL
JUDGMENT_ADEQUATE_SAFELY_COMPLETE_DAILY_ACTIVITIES: YES
ADEQUATE_TO_COMPLETE_ADL: YES
LACK_OF_TRANSPORTATION: NO
WALKS IN HOME: INDEPENDENT
GROOMING: INDEPENDENT
HEARING - LEFT EAR: FUNCTIONAL
BATHING: INDEPENDENT
DRESSING YOURSELF: INDEPENDENT
FEEDING YOURSELF: INDEPENDENT

## 2024-07-03 ASSESSMENT — PATIENT HEALTH QUESTIONNAIRE - PHQ9
2. FEELING DOWN, DEPRESSED OR HOPELESS: NOT AT ALL
1. LITTLE INTEREST OR PLEASURE IN DOING THINGS: NOT AT ALL
SUM OF ALL RESPONSES TO PHQ9 QUESTIONS 1 & 2: 0

## 2024-07-03 ASSESSMENT — COGNITIVE AND FUNCTIONAL STATUS - GENERAL
MOVING FROM LYING ON BACK TO SITTING ON SIDE OF FLAT BED WITH BEDRAILS: A LITTLE
DRESSING REGULAR UPPER BODY CLOTHING: A LITTLE
MOBILITY SCORE: 16
CLIMB 3 TO 5 STEPS WITH RAILING: A LOT
MOBILITY SCORE: 19
DAILY ACTIVITIY SCORE: 20
PATIENT BASELINE BEDBOUND: NO
TOILETING: A LITTLE
HELP NEEDED FOR BATHING: A LITTLE
MOVING TO AND FROM BED TO CHAIR: A LITTLE
STANDING UP FROM CHAIR USING ARMS: A LITTLE
CLIMB 3 TO 5 STEPS WITH RAILING: A LOT
WALKING IN HOSPITAL ROOM: A LITTLE
WALKING IN HOSPITAL ROOM: A LOT
STANDING UP FROM CHAIR USING ARMS: A LITTLE
DAILY ACTIVITIY SCORE: 20
TURNING FROM BACK TO SIDE WHILE IN FLAT BAD: A LITTLE
TOILETING: A LITTLE
DRESSING REGULAR UPPER BODY CLOTHING: A LITTLE
PERSONAL GROOMING: A LITTLE
HELP NEEDED FOR BATHING: A LITTLE
MOVING TO AND FROM BED TO CHAIR: A LITTLE
DRESSING REGULAR LOWER BODY CLOTHING: A LITTLE

## 2024-07-03 ASSESSMENT — PAIN SCALES - GENERAL
PAINLEVEL_OUTOF10: 2
PAINLEVEL_OUTOF10: 0 - NO PAIN

## 2024-07-03 ASSESSMENT — PAIN SCALES - WONG BAKER: WONGBAKER_NUMERICALRESPONSE: NO HURT

## 2024-07-03 ASSESSMENT — PAIN - FUNCTIONAL ASSESSMENT
PAIN_FUNCTIONAL_ASSESSMENT: 0-10

## 2024-07-03 NOTE — PROGRESS NOTES
"Occupational Therapy                 Therapy Communication Note    Patient Name: Sherri Najera \"Elza"  MRN: [id number]  Today's Date: 7/3/2024     Discipline: Occupational Therapy    Missed Visit Reason: Missed Visit Reason: Other (Comment) (At 13:03, pt soundly sleeping and staff requests to allow pt rest. No OT eval completed today)    Missed Time: Attempt      "

## 2024-07-03 NOTE — CONSULTS
Reason For Consult  Ureteral stone, possible UTI    History Of Present Illness  Millicent Najera is a 79 y.o. female presenting with urgency and pain.  She underwent recent stent placement for a 17 mm stone in the proximal left ureter.  She states her symptoms have improved.  She was having urinary incontinence additionally.  CT scan this admission identified a large stone at the left ureteropelvic junction with the stent in correct position.     Past Medical History  She has a past medical history of Aneurysm, splenic artery (CMS-HCC), Ankylosing spondylitis (Multi), Anxiety, Arthritis, Body mass index (BMI) 27.0-27.9, adult (12/07/2021), BPPV (benign paroxysmal positional vertigo), Cervical radiculitis, Depression, Diabetes mellitus (Multi), Effusion, left knee (02/11/2019), Exocrine pancreatic insufficiency ([facility]), GERD (gastroesophageal reflux disease), Hearing aid worn, HL (hearing loss), HLD (hyperlipidemia), Hypertension, Injury of tendon of rotator cuff (06/19/2024), Irritable bowel syndrome with diarrhea (01/16/2020), Kidney stone, Kidney stone (08/21/2023), Nephrolithiasis, Other conditions influencing health status (06/08/2020), Other hemorrhoids (06/28/2017), Other nonspecific abnormal finding of lung field (03/26/2019), Pain in left knee (04/30/2019), Pain in unspecified knee (02/11/2019), Personal history of other diseases of the circulatory system (08/12/2017), Personal history of other diseases of the musculoskeletal system and connective tissue (05/21/2015), Personal history of other specified conditions (11/03/2020), Personal history of other specified conditions (05/04/2021), Personal history of other specified conditions (03/07/2014), Personal history of other specified conditions (01/31/2013), Skin cancer, Unspecified injury of muscle(s) and tendon(s) of the rotator cuff of right shoulder, initial encounter (10/11/2016), and Wears glasses.    Surgical History  She has a past surgical history  "that includes Hand surgery (Left, 03/07/2014); Other surgical history (Bilateral, 07/11/2022); MR angio head wo IV contrast (04/15/2023); MR angio neck wo IV contrast (04/15/2023); Carpal tunnel release (Bilateral); Colonoscopy (04/11/2018); and Breast biopsy (Right, 2004).     Social History  She reports that she has never smoked. She has never used smokeless tobacco. She reports that she does not drink alcohol and does not use drugs.    Family History  Family History   Problem Relation Name Age of Onset    Diabetes Mother      Asthma Mother      Other (CRF) Mother      Heart failure Father          Allergies  Fenofibrate and Sulfa (sulfonamide antibiotics)    Review of Systems  Recent left-sided flank pain     Physical Exam  Alert, no distress  Normal respiratory effort  Regular rate rhythm  Abdomen soft nontender nondistended  No CVA tenderness     Last Recorded Vitals  Blood pressure 138/79, pulse 78, temperature 36.4 °C (97.5 °F), temperature source Temporal, resp. rate 17, height 1.6 m (5' 3\"), weight 68.2 kg (150 lb 6.4 oz), SpO2 97%.    Relevant Results      CT and labs reviewed     Assessment/Plan     79 year year old is admitted with pain and incontinence.  She has a 17 mm proximal left ureteral stone, s/p recent stent placement.   CT reviewed and her stent is in good position.  She likely had stent irritation, and her pain has improved.  She does not have urinary retention.  Culture pending.  She will follow up with Dr. Leal for ureteroscopy and stent removal.        Shlomo Dudley MD    "

## 2024-07-03 NOTE — PROGRESS NOTES
07/03/24 0727   Discharge Planning   Living Arrangements Spouse/significant other   Support Systems Spouse/significant other   Assistance Needed A&OX3; independent with ADLs with walker at times; drives; room air baseline -currently on 2L NC   Type of Residence Private residence   Number of Stairs to Enter Residence 3   Number of Stairs Within Residence 14   Do you have animals or pets at home? No   Who is requesting discharge planning? Provider   Patient expects to be discharged to: TBD pending urology workup and PT OT evals but likely home no needs   Does the patient need discharge transport arranged? No   Financial Resource Strain   How hard is it for you to pay for the very basics like food, housing, medical care, and heating? Not hard   Housing Stability   In the last 12 months, was there a time when you were not able to pay the mortgage or rent on time? N   In the last 12 months, how many places have you lived? 1   In the last 12 months, was there a time when you did not have a steady place to sleep or slept in a shelter (including now)? N   Transportation Needs   In the past 12 months, has lack of transportation kept you from medical appointments or from getting medications? no   In the past 12 months, has lack of transportation kept you from meetings, work, or from getting things needed for daily living? No

## 2024-07-03 NOTE — PROGRESS NOTES
"Internal Medicine-Progress Note      Chief Complaint     Chief Complaint   Patient presents with   • Post-op Problem     Had stent placed in ureter Sunday now concerned for new urinary and stool incontinence and weakness        Subjective    Patient c/o dysuria and urinary frequency as well as non bloody diarrhea. Not in acute distress. No new complaints or concerns. Sitting comfortably in bed. at bedside    Overnight Events: No overnight events     Objective    Vitals  Visit Vitals  /71 (BP Location: Right arm, Patient Position: Lying)   Pulse 80   Temp 37 °C (98.6 °F) (Temporal)   Resp 16   Ht 1.6 m (5' 3\")   Wt 68.2 kg (150 lb 6.4 oz)   SpO2 95%   BMI 26.64 kg/m²   OB Status Postmenopausal   Smoking Status Never   BSA 1.74 m²       Physical Examination:  GEN:  A&Ox3, no acute distress, appears comfortable.  Conversational and appropriate.  No confusion or gross mental status changes.  EYES: EOMI, non-injected sclera.  ENT: Moist mucous membranes, no apparent injuries or lesions.   CARDIO: Normal rate and regular rhythm. No murmurs, rubs, or gallops.  2+ equal pulses of the distal extremities.   PULM: Clear to auscultation bilaterally. No rales, rhonchi, or wheezes. Good symmetric chest expansion.  GI: BS+  Soft, non-distended. No rebound tenderness or guarding.  SKIN: Warm and dry, no rashes or lesions.  MSK: ROM intact the extremities without contractures.   EXT: No peripheral edema, contusions, or wounds.   NEURO: Cranial nerves II-XII grossly intact.  PSYCH: Appropriate mood and behavior, converses and responds appropriately during exam.   IOs    Intake/Output Summary (Last 24 hours) at 7/3/2024 0913  Last data filed at 7/3/2024 0821  Gross per 24 hour   Intake 168 ml   Output 275 ml   Net -107 ml     Vent settings:       Labs:   Results from last 72 hours   Lab Units 07/03/24  0717 07/02/24  1543   SODIUM mmol/L 140 137   POTASSIUM mmol/L 3.0* 3.5   CHLORIDE mmol/L 102 100   CO2 mmol/L 27 26 " "  BUN mg/dL 20 19   CREATININE mg/dL 0.95 1.03   GLUCOSE mg/dL 101* 147*   CALCIUM mg/dL 8.4* 9.1   ANION GAP mmol/L 14 15   EGFR mL/min/1.73m*2 61 55*   PHOSPHORUS mg/dL 4.0 3.5      Results from last 72 hours   Lab Units 07/03/24  0717 07/02/24  1543   WBC AUTO x10*3/uL 1.6* 1.9*   HEMOGLOBIN g/dL 10.3* 11.2*   HEMATOCRIT % 32.1* 34.4*   PLATELETS AUTO x10*3/uL 140* 147*   NEUTROS PCT AUTO % 47.9  --    LYMPHO PCT MAN %  --  32.0   LYMPHS PCT AUTO % 42.3  --    MONO PCT MAN %  --  10.0   MONOS PCT AUTO % 9.2  --    EOSINO PCT MAN %  --  0.0   EOS PCT AUTO % 0.6  --       Lab Results   Component Value Date    CALCIUM 8.4 (L) 07/03/2024    PHOS 4.0 07/03/2024      Lab Results   Component Value Date    CRP 0.59 03/05/2020      [unfilled]   Micro/ID:   No results found for the last 90 days.                   No lab exists for component: \"AGALPCRNB\"   .ID  Lab Results   Component Value Date    URINECULTURE No significant growth 06/29/2024    BLOODCULT CANCELED 04/15/2023    BLOODCULT CANCELED 04/15/2023     Images  MR lumbar spine w and wo IV contrast  Narrative: Interpreted By:  Benjamin Rodriges,   STUDY:  MR LUMBAR SPINE W AND WO IV CONTRAST;  7/2/2024 9:24 pm      INDICATION:  Signs/Symptoms:urinary and fecal incontinece, low back pain, concern  for neoplastic process on CT.      COMPARISON:  CT of the lumbar spine dated 06/29/2024; CT of the abdomen and pelvis  dated 07/02/2024.      ACCESSION NUMBER(S):  [id number]      ORDERING CLINICIAN:  VAIBHAV CONNELL      TECHNIQUE:  Sagittal T1, T2, STIR, axial T1 and T2 weighted images of the lumbar  spine were acquired. Additional axial and sagittal postcontrast T1  weighted images of the lumbar spine were obtained after intravenous  administration of 13 mL of Dotarem gadolinium contrast.      FINDINGS:  There are 5 lumbar type non rib-bearing vertebral bodies, with the  lowest well-formed intervertebral disc space labeled L5-S1.      A subtle 1-2 mm " retrolisthesis is present at L2-L3, with a 7-8 mm  anterolisthesis present at L4-L5. 1-2 mm anterolisthesis is present  at L5-S1.      No compression fractures are identified in the lumbar spine. Slight  STIR hyperintense edema is present along the inferior endplate of L3  and superior endplate of L4. Small amount of T2 hyperintense signal  is present within the Schmorl's node along the inferior endplate of  L5. Otherwise fatty endplate changes are present at L5-S1.      No T1 hypointense, marrow replacing intramedullary process is  identified in the lumbar spine.      Multilevel degenerate facet osteoarthropathy is present in the lumbar  spine, most pronounced at the level of L3-L4 and L4-L5 on the right,  with some STIR hyperintense fluid present within the facet joints.  Mild STIR hyperintense edema is present in the adjacent soft tissues.      Multilevel intervertebral disc desiccation is present, with moderate  to severe intervertebral disc height loss present at L5-S1. Moderate  disc height loss is present at L3-L4.      Visualized lower thoracic spinal cord is unremarkable in appearance.  Conus medullaris terminates at the level of L1. Although exam is  somewhat degraded by motion, no leptomeningeal enhancement is  identified.      T12-L1: Mild disc bulge slightly effaces the anterior subarachnoid  space without spinal canal narrowing. Mild left-sided neural  foraminal stenosis is present due to hypertrophic facet changes.      L1-L2: Circumferential disc bulge effaces the subarachnoid space  without spinal canal narrowing. Bulging disc material and  hypertrophic facet changes encroach in the neural foramina  bilaterally without significant stenosis.      L2-L3: Combination of the bulging disc, ligamentum flavum thickening  and hypertrophic facet changes cause mild spinal canal narrowing with  some effacement of the subarticular facets bilaterally. Mild  bilateral neural foraminal stenosis is present slightly  worse on the  right due to facet joint hypertrophy and bulging disc.      L3-L4: Bulging disc and ligamentum flavum thickening efface the  subarticular facets bilaterally, without spinal canal stenosis. Mild  bilateral neural foraminal narrowing is present due to hypertrophic  facet changes and bulging disc, slightly worse on the right.      L4-L5: Combination of spondylolisthesis, ligamentum flavum thickening  and bulging disc effaces the subarticular recesses bilaterally and  causes mild spinal canal narrowing. No significant neural foraminal  stenosis is present bilaterally.      L5-S1: Bulging disc material effaces the subarachnoid space without  significant stenosis. Mild bilateral neural foraminal narrowing is  present due to bulging disc and hypertrophic facet changes.      Prevertebral and paraspinal soft tissues do not demonstrate any acute  abnormality.      Some inflammatory changes are present surrounding the visualized left  ureter, as seen on same day CT.      Impression: 1.  No evidence of neoplastic or infectious process in the lumbar  spine. Multilevel degenerative changes of the lumbar spine with mild  spinal canal narrowing at several levels as detailed above.  2. Degenerate facet osteoarthropathy is present bilaterally, worst at  L3-L4 and L4-L5 on the right.      MACRO:  None      Signed by: Benjamin Rodriges 7/2/2024 10:02 PM  Dictation workstation:   [id number]  CT abdomen pelvis wo IV contrast  Narrative: STUDY:  CT Abdomen and Pelvis without IV Contrast; 7/2/2024 4:19 PM  INDICATION:  Recent stent, still a lot of pain.  COMPARISON:  CTA CAP 6/29/2024.  ACCESSION NUMBER(S):  [id number]  ORDERING CLINICIAN:  VAIBHAV CONNELL  TECHNIQUE:  CT of the abdomen and pelvis was performed.  Contiguous axial images  were obtained at 3 mm slice thickness through the abdomen and pelvis.   Coronal and sagittal reconstructions at 3 mm slice thickness were  performed. No intravenous contrast was  administered.    Automated mA/kV exposure control was utilized and patient examination  was performed in strict accordance with principles of ALARA.  FINDINGS:  Please note that the evaluation of vessels, lymph nodes and organs is  limited without intravenous contrast.      LOWER CHEST:  No cardiomegaly.  No pericardial effusion.  Lung bases are clear.     ABDOMEN:     LIVER:  No hepatomegaly.  Smooth surface contour.  Normal attenuation.     BILE DUCTS:  No intrahepatic or extrahepatic biliary ductal dilatation.     GALLBLADDER:  The gallbladder is unremarkable.  STOMACH:  No abnormalities identified.     PANCREAS:  No masses or ductal dilatation.     SPLEEN:  No splenomegaly or focal splenic lesion.     ADRENAL GLANDS:  No thickening or nodules.     KIDNEYS AND URETERS:  Kidneys are normal in size and location.  There is a nonobstructing 17  mm stone in the left kidney. A left ureteral stent is in place.     PELVIS:     BLADDER:  No abnormalities identified.     REPRODUCTIVE ORGANS:  No abnormalities identified.     BOWEL:  No abnormalities identified.     VESSELS:  No abnormalities identified.  Abdominal aorta is normal in caliber.      PERITONEUM/RETROPERITONEUM/LYMPH NODES:  No free fluid.  No pneumoperitoneum.  There are enlarged bilateral inguinal lymph nodes. The largest  measures 1.7 x 1.4 cm in the left inguinal region. There are also  prominent lymph nodes along the left periaortic region, left pelvic  sidewall and left iliac chain.     ABDOMINAL WALL:  No abnormalities identified.  SOFT TISSUES:   No abnormalities identified.     BONES:  No acute fracture or aggressive osseous lesion.  Impression: 1. There is a nonobstructing 17 mm stone in the left kidney. A left  ureteral stent is in place. There is no hydronephrosis.  2. There are enlarged bilateral inguinal lymph nodes. The largest  measures 1.7 x 1.4 cm in the left inguinal region. There are also  prominent lymph nodes along the left periaortic  region, left pelvic  sidewall, and left iliac chain. These may be reactive, but I cannot  exclude a neoplastic process.  Signed by Fernando Parham MD    Meds  Scheduled medications  atorvastatin, 80 mg, oral, Daily  cefTRIAXone, 1 g, intravenous, Daily  enoxaparin, 40 mg, subcutaneous, q24h  insulin lispro, 0-10 Units, subcutaneous, After meals & nightly  [Held by provider] lisinopril, 20 mg, oral, Daily  mirtazapine, 15 mg, oral, Nightly  oxybutynin, 5 mg, oral, TID  PARoxetine, 10 mg, oral, Daily  phenazopyridine, 100 mg, oral, TID  tamsulosin, 0.4 mg, oral, Daily      Continuous medications     PRN medications  PRN medications: acetaminophen **OR** acetaminophen, dextrose, dextrose, glucagon, glucagon, ondansetron, oxyCODONE, oxyCODONE     Problem List    Problem list:   Patient Active Problem List   Diagnosis   • Ankylosing spondylitis (Multi)   • Anxiety   • Aortic regurgitation   • Essential hypertension   • Depression   • Headache, chronic daily   • Insomnia   • Hypercholesterolemia   • Osteoarthritis   • Skin cancer   • Lumbar radiculopathy   • Cervical radiculopathy   • Splenic artery aneurysm (CMS-HCC)   • Controlled diabetes mellitus type II without complication (Multi)   • Benign paroxysmal positional vertigo of right ear   • Status post total left knee replacement   • Chronic diarrhea   • Overweight with body mass index (BMI) 25.0-29.9   • Personal history of other malignant neoplasm of skin   • Nephrolithiasis   • Left renal stone   • UTI (urinary tract infection), bacterial        Assessment and Plan    Sherri Najera, a 79-year-old female with past medical history of hypertension, hyperlipidemia, non-insulin-dependent diabetes mellitus type 2, depression, anxiety, left renal stone status post cystoscopy and insertion of ureteral stent on June 30 presented to the emergency department with complaints of urinary urgency, pain, and confusion.     Acute Issues:   #UTI   #Mild elevation in Cr. / monitor  for DANY development   #left renal stone status post cystoscopy and insertion of ureteral stent on 6/30/24  -Patient presented with urinary urgency, acute pain, and confusion ( now resolved)  -Recent stent placement for large left UPJ with a plan for possible lithotripsy outpatient  -Cr on admission 1.03 with baseline 0.7  -CT abdomen and pelvis without IV contrast showed nonobstructing 17 mm stone in left kidney with ureteral stent in place and no hydronephrosis.  -Started Rocephin ; urine culture pending   -Continue oxybutynin, tamsulosin, and phenazopyridine   -Strict IsOS, avoid nephrotoxic meds, and daily rfp  -Urology consulted; pending recs     #Acute Diarrhea   -Patient reported symptoms prior stent placement. She denies abdominal pain, N/V, or blood in the stools. She received abx recently  -Cdiff negative   -Monitor     #Leukopenia/thrombocytopenia   #Enlarged lymph nodes   -CT showed enlarged bilateral inguinal lymph nodes with the largest measures 1.7 x 1.4 cm in the left inguinal region with prominent lymph nodes along the left periaortic region left pelvic sidewall and left iliac chain which could be reactive but cannot exclude neoplastic process.  -WBC 1.9 on admission (improved from 6/30 1.1).Will likely need a oncology outpatient if not resolved  -Platelet count 140  -Recommend close follow up     Chronic Issues:   #HTN: holding home lisinopril  #HLD: continue home statin   #Depression/Anxiety: continue home mirtazapine and paroxetine   #NIDDM2: holding home metformin and pioglitazone; Added SSI and hypoglycemia protocol     Fluids: PO  Electrolytes: replete as needed  Nutrition: diabetic   GI Prophylaxis: none  DVT Prophylaxis: subcutaneous lovenox       Code Status: Full Code   Emergency Contact: Extended Emergency Contact Information  Primary Emergency Contact: Moose Najera  Home Phone: [phone]  Work Phone: [phone]  Relation: Spouse  Secondary Emergency Contact: Jersey Glover  Home Phone:  [phone]  Relation: Sibling         Pual Mancini MD   PGY-3  [facility] chat

## 2024-07-03 NOTE — SIGNIFICANT EVENT
Follow Up Phone Call    Outgoing phone call    Spoke to: Sherri Hanson Reinaldo Relationship:self   Phone number: [phone]      Outcome: I left a message on answering machine   Chief Complaint   Patient presents with   • Post-op Problem     Had stent placed in ureter Sunday now concerned for new urinary and stool incontinence and weakness          Diagnosis:Not applicable

## 2024-07-03 NOTE — H&P
Patient: Sherri Najera   Age: 79 y.o.   Gender: female   Room/Bed: 135/135-A     Attending: Mark Rabago MD  Code Status:  Full Code    Chief Complaint:  Patient: Sherri Najera is a 79 y.o. female who presented to the hospital for urinary urgency, pain, and confusion     History of Presenting Illness  Sherri Najera, a 79-year-old female with past medical history of hypertension, hyperlipidemia, non-insulin-dependent diabetes mellitus type 2, depression, anxiety, left renal stone status post cystoscopy and insertion of ureteral stent on June 30 presented to the emergency department with complaints of urinary urgency, pain, and confusion.  Patient was discharged on Sunday after the stent was placed with no complications.  She went home and unfortunately had difficulty sleeping due to the pain and urgency.  She mentioned that the symptoms she had prior to the stent placement have been similar to the symptoms after she got discharged on Sunday.  She denies any fever, chills, sweats, nausea vomiting, abdominal pain, suprapubic or costovertebral tenderness.  Also her  who was present during her admission have noted some confusion.  Patient appear with no distress awake, alert and oriented x 3.  She is following basic command.     In the ED  Vitals: Temperature 36.5 /55 pulse 88 respiratory rate 1897% on room air  CBC showed WBC of 1.9 hemoglobin 11.2 platelets 147  RFP showed glucose 147 sodium 137 potassium 3.5 BUN 19 creatinine 1.03  UA dark brown urine turbid +3 protein +1 glucose +3 blood +2 nitrite plus leukocyte esterase more than 20 RBC and more than 50 WBC    CT abdomen and pelvis without IV contrast showed nonobstructing 17 mm stone in left kidney with ureteral stent in place and no hydronephrosis.  There are enlarged bilateral inguinal lymph nodes with the largest measures 1.7 x 1.4 cm in the left inguinal region with prominent lymph nodes along the left periaortic region left pelvic  sidewall and left iliac chain which could be reactive but cannot exclude neoplastic process.        ED intervention include Rocephin, Dilaudid, Ativan, Zofran, and 1 L IV bolus LR.    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[facility])     Ankylosing spondylitis (Multi)     Anxiety     Arthritis     Body mass index (BMI) 27.0-27.9, adult 12/07/2021    BMI 27.0-27.9,adult    BPPV (benign paroxysmal positional vertigo)     Cervical radiculitis     Depression     Diabetes mellitus (Multi)     Effusion, left knee 02/11/2019    Effusion of left knee    Exocrine pancreatic insufficiency ([facility])     GERD (gastroesophageal reflux disease)     Hearing aid worn     HL (hearing loss)     HLD (hyperlipidemia)     Hypertension     Injury of tendon of rotator cuff 06/19/2024    Irritable bowel syndrome with diarrhea 01/16/2020    Irritable bowel syndrome with diarrhea    Kidney stone     Kidney stone 08/21/2023    Nephrolithiasis     Other conditions influencing health status 06/08/2020    History of chronic diarrhea    Other hemorrhoids 06/28/2017    Internal hemorrhoid    Other nonspecific abnormal finding of lung field 03/26/2019    Ground glass opacity present on imaging of lung    Pain in left knee 04/30/2019    Left knee pain    Pain in unspecified knee 02/11/2019    Knee pain    Personal history of other diseases of the circulatory system 08/12/2017    History of hypertension    Personal history of other diseases of the musculoskeletal system and connective tissue 05/21/2015    History of low back pain    Personal history of other specified conditions 11/03/2020    History of fatigue    Personal history of other specified conditions 05/04/2021    History of diarrhea    Personal history of other specified conditions 03/07/2014    History of chest pain    Personal history of other specified conditions 01/31/2013    History of headache    Skin cancer     Unspecified injury of  muscle(s) and tendon(s) of the rotator cuff of right shoulder, initial encounter 10/11/2016    Injury of right rotator cuff    Wears glasses       Past Surgical History:   Past Surgical History:   Procedure Laterality Date    BREAST BIOPSY Right 2004    benign    CARPAL TUNNEL RELEASE Bilateral     COLONOSCOPY  04/11/2018    no further needed-normal    HAND SURGERY Left 03/07/2014    Hand Surgery                                                                                                                                                          MR HEAD ANGIO WO IV CONTRAST  04/15/2023    MR HEAD ANGIO WO IV CONTRAST GEA MRI    MR NECK ANGIO WO IV CONTRAST  04/15/2023    MR NECK ANGIO WO IV CONTRAST GEA MRI    OTHER SURGICAL HISTORY Bilateral 07/11/2022    Shoulder replacement     Family History:   Family History   Problem Relation Name Age of Onset    Diabetes Mother      Asthma Mother      Other (CRF) Mother      Heart failure Father       Social History:   Tobacco Use: Low Risk  (7/2/2024)    Patient History     Smoking Tobacco Use: Never     Smokeless Tobacco Use: Never     Passive Exposure: Not on file      Social History     Substance and Sexual Activity   Alcohol Use Never       Outpatient Medications:  Current Outpatient Medications   Medication Instructions    amoxicillin-pot clavulanate (Augmentin) 875-125 mg tablet 875 mg, oral, 2 times daily    ascorbic acid (Vitamin C) 500 mg tablet once daily    atorvastatin (LIPITOR) 80 mg, oral, Daily    cyanocobalamin (Vitamin B-12) 1,000 mcg tablet 1 tablet, oral, Daily    fluticasone (Flonase Allergy Relief) 50 mcg/actuation nasal spray 1 spray, Each Nostril, 2 times daily    lisinopril 20 mg tablet TAKE 1 TABLET DAILY AS DIRECTED    metFORMIN (Glucophage) 500 mg tablet 1 tablet, oral, Daily, TAKE WITH A MEAL     mirtazapine (REMERON) 15 mg, oral, Nightly    MULTIVITAMIN ORAL 1 tablet, oral, Daily    OneTouch Delica Plus Lancet 33 gauge misc 3 times daily     OneTouch Verio test strips strip 3 times daily, BEFORE MEALS AND AT BEDTIME. - USE 1 STRIP TO CHECK BLOOD SUGAR.     oxybutynin (DITROPAN) 5 mg, oral, 3 times daily    PARoxetine (PAXIL) 10 mg, oral, Daily    pioglitazone (ACTOS) 15 mg, oral, Daily    saccharomyces boulardii (Florastor) 250 mg capsule 1 capsule, oral, 2 times daily    zinc gluconate 50 mg tablet 1 tablet, oral, Daily        ED Course   Vitals - /69 (BP Location: Right arm, Patient Position: Lying)   Pulse 73   Temp 37 °C (98.6 °F) (Temporal)   Resp 16   Wt 65.8 kg (145 lb)   SpO2 97%     Labs:   CBC:  Recent Labs     07/02/24  1543 06/30/24  0810 06/29/24  0828   WBC 1.9* 1.1* 1.6*   HGB 11.2* 10.4* 11.5*   HCT 34.4* 32.8* 36.3   * 98* 125*   MCV 86 89 89     CMP:  Recent Labs     07/02/24  1543 06/30/24  0810 06/29/24  0828    137 136   K 3.5 4.3 3.7    102 100   CO2 26 27 25   ANIONGAP 15 12 15   BUN 19 15 17   CREATININE 1.03 0.59 0.70   EGFR 55* >90 88   GLUCOSE 147* 135* 192*     Recent Labs     07/02/24  1543 06/29/24  0828 09/12/23  0808 04/16/23  0506 04/15/23  1333   ALBUMIN 3.8 4.3 4.5   < > 4.2   ALKPHOS  --  80 84  --  99   ALT  --  38 44  --  14   AST  --  36 24  --  14   BILITOT  --  0.4 0.5  --  0.9   LIPASE  --  20  --   --   --     < > = values in this interval not displayed.     Calcium/Phos:   Lab Results   Component Value Date    CALCIUM 9.1 07/02/2024    PHOS 3.5 07/02/2024      COAG:   Recent Labs     04/15/23  1333 01/18/23  1115 11/03/22  1000   INR 1.2* 0.9 0.9     CRP:   Lab Results   Component Value Date    CRP 0.59 03/05/2020      [unfilled]   ENDO:  Recent Labs     02/09/24  1105 10/06/23  1220 09/12/23  0808 04/16/23  0506 01/30/23  1115 01/18/23  1115 11/03/22  1000 08/24/22  1345 11/04/20  0821 03/22/19  1238   TSH  --   --   --   --   --  3.82 4.59* 3.97  --  3.51   HGBA1C 6.1* 6.7* 6.3* 6.1*   < > 6.6* 6.7* 6.2*   < >  --     < > = values in this interval not displayed.     "  CARDIAC:   Recent Labs     06/29/24  0828 04/16/23  0506 04/15/23  1333 01/18/23  1115   TROPHS 5 14* 11 <3   BNP  --  57 86 13     Recent Labs     09/12/23  0808 04/16/23  0506 01/18/23  1115   CHOL 191 220* 198   LDLF 74 136* 90   HDL 37.9* 42.3 42.2   TRIG 394* 211* 328*     No data recorded    Micro/ID:   No results found for the last 90 days.                   No lab exists for component: \"AGALPCRNB\"   .ID  Lab Results   Component Value Date    URINECULTURE No significant growth 06/29/2024    BLOODCULT CANCELED 04/15/2023    BLOODCULT CANCELED 04/15/2023       Imaging:   No orders to display     Encounter Date: 06/29/24   ECG 12 lead   Result Value    Ventricular Rate 87    Atrial Rate 87    CO Interval 160    QRS Duration 84    QT Interval 372    QTC Calculation(Bazett) 447    P Axis 48    R Axis -32    T Axis 38    QRS Count 14    Q Onset 216    P Onset 136    P Offset 190    T Offset 402    QTC Fredericia 420    Narrative    Normal sinus rhythm with sinus arrhythmia  Left axis deviation  Low voltage QRS  Possible Anterolateral infarct (cited on or before 09-FEB-2024)  Abnormal ECG  When compared with ECG of 09-FEB-2024 10:24,  No significant change was found     Transthoracic Echo (TTE) Complete    Result Date: 5/8/2024   [facility], [address] [place] [address]            Tel [phone] and Fax [phone] TRANSTHORACIC ECHOCARDIOGRAM REPORT  Patient Name:      BAUTISTA RYAN     Reading Physician:    [id number] Kaushal Davila MD Study Date:        5/8/2024             Ordering Provider:    [id number] JACKIE KUMAR MRN/PID:           [id number]             Fellow: Accession#:        [id number]         Nurse:                Yana Vital RN Date of Birth/Age: 1945 / 78 years Sonographer:          Juana Elizalde                                                    "            [facility] Gender:            F                    Additional Staff: Height:            160.02 cm            Admit Date: Weight:            69.40 kg             Admission Status:     Outpatient BSA / BMI:         1.73 m2 / 27.10      Encounter#:           [id number]                    kg/m2                                         Department Location:  [facility] Blood Pressure: 138 /78 mmHg Study Type:    TRANSTHORACIC ECHO (TTE) COMPLETE Diagnosis/ICD: Nonrheumatic aortic (valve) insufficiency-I35.1 Indication:    Aortic insufficiency CPT Code:      Echo Complete w Full Doppler-[id number] Patient History: Valve Disorders:   Aortic Insufficiency. Diabetes:          Yes Pertinent History: HTN and Murmur. Study Detail: The following Echo studies were performed: 2D, M-Mode, Doppler and               color flow. Definity used as a contrast agent for endocardial               border definition. Total contrast used for this procedure was 2.0               mL via IV push.  PHYSICIAN INTERPRETATION: Left Ventricle: The left ventricular systolic function is normal, with an estimated ejection fraction of 65-70%. There are no regional wall motion abnormalities. The left ventricular cavity size is normal. Spectral Doppler shows a pseudonormal pattern of left ventricular diastolic filling. Left Atrium: The left atrium is normal in size. Right Ventricle: The right ventricle is normal in size. There is normal right ventricular global systolic function. Right Atrium: The right atrium is normal in size. Aortic Valve: The aortic valve is trileaflet. There is mild aortic valve cusp calcification. There is no evidence of aortic valve stenosis. There is mild aortic valve regurgitation. The peak instantaneous gradient of the aortic valve is 9.6 mmHg. Mitral Valve: The mitral valve is mildly thickened. There is trace to mild mitral valve regurgitation. Tricuspid Valve: The tricuspid valve is structurally normal. There is trace to  mild tricuspid regurgitation. The Doppler estimated RVSP is slightly elevated at 31.9 mmHg. Pulmonic Valve: The pulmonic valve is structurally normal. There is trace to mild pulmonic valve regurgitation. Pericardium: There is no pericardial effusion noted. Aorta: The aortic root is normal. Systemic Veins: The inferior vena cava size appears small. There is IVC inspiratory collapse greater than 50%. In comparison to the previous echocardiogram(s): There are no prior studies on this patient for comparison purposes.  CONCLUSIONS:  1. Left ventricular systolic function is normal with a 65-70% estimated ejection fraction.  2. Spectral Doppler shows a pseudonormal pattern of left ventricular diastolic filling.  3. Slightly elevated RVSP.  4. Mild aortic valve regurgitation. QUANTITATIVE DATA SUMMARY: 2D MEASUREMENTS:                          Normal Ranges: Ao Root d:     3.00 cm   (2.0-3.7cm) LAs:           3.20 cm   (2.7-4.0cm) IVSd:          1.00 cm   (0.6-1.1cm) LVPWd:         0.90 cm   (0.6-1.1cm) LVIDd:         3.70 cm   (3.9-5.9cm) LVIDs:         2.50 cm LV Mass Index: 60.6 g/m2 LV % FS        32.4 % LA VOLUME:                               Normal Ranges: LA Vol A4C:        44.7 ml    (22+/-6mL/m2) LA Vol A2C:        47.9 ml LA Vol BP:         47.6 ml LA Vol Index A4C:  25.9ml/m2 LA Vol Index A2C:  27.8 ml/m2 LA Vol Index BP:   27.6 ml/m2 LA Area A4C:       15.5 cm2 LA Area A2C:       16.5 cm2 LA Major Axis A4C: 4.6 cm LA Major Axis A2C: 4.8 cm LA Volume Index:   26.0 ml/m2 LA Vol A4C:        42.0 ml LA Vol A2C:        47.0 ml M-MODE MEASUREMENTS:                  Normal Ranges: Ao Root: 3.20 cm (2.0-3.7cm) AORTA MEASUREMENTS:                    Normal Ranges: Asc Ao, d: 3.20 cm (2.1-3.4cm) LV SYSTOLIC FUNCTION BY 2D PLANIMETRY (MOD):                     Normal Ranges: EF-A4C View: 70.9 % (>=55%) EF-A2C View: 66.7 % EF-Biplane:  70.1 % LV DIASTOLIC FUNCTION:                               Normal Ranges: MV Peak E:         1.09 m/s    (0.7-1.2 m/s) MV Peak A:        1.16 m/s    (0.42-0.7 m/s) E/A Ratio:        0.94        (1.0-2.2) MV lateral e'     0.06 m/s MV medial e'      0.05 m/s MV A Dur:         121.00 msec E/e' Ratio:       19.30       (<8.0) PulmV Sys Farhad:    84.90 cm/s PulmV García Farhad:   46.40 cm/s PulmV S/D Farhad:    1.80 PulmV A Revs Farhad: 27.10 cm/s PulmV A Revs Dur: 114.00 msec MITRAL VALVE:                      Normal Ranges: MV Vmax:    1.36 m/s (<=1.3m/s) MV peak P.4 mmHg (<5mmHg) MV mean PG: 3.0 mmHg (<2mmHg) MV DT:      232 msec (150-240msec) AORTIC VALVE:                         Normal Ranges: AoV Vmax:      1.55 m/s (<=1.7m/s) AoV Peak P.6 mmHg (<20mmHg) LVOT Max Farhad:  1.07 m/s (<=1.1m/s) LVOT VTI:      24.00 cm LVOT Diameter: 2.00 cm  (1.8-2.4cm) AoV Area,Vmax: 2.17 cm2 (2.5-4.5cm2) AORTIC INSUFFICIENCY: AI Vmax:       4.44 m/s AI Half-time:  417 msec AI Decel Rate: 313.00 cm/s2  RIGHT VENTRICLE: RV Basal 3.26 cm RV Mid   2.33 cm RV Major 6.2 cm TAPSE:   25.3 mm RV s'    0.16 m/s TRICUSPID VALVE/RVSP:                             Normal Ranges: Peak TR Velocity: 2.69 m/s Est. RA Pressure: 3 mmHg RV Syst Pressure: 31.9 mmHg (< 30mmHg) PULMONIC VALVE:                      Normal Ranges: PV Max Farhad: 0.8 m/s  (0.6-0.9m/s) PV Max P.9 mmHg PIEDV:      0.86 m/s PADP:       6.0 mmHg Pulmonary Veins: PulmV A Revs Dur: 114.00 msec PulmV A Revs Farhad: 27.10 cm/s PulmV García Farhad:   46.40 cm/s PulmV S/D Farhad:    1.80 PulmV Sys Farhad:    84.90 cm/s  [id number] Kaushal Davila MD Electronically signed on 2024 at 1:00:36 PM  ** Final **    MR lumbar spine w and wo IV contrast    Result Date: 2024  Interpreted By:  Benjamin Rodriges, STUDY: MR LUMBAR SPINE W AND WO IV CONTRAST;  2024 9:24 pm   INDICATION: Signs/Symptoms:urinary and fecal incontinece, low back pain, concern for neoplastic process on CT.   COMPARISON: CT of the lumbar spine dated 2024; CT of the abdomen and pelvis dated 2024.    ACCESSION NUMBER(S): [id number]   ORDERING CLINICIAN: VAIBHAV CONNELL   TECHNIQUE: Sagittal T1, T2, STIR, axial T1 and T2 weighted images of the lumbar spine were acquired. Additional axial and sagittal postcontrast T1 weighted images of the lumbar spine were obtained after intravenous administration of 13 mL of Dotarem gadolinium contrast.   FINDINGS: There are 5 lumbar type non rib-bearing vertebral bodies, with the lowest well-formed intervertebral disc space labeled L5-S1.   A subtle 1-2 mm retrolisthesis is present at L2-L3, with a 7-8 mm anterolisthesis present at L4-L5. 1-2 mm anterolisthesis is present at L5-S1.   No compression fractures are identified in the lumbar spine. Slight STIR hyperintense edema is present along the inferior endplate of L3 and superior endplate of L4. Small amount of T2 hyperintense signal is present within the Schmorl's node along the inferior endplate of L5. Otherwise fatty endplate changes are present at L5-S1.   No T1 hypointense, marrow replacing intramedullary process is identified in the lumbar spine.   Multilevel degenerate facet osteoarthropathy is present in the lumbar spine, most pronounced at the level of L3-L4 and L4-L5 on the right, with some STIR hyperintense fluid present within the facet joints. Mild STIR hyperintense edema is present in the adjacent soft tissues.   Multilevel intervertebral disc desiccation is present, with moderate to severe intervertebral disc height loss present at L5-S1. Moderate disc height loss is present at L3-L4.   Visualized lower thoracic spinal cord is unremarkable in appearance. Conus medullaris terminates at the level of L1. Although exam is somewhat degraded by motion, no leptomeningeal enhancement is identified.   T12-L1: Mild disc bulge slightly effaces the anterior subarachnoid space without spinal canal narrowing. Mild left-sided neural foraminal stenosis is present due to hypertrophic facet changes.   L1-L2: Circumferential disc  bulge effaces the subarachnoid space without spinal canal narrowing. Bulging disc material and hypertrophic facet changes encroach in the neural foramina bilaterally without significant stenosis.   L2-L3: Combination of the bulging disc, ligamentum flavum thickening and hypertrophic facet changes cause mild spinal canal narrowing with some effacement of the subarticular facets bilaterally. Mild bilateral neural foraminal stenosis is present slightly worse on the right due to facet joint hypertrophy and bulging disc.   L3-L4: Bulging disc and ligamentum flavum thickening efface the subarticular facets bilaterally, without spinal canal stenosis. Mild bilateral neural foraminal narrowing is present due to hypertrophic facet changes and bulging disc, slightly worse on the right.   L4-L5: Combination of spondylolisthesis, ligamentum flavum thickening and bulging disc effaces the subarticular recesses bilaterally and causes mild spinal canal narrowing. No significant neural foraminal stenosis is present bilaterally.   L5-S1: Bulging disc material effaces the subarachnoid space without significant stenosis. Mild bilateral neural foraminal narrowing is present due to bulging disc and hypertrophic facet changes.   Prevertebral and paraspinal soft tissues do not demonstrate any acute abnormality.   Some inflammatory changes are present surrounding the visualized left ureter, as seen on same day CT.       1.  No evidence of neoplastic or infectious process in the lumbar spine. Multilevel degenerative changes of the lumbar spine with mild spinal canal narrowing at several levels as detailed above. 2. Degenerate facet osteoarthropathy is present bilaterally, worst at L3-L4 and L4-L5 on the right.   MACRO: None   Signed by: Benjamin Rodriges 7/2/2024 10:02 PM Dictation workstation:   [id number]    CT abdomen pelvis wo IV contrast    Result Date: 7/2/2024  STUDY: CT Abdomen and Pelvis without IV Contrast; 7/2/2024 4:19 PM  INDICATION: Recent stent, still a lot of pain. COMPARISON: [facility] CAP 6/29/2024. ACCESSION NUMBER(S): [id number] ORDERING CLINICIAN: VAIBHAV CONNELL TECHNIQUE: CT of the abdomen and pelvis was performed.  Contiguous axial images were obtained at 3 mm slice thickness through the abdomen and pelvis. Coronal and sagittal reconstructions at 3 mm slice thickness were performed. No intravenous contrast was administered.  Automated mA/kV exposure control was utilized and patient examination was performed in strict accordance with principles of ALARA. FINDINGS: Please note that the evaluation of vessels, lymph nodes and organs is limited without intravenous contrast.  LOWER CHEST: No cardiomegaly.  No pericardial effusion.  Lung bases are clear.  ABDOMEN:  LIVER: No hepatomegaly.  Smooth surface contour.  Normal attenuation.  BILE DUCTS: No intrahepatic or extrahepatic biliary ductal dilatation.  GALLBLADDER: The gallbladder is unremarkable. STOMACH: No abnormalities identified.  PANCREAS: No masses or ductal dilatation.  SPLEEN: No splenomegaly or focal splenic lesion.  ADRENAL GLANDS: No thickening or nodules.  KIDNEYS AND URETERS: Kidneys are normal in size and location.  There is a nonobstructing 17 mm stone in the left kidney. A left ureteral stent is in place.  PELVIS:  BLADDER: No abnormalities identified.  REPRODUCTIVE ORGANS: No abnormalities identified.  BOWEL: No abnormalities identified.  VESSELS: No abnormalities identified.  Abdominal aorta is normal in caliber.  PERITONEUM/RETROPERITONEUM/LYMPH NODES: No free fluid.  No pneumoperitoneum. There are enlarged bilateral inguinal lymph nodes. The largest measures 1.7 x 1.4 cm in the left inguinal region. There are also prominent lymph nodes along the left periaortic region, left pelvic sidewall and left iliac chain.  ABDOMINAL WALL: No abnormalities identified. SOFT TISSUES: No abnormalities identified.  BONES: No acute fracture or aggressive osseous lesion.    1.  There is a nonobstructing 17 mm stone in the left kidney. A left ureteral stent is in place. There is no hydronephrosis. 2. There are enlarged bilateral inguinal lymph nodes. The largest measures 1.7 x 1.4 cm in the left inguinal region. There are also prominent lymph nodes along the left periaortic region, left pelvic sidewall, and left iliac chain. These may be reactive, but I cannot exclude a neoplastic process. Signed by Fernando Parham MD     Interventions:  Medications   atorvastatin (Lipitor) tablet 80 mg (has no administration in time range)   lisinopril tablet 20 mg ( oral Dose Auto Held 7/7/24 0900)   mirtazapine (Remeron) tablet 15 mg (has no administration in time range)   PARoxetine (Paxil) tablet 10 mg (has no administration in time range)   oxybutynin (Ditropan) tablet 5 mg (has no administration in time range)   tamsulosin (Flomax) 24 hr capsule 0.4 mg (has no administration in time range)   cefTRIAXone (Rocephin) IVPB 1 g (has no administration in time range)   glucagon (Glucagen) injection 1 mg (has no administration in time range)   dextrose 50 % injection 25 g (has no administration in time range)   glucagon (Glucagen) injection 1 mg (has no administration in time range)   dextrose 50 % injection 12.5 g (has no administration in time range)   insulin lispro (HumaLOG) injection 0-10 Units (has no administration in time range)   ondansetron (Zofran) injection 4 mg (has no administration in time range)   acetaminophen (Tylenol) oral liquid 650 mg (has no administration in time range)     Or   acetaminophen (Tylenol) tablet 650 mg (has no administration in time range)   oxyCODONE (Roxicodone) immediate release tablet 2.5 mg (has no administration in time range)   oxyCODONE (Roxicodone) immediate release tablet 5 mg (has no administration in time range)   phenazopyridine (Pyridium) tablet 100 mg (has no administration in time range)   HYDROmorphone (Dilaudid) injection 0.5 mg (0.5 mg intravenous Given  7/2/24 1602)   ondansetron (Zofran) injection 4 mg (4 mg intravenous Given 7/2/24 1602)   lactated Ringer's bolus 1,000 mL (0 mL intravenous Stopped 7/2/24 1837)   LORazepam (Ativan) injection 0.5 mg (0.5 mg intravenous Given 7/2/24 2026)   gadoterate meglumine (Dotarem) 0.5 mmol/mL contrast injection 13 mL (13 mL intravenous Given 7/2/24 2114)   cefTRIAXone (Rocephin) 2 g in dextrose 5% in water (D5W) 50 mL (0 g intravenous Stopped 7/2/24 2239)       Objective Data  Physical Exam  Constitutional:       General: She is not in acute distress.     Appearance: She is ill-appearing.   HENT:      Head: Normocephalic and atraumatic.   Cardiovascular:      Rate and Rhythm: Normal rate.   Pulmonary:      Effort: Pulmonary effort is normal. No respiratory distress.   Abdominal:      Palpations: Abdomen is soft.      Tenderness: There is no abdominal tenderness.   Musculoskeletal:         General: No swelling.      Right lower leg: No edema.      Left lower leg: No edema.   Skin:     General: Skin is warm.   Neurological:      Mental Status: She is alert and oriented to person, place, and time.      Motor: No weakness.   Psychiatric:         Mood and Affect: Mood normal.                Assessment and Plan   Sherri Najera, a 79-year-old female with past medical history of hypertension, hyperlipidemia, non-insulin-dependent diabetes mellitus type 2, depression, anxiety, left renal stone status post cystoscopy and insertion of ureteral stent on June 30 presented to the emergency department with complaints of urinary urgency, pain, and confusion.  Patient was discharged on Sunday after the stent was placed with no complications.  She went home and unfortunately had difficulty sleeping due to the pain and urinary urgency.  She mentioned that the symptoms she had prior to the stent placement have been similar to the symptoms after she got discharged on Sunday.  She denies any fever, chills, sweats, nausea vomiting, abdominal  pain, suprapubic or costovertebral tenderness.  Also her  who was present during her admission have noted some confusion.  Patient appear with no distress awake, alert and oriented x 3.  She is following basic command.       Acute Medical Issues:  #UTI   #Mild elevation in Cr. / monitor for DANY development   #left renal stone status post cystoscopy and insertion of ureteral stent on 6/30/24  -Patient presented with urinary urgency, acute pain, and confusion  -Recent stent placement for large left UPJ with a plan for possible lithotripsy outpatient  -Cr on admission 1.03 with baseline 0.7  -CT abdomen and pelvis without IV contrast showed nonobstructing 17 mm stone in left kidney with ureteral stent in place and no hydronephrosis.  -Urology consulted; appreciate recs  -Started Rocephin ; urine culture pending   -Continue oxybutynin, tamsulosin, and phenazopyridine   -Strict IsOS, avoid nephrotoxic meds, and daily rfp    #Acute Diarrhea   -Patient reported symptoms prior stent placement. She denies abdominal pain, N/V, or blood in the stools. She received abx recently  -Cdiff PCR ordered in the ED  -Monitor    #Leukopenia   #Enlarged lymph nodes   -CT showed enlarged bilateral inguinal lymph nodes with the largest measures 1.7 x 1.4 cm in the left inguinal region with prominent lymph nodes along the left periaortic region left pelvic sidewall and left iliac chain which could be reactive but cannot exclude neoplastic process.  -WBC 1.9 on admission (improved from 6/30 1.1)  -Discussed findings with patient and   -Recommend close follow up      Chronic Medical Issues:   #HTN: holding home lisinopril, reassess in the am   #HLD: continue home statin   #Depression/Anxiety: continue home mirtazapine and paroxetine   #NIDDM2: holding home metformin and pioglitazone; Added SSI and hypoglycemia protocol      Fluids: PO  Electrolytes: replete as needed  Nutrition: diabetic   GI Prophylaxis: none  DVT Prophylaxis:  subcutaneous lovenox         Dispo: Patient admitted for UTI and diarrhea; started IV Abx. Workup pending. ELOS<48hours

## 2024-07-03 NOTE — CARE PLAN
The patient's goals for the shift include      The clinical goals for the shift include Pt will remain fall and pain free throughout the shift    Over the shift, the patient did not make progress toward the following goals. Barriers to progression include . Recommendations to address these barriers include .

## 2024-07-03 NOTE — PROGRESS NOTES
"Occupational Therapy                 Therapy Communication Note    Patient Name: Sherri Najera \"Millicent\"  MRN: [id number]  Today's Date: 7/3/2024     Discipline: Occupational Therapy    Missed Visit Reason: Missed Visit Reason: Patient refused (at 11:32, pt declined participation in OT eval due to discomfort at K+ infusion site and \"not feeling well\". Agreeable to OT return at another time. Communicated with nursing staff)    Missed Time: Attempt       "

## 2024-07-03 NOTE — NURSING NOTE
10:45  Patient stated  that the IV potassium was burning her IV site, I stopped the IV and notified the doctor.    12:35 The doctor is aware that the IV potassium was stopped. No further orders at this time.

## 2024-07-04 LAB
ALBUMIN SERPL BCP-MCNC: 3.5 G/DL (ref 3.4–5)
ALP SERPL-CCNC: 66 U/L (ref 33–136)
ALT SERPL W P-5'-P-CCNC: 25 U/L (ref 7–45)
ANION GAP SERPL CALC-SCNC: 14 MMOL/L (ref 10–20)
AST SERPL W P-5'-P-CCNC: 22 U/L (ref 9–39)
BACTERIA UR CULT: NORMAL
BILIRUB SERPL-MCNC: 0.3 MG/DL (ref 0–1.2)
BUN SERPL-MCNC: 20 MG/DL (ref 6–23)
C DIF TOX TCDA+TCDB STL QL NAA+PROBE: DETECTED
C DIFF TOX A+B STL QL IA: POSITIVE
CALCIUM SERPL-MCNC: 8.7 MG/DL (ref 8.6–10.3)
CHLORIDE SERPL-SCNC: 105 MMOL/L (ref 98–107)
CO2 SERPL-SCNC: 25 MMOL/L (ref 21–32)
CREAT SERPL-MCNC: 0.8 MG/DL (ref 0.5–1.05)
EGFRCR SERPLBLD CKD-EPI 2021: 75 ML/MIN/1.73M*2
ERYTHROCYTE [DISTWIDTH] IN BLOOD BY AUTOMATED COUNT: 13.6 % (ref 11.5–14.5)
GLUCOSE BLD MANUAL STRIP-MCNC: 119 MG/DL (ref 74–99)
GLUCOSE BLD MANUAL STRIP-MCNC: 131 MG/DL (ref 74–99)
GLUCOSE BLD MANUAL STRIP-MCNC: 136 MG/DL (ref 74–99)
GLUCOSE BLD MANUAL STRIP-MCNC: 138 MG/DL (ref 74–99)
GLUCOSE SERPL-MCNC: 113 MG/DL (ref 74–99)
HCT VFR BLD AUTO: 29.9 % (ref 36–46)
HGB BLD-MCNC: 9.8 G/DL (ref 12–16)
MCH RBC QN AUTO: 29.5 PG (ref 26–34)
MCHC RBC AUTO-ENTMCNC: 32.8 G/DL (ref 32–36)
MCV RBC AUTO: 90 FL (ref 80–100)
NRBC BLD-RTO: 0 /100 WBCS (ref 0–0)
PLATELET # BLD AUTO: 158 X10*3/UL (ref 150–450)
POTASSIUM SERPL-SCNC: 3.4 MMOL/L (ref 3.5–5.3)
PROT SERPL-MCNC: 5.9 G/DL (ref 6.4–8.2)
RBC # BLD AUTO: 3.32 X10*6/UL (ref 4–5.2)
SODIUM SERPL-SCNC: 141 MMOL/L (ref 136–145)
WBC # BLD AUTO: 2.9 X10*3/UL (ref 4.4–11.3)

## 2024-07-04 PROCEDURE — 36415 COLL VENOUS BLD VENIPUNCTURE: CPT | Performed by: FAMILY MEDICINE

## 2024-07-04 PROCEDURE — 99232 SBSQ HOSP IP/OBS MODERATE 35: CPT

## 2024-07-04 PROCEDURE — 85027 COMPLETE CBC AUTOMATED: CPT | Performed by: FAMILY MEDICINE

## 2024-07-04 PROCEDURE — 87506 IADNA-DNA/RNA PROBE TQ 6-11: CPT | Mod: GEALAB | Performed by: FAMILY MEDICINE

## 2024-07-04 PROCEDURE — 2500000004 HC RX 250 GENERAL PHARMACY W/ HCPCS (ALT 636 FOR OP/ED)

## 2024-07-04 PROCEDURE — 84075 ASSAY ALKALINE PHOSPHATASE: CPT | Performed by: FAMILY MEDICINE

## 2024-07-04 PROCEDURE — 2500000001 HC RX 250 WO HCPCS SELF ADMINISTERED DRUGS (ALT 637 FOR MEDICARE OP): Performed by: FAMILY MEDICINE

## 2024-07-04 PROCEDURE — 2500000002 HC RX 250 W HCPCS SELF ADMINISTERED DRUGS (ALT 637 FOR MEDICARE OP, ALT 636 FOR OP/ED)

## 2024-07-04 PROCEDURE — 2500000001 HC RX 250 WO HCPCS SELF ADMINISTERED DRUGS (ALT 637 FOR MEDICARE OP)

## 2024-07-04 PROCEDURE — 1100000001 HC PRIVATE ROOM DAILY

## 2024-07-04 PROCEDURE — 82947 ASSAY GLUCOSE BLOOD QUANT: CPT

## 2024-07-04 RX ORDER — VANCOMYCIN HYDROCHLORIDE 125 MG/1
125 CAPSULE ORAL 4 TIMES DAILY
Status: DISCONTINUED | OUTPATIENT
Start: 2024-07-04 | End: 2024-07-06 | Stop reason: HOSPADM

## 2024-07-04 RX ORDER — POTASSIUM CHLORIDE 20 MEQ/1
40 TABLET, EXTENDED RELEASE ORAL ONCE
Status: COMPLETED | OUTPATIENT
Start: 2024-07-04 | End: 2024-07-04

## 2024-07-04 RX ORDER — HYDROXYZINE HYDROCHLORIDE 25 MG/1
25 TABLET, FILM COATED ORAL EVERY 6 HOURS PRN
Status: DISCONTINUED | OUTPATIENT
Start: 2024-07-04 | End: 2024-07-06 | Stop reason: HOSPADM

## 2024-07-04 ASSESSMENT — PAIN - FUNCTIONAL ASSESSMENT
PAIN_FUNCTIONAL_ASSESSMENT: 0-10

## 2024-07-04 ASSESSMENT — COGNITIVE AND FUNCTIONAL STATUS - GENERAL
WALKING IN HOSPITAL ROOM: A LOT
HELP NEEDED FOR BATHING: A LITTLE
MOBILITY SCORE: 16
STANDING UP FROM CHAIR USING ARMS: A LITTLE
TURNING FROM BACK TO SIDE WHILE IN FLAT BAD: A LITTLE
MOVING TO AND FROM BED TO CHAIR: A LITTLE
DAILY ACTIVITIY SCORE: 20
CLIMB 3 TO 5 STEPS WITH RAILING: A LOT
TOILETING: A LITTLE
DRESSING REGULAR UPPER BODY CLOTHING: A LITTLE
DRESSING REGULAR LOWER BODY CLOTHING: A LITTLE
MOVING FROM LYING ON BACK TO SITTING ON SIDE OF FLAT BED WITH BEDRAILS: A LITTLE

## 2024-07-04 ASSESSMENT — PAIN SCALES - GENERAL
PAINLEVEL_OUTOF10: 1
PAINLEVEL_OUTOF10: 4
PAINLEVEL_OUTOF10: 1
PAINLEVEL_OUTOF10: 0 - NO PAIN
PAINLEVEL_OUTOF10: 7

## 2024-07-04 NOTE — CARE PLAN
The patient's goals for the shift include  get better    The clinical goals for the shift include Patient will report decreased stools this shift      Problem: Pain - Adult  Goal: Verbalizes/displays adequate comfort level or baseline comfort level  Outcome: Progressing     Problem: Safety - Adult  Goal: Free from fall injury  Outcome: Progressing     Problem: Discharge Planning  Goal: Discharge to home or other facility with appropriate resources  Outcome: Progressing     Problem: Chronic Conditions and Co-morbidities  Goal: Patient's chronic conditions and co-morbidity symptoms are monitored and maintained or improved  Outcome: Progressing     Problem: Diabetes  Goal: Achieve decreasing blood glucose levels by end of shift  Outcome: Progressing  Goal: Increase stability of blood glucose readings by end of shift  Outcome: Progressing  Goal: Decrease in ketones present in urine by end of shift  Outcome: Progressing  Goal: Maintain electrolyte levels within acceptable range throughout shift  Outcome: Progressing  Goal: Maintain glucose levels >70mg/dl to <250mg/dl throughout shift  Outcome: Progressing  Goal: No changes in neurological exam by end of shift  Outcome: Progressing  Goal: Learn about and adhere to nutrition recommendations by end of shift  Outcome: Progressing  Goal: Vital signs within normal range for age by end of shift  Outcome: Progressing  Goal: Increase self care and/or family involovement by end of shift  Outcome: Progressing  Goal: Receive DSME education by end of shift  Outcome: Progressing     Problem: Skin  Goal: Decreased wound size/increased tissue granulation at next dressing change  Outcome: Progressing  Goal: Participates in plan/prevention/treatment measures  Outcome: Progressing  Goal: Prevent/manage excess moisture  Outcome: Progressing  Goal: Prevent/minimize sheer/friction injuries  Outcome: Progressing  Goal: Promote/optimize nutrition  Outcome: Progressing  Goal: Promote skin  healing  Outcome: Progressing     Problem: Fall/Injury  Goal: Not fall by end of shift  Outcome: Progressing  Goal: Be free from injury by end of the shift  Outcome: Progressing  Goal: Verbalize understanding of personal risk factors for fall in the hospital  Outcome: Progressing  Goal: Verbalize understanding of risk factor reduction measures to prevent injury from fall in the home  Outcome: Progressing  Goal: Use assistive devices by end of the shift  Outcome: Progressing  Goal: Pace activities to prevent fatigue by end of the shift  Outcome: Progressing

## 2024-07-04 NOTE — PROGRESS NOTES
"Internal Medicine-Progress Note      Chief Complaint     Chief Complaint   Patient presents with   • Post-op Problem     Had stent placed in ureter Sunday now concerned for new urinary and stool incontinence and weakness        Subjective    Patient continues to complain of nonbloody diarrhea, fatigue no new complaints or concerns in no acute distress.    Overnight Events: No overnight events     Objective    Vitals  Visit Vitals  /78   Pulse 76   Temp 36.6 °C (97.9 °F)   Resp 16   Ht 1.6 m (5' 3\")   Wt 68.2 kg (150 lb 6.4 oz)   SpO2 93%   BMI 26.64 kg/m²   OB Status Postmenopausal   Smoking Status Never   BSA 1.74 m²       Physical Examination:  GEN:  A&Ox3, no acute distress, appears comfortable.  Conversational and appropriate.  No confusion or gross mental status changes.  EYES: EOMI, non-injected sclera.  ENT: Moist mucous membranes, no apparent injuries or lesions.   CARDIO: Normal rate and regular rhythm. No murmurs, rubs, or gallops.  2+ equal pulses of the distal extremities.   PULM: Clear to auscultation bilaterally. No rales, rhonchi, or wheezes. Good symmetric chest expansion.  GI: BS+  Soft, non-distended. No rebound tenderness or guarding.  SKIN: Warm and dry, no rashes or lesions.  MSK: ROM intact the extremities without contractures.   EXT: No peripheral edema, contusions, or wounds.   NEURO: Cranial nerves II-XII grossly intact.   PSYCH: Appropriate mood and behavior, converses and responds appropriately during exam.   IOs    Intake/Output Summary (Last 24 hours) at 7/4/2024 1338  Last data filed at 7/4/2024 1256  Gross per 24 hour   Intake 730 ml   Output 695 ml   Net 35 ml     Vent settings:       Labs:   Results from last 72 hours   Lab Units 07/04/24  0628 07/03/24  0717 07/02/24  1543   SODIUM mmol/L 141 140 137   POTASSIUM mmol/L 3.4* 3.0* 3.5   CHLORIDE mmol/L 105 102 100   CO2 mmol/L 25 27 26   BUN mg/dL 20 20 19   CREATININE mg/dL 0.80 0.95 1.03   GLUCOSE mg/dL 113* 101* 147* " "  CALCIUM mg/dL 8.7 8.4* 9.1   ANION GAP mmol/L 14 14 15   EGFR mL/min/1.73m*2 75 61 55*   PHOSPHORUS mg/dL  --  4.0 3.5      Results from last 72 hours   Lab Units 07/04/24  0628 07/03/24  0717 07/02/24  1543   WBC AUTO x10*3/uL 2.9* 1.6* 1.9*   HEMOGLOBIN g/dL 9.8* 10.3* 11.2*   HEMATOCRIT % 29.9* 32.1* 34.4*   PLATELETS AUTO x10*3/uL 158 140* 147*   NEUTROS PCT AUTO %  --  47.9  --    LYMPHO PCT MAN %  --   --  32.0   LYMPHS PCT AUTO %  --  42.3  --    MONO PCT MAN %  --   --  10.0   MONOS PCT AUTO %  --  9.2  --    EOSINO PCT MAN %  --   --  0.0   EOS PCT AUTO %  --  0.6  --       Lab Results   Component Value Date    CALCIUM 8.7 07/04/2024    PHOS 4.0 07/03/2024      Lab Results   Component Value Date    CRP 0.59 03/05/2020      [unfilled]   Micro/ID:   No results found for the last 90 days.                   No lab exists for component: \"AGALPCRNB\"   .ID  Lab Results   Component Value Date    URINECULTURE No significant growth 07/02/2024    BLOODCULT CANCELED 04/15/2023    BLOODCULT CANCELED 04/15/2023     Images  MR lumbar spine w and wo IV contrast  Narrative: Interpreted By:  Benjamin Rodriges,   STUDY:  MR LUMBAR SPINE W AND WO IV CONTRAST;  7/2/2024 9:24 pm      INDICATION:  Signs/Symptoms:urinary and fecal incontinece, low back pain, concern  for neoplastic process on CT.      COMPARISON:  CT of the lumbar spine dated 06/29/2024; CT of the abdomen and pelvis  dated 07/02/2024.      ACCESSION NUMBER(S):  [id number]      ORDERING CLINICIAN:  VAIBHAV CONNELL      TECHNIQUE:  Sagittal T1, T2, STIR, axial T1 and T2 weighted images of the lumbar  spine were acquired. Additional axial and sagittal postcontrast T1  weighted images of the lumbar spine were obtained after intravenous  administration of 13 mL of Dotarem gadolinium contrast.      FINDINGS:  There are 5 lumbar type non rib-bearing vertebral bodies, with the  lowest well-formed intervertebral disc space labeled L5-S1.      A subtle 1-2 mm " retrolisthesis is present at L2-L3, with a 7-8 mm  anterolisthesis present at L4-L5. 1-2 mm anterolisthesis is present  at L5-S1.      No compression fractures are identified in the lumbar spine. Slight  STIR hyperintense edema is present along the inferior endplate of L3  and superior endplate of L4. Small amount of T2 hyperintense signal  is present within the Schmorl's node along the inferior endplate of  L5. Otherwise fatty endplate changes are present at L5-S1.      No T1 hypointense, marrow replacing intramedullary process is  identified in the lumbar spine.      Multilevel degenerate facet osteoarthropathy is present in the lumbar  spine, most pronounced at the level of L3-L4 and L4-L5 on the right,  with some STIR hyperintense fluid present within the facet joints.  Mild STIR hyperintense edema is present in the adjacent soft tissues.      Multilevel intervertebral disc desiccation is present, with moderate  to severe intervertebral disc height loss present at L5-S1. Moderate  disc height loss is present at L3-L4.      Visualized lower thoracic spinal cord is unremarkable in appearance.  Conus medullaris terminates at the level of L1. Although exam is  somewhat degraded by motion, no leptomeningeal enhancement is  identified.      T12-L1: Mild disc bulge slightly effaces the anterior subarachnoid  space without spinal canal narrowing. Mild left-sided neural  foraminal stenosis is present due to hypertrophic facet changes.      L1-L2: Circumferential disc bulge effaces the subarachnoid space  without spinal canal narrowing. Bulging disc material and  hypertrophic facet changes encroach in the neural foramina  bilaterally without significant stenosis.      L2-L3: Combination of the bulging disc, ligamentum flavum thickening  and hypertrophic facet changes cause mild spinal canal narrowing with  some effacement of the subarticular facets bilaterally. Mild  bilateral neural foraminal stenosis is present slightly  worse on the  right due to facet joint hypertrophy and bulging disc.      L3-L4: Bulging disc and ligamentum flavum thickening efface the  subarticular facets bilaterally, without spinal canal stenosis. Mild  bilateral neural foraminal narrowing is present due to hypertrophic  facet changes and bulging disc, slightly worse on the right.      L4-L5: Combination of spondylolisthesis, ligamentum flavum thickening  and bulging disc effaces the subarticular recesses bilaterally and  causes mild spinal canal narrowing. No significant neural foraminal  stenosis is present bilaterally.      L5-S1: Bulging disc material effaces the subarachnoid space without  significant stenosis. Mild bilateral neural foraminal narrowing is  present due to bulging disc and hypertrophic facet changes.      Prevertebral and paraspinal soft tissues do not demonstrate any acute  abnormality.      Some inflammatory changes are present surrounding the visualized left  ureter, as seen on same day CT.      Impression: 1.  No evidence of neoplastic or infectious process in the lumbar  spine. Multilevel degenerative changes of the lumbar spine with mild  spinal canal narrowing at several levels as detailed above.  2. Degenerate facet osteoarthropathy is present bilaterally, worst at  L3-L4 and L4-L5 on the right.      MACRO:  None      Signed by: Benjamin Rodriges 7/2/2024 10:02 PM  Dictation workstation:   [id number]  CT abdomen pelvis wo IV contrast  Narrative: STUDY:  CT Abdomen and Pelvis without IV Contrast; 7/2/2024 4:19 PM  INDICATION:  Recent stent, still a lot of pain.  COMPARISON:  CTA CAP 6/29/2024.  ACCESSION NUMBER(S):  [id number]  ORDERING CLINICIAN:  VAIBHAV CONNELL  TECHNIQUE:  CT of the abdomen and pelvis was performed.  Contiguous axial images  were obtained at 3 mm slice thickness through the abdomen and pelvis.   Coronal and sagittal reconstructions at 3 mm slice thickness were  performed. No intravenous contrast was  administered.    Automated mA/kV exposure control was utilized and patient examination  was performed in strict accordance with principles of ALARA.  FINDINGS:  Please note that the evaluation of vessels, lymph nodes and organs is  limited without intravenous contrast.      LOWER CHEST:  No cardiomegaly.  No pericardial effusion.  Lung bases are clear.     ABDOMEN:     LIVER:  No hepatomegaly.  Smooth surface contour.  Normal attenuation.     BILE DUCTS:  No intrahepatic or extrahepatic biliary ductal dilatation.     GALLBLADDER:  The gallbladder is unremarkable.  STOMACH:  No abnormalities identified.     PANCREAS:  No masses or ductal dilatation.     SPLEEN:  No splenomegaly or focal splenic lesion.     ADRENAL GLANDS:  No thickening or nodules.     KIDNEYS AND URETERS:  Kidneys are normal in size and location.  There is a nonobstructing 17  mm stone in the left kidney. A left ureteral stent is in place.     PELVIS:     BLADDER:  No abnormalities identified.     REPRODUCTIVE ORGANS:  No abnormalities identified.     BOWEL:  No abnormalities identified.     VESSELS:  No abnormalities identified.  Abdominal aorta is normal in caliber.      PERITONEUM/RETROPERITONEUM/LYMPH NODES:  No free fluid.  No pneumoperitoneum.  There are enlarged bilateral inguinal lymph nodes. The largest  measures 1.7 x 1.4 cm in the left inguinal region. There are also  prominent lymph nodes along the left periaortic region, left pelvic  sidewall and left iliac chain.     ABDOMINAL WALL:  No abnormalities identified.  SOFT TISSUES:   No abnormalities identified.     BONES:  No acute fracture or aggressive osseous lesion.  Impression: 1. There is a nonobstructing 17 mm stone in the left kidney. A left  ureteral stent is in place. There is no hydronephrosis.  2. There are enlarged bilateral inguinal lymph nodes. The largest  measures 1.7 x 1.4 cm in the left inguinal region. There are also  prominent lymph nodes along the left periaortic  region, left pelvic  sidewall, and left iliac chain. These may be reactive, but I cannot  exclude a neoplastic process.  Signed by Fernando Parham MD    Meds  Scheduled medications  atorvastatin, 80 mg, oral, Daily  enoxaparin, 40 mg, subcutaneous, q24h  insulin lispro, 0-10 Units, subcutaneous, After meals & nightly  [Held by provider] lisinopril, 20 mg, oral, Daily  mirtazapine, 15 mg, oral, Nightly  oxybutynin, 5 mg, oral, TID  PARoxetine, 10 mg, oral, Daily  phenazopyridine, 100 mg, oral, TID  tamsulosin, 0.4 mg, oral, Daily  vancomycin, 125 mg, oral, 4x daily      Continuous medications     PRN medications  PRN medications: acetaminophen **OR** acetaminophen, dextrose, dextrose, glucagon, glucagon, ondansetron, oxyCODONE, oxyCODONE     Problem List    Problem list:   Patient Active Problem List   Diagnosis   • Ankylosing spondylitis (Multi)   • Anxiety   • Aortic regurgitation   • Essential hypertension   • Depression   • Headache, chronic daily   • Insomnia   • Hypercholesterolemia   • Osteoarthritis   • Skin cancer   • Lumbar radiculopathy   • Cervical radiculopathy   • Splenic artery aneurysm (CMS-HCC)   • Controlled diabetes mellitus type II without complication (Multi)   • Benign paroxysmal positional vertigo of right ear   • Status post total left knee replacement   • Chronic diarrhea   • Overweight with body mass index (BMI) 25.0-29.9   • Personal history of other malignant neoplasm of skin   • Nephrolithiasis   • Left renal stone   • UTI (urinary tract infection), bacterial        Assessment and Plan    Sherri Najera, a 79-year-old female with past medical history of hypertension, hyperlipidemia, non-insulin-dependent diabetes mellitus type 2, depression, anxiety, left renal stone status post cystoscopy and insertion of ureteral stent on June 30 presented to the emergency department with complaints of urinary urgency, pain, and confusion (now resolved)    Acute Medical Issues:  # C. difficile  colitis  -Patient reported symptoms prior stent placement. She denies abdominal pain, N/V, or blood in the stools. She received abx recently  -C. difficile PCR positive  -Vancomycin 125 mg every 6 for 10 days  -Monitor for toxic megacolon    #Stent irritation  #left renal stone status post cystoscopy and insertion of ureteral stent on 6/30/24  -Patient presented with urinary urgency, acute pain, and confusion  -Recent stent placement for large left UPJ with a plan for possible lithotripsy outpatient  -CT abdomen and pelvis without IV contrast showed nonobstructing 17 mm stone in left kidney with ureteral stent in place and no hydronephrosis.  -Continue oxybutynin, tamsulosin, and phenazopyridine   -Urology consulted.  Reviewed the CT which showed stent in good position.  Symptoms likely due to stent irritation.          #Leukopenia   #Enlarged lymph nodes   -CT showed enlarged bilateral inguinal lymph nodes with the largest measures 1.7 x 1.4 cm in the left inguinal region with prominent lymph nodes along the left periaortic region left pelvic sidewall and left iliac chain which could be reactive but cannot exclude neoplastic process.  -WBC 1.9 on admission now at 2.9  -Recommend hematology oncology referral        Chronic Medical Issues:   #HTN: holding home lisinopril, reassess in the am   #HLD: continue home statin   #Depression/Anxiety: continue home mirtazapine and paroxetine   #NIDDM2: holding home metformin and pioglitazone; Added SSI and hypoglycemia protocol        Fluids: PO  Electrolytes: replete as needed  Nutrition: diabetic   GI Prophylaxis: none  DVT Prophylaxis: subcutaneous lovenox     Code Status: Full Code   Emergency Contact: Extended Emergency Contact Information  Primary Emergency Contact: Moose Najera  Home Phone: [phone]  Work Phone: [phone]  Relation: Spouse  Secondary Emergency Contact: Jersey Glover  Home Phone: [phone]  Relation: Sibling     Disposition: 79 y.o.female  admitted for []. Estimated length of stay [greater than/ less than] 48 hrs.     Paul Mancini MD   PGY-3  Epic chat

## 2024-07-05 ENCOUNTER — APPOINTMENT (OUTPATIENT)
Dept: PRIMARY CARE | Facility: CLINIC | Age: 79
End: 2024-07-05
Payer: MEDICARE

## 2024-07-05 ENCOUNTER — APPOINTMENT (OUTPATIENT)
Dept: RADIOLOGY | Facility: HOSPITAL | Age: 79
DRG: 659 | End: 2024-07-05
Payer: MEDICARE

## 2024-07-05 LAB
ALBUMIN SERPL BCP-MCNC: 3.3 G/DL (ref 3.4–5)
ALP SERPL-CCNC: 64 U/L (ref 33–136)
ALT SERPL W P-5'-P-CCNC: 21 U/L (ref 7–45)
ANION GAP SERPL CALC-SCNC: 12 MMOL/L (ref 10–20)
AST SERPL W P-5'-P-CCNC: 17 U/L (ref 9–39)
BILIRUB SERPL-MCNC: 0.5 MG/DL (ref 0–1.2)
BUN SERPL-MCNC: 16 MG/DL (ref 6–23)
C COLI+JEJ+UPSA DNA STL QL NAA+PROBE: NOT DETECTED
CALCIUM SERPL-MCNC: 8.3 MG/DL (ref 8.6–10.3)
CHLORIDE SERPL-SCNC: 106 MMOL/L (ref 98–107)
CO2 SERPL-SCNC: 25 MMOL/L (ref 21–32)
CREAT SERPL-MCNC: 0.68 MG/DL (ref 0.5–1.05)
EC STX1 GENE STL QL NAA+PROBE: NOT DETECTED
EC STX2 GENE STL QL NAA+PROBE: NOT DETECTED
EGFRCR SERPLBLD CKD-EPI 2021: 89 ML/MIN/1.73M*2
ERYTHROCYTE [DISTWIDTH] IN BLOOD BY AUTOMATED COUNT: 14.7 % (ref 11.5–14.5)
GLUCOSE BLD MANUAL STRIP-MCNC: 125 MG/DL (ref 74–99)
GLUCOSE BLD MANUAL STRIP-MCNC: 136 MG/DL (ref 74–99)
GLUCOSE BLD MANUAL STRIP-MCNC: 140 MG/DL (ref 74–99)
GLUCOSE BLD MANUAL STRIP-MCNC: 160 MG/DL (ref 74–99)
GLUCOSE SERPL-MCNC: 116 MG/DL (ref 74–99)
HCT VFR BLD AUTO: 28.6 % (ref 36–46)
HGB BLD-MCNC: 9.8 G/DL (ref 12–16)
MCH RBC QN AUTO: 33.6 PG (ref 26–34)
MCHC RBC AUTO-ENTMCNC: 34.3 G/DL (ref 32–36)
MCV RBC AUTO: 98 FL (ref 80–100)
NOROVIRUS GI + GII RNA STL NAA+PROBE: NOT DETECTED
NRBC BLD-RTO: 0 /100 WBCS (ref 0–0)
PLATELET # BLD AUTO: 175 X10*3/UL (ref 150–450)
POTASSIUM SERPL-SCNC: 3.3 MMOL/L (ref 3.5–5.3)
PROT SERPL-MCNC: 5.5 G/DL (ref 6.4–8.2)
RBC # BLD AUTO: 2.92 X10*6/UL (ref 4–5.2)
RV RNA STL NAA+PROBE: NOT DETECTED
SALMONELLA DNA STL QL NAA+PROBE: NOT DETECTED
SHIGELLA DNA SPEC QL NAA+PROBE: NOT DETECTED
SODIUM SERPL-SCNC: 140 MMOL/L (ref 136–145)
V CHOLERAE DNA STL QL NAA+PROBE: NOT DETECTED
WBC # BLD AUTO: 2.9 X10*3/UL (ref 4.4–11.3)
Y ENTEROCOL DNA STL QL NAA+PROBE: NOT DETECTED

## 2024-07-05 PROCEDURE — 2500000001 HC RX 250 WO HCPCS SELF ADMINISTERED DRUGS (ALT 637 FOR MEDICARE OP)

## 2024-07-05 PROCEDURE — 85027 COMPLETE CBC AUTOMATED: CPT | Performed by: FAMILY MEDICINE

## 2024-07-05 PROCEDURE — 71046 X-RAY EXAM CHEST 2 VIEWS: CPT | Performed by: RADIOLOGY

## 2024-07-05 PROCEDURE — 97165 OT EVAL LOW COMPLEX 30 MIN: CPT | Mod: GO

## 2024-07-05 PROCEDURE — 80053 COMPREHEN METABOLIC PANEL: CPT | Performed by: FAMILY MEDICINE

## 2024-07-05 PROCEDURE — 1100000001 HC PRIVATE ROOM DAILY

## 2024-07-05 PROCEDURE — 99232 SBSQ HOSP IP/OBS MODERATE 35: CPT | Performed by: FAMILY MEDICINE

## 2024-07-05 PROCEDURE — 2500000001 HC RX 250 WO HCPCS SELF ADMINISTERED DRUGS (ALT 637 FOR MEDICARE OP): Performed by: FAMILY MEDICINE

## 2024-07-05 PROCEDURE — 71046 X-RAY EXAM CHEST 2 VIEWS: CPT

## 2024-07-05 PROCEDURE — 82947 ASSAY GLUCOSE BLOOD QUANT: CPT

## 2024-07-05 PROCEDURE — 2500000002 HC RX 250 W HCPCS SELF ADMINISTERED DRUGS (ALT 637 FOR MEDICARE OP, ALT 636 FOR OP/ED)

## 2024-07-05 PROCEDURE — 97161 PT EVAL LOW COMPLEX 20 MIN: CPT | Mod: GP | Performed by: PHYSICAL THERAPIST

## 2024-07-05 PROCEDURE — 97116 GAIT TRAINING THERAPY: CPT | Mod: GP | Performed by: PHYSICAL THERAPIST

## 2024-07-05 PROCEDURE — 36415 COLL VENOUS BLD VENIPUNCTURE: CPT | Performed by: FAMILY MEDICINE

## 2024-07-05 PROCEDURE — 2500000004 HC RX 250 GENERAL PHARMACY W/ HCPCS (ALT 636 FOR OP/ED)

## 2024-07-05 RX ORDER — VANCOMYCIN HYDROCHLORIDE 125 MG/1
125 CAPSULE ORAL 4 TIMES DAILY
Qty: 40 CAPSULE | Refills: 0 | Status: SHIPPED | OUTPATIENT
Start: 2024-07-05 | End: 2024-07-15

## 2024-07-05 RX ORDER — TALC
6 POWDER (GRAM) TOPICAL NIGHTLY
Status: DISCONTINUED | OUTPATIENT
Start: 2024-07-05 | End: 2024-07-06 | Stop reason: HOSPADM

## 2024-07-05 RX ORDER — POTASSIUM CHLORIDE 20 MEQ/1
40 TABLET, EXTENDED RELEASE ORAL ONCE
Status: COMPLETED | OUTPATIENT
Start: 2024-07-05 | End: 2024-07-05

## 2024-07-05 ASSESSMENT — COGNITIVE AND FUNCTIONAL STATUS - GENERAL
CLIMB 3 TO 5 STEPS WITH RAILING: A LITTLE
MOBILITY SCORE: 22
DRESSING REGULAR LOWER BODY CLOTHING: A LITTLE
WALKING IN HOSPITAL ROOM: A LITTLE
DAILY ACTIVITIY SCORE: 21
WALKING IN HOSPITAL ROOM: A LITTLE
CLIMB 3 TO 5 STEPS WITH RAILING: A LITTLE
DAILY ACTIVITIY SCORE: 24
MOBILITY SCORE: 22
TOILETING: A LITTLE
WALKING IN HOSPITAL ROOM: A LITTLE
CLIMB 3 TO 5 STEPS WITH RAILING: A LITTLE
MOBILITY SCORE: 22
HELP NEEDED FOR BATHING: A LITTLE
DAILY ACTIVITIY SCORE: 24

## 2024-07-05 ASSESSMENT — ACTIVITIES OF DAILY LIVING (ADL)
ADL_ASSISTANCE: INDEPENDENT
ADL_ASSISTANCE: INDEPENDENT
BATHING_ASSISTANCE: STAND BY

## 2024-07-05 ASSESSMENT — PAIN SCALES - WONG BAKER: WONGBAKER_NUMERICALRESPONSE: NO HURT

## 2024-07-05 ASSESSMENT — PAIN SCALES - GENERAL
PAINLEVEL_OUTOF10: 0 - NO PAIN

## 2024-07-05 ASSESSMENT — PAIN - FUNCTIONAL ASSESSMENT
PAIN_FUNCTIONAL_ASSESSMENT: WONG-BAKER FACES
PAIN_FUNCTIONAL_ASSESSMENT: 0-10
PAIN_FUNCTIONAL_ASSESSMENT: WONG-BAKER FACES

## 2024-07-05 NOTE — PROGRESS NOTES
"Millicent Najera is a 79 y.o. female on day 2 of admission presenting with UTI (urinary tract infection), bacterial.    Subjective   Some urgency, no flank pain.       Objective     Physical Exam  Alert, oriented  Abdomen soft, non tender    Last Recorded Vitals  Blood pressure 134/71, pulse 84, temperature 36.5 °C (97.7 °F), temperature source Temporal, resp. rate 17, height 1.6 m (5' 3\"), weight 68.2 kg (150 lb 6.4 oz), SpO2 90%.  Intake/Output last 3 Shifts:  I/O last 3 completed shifts:  In: 1070 (15.7 mL/kg) [P.O.:1000; IV Piggyback:70]  Out: 1220 (17.9 mL/kg) [Urine:1195 (0.5 mL/kg/hr); Stool:25]  Weight: 68.2 kg     Relevant Results  Scheduled medications  atorvastatin, 80 mg, oral, Daily  enoxaparin, 40 mg, subcutaneous, q24h  insulin lispro, 0-10 Units, subcutaneous, After meals & nightly  [Held by provider] lisinopril, 20 mg, oral, Daily  mirtazapine, 15 mg, oral, Nightly  oxybutynin, 5 mg, oral, TID  PARoxetine, 10 mg, oral, Daily  phenazopyridine, 100 mg, oral, TID  tamsulosin, 0.4 mg, oral, Daily  vancomycin, 125 mg, oral, 4x daily      Continuous medications     PRN medications  PRN medications: acetaminophen **OR** acetaminophen, dextrose, dextrose, glucagon, glucagon, hydrOXYzine HCL, ondansetron, oxyCODONE, oxyCODONE    Results for orders placed or performed during the hospital encounter of 07/02/24 (from the past 24 hour(s))   CBC   Result Value Ref Range    WBC 2.9 (L) 4.4 - 11.3 x10*3/uL    nRBC 0.0 0.0 - 0.0 /100 WBCs    RBC 3.32 (L) 4.00 - 5.20 x10*6/uL    Hemoglobin 9.8 (L) 12.0 - 16.0 g/dL    Hematocrit 29.9 (L) 36.0 - 46.0 %    MCV 90 80 - 100 fL    MCH 29.5 26.0 - 34.0 pg    MCHC 32.8 32.0 - 36.0 g/dL    RDW 13.6 11.5 - 14.5 %    Platelets 158 150 - 450 x10*3/uL   Comprehensive Metabolic Panel   Result Value Ref Range    Glucose 113 (H) 74 - 99 mg/dL    Sodium 141 136 - 145 mmol/L    Potassium 3.4 (L) 3.5 - 5.3 mmol/L    Chloride 105 98 - 107 mmol/L    Bicarbonate 25 21 - 32 mmol/L    Anion " Gap 14 10 - 20 mmol/L    Urea Nitrogen 20 6 - 23 mg/dL    Creatinine 0.80 0.50 - 1.05 mg/dL    eGFR 75 >60 mL/min/1.73m*2    Calcium 8.7 8.6 - 10.3 mg/dL    Albumin 3.5 3.4 - 5.0 g/dL    Alkaline Phosphatase 66 33 - 136 U/L    Total Protein 5.9 (L) 6.4 - 8.2 g/dL    AST 22 9 - 39 U/L    Bilirubin, Total 0.3 0.0 - 1.2 mg/dL    ALT 25 7 - 45 U/L   POCT GLUCOSE   Result Value Ref Range    POCT Glucose 131 (H) 74 - 99 mg/dL   POCT GLUCOSE   Result Value Ref Range    POCT Glucose 138 (H) 74 - 99 mg/dL   POCT GLUCOSE   Result Value Ref Range    POCT Glucose 119 (H) 74 - 99 mg/dL       MR lumbar spine w and wo IV contrast    Result Date: 7/2/2024  Interpreted By:  Benjamin Rodriges, STUDY: MR LUMBAR SPINE W AND WO IV CONTRAST;  7/2/2024 9:24 pm   INDICATION: Signs/Symptoms:urinary and fecal incontinece, low back pain, concern for neoplastic process on CT.   COMPARISON: CT of the lumbar spine dated 06/29/2024; CT of the abdomen and pelvis dated 07/02/2024.   ACCESSION NUMBER(S): [id number]   ORDERING CLINICIAN: VAIBHAV CONNELL   TECHNIQUE: Sagittal T1, T2, STIR, axial T1 and T2 weighted images of the lumbar spine were acquired. Additional axial and sagittal postcontrast T1 weighted images of the lumbar spine were obtained after intravenous administration of 13 mL of Dotarem gadolinium contrast.   FINDINGS: There are 5 lumbar type non rib-bearing vertebral bodies, with the lowest well-formed intervertebral disc space labeled L5-S1.   A subtle 1-2 mm retrolisthesis is present at L2-L3, with a 7-8 mm anterolisthesis present at L4-L5. 1-2 mm anterolisthesis is present at L5-S1.   No compression fractures are identified in the lumbar spine. Slight STIR hyperintense edema is present along the inferior endplate of L3 and superior endplate of L4. Small amount of T2 hyperintense signal is present within the Schmorl's node along the inferior endplate of L5. Otherwise fatty endplate changes are present at L5-S1.   No T1  hypointense, marrow replacing intramedullary process is identified in the lumbar spine.   Multilevel degenerate facet osteoarthropathy is present in the lumbar spine, most pronounced at the level of L3-L4 and L4-L5 on the right, with some STIR hyperintense fluid present within the facet joints. Mild STIR hyperintense edema is present in the adjacent soft tissues.   Multilevel intervertebral disc desiccation is present, with moderate to severe intervertebral disc height loss present at L5-S1. Moderate disc height loss is present at L3-L4.   Visualized lower thoracic spinal cord is unremarkable in appearance. Conus medullaris terminates at the level of L1. Although exam is somewhat degraded by motion, no leptomeningeal enhancement is identified.   T12-L1: Mild disc bulge slightly effaces the anterior subarachnoid space without spinal canal narrowing. Mild left-sided neural foraminal stenosis is present due to hypertrophic facet changes.   L1-L2: Circumferential disc bulge effaces the subarachnoid space without spinal canal narrowing. Bulging disc material and hypertrophic facet changes encroach in the neural foramina bilaterally without significant stenosis.   L2-L3: Combination of the bulging disc, ligamentum flavum thickening and hypertrophic facet changes cause mild spinal canal narrowing with some effacement of the subarticular facets bilaterally. Mild bilateral neural foraminal stenosis is present slightly worse on the right due to facet joint hypertrophy and bulging disc.   L3-L4: Bulging disc and ligamentum flavum thickening efface the subarticular facets bilaterally, without spinal canal stenosis. Mild bilateral neural foraminal narrowing is present due to hypertrophic facet changes and bulging disc, slightly worse on the right.   L4-L5: Combination of spondylolisthesis, ligamentum flavum thickening and bulging disc effaces the subarticular recesses bilaterally and causes mild spinal canal narrowing. No  significant neural foraminal stenosis is present bilaterally.   L5-S1: Bulging disc material effaces the subarachnoid space without significant stenosis. Mild bilateral neural foraminal narrowing is present due to bulging disc and hypertrophic facet changes.   Prevertebral and paraspinal soft tissues do not demonstrate any acute abnormality.   Some inflammatory changes are present surrounding the visualized left ureter, as seen on same day CT.       1.  No evidence of neoplastic or infectious process in the lumbar spine. Multilevel degenerative changes of the lumbar spine with mild spinal canal narrowing at several levels as detailed above. 2. Degenerate facet osteoarthropathy is present bilaterally, worst at L3-L4 and L4-L5 on the right.   MACRO: None   Signed by: Benjamin Rodriges 7/2/2024 10:02 PM Dictation workstation:   [id number]    CT abdomen pelvis wo IV contrast    Result Date: 7/2/2024  STUDY: CT Abdomen and Pelvis without IV Contrast; 7/2/2024 4:19 PM INDICATION: Recent stent, still a lot of pain. COMPARISON: CTA CAP 6/29/2024. ACCESSION NUMBER(S): [id number] ORDERING CLINICIAN: VAIBHAV CONNELL TECHNIQUE: CT of the abdomen and pelvis was performed.  Contiguous axial images were obtained at 3 mm slice thickness through the abdomen and pelvis. Coronal and sagittal reconstructions at 3 mm slice thickness were performed. No intravenous contrast was administered.  Automated mA/kV exposure control was utilized and patient examination was performed in strict accordance with principles of ALARA. FINDINGS: Please note that the evaluation of vessels, lymph nodes and organs is limited without intravenous contrast.  LOWER CHEST: No cardiomegaly.  No pericardial effusion.  Lung bases are clear.  ABDOMEN:  LIVER: No hepatomegaly.  Smooth surface contour.  Normal attenuation.  BILE DUCTS: No intrahepatic or extrahepatic biliary ductal dilatation.  GALLBLADDER: The gallbladder is unremarkable. STOMACH: No  abnormalities identified.  PANCREAS: No masses or ductal dilatation.  SPLEEN: No splenomegaly or focal splenic lesion.  ADRENAL GLANDS: No thickening or nodules.  KIDNEYS AND URETERS: Kidneys are normal in size and location.  There is a nonobstructing 17 mm stone in the left kidney. A left ureteral stent is in place.  PELVIS:  BLADDER: No abnormalities identified.  REPRODUCTIVE ORGANS: No abnormalities identified.  BOWEL: No abnormalities identified.  VESSELS: No abnormalities identified.  Abdominal aorta is normal in caliber.  PERITONEUM/RETROPERITONEUM/LYMPH NODES: No free fluid.  No pneumoperitoneum. There are enlarged bilateral inguinal lymph nodes. The largest measures 1.7 x 1.4 cm in the left inguinal region. There are also prominent lymph nodes along the left periaortic region, left pelvic sidewall and left iliac chain.  ABDOMINAL WALL: No abnormalities identified. SOFT TISSUES: No abnormalities identified.  BONES: No acute fracture or aggressive osseous lesion.    1. There is a nonobstructing 17 mm stone in the left kidney. A left ureteral stent is in place. There is no hydronephrosis. 2. There are enlarged bilateral inguinal lymph nodes. The largest measures 1.7 x 1.4 cm in the left inguinal region. There are also prominent lymph nodes along the left periaortic region, left pelvic sidewall, and left iliac chain. These may be reactive, but I cannot exclude a neoplastic process. Signed by Fernando Parham MD    ECG 12 lead    Result Date: 7/1/2024  Normal sinus rhythm with sinus arrhythmia Left axis deviation Low voltage QRS Possible Anterolateral infarct (cited on or before 09-FEB-2024) Abnormal ECG When compared with ECG of 09-FEB-2024 10:24, No significant change was found    FL pyelogram retrograde    Result Date: 7/1/2024  Interpreted By:  Fredrick Tejeda, STUDY: FL PYELOGRAM RETROGRADE;  6/30/2024 9:30 am   INDICATION: Signs/Symptoms:retrograde.   COMPARISON: CT scan from 06/29/2024   ACCESSION NUMBER(S):  [id number]   ORDERING CLINICIAN: HIRA PARHAM   TECHNIQUE: Fluoroscopic support was provided to urology to perform this left-sided retrograde pyelogram study.    3 spot fluoroscopic images were submitted for interpretation on  07/01/2024. Recommend correlation to real time fluoroscopy and to final operative report.   FINDINGS: There is opacification of the left renal collecting system. Filling defect within the proximal segment of the left ureter probably air bubble. There is mild-to-moderate left-sided hydronephrosis. Last images demonstrate proximal segment of the double-J ureteral stent in place.       1. Fluoroscopic support was provided to perform this  left-sided retrograde pyelogram. 2. Findings as described.  Recommend correlation to final real time fluoroscopic and operative report.     MACRO: None   Signed by: Fredrick Tejeda 7/1/2024 7:30 AM Dictation workstation:   [id number]    CT lumbar spine wo IV contrast    Result Date: 6/29/2024  STUDY: CT Lumbar Spine without IV Contrast; 6/29/24 at 9:56 AM INDICATION: Back pain. COMPARISON: None available. ACCESSION NUMBER(S): [id number] ORDERING CLINICIAN: VAIBHAV CONNELL TECHNIQUE:  CT of the lumbar spine was performed without intravenous or intrathecal contrast.  Sagittal and coronal reconstructions were generated.  Automated mA/kV exposure control was utilized and patient examination was performed in strict accordance with principles of ALARA. FINDINGS: There is a mild grade 1 spondylolisthesis at L4-5. The spine is in otherwise good alignment.  There is no fracture or traumatic subluxation. The vertebral body heights are well maintained.  There is disc space narrowing at L5-S1.  T12-L1: No central canal or neuroforaminal stenosis. L1-2: No central canal or neuroforaminal stenosis. L2-3: No central canal or neuroforaminal stenosis. L3-4: No central canal or neuroforaminal stenosis. L4-5: There is the aforementioned grade 1 spondylolisthesis as well as  bilateral facet hypertrophy. This causes mild to moderate central canal stenosis, but no neuroforaminal narrowing. L5-S1: There is bilateral facet hypertrophy. No central canal or foraminal stenosis. The paravertebral soft tissues are within normal limits. There is a large stone in the left renal pelvis measuring approximately 1.7 x 1.3 cm. There is mild left hydronephrosis and hydroureter.    1. At L4-5, there is a mild grade 1 spondylolisthesis as well as bilateral facet hypertrophy. This causes mild to moderate central canal stenosis, but no neuroforaminal narrowing. 2. At L5-S1, there is bilateral facet hypertrophy, but no central canal or foraminal stenosis. 3. There is a large stone in the left renal pelvis with mild left hydronephrosis and hydroureter. It is unclear whether or not this stone is causing obstruction. Recommend full CT of the abdomen and pelvis for further evaluation. Signed by Fernando Parham MD    CT thoracic spine wo IV contrast    Result Date: 6/29/2024  STUDY: CT Thoracic Spine without IV Contrast; 6/29/24 at 9:55 AM INDICATION: Back pain. COMPARISON: None available. ACCESSION NUMBER(S): [id number] ORDERING CLINICIAN: VAIBHAV CONNELL TECHNIQUE:  CT of the thoracic spine was performed without intravenous or intrathecal contrast.  Sagittal and coronal reconstructions were generated. Automated mA/kV exposure control was utilized and patient examination was performed in strict accordance with principles of ALARA. FINDINGS: The alignment is anatomic.  There is no fracture or traumatic subluxation. The vertebral body heights are well maintained.  Disc spaces are preserved.  No significant central canal stenosis is demonstrated. The neural foramina are patent throughout. There is mild degenerative change. The paravertebral soft tissues are within normal limits.  The visualized chest and abdomen are unremarkable.    Mild degenerative change. Signed by Fernando Parham MD    CT angio chest abdomen  pelvis    Result Date: 6/29/2024  Interpreted By:  Hermann Mathews, STUDY: CT ANGIO CHEST ABDOMEN PELVIS;  6/29/2024 9:55 am   INDICATION: Signs/Symptoms:r/o dissection. Worsening back pain.   COMPARISON: Renal colic CT of 01/07/2011.   ACCESSION NUMBER(S): [id number]   ORDERING CLINICIAN: VAIBHAV CONNELL   TECHNIQUE: Unenhanced axial images were obtained through the chest, abdomen and pelvis. Subsequently following intravenous administration of  75 mL Omnipaque 350, CTA of the chest, abdomen and pelvis was performed. Coronal and sagittal reformations were made from the axial data. Additional 3D volume rendered reformations were made on separate workstation.   FINDINGS: CHEST:   VESSELS: Unenhanced images through the chest show no evidence of aortic intramural hematoma.  Irregular atherosclerotic calcifications are present in the aorta and branch vessels.   There is no evidence of aortic aneurysm or dissection throughout the chest and abdomen.   There is a splenic artery aneurysm containing peripheral calcification located near the splenic hilum measuring 2 cm in diameter similar to prior.   No central pulmonary embolism is seen.   HEART: The heart is not significantly enlarged.  No pericardial effusion.   MEDIASTINUM AND JAMI: Multi station mediastinal small nonspecific lymph nodes are not individually pathologically enlarged. No significantly enlarged hilar lymph nodes are seen.   LUNG/PLEURA/LARGE AIRWAYS: Mild multifocal predominantly dependent and basilar atelectasis is seen bilaterally. Small calcified granulomas are seen in the right upper lobe and right middle lobe.   No pleural effusion or pneumothorax is seen bilaterally.   CHEST WALL AND LOWER NECK: Nonspecific mildly enlarged bilateral axillary lymph nodes are present.   ABDOMEN:   LIVER: Within normal limits.   BILE DUCTS: Nondilated.   GALLBLADDER: The gallbladder is not distended and without calcified stones.   PANCREAS: There is nonspecific mild  dilation of the main pancreatic duct measuring up to 4 mm in diameter in the ventral segment of the pancreatic head. No discrete pancreatic lesion or peripancreatic inflammation is identified.   SPLEEN: Within normal limits.   ADRENAL GLANDS: Within normal limits.   KIDNEYS AND URETERS: There is a left central renal large calculus measuring approximately 1.7 x 1.3 cm in axial dimension. There is moderate left hydronephrosis and mild dilation of the left proximal ureteral segment with left central renal and proximal periureteral fat stranding. While this large calculus is not clearly an obstructing position at the current time, it may be causing intermittent obstruction based upon the additional findings. Left renal varying sized cysts are present with the largest posterior interpolar partially exophytic cyst measuring 5 cm in diameter.   No right renal or ureteral calculi are seen. Several subcentimeter right renal cortical lesions also likely represent small cysts. No right hydroureteronephrosis.   Urinary bladder is minimally distended.   BOWEL: No bowel obstruction. Appendix is not clearly identified although no regional inflammation is seen. There is mild submucosal fat prominence of the cecum and ascending colon which may be related to chronic inflammation. Few scattered small colonic diverticula are present without acute diverticulitis.   PERITONEUM/RETROPERITONEUM/LYMPH NODES: No ascites or free air, no fluid collection.   No retroperitoneal fluid collection or lymphadenopathy.       ABDOMINAL WALL: Small lobulated fat containing periumbilical hernia is seen without inflammation.   BONE AND SOFT TISSUE: Bilateral shoulder arthroplasty hardware is present, streak artifact from which obscures some regional visualization. Multilevel disc space narrowing and predominantly anterior endplate spurring is present throughout the thoracic and lumbar spine. Facet arthrosis is greatest in the mid-lower lumbar spine.  Small vacuum discs are seen at L3-4 and L5-S1. A mild anterolisthesis of L4 relative to L5 more likely is chronic/degenerative.       No evidence of aortic dissection or aneurysm.   Left central renal 1.7 x 1.3 cm calculus. While this calculus is not currently in an obstructing position, it may be causing intermittent obstruction given the presence of moderate left hydronephrosis, mild left proximal ureteral dilation as well as left central renal and proximal periureteral fat stranding. Component of infection not excluded.   Mild dependent and basilar atelectasis bilaterally.   Nonspecific mildly enlarged bilateral axillary lymph nodes.   Approximate 2 cm splenic artery aneurysm with peripheral calcification near the splenic hilum similar to prior.       MACRO: None.   Signed by: Hermann Mathews 6/29/2024 10:17 AM Dictation workstation:   [id number]                             Assessment/Plan   Principal Problem:    UTI (urinary tract infection), bacterial  Active Problems:    Left renal stone    79 year year old is admitted with pain and incontinence.  She has a 17 mm proximal left ureteral stone, s/p recent stent placement.   CT reviewed and her stent is in good position.  She likely had stent irritation, and her pain has improved.  She does not have urinary retention.  Culture without growth.  She will follow up with Dr. Leal for ureteroscopy and stent removal.                Shlomo Dudley MD

## 2024-07-05 NOTE — PROGRESS NOTES
07/05/24 0900   Discharge Planning   Living Arrangements Spouse/significant other   Support Systems Spouse/significant other   Assistance Needed Patient is A&O X3, on room air at baseline, is independent with ADLs and uses a walker as needed for ambulation.   Type of Residence Private residence   Number of Stairs to Enter Residence 3   Number of Stairs Within Residence 14   Do you have animals or pets at home? No   Who is requesting discharge planning? Provider   Home or Post Acute Services None   Patient expects to be discharged to: Home no needs   Does the patient need discharge transport arranged? No

## 2024-07-05 NOTE — PROGRESS NOTES
"Millicent Najera is a 79 y.o. female on day 3 of admission presenting with UTI (urinary tract infection), bacterial.    Subjective   No flank pain today,       Objective     Physical Exam  Alert, oriented  Fabi;l resp effort  Abdomen soft nontender    Last Recorded Vitals  Blood pressure 109/64, pulse 88, temperature 37.1 °C (98.8 °F), temperature source Temporal, resp. rate 18, height 1.6 m (5' 3\"), weight 68.2 kg (150 lb 6.4 oz), SpO2 97%.  Intake/Output last 3 Shifts:  I/O last 3 completed shifts:  In: 440 (6.4 mL/kg) [P.O.:440]  Out: 680 (10 mL/kg) [Urine:680 (0.3 mL/kg/hr)]  Weight: 68.2 kg     Relevant Results  Scheduled medications  atorvastatin, 80 mg, oral, Daily  enoxaparin, 40 mg, subcutaneous, q24h  insulin lispro, 0-10 Units, subcutaneous, After meals & nightly  [Held by provider] lisinopril, 20 mg, oral, Daily  melatonin, 6 mg, oral, Nightly  mirtazapine, 15 mg, oral, Nightly  oxybutynin, 5 mg, oral, TID  PARoxetine, 10 mg, oral, Daily  tamsulosin, 0.4 mg, oral, Daily  vancomycin, 125 mg, oral, 4x daily      Continuous medications     PRN medications  PRN medications: acetaminophen **OR** acetaminophen, dextrose, dextrose, glucagon, glucagon, hydrOXYzine HCL, ondansetron, oxyCODONE, oxyCODONE    Results for orders placed or performed during the hospital encounter of 07/02/24 (from the past 24 hour(s))   POCT GLUCOSE   Result Value Ref Range    POCT Glucose 136 (H) 74 - 99 mg/dL   CBC   Result Value Ref Range    WBC 2.9 (L) 4.4 - 11.3 x10*3/uL    nRBC 0.0 0.0 - 0.0 /100 WBCs    RBC 2.92 (L) 4.00 - 5.20 x10*6/uL    Hemoglobin 9.8 (L) 12.0 - 16.0 g/dL    Hematocrit 28.6 (L) 36.0 - 46.0 %    MCV 98 80 - 100 fL    MCH 33.6 26.0 - 34.0 pg    MCHC 34.3 32.0 - 36.0 g/dL    RDW 14.7 (H) 11.5 - 14.5 %    Platelets 175 150 - 450 x10*3/uL   Comprehensive Metabolic Panel   Result Value Ref Range    Glucose 116 (H) 74 - 99 mg/dL    Sodium 140 136 - 145 mmol/L    Potassium 3.3 (L) 3.5 - 5.3 mmol/L    Chloride 106 98 " - 107 mmol/L    Bicarbonate 25 21 - 32 mmol/L    Anion Gap 12 10 - 20 mmol/L    Urea Nitrogen 16 6 - 23 mg/dL    Creatinine 0.68 0.50 - 1.05 mg/dL    eGFR 89 >60 mL/min/1.73m*2    Calcium 8.3 (L) 8.6 - 10.3 mg/dL    Albumin 3.3 (L) 3.4 - 5.0 g/dL    Alkaline Phosphatase 64 33 - 136 U/L    Total Protein 5.5 (L) 6.4 - 8.2 g/dL    AST 17 9 - 39 U/L    Bilirubin, Total 0.5 0.0 - 1.2 mg/dL    ALT 21 7 - 45 U/L   POCT GLUCOSE   Result Value Ref Range    POCT Glucose 125 (H) 74 - 99 mg/dL   POCT GLUCOSE   Result Value Ref Range    POCT Glucose 136 (H) 74 - 99 mg/dL   POCT GLUCOSE   Result Value Ref Range    POCT Glucose 160 (H) 74 - 99 mg/dL       XR chest 2 views    Result Date: 7/5/2024  Interpreted By:  Misty Collins, STUDY: XR CHEST 2 VIEWS;  7/5/2024 12:22 pm   INDICATION: Signs/Symptoms:hypoxia overnight.   COMPARISON: 04/15/2023   ACCESSION NUMBER(S): [id number]   ORDERING CLINICIAN: RONAK SAMANIEGO   FINDINGS: Heart is normal size.   There is no consolidation or pleural fluid.   Mediastinum is unremarkable.   There are degenerative changes of the spine. The patient is status post bilateral shoulder replacements.   COMPARISON OF FINDING: Left-sided infiltrate present previously has resolved.       No acute cardiopulmonary disease.   MACRO: none   Signed by: Misty Collins 7/5/2024 12:51 PM Dictation workstation:   [id number]    MR lumbar spine w and wo IV contrast    Result Date: 7/2/2024  Interpreted By:  Benjamin Rodriges, STUDY: MR LUMBAR SPINE W AND WO IV CONTRAST;  7/2/2024 9:24 pm   INDICATION: Signs/Symptoms:urinary and fecal incontinece, low back pain, concern for neoplastic process on CT.   COMPARISON: CT of the lumbar spine dated 06/29/2024; CT of the abdomen and pelvis dated 07/02/2024.   ACCESSION NUMBER(S): [id number]   ORDERING CLINICIAN: VAIBHAV CONNELL   TECHNIQUE: Sagittal T1, T2, STIR, axial T1 and T2 weighted images of the lumbar spine were acquired. Additional axial and sagittal  postcontrast T1 weighted images of the lumbar spine were obtained after intravenous administration of 13 mL of Dotarem gadolinium contrast.   FINDINGS: There are 5 lumbar type non rib-bearing vertebral bodies, with the lowest well-formed intervertebral disc space labeled L5-S1.   A subtle 1-2 mm retrolisthesis is present at L2-L3, with a 7-8 mm anterolisthesis present at L4-L5. 1-2 mm anterolisthesis is present at L5-S1.   No compression fractures are identified in the lumbar spine. Slight STIR hyperintense edema is present along the inferior endplate of L3 and superior endplate of L4. Small amount of T2 hyperintense signal is present within the Schmorl's node along the inferior endplate of L5. Otherwise fatty endplate changes are present at L5-S1.   No T1 hypointense, marrow replacing intramedullary process is identified in the lumbar spine.   Multilevel degenerate facet osteoarthropathy is present in the lumbar spine, most pronounced at the level of L3-L4 and L4-L5 on the right, with some STIR hyperintense fluid present within the facet joints. Mild STIR hyperintense edema is present in the adjacent soft tissues.   Multilevel intervertebral disc desiccation is present, with moderate to severe intervertebral disc height loss present at L5-S1. Moderate disc height loss is present at L3-L4.   Visualized lower thoracic spinal cord is unremarkable in appearance. Conus medullaris terminates at the level of L1. Although exam is somewhat degraded by motion, no leptomeningeal enhancement is identified.   T12-L1: Mild disc bulge slightly effaces the anterior subarachnoid space without spinal canal narrowing. Mild left-sided neural foraminal stenosis is present due to hypertrophic facet changes.   L1-L2: Circumferential disc bulge effaces the subarachnoid space without spinal canal narrowing. Bulging disc material and hypertrophic facet changes encroach in the neural foramina bilaterally without significant stenosis.    L2-L3: Combination of the bulging disc, ligamentum flavum thickening and hypertrophic facet changes cause mild spinal canal narrowing with some effacement of the subarticular facets bilaterally. Mild bilateral neural foraminal stenosis is present slightly worse on the right due to facet joint hypertrophy and bulging disc.   L3-L4: Bulging disc and ligamentum flavum thickening efface the subarticular facets bilaterally, without spinal canal stenosis. Mild bilateral neural foraminal narrowing is present due to hypertrophic facet changes and bulging disc, slightly worse on the right.   L4-L5: Combination of spondylolisthesis, ligamentum flavum thickening and bulging disc effaces the subarticular recesses bilaterally and causes mild spinal canal narrowing. No significant neural foraminal stenosis is present bilaterally.   L5-S1: Bulging disc material effaces the subarachnoid space without significant stenosis. Mild bilateral neural foraminal narrowing is present due to bulging disc and hypertrophic facet changes.   Prevertebral and paraspinal soft tissues do not demonstrate any acute abnormality.   Some inflammatory changes are present surrounding the visualized left ureter, as seen on same day CT.       1.  No evidence of neoplastic or infectious process in the lumbar spine. Multilevel degenerative changes of the lumbar spine with mild spinal canal narrowing at several levels as detailed above. 2. Degenerate facet osteoarthropathy is present bilaterally, worst at L3-L4 and L4-L5 on the right.   MACRO: None   Signed by: Benjamin Rodriges 7/2/2024 10:02 PM Dictation workstation:   [id number]    CT abdomen pelvis wo IV contrast    Result Date: 7/2/2024  STUDY: CT Abdomen and Pelvis without IV Contrast; 7/2/2024 4:19 PM INDICATION: Recent stent, still a lot of pain. COMPARISON: CTA CAP 6/29/2024. ACCESSION NUMBER(S): [id number] ORDERING CLINICIAN: VAIBHAV CONNELL TECHNIQUE: CT of the abdomen and pelvis was performed.   Contiguous axial images were obtained at 3 mm slice thickness through the abdomen and pelvis. Coronal and sagittal reconstructions at 3 mm slice thickness were performed. No intravenous contrast was administered.  Automated mA/kV exposure control was utilized and patient examination was performed in strict accordance with principles of ALARA. FINDINGS: Please note that the evaluation of vessels, lymph nodes and organs is limited without intravenous contrast.  LOWER CHEST: No cardiomegaly.  No pericardial effusion.  Lung bases are clear.  ABDOMEN:  LIVER: No hepatomegaly.  Smooth surface contour.  Normal attenuation.  BILE DUCTS: No intrahepatic or extrahepatic biliary ductal dilatation.  GALLBLADDER: The gallbladder is unremarkable. STOMACH: No abnormalities identified.  PANCREAS: No masses or ductal dilatation.  SPLEEN: No splenomegaly or focal splenic lesion.  ADRENAL GLANDS: No thickening or nodules.  KIDNEYS AND URETERS: Kidneys are normal in size and location.  There is a nonobstructing 17 mm stone in the left kidney. A left ureteral stent is in place.  PELVIS:  BLADDER: No abnormalities identified.  REPRODUCTIVE ORGANS: No abnormalities identified.  BOWEL: No abnormalities identified.  VESSELS: No abnormalities identified.  Abdominal aorta is normal in caliber.  PERITONEUM/RETROPERITONEUM/LYMPH NODES: No free fluid.  No pneumoperitoneum. There are enlarged bilateral inguinal lymph nodes. The largest measures 1.7 x 1.4 cm in the left inguinal region. There are also prominent lymph nodes along the left periaortic region, left pelvic sidewall and left iliac chain.  ABDOMINAL WALL: No abnormalities identified. SOFT TISSUES: No abnormalities identified.  BONES: No acute fracture or aggressive osseous lesion.    1. There is a nonobstructing 17 mm stone in the left kidney. A left ureteral stent is in place. There is no hydronephrosis. 2. There are enlarged bilateral inguinal lymph nodes. The largest measures 1.7 x  1.4 cm in the left inguinal region. There are also prominent lymph nodes along the left periaortic region, left pelvic sidewall, and left iliac chain. These may be reactive, but I cannot exclude a neoplastic process. Signed by Fernando Parham MD    ECG 12 lead    Result Date: 7/1/2024  Normal sinus rhythm with sinus arrhythmia Left axis deviation Low voltage QRS Possible Anterolateral infarct (cited on or before 09-FEB-2024) Abnormal ECG When compared with ECG of 09-FEB-2024 10:24, No significant change was found    FL pyelogram retrograde    Result Date: 7/1/2024  Interpreted By:  Fredrick Tejeda, STUDY: FL PYELOGRAM RETROGRADE;  6/30/2024 9:30 am   INDICATION: Signs/Symptoms:retrograde.   COMPARISON: CT scan from 06/29/2024   ACCESSION NUMBER(S): [id number]   ORDERING CLINICIAN: HIRA PARHAM   TECHNIQUE: Fluoroscopic support was provided to urology to perform this left-sided retrograde pyelogram study.    3 spot fluoroscopic images were submitted for interpretation on  07/01/2024. Recommend correlation to real time fluoroscopy and to final operative report.   FINDINGS: There is opacification of the left renal collecting system. Filling defect within the proximal segment of the left ureter probably air bubble. There is mild-to-moderate left-sided hydronephrosis. Last images demonstrate proximal segment of the double-J ureteral stent in place.       1. Fluoroscopic support was provided to perform this  left-sided retrograde pyelogram. 2. Findings as described.  Recommend correlation to final real time fluoroscopic and operative report.     MACRO: None   Signed by: Fredrick Tejeda 7/1/2024 7:30 AM Dictation workstation:   [id number]    CT lumbar spine wo IV contrast    Result Date: 6/29/2024  STUDY: CT Lumbar Spine without IV Contrast; 6/29/24 at 9:56 AM INDICATION: Back pain. COMPARISON: None available. ACCESSION NUMBER(S): [id number] ORDERING CLINICIAN: VAIBHAV CONNELL TECHNIQUE:  CT of the lumbar spine was  performed without intravenous or intrathecal contrast.  Sagittal and coronal reconstructions were generated.  Automated mA/kV exposure control was utilized and patient examination was performed in strict accordance with principles of ALARA. FINDINGS: There is a mild grade 1 spondylolisthesis at L4-5. The spine is in otherwise good alignment.  There is no fracture or traumatic subluxation. The vertebral body heights are well maintained.  There is disc space narrowing at L5-S1.  T12-L1: No central canal or neuroforaminal stenosis. L1-2: No central canal or neuroforaminal stenosis. L2-3: No central canal or neuroforaminal stenosis. L3-4: No central canal or neuroforaminal stenosis. L4-5: There is the aforementioned grade 1 spondylolisthesis as well as bilateral facet hypertrophy. This causes mild to moderate central canal stenosis, but no neuroforaminal narrowing. L5-S1: There is bilateral facet hypertrophy. No central canal or foraminal stenosis. The paravertebral soft tissues are within normal limits. There is a large stone in the left renal pelvis measuring approximately 1.7 x 1.3 cm. There is mild left hydronephrosis and hydroureter.    1. At L4-5, there is a mild grade 1 spondylolisthesis as well as bilateral facet hypertrophy. This causes mild to moderate central canal stenosis, but no neuroforaminal narrowing. 2. At L5-S1, there is bilateral facet hypertrophy, but no central canal or foraminal stenosis. 3. There is a large stone in the left renal pelvis with mild left hydronephrosis and hydroureter. It is unclear whether or not this stone is causing obstruction. Recommend full CT of the abdomen and pelvis for further evaluation. Signed by Fernando Parham MD    CT thoracic spine wo IV contrast    Result Date: 6/29/2024  STUDY: CT Thoracic Spine without IV Contrast; 6/29/24 at 9:55 AM INDICATION: Back pain. COMPARISON: None available. ACCESSION NUMBER(S): [id number] ORDERING CLINICIAN: VAIBHAV CONNELL TECHNIQUE:   CT of the thoracic spine was performed without intravenous or intrathecal contrast.  Sagittal and coronal reconstructions were generated. Automated mA/kV exposure control was utilized and patient examination was performed in strict accordance with principles of ALARA. FINDINGS: The alignment is anatomic.  There is no fracture or traumatic subluxation. The vertebral body heights are well maintained.  Disc spaces are preserved.  No significant central canal stenosis is demonstrated. The neural foramina are patent throughout. There is mild degenerative change. The paravertebral soft tissues are within normal limits.  The visualized chest and abdomen are unremarkable.    Mild degenerative change. Signed by Fernando Parham MD    CT angio chest abdomen pelvis    Result Date: 6/29/2024  Interpreted By:  Hermann Mathews, STUDY: CT ANGIO CHEST ABDOMEN PELVIS;  6/29/2024 9:55 am   INDICATION: Signs/Symptoms:r/o dissection. Worsening back pain.   COMPARISON: Renal colic CT of 01/07/2011.   ACCESSION NUMBER(S): [id number]   ORDERING CLINICIAN: VAIBHAV CONNELL   TECHNIQUE: Unenhanced axial images were obtained through the chest, abdomen and pelvis. Subsequently following intravenous administration of  75 mL Omnipaque 350, CTA of the chest, abdomen and pelvis was performed. Coronal and sagittal reformations were made from the axial data. Additional 3D volume rendered reformations were made on separate workstation.   FINDINGS: CHEST:   VESSELS: Unenhanced images through the chest show no evidence of aortic intramural hematoma.  Irregular atherosclerotic calcifications are present in the aorta and branch vessels.   There is no evidence of aortic aneurysm or dissection throughout the chest and abdomen.   There is a splenic artery aneurysm containing peripheral calcification located near the splenic hilum measuring 2 cm in diameter similar to prior.   No central pulmonary embolism is seen.   HEART: The heart is not significantly  enlarged.  No pericardial effusion.   MEDIASTINUM AND JAMI: Multi station mediastinal small nonspecific lymph nodes are not individually pathologically enlarged. No significantly enlarged hilar lymph nodes are seen.   LUNG/PLEURA/LARGE AIRWAYS: Mild multifocal predominantly dependent and basilar atelectasis is seen bilaterally. Small calcified granulomas are seen in the right upper lobe and right middle lobe.   No pleural effusion or pneumothorax is seen bilaterally.   CHEST WALL AND LOWER NECK: Nonspecific mildly enlarged bilateral axillary lymph nodes are present.   ABDOMEN:   LIVER: Within normal limits.   BILE DUCTS: Nondilated.   GALLBLADDER: The gallbladder is not distended and without calcified stones.   PANCREAS: There is nonspecific mild dilation of the main pancreatic duct measuring up to 4 mm in diameter in the ventral segment of the pancreatic head. No discrete pancreatic lesion or peripancreatic inflammation is identified.   SPLEEN: Within normal limits.   ADRENAL GLANDS: Within normal limits.   KIDNEYS AND URETERS: There is a left central renal large calculus measuring approximately 1.7 x 1.3 cm in axial dimension. There is moderate left hydronephrosis and mild dilation of the left proximal ureteral segment with left central renal and proximal periureteral fat stranding. While this large calculus is not clearly an obstructing position at the current time, it may be causing intermittent obstruction based upon the additional findings. Left renal varying sized cysts are present with the largest posterior interpolar partially exophytic cyst measuring 5 cm in diameter.   No right renal or ureteral calculi are seen. Several subcentimeter right renal cortical lesions also likely represent small cysts. No right hydroureteronephrosis.   Urinary bladder is minimally distended.   BOWEL: No bowel obstruction. Appendix is not clearly identified although no regional inflammation is seen. There is mild submucosal fat  prominence of the cecum and ascending colon which may be related to chronic inflammation. Few scattered small colonic diverticula are present without acute diverticulitis.   PERITONEUM/RETROPERITONEUM/LYMPH NODES: No ascites or free air, no fluid collection.   No retroperitoneal fluid collection or lymphadenopathy.       ABDOMINAL WALL: Small lobulated fat containing periumbilical hernia is seen without inflammation.   BONE AND SOFT TISSUE: Bilateral shoulder arthroplasty hardware is present, streak artifact from which obscures some regional visualization. Multilevel disc space narrowing and predominantly anterior endplate spurring is present throughout the thoracic and lumbar spine. Facet arthrosis is greatest in the mid-lower lumbar spine. Small vacuum discs are seen at L3-4 and L5-S1. A mild anterolisthesis of L4 relative to L5 more likely is chronic/degenerative.       No evidence of aortic dissection or aneurysm.   Left central renal 1.7 x 1.3 cm calculus. While this calculus is not currently in an obstructing position, it may be causing intermittent obstruction given the presence of moderate left hydronephrosis, mild left proximal ureteral dilation as well as left central renal and proximal periureteral fat stranding. Component of infection not excluded.   Mild dependent and basilar atelectasis bilaterally.   Nonspecific mildly enlarged bilateral axillary lymph nodes.   Approximate 2 cm splenic artery aneurysm with peripheral calcification near the splenic hilum similar to prior.       MACRO: None.   Signed by: Hermann Mathews 6/29/2024 10:17 AM Dictation workstation:   [id number]                             Assessment/Plan   Principal Problem:    UTI (urinary tract infection), bacterial  Active Problems:    Left renal stone    79 year year old is admitted with pain and incontinence.    She has a 17 mm proximal left ureteral stone, s/p recent stent placement.   CT reviewed and her stent is in good position.   She likely had stent irritation, and her pain has improved.  She does not have urinary retention.    Culture without growth.  Monitor urgency  She will follow up with Dr. Leal for ureteroscopy and stent removal.             Shlomo Dudley MD

## 2024-07-05 NOTE — PROGRESS NOTES
"Physical Therapy    Physical Therapy Evaluation    Patient Name: Sherri Najera \"Millicent\"  MRN: [id number]  Today's Date: 7/5/2024   Time Calculation  Start Time: 1349  Stop Time: 1413  Time Calculation (min): 24 min    Assessment/Plan   PT Assessment  PT Assessment Results: Decreased endurance, Decreased mobility  Rehab Prognosis: Good  Evaluation/Treatment Tolerance: Patient tolerated treatment well, Patient limited by fatigue  Medical Staff Made Aware: Yes  Strengths: Ability to acquire knowledge  Barriers to Participation: Comorbidities  End of Session Communication: Bedside nurse  End of Session Patient Position: Bed, 3 rail up, Alarm off, not on at start of session  IP OR SWING BED PT PLAN  Inpatient or Swing Bed: Inpatient  PT Plan  PT Plan: PT Eval only  PT Eval Only Reason: No acute PT needs identified (safe to return home)  PT Discharge Recommendations: No PT needed after discharge  Equipment Recommended upon Discharge:  (pt has walker at home, educated to use upon discharge as needed)  PT Recommended Transfer Status: Stand by assist  PT - OK to Discharge: Yes      Subjective   General Visit Information:  General  Reason for Referral: Pt. is a 79 yr old female admitted for UTI. Pt. referred to PT for impaired mobility.  Past Medical History Relevant to Rehab: hypertension, hyperlipidemia, non-insulin-dependent diabetes mellitus type 2, depression, anxiety, left renal stone status post cystoscopy and insertion of ureteral stent on June 30  Family/Caregiver Present: No  Co-Treatment: OT  Co-Treatment Reason: To maximize pt. safety and functional outcomes.  Prior to Session Communication: Bedside nurse  Patient Position Received: Bed, 3 rail up, Alarm off, not on at start of session (Nurse notified; confirmed alarm status with nurse)  General Comment: Pt. pleasant and agreeable to participate in therapy evaluation.  Home Living:  Home Living  Type of Home: House  Lives With: Spouse  Home Adaptive Equipment: " Walker rolling or standard, Cane  Home Layout: Two level  Home Access: Stairs to enter with rails  Entrance Stairs-Rails:  (Unilateral)  Bathroom Shower/Tub: Walk-in shower  Bathroom Toilet: Handicapped height  Bathroom Equipment: Grab bars in shower, Shower chair with back, Raised toilet seat with rails  Prior Level of Function:  Prior Function Per Pt/Caregiver Report  Level of [place]: Independent with ADLs and functional transfers, Independent with homemaking with ambulation  Receives Help From: Family (Pt. reports that spouse will provide assistance if needed)  ADL Assistance: Independent  Homemaking Assistance: Independent  Ambulatory Assistance: Independent (Pt. reports that she will use the FWW at times for completion of functional mobility.)  Prior Function Comments: Pt. reports to independence with ADL routine prior to admission.  Precautions:  Precautions  Medical Precautions: Fall precautions (Contact plus precautions)  Vital Signs:       Objective   Pain:  Pain Assessment  Pain Assessment: Vanegas-Baker FACES  0-10 (Numeric) Pain Score: 0 - No pain  Vanegas-Baker FACES Pain Rating:  (reporst low back discomfort in bed)  Pain Type: Chronic pain  Pain Location: Back  Pain Orientation: Lower  Pain Interventions: Repositioned, Ambulation/increased activity  Response to Interventions: pt resting at end of session in bed  Cognition:  Cognition  Overall Cognitive Status: Within Functional Limits  Orientation Level: Oriented X4    General Assessments:     Activity Tolerance  Endurance: Tolerates less than 10 min exercise, no significant change in vital signs    Strength  Strength Comments: grossly WFL        Functional Assessments:  Bed Mobility  Bed Mobility: Yes  Bed Mobility 1  Bed Mobility 1: Supine to sitting, Sitting to supine  Level of Assistance 1: Close supervision    Transfers  Transfer: Yes  Transfer 1  Transfer From 1: Bed to  Transfer to 1: Chair with arms  Technique 1: Sit to stand, Stand to  sit  Transfer Device 1: Walker  Transfer Level of Assistance 1: Close supervision  Transfers 2  Transfer From 2: Chair with arms to  Transfer to 2: Commode-standard  Technique 2: Sit to stand, Stand to sit  Transfer Device 2: Walker  Transfer Level of Assistance 2: Close supervision  Trials/Comments 2: VC for hand placement    Ambulation/Gait Training  Ambulation/Gait Training Performed: Yes  Ambulation/Gait Training 1  Device 1: Rolling walker  Assistance 1: Close supervision  Comments/Distance (ft) 1: x40 ft within room using lala TRUJILLO safe ambulation within room  Extremity/Trunk Assessments:        Outcome Measures:  [facility] Basic Mobility  Turning from your back to your side while in a flat bed without using bedrails: None  Moving from lying on your back to sitting on the side of a flat bed without using bedrails: None  Moving to and from bed to chair (including a wheelchair): None  Standing up from a chair using your arms (e.g. wheelchair or bedside chair): None  To walk in hospital room: A little  Climbing 3-5 steps with railing: A little  Basic Mobility - Total Score: 22    Encounter Problems       Encounter Problems (Active)       Pain - Adult              Education Documentation  Mobility Training, taught by Summer Adame, PT at 7/5/2024  2:46 PM.  Learner: Patient  Readiness: Acceptance  Method: Explanation  Response: Verbalizes Understanding    Education Comments  No comments found.

## 2024-07-05 NOTE — PROGRESS NOTES
Millicent Najera is a 79 y.o. female on day 3 of admission presenting with UTI (urinary tract infection), bacterial.      Subjective   Patient reports fatigue and abdominal discomfort but improvement of stool count.     Objective     Last Recorded Vitals  /64 (BP Location: Left arm, Patient Position: Lying)   Pulse 88   Temp 37.1 °C (98.8 °F) (Temporal)   Resp 18   Wt 68.2 kg (150 lb 6.4 oz)   SpO2 97%   Intake/Output last 3 Shifts:    Intake/Output Summary (Last 24 hours) at 7/5/2024 1703  Last data filed at 7/5/2024 1104  Gross per 24 hour   Intake 100 ml   Output 410 ml   Net -310 ml       Admission Weight  Weight: 65.8 kg (145 lb) (07/02/24 1530)    Daily Weight  07/03/24 : 68.2 kg (150 lb 6.4 oz)      Physical Exam  Constitutional: alert and oriented x 3, awake, cooperative, no acute distress  Skin: warm and dry  Head/Neck: Normocephalic, atraumatic  Eyes: clear sclera  ENMT: mucous membranes moist  Cardio: Regular rate and rhythm  Resp: CTA bilaterally, good respiratory effort  Gastrointestinal: Soft, nontender, nondistended  Musculoskeletal: ROM intact, no joint swelling  Extremities: No edema, cyanosis, or clubbing  Neuro: alert and oriented x 3  Psychological: Appropriate mood and behavior    Relevant Results  Scheduled medications  atorvastatin, 80 mg, oral, Daily  enoxaparin, 40 mg, subcutaneous, q24h  insulin lispro, 0-10 Units, subcutaneous, After meals & nightly  [Held by provider] lisinopril, 20 mg, oral, Daily  melatonin, 6 mg, oral, Nightly  mirtazapine, 15 mg, oral, Nightly  oxybutynin, 5 mg, oral, TID  PARoxetine, 10 mg, oral, Daily  phenazopyridine, 100 mg, oral, TID  tamsulosin, 0.4 mg, oral, Daily  vancomycin, 125 mg, oral, 4x daily      Continuous medications     PRN medications  PRN medications: acetaminophen **OR** acetaminophen, dextrose, dextrose, glucagon, glucagon, hydrOXYzine HCL, ondansetron, oxyCODONE, oxyCODONE    Results for orders placed or performed during the hospital  encounter of 07/02/24 (from the past 24 hour(s))   POCT GLUCOSE   Result Value Ref Range    POCT Glucose 136 (H) 74 - 99 mg/dL   CBC   Result Value Ref Range    WBC 2.9 (L) 4.4 - 11.3 x10*3/uL    nRBC 0.0 0.0 - 0.0 /100 WBCs    RBC 2.92 (L) 4.00 - 5.20 x10*6/uL    Hemoglobin 9.8 (L) 12.0 - 16.0 g/dL    Hematocrit 28.6 (L) 36.0 - 46.0 %    MCV 98 80 - 100 fL    MCH 33.6 26.0 - 34.0 pg    MCHC 34.3 32.0 - 36.0 g/dL    RDW 14.7 (H) 11.5 - 14.5 %    Platelets 175 150 - 450 x10*3/uL   Comprehensive Metabolic Panel   Result Value Ref Range    Glucose 116 (H) 74 - 99 mg/dL    Sodium 140 136 - 145 mmol/L    Potassium 3.3 (L) 3.5 - 5.3 mmol/L    Chloride 106 98 - 107 mmol/L    Bicarbonate 25 21 - 32 mmol/L    Anion Gap 12 10 - 20 mmol/L    Urea Nitrogen 16 6 - 23 mg/dL    Creatinine 0.68 0.50 - 1.05 mg/dL    eGFR 89 >60 mL/min/1.73m*2    Calcium 8.3 (L) 8.6 - 10.3 mg/dL    Albumin 3.3 (L) 3.4 - 5.0 g/dL    Alkaline Phosphatase 64 33 - 136 U/L    Total Protein 5.5 (L) 6.4 - 8.2 g/dL    AST 17 9 - 39 U/L    Bilirubin, Total 0.5 0.0 - 1.2 mg/dL    ALT 21 7 - 45 U/L   POCT GLUCOSE   Result Value Ref Range    POCT Glucose 125 (H) 74 - 99 mg/dL   POCT GLUCOSE   Result Value Ref Range    POCT Glucose 136 (H) 74 - 99 mg/dL   POCT GLUCOSE   Result Value Ref Range    POCT Glucose 160 (H) 74 - 99 mg/dL     XR chest 2 views    Result Date: 7/5/2024  Interpreted By:  iMsty Collins, STUDY: XR CHEST 2 VIEWS;  7/5/2024 12:22 pm   INDICATION: Signs/Symptoms:hypoxia overnight.   COMPARISON: 04/15/2023   ACCESSION NUMBER(S): [id number]   ORDERING CLINICIAN: RONAK SAMANIEGO   FINDINGS: Heart is normal size.   There is no consolidation or pleural fluid.   Mediastinum is unremarkable.   There are degenerative changes of the spine. The patient is status post bilateral shoulder replacements.   COMPARISON OF FINDING: Left-sided infiltrate present previously has resolved.       No acute cardiopulmonary disease.   MACRO: none   Signed by: Misty  Young 7/5/2024 12:51 PM Dictation workstation:   [id number]         Assessment/Plan   78yo CF with PMH of HTN, HLD, NIDDM, Depression with anxiety, and hx of left renal stone s/p stent on 6/30 that presented to the ED confusion and diarrhea.     Cdiff colitis  - continue PO vancomycin x10day  - continue precautions  - educated patient and family regarding symptoms of fatigue and abdominal discomfort being consistent with symptoms  - encouraged PO intake    Hypokalemia  - likely secondary to above  - replete and recheck    Recent stone s/p stent  - urology consulted, feel stent is in appropriate location so follow up as previously planned  - continue oxybutynin, tamsulosin, and phenazopyridine    NIDDM  - hold home PO meds  - continue ISS    Leukopenia  - noted with lymphadenopathy on CT  - plan for outpt heme/onc referral    Hypertension  - hold home PO meds in setting of dehydration    DVT Proph: lovenox    Dispo: Patient appears medically stable for discharge, plan to discharge tomorrow once vancomycin price checked and family has room set up at home.              Principal Problem:    UTI (urinary tract infection), bacterial  Active Problems:    Left renal stone             Stephanie Gamez DO

## 2024-07-05 NOTE — CARE PLAN
The patient's goals for the shift include      The clinical goals for the shift include patient will remain free from falls and injury throughout shift

## 2024-07-05 NOTE — PROGRESS NOTES
"Occupational Therapy    Evaluation    Patient Name: Sherri Najera \"Millicent\"  MRN: [id number]  Today's Date: 7/5/2024  Time Calculation  Start Time: 1348  Stop Time: 1412  Time Calculation (min): 24 min    Assessment  IP OT Assessment  OT Assessment: Pt. presents with ADL and mobility at her baseline. No further acute skilled OT services needed at this time.  Prognosis: Good  Barriers to Discharge: None  Evaluation/Treatment Tolerance: Patient tolerated treatment well  Medical Staff Made Aware: Yes  End of Session Communication: Bedside nurse  End of Session Patient Position: Bed, 3 rail up, Alarm off, not on at start of session (nursing staff confirmed bed alarm not necessary)  Plan:  No Skilled OT: At baseline function  OT Discharge Recommendations: No further acute OT, No OT needed after discharge  Equipment Recommended upon Discharge: Wheeled walker (pt owns)  OT - OK to Discharge: Yes (Per OT POC)    Subjective     General:  General  Reason for Referral: Pt. is a 79 yr old female admitted for UTI. Pt. referred to OT for impaired ADL and mobility.  Past Medical History Relevant to Rehab: hypertension, hyperlipidemia, non-insulin-dependent diabetes mellitus type 2, depression, anxiety, left renal stone status post cystoscopy and insertion of ureteral stent on June 30  Family/Caregiver Present: No  Co-Treatment: PT  Co-Treatment Reason: To maximize pt. safety and functional outcomes.  Prior to Session Communication: Bedside nurse  Patient Position Received: Bed, 3 rail up, Alarm off, not on at start of session  General Comment: Pt. pleasant and agreeable to participate in therapy evaluation.  Precautions:  Medical Precautions: Fall precautions (Contact plus precautions)     Pain:  Pain Assessment  Pain Assessment: Vanegas-Baker FACES  Vanegas-Baker FACES Pain Rating: No hurt (Pt. reported slight pain with lower back.)  Pain Type: Chronic pain  Pain Location: Back  Pain Orientation: Lower  Pain Interventions: " Repositioned, Ambulation/increased activity  Response to Interventions: Pt. resting comfortably in bed at end of session.    Objective   Cognition:  Overall Cognitive Status: Within Functional Limits  Orientation Level: Oriented X4           Home Living:  Type of Home: House  Lives With: Spouse  Home Adaptive Equipment: Walker rolling or standard, Cane  Home Layout: Two level  Home Access: Stairs to enter with rails  Entrance Stairs-Rails:  (Unilateral)  Entrance Stairs-Number of Steps: 3  Bathroom Shower/Tub: Walk-in shower  Bathroom Toilet: Handicapped height  Bathroom Equipment: Grab bars in shower, Shower chair with back, Raised toilet seat with rails   Prior Function:  Level of [place]: Independent with ADLs and functional transfers, Independent with homemaking with ambulation  Receives Help From: Family (Pt. reports that spouse will provide assistance if needed)  ADL Assistance: Independent  Homemaking Assistance: Independent  Ambulatory Assistance: Independent (Pt. reports that she will use the FWW at times for completion of functional mobility.)  Prior Function Comments: Pt. reports to independence with ADL routine prior to admission.  IADL History:  IADL Comments: (+) driving  ADL:  Eating Assistance: Independent  Grooming Assistance: Independent  Bathing Assistance: Stand by  UE Dressing Assistance: Independent  LE Dressing Assistance: Stand by  LE Dressing Deficit: Thread RLE into pants, Thread LLE into pants, Pull up over hips (Pt. with good dynamic balance while sitting EOB to don pants.)  Toileting Assistance with Device: Stand by  Activity Tolerance:  Endurance: Tolerates less than 10 min exercise, no significant change in vital signs  Bed Mobility/Transfers: Bed Mobility  Bed Mobility: Yes  Bed Mobility 1  Bed Mobility 1: Supine to sitting, Sitting to supine  Level of Assistance 1: Close supervision  Bed Mobility Comments 1: Pt. demonstrated ability to perform bed mobility with no  difficulties.    Transfers  Transfer: Yes  Transfer 1  Transfer From 1: Bed to  Transfer to 1: Chair with arms  Technique 1: Sit to stand, Stand to sit  Transfer Device 1: Walker  Transfer Level of Assistance 1: Close supervision  Transfers 2  Transfer From 2: Chair with arms to  Transfer to 2: Commode-standard  Technique 2: Sit to stand, Stand to sit  Transfer Device 2: Walker  Transfer Level of Assistance 2: Close supervision  Trials/Comments 2: Pt. required VCs for hand placement on walker when preparing for transfer.      Functional Mobility:  Functional Mobility  Functional Mobility Performed: Yes  Functional Mobility 1  Surface 1: Level tile  Device 1: Rolling walker  Assistance 1: Close supervision  Comments 1: Pt. demonstrates ability to perform functional mobility throughout room with no difficulties.  Sitting Balance:  Static Sitting Balance  Static Sitting-Balance Support: No upper extremity supported, Feet supported  Static Sitting-Level of Assistance: Close supervision  Static Sitting-Comment/Number of Minutes: Pt. able to maintain upright posture while sitting EOB for approximately 3 minutes.  Dynamic Sitting Balance  Dynamic Sitting-Balance Support: No upper extremity supported, Feet supported  Dynamic Sitting-Balance: Forward lean  Dynamic Sitting-Comments: Pt. with good dynamic balance while donning pants at EOB.  Standing Balance:  Static Standing Balance  Static Standing-Balance Support: Bilateral upper extremity supported  Static Standing-Level of Assistance: Close supervision    IADL's:   IADL Comments: (+) driving    Sensation:  Light Touch: No apparent deficits  Strength:  Strength Comments: No functional strength deficit observed    Outcome Measures: [facility] Daily Activity  Putting on and taking off regular lower body clothing: None  Bathing (including washing, rinsing, drying): None  Putting on and taking off regular upper body clothing: None  Toileting, which includes using toilet, bedpan or  urinal: None  Taking care of personal grooming such as brushing teeth: None  Eating Meals: None  Daily Activity - Total Score: 24      Education Documentation  Body Mechanics, taught by VICENTE Hung at 7/5/2024  2:36 PM.  Learner: Patient  Readiness: Acceptance  Method: Explanation, Demonstration  Response: Verbalizes Understanding

## 2024-07-05 NOTE — HOSPITAL COURSE
HPI   Sherri Najera, a 79-year-old female with past medical history of hypertension, hyperlipidemia, non-insulin-dependent diabetes mellitus type 2, depression, anxiety, left renal stone status post cystoscopy and insertion of ureteral stent on June 30 presented to the emergency department with complaints of urinary urgency, pain, and confusion.     Hospital course  Up arrival to the ED CT abdomen and pelvis demonstrated an obstructing 17 mm stone ureteral stent in place and no hydronephrosis.  Urology was consulted who thought that the patient's symptoms was worse due to stent irritation.  Of note was noted to have enlarged bilateral inguinal lymph nodes are now WBC count of 1.9.  During this hospitalization mental status back to baseline.  As the patient continued to have multiple bouts of diarrhea C. difficile stool pathogen test was ordered.  Patient was positive for C. difficile by PCR.  She was started on vancomycin 125 mg p.o. for 10 days.  In addition likely secondary to her diarrhea patient was hypokalemic requiring potassium to be repleated.     Discharge   Please take your medications as instructed at time of hospital discharge.     Please follow-up with your primary care provider within 5-7 days from time of discharge.   If you experience any worsening symptoms or any new acute concerns arise, please contact your primary care provider to discuss and possibly arrange an appointment. If you cannot get in touch with provider or severe symptoms are present, please return to nearest emergency room/urgent care for evaluation and treatment.

## 2024-07-06 VITALS
TEMPERATURE: 97 F | WEIGHT: 150.4 LBS | DIASTOLIC BLOOD PRESSURE: 87 MMHG | OXYGEN SATURATION: 92 % | BODY MASS INDEX: 26.65 KG/M2 | SYSTOLIC BLOOD PRESSURE: 141 MMHG | HEIGHT: 63 IN | RESPIRATION RATE: 12 BRPM | HEART RATE: 84 BPM

## 2024-07-06 LAB
ALBUMIN SERPL BCP-MCNC: 3.1 G/DL (ref 3.4–5)
ALP SERPL-CCNC: 64 U/L (ref 33–136)
ALT SERPL W P-5'-P-CCNC: 19 U/L (ref 7–45)
ANION GAP SERPL CALC-SCNC: 11 MMOL/L (ref 10–20)
AST SERPL W P-5'-P-CCNC: 16 U/L (ref 9–39)
ATRIAL RATE: 87 BPM
BILIRUB SERPL-MCNC: 0.5 MG/DL (ref 0–1.2)
BUN SERPL-MCNC: 16 MG/DL (ref 6–23)
CALCIUM SERPL-MCNC: 8.6 MG/DL (ref 8.6–10.3)
CHLORIDE SERPL-SCNC: 106 MMOL/L (ref 98–107)
CO2 SERPL-SCNC: 27 MMOL/L (ref 21–32)
CREAT SERPL-MCNC: 0.75 MG/DL (ref 0.5–1.05)
EGFRCR SERPLBLD CKD-EPI 2021: 81 ML/MIN/1.73M*2
ERYTHROCYTE [DISTWIDTH] IN BLOOD BY AUTOMATED COUNT: 13.7 % (ref 11.5–14.5)
GLUCOSE BLD MANUAL STRIP-MCNC: 125 MG/DL (ref 74–99)
GLUCOSE SERPL-MCNC: 126 MG/DL (ref 74–99)
HCT VFR BLD AUTO: 30.4 % (ref 36–46)
HGB BLD-MCNC: 9.9 G/DL (ref 12–16)
MCH RBC QN AUTO: 28.1 PG (ref 26–34)
MCHC RBC AUTO-ENTMCNC: 32.6 G/DL (ref 32–36)
MCV RBC AUTO: 86 FL (ref 80–100)
NRBC BLD-RTO: 0 /100 WBCS (ref 0–0)
P AXIS: 48 DEGREES
P OFFSET: 190 MS
P ONSET: 136 MS
PLATELET # BLD AUTO: 205 X10*3/UL (ref 150–450)
POTASSIUM SERPL-SCNC: 3.4 MMOL/L (ref 3.5–5.3)
PR INTERVAL: 160 MS
PROT SERPL-MCNC: 5.8 G/DL (ref 6.4–8.2)
Q ONSET: 216 MS
QRS COUNT: 14 BEATS
QRS DURATION: 84 MS
QT INTERVAL: 372 MS
QTC CALCULATION(BAZETT): 447 MS
QTC FREDERICIA: 420 MS
R AXIS: -32 DEGREES
RBC # BLD AUTO: 3.52 X10*6/UL (ref 4–5.2)
SODIUM SERPL-SCNC: 141 MMOL/L (ref 136–145)
T AXIS: 38 DEGREES
T OFFSET: 402 MS
VENTRICULAR RATE: 87 BPM
WBC # BLD AUTO: 3.3 X10*3/UL (ref 4.4–11.3)

## 2024-07-06 PROCEDURE — 2500000002 HC RX 250 W HCPCS SELF ADMINISTERED DRUGS (ALT 637 FOR MEDICARE OP, ALT 636 FOR OP/ED)

## 2024-07-06 PROCEDURE — 2500000001 HC RX 250 WO HCPCS SELF ADMINISTERED DRUGS (ALT 637 FOR MEDICARE OP)

## 2024-07-06 PROCEDURE — 85027 COMPLETE CBC AUTOMATED: CPT | Performed by: FAMILY MEDICINE

## 2024-07-06 PROCEDURE — 84075 ASSAY ALKALINE PHOSPHATASE: CPT | Performed by: FAMILY MEDICINE

## 2024-07-06 PROCEDURE — 99239 HOSP IP/OBS DSCHRG MGMT >30: CPT | Performed by: FAMILY MEDICINE

## 2024-07-06 PROCEDURE — 82947 ASSAY GLUCOSE BLOOD QUANT: CPT

## 2024-07-06 PROCEDURE — 36415 COLL VENOUS BLD VENIPUNCTURE: CPT | Performed by: FAMILY MEDICINE

## 2024-07-06 PROCEDURE — 2500000004 HC RX 250 GENERAL PHARMACY W/ HCPCS (ALT 636 FOR OP/ED)

## 2024-07-06 ASSESSMENT — COGNITIVE AND FUNCTIONAL STATUS - GENERAL
WALKING IN HOSPITAL ROOM: A LITTLE
MOBILITY SCORE: 22
CLIMB 3 TO 5 STEPS WITH RAILING: A LITTLE
DAILY ACTIVITIY SCORE: 24

## 2024-07-06 ASSESSMENT — PAIN SCALES - GENERAL: PAINLEVEL_OUTOF10: 0 - NO PAIN

## 2024-07-06 NOTE — DISCHARGE SUMMARY
Discharge Diagnosis  UTI (urinary tract infection), bacterial    Issues Requiring Follow-Up  PCP follow up in one week for hospital follow up and to recheck CBC  Hematology follow up for leukopenia and lymphadenopathy  Urology follow up as scheduled    Discharge Meds     Your medication list        START taking these medications        Instructions Last Dose Given Next Dose Due   vancomycin 125 mg capsule  Commonly known as: Vancocin      Take 1 capsule (125 mg) by mouth 4 times a day for 10 days.              CONTINUE taking these medications        Instructions Last Dose Given Next Dose Due   atorvastatin 80 mg tablet  Commonly known as: Lipitor      Take 1 tablet (80 mg) by mouth once daily.       Flomax 0.4 mg 24 hr capsule  Generic drug: tamsulosin           Flonase Allergy Relief 50 mcg/actuation nasal spray  Generic drug: fluticasone           lisinopril 20 mg tablet      TAKE 1 TABLET DAILY AS DIRECTED       metFORMIN 500 mg tablet  Commonly known as: Glucophage           MULTIVITAMIN ORAL           OneTouch Delica Plus Lancet 33 gauge misc  Generic drug: lancets           OneTouch Verio test strips strip  Generic drug: blood sugar diagnostic           oxybutynin 5 mg tablet  Commonly known as: Ditropan      Take 1 tablet (5 mg) by mouth 3 times a day.       PARoxetine 10 mg tablet  Commonly known as: Paxil      Take 1 tablet (10 mg) by mouth once daily.       pioglitazone 15 mg tablet  Commonly known as: Actos      Take 1 tablet (15 mg) by mouth once daily.       saccharomyces boulardii 250 mg capsule  Commonly known as: Florastor           Vitamin B-12 1,000 mcg tablet  Generic drug: cyanocobalamin           Vitamin C 500 mg tablet  Generic drug: ascorbic acid           zinc gluconate 50 mg tablet                  STOP taking these medications      amoxicillin-pot clavulanate 875-125 mg tablet  Commonly known as: Augmentin        mirtazapine 15 mg tablet  Commonly known as: Remeron         oxyCODONE-acetaminophen 5-325 mg tablet  Commonly known as: Percocet                  Where to Get Your Medications        These medications were sent to [facility] [place], [place] - [address] [place] [address]  [address], [place] [address]      Phone: [phone]   vancomycin 125 mg capsule         Test Results Pending At Discharge  Pending Labs       No current pending labs.            Hospital Course  80yo CF with PMH of HTN, HLD, NIDDM, Depression with anxiety, and hx of left renal stone s/p stent on 6/30 that presented to the ED confusion, pain, and diarrhea. Patient was diagnosed with Cdiff and started on PO vancomycin with improvement of symptoms. She was also seen by urology since she had a recent stent, they reviewed imaging and report stent is in correct position and her pain is likely from stent irritation. Patient had improvement of symptoms and appears medically stable for discharge. She was instructed to call urology office to notify them about Cdiff diagnosis for potential delay of appointment as well as get CBC after abx course to confirm leukopenia prior to hematology follow up.     Medically cleared for discharge. Medications reviewed and reconciled. Scripts prepared as needed.  Discussed with the family, , and . Questions answered. Understanding verbalized. Overall time spent on discharge was longer than 35 min.      Pertinent Physical Exam At Time of Discharge  Constitutional: alert and oriented x 3, awake, cooperative, no acute distress  Skin: warm and dry  Head/Neck: Normocephalic, atraumatic  Eyes: clear sclera  ENMT: mucous membranes moist  Cardio: Regular rate and rhythm  Resp: CTA bilaterally, good respiratory effort  Gastrointestinal: Soft, nontender, nondistended  Musculoskeletal: ROM intact, no joint swelling  Extremities: No edema, cyanosis, or clubbing  Neuro: alert and oriented x 3  Psychological: Appropriate mood and behavior      Outpatient  Follow-Up  Future Appointments   Date Time Provider Department Center   7/8/2024  9:20 AM Elena Leal MD [id number] [facility]   8/6/2024 10:30 AM Isaac Dumont MD [id number] [facility]   10/3/2024 10:30 AM Yesi Lam MD [id number] [facility]         Stephanie Gamez DO

## 2024-07-06 NOTE — CARE PLAN
The patient's goals for the shift include  patient will remain safe during this shift     The clinical goals for the shift include to be able to sleep during this shift      Problem: Pain - Adult  Goal: Verbalizes/displays adequate comfort level or baseline comfort level  Outcome: Progressing     Problem: Safety - Adult  Goal: Free from fall injury  Outcome: Progressing     Problem: Discharge Planning  Goal: Discharge to home or other facility with appropriate resources  Outcome: Progressing     Problem: Chronic Conditions and Co-morbidities  Goal: Patient's chronic conditions and co-morbidity symptoms are monitored and maintained or improved  Outcome: Progressing     Problem: Diabetes  Goal: Achieve decreasing blood glucose levels by end of shift  Outcome: Progressing

## 2024-07-08 ENCOUNTER — DOCUMENTATION (OUTPATIENT)
Dept: PRIMARY CARE | Facility: CLINIC | Age: 79
End: 2024-07-08

## 2024-07-08 ENCOUNTER — APPOINTMENT (OUTPATIENT)
Dept: UROLOGY | Facility: CLINIC | Age: 79
End: 2024-07-08
Payer: MEDICARE

## 2024-07-08 ENCOUNTER — PATIENT OUTREACH (OUTPATIENT)
Dept: PRIMARY CARE | Facility: CLINIC | Age: 79
End: 2024-07-08

## 2024-07-08 NOTE — PROGRESS NOTES
Discharge Facility: [facility]     Discharge Diagnosis:    UTI (urinary tract infection), bacterial     Issues Requiring Follow-Up  PCP follow up in one week for hospital follow up and to recheck CBC  Hematology follow up for leukopenia and lymphadenopathy  Urology follow up as scheduled     Admission Date:  7/2/2024   Discharge Date:  7/6/2024     PCP Appointment Date: 7/9/2024     Specialist Appointment Date:     instructed to call urology office to notify them about Cdiff diagnosis for potential delay of appointment as well as get CBC after abx course to confirm leukopenia prior to hematology follow up.     Hem /ONC awaiting call to schedule     Hospital Encounter and Summary Linked: Yes    See discharge assessment below for further details     Engagement  Call Start Time: 1116 (7/8/2024 11:16 AM)    Medications  Medications reviewed with patient/caregiver?: Yes (7/8/2024 11:16 AM)  Is the patient having any side effects they believe may be caused by any medication additions or changes?: -- (diarrhea / on Vanco Cdiff , not much  of an appetite) (7/8/2024 11:16 AM)  Does the patient have all medications ordered at discharge?: -- (Patients spouse calling PCP D/T in need of Paxil) (7/8/2024 11:16 AM)  Care Management Interventions: No intervention needed (7/8/2024 11:16 AM)  Prescription Comments: New  Vanco reviewed , STOP (7/8/2024 11:16 AM)  Is the patient taking all medications as directed (includes completed medication regime)?: -- (Patient states  has all meds) (7/1/2024  9:47 AM)  Care Management Interventions: Provided patient education (7/1/2024  9:47 AM)  Medication Comments: Patient has no questions or concerns (7/1/2024  9:47 AM)    Appointments  Does the patient have a primary care provider?: Yes (7/1/2024  9:47 AM)  Care Management Interventions: Verified appointment date/time/provider (7/5) (7/1/2024  9:47 AM)  Has the patient kept scheduled appointments due by today?: Yes (7/1/2024  9:47 AM)  Care  Management Interventions: Advised patient to keep appointment (Dr. Leal 7/8) (7/1/2024  9:47 AM)    Self Management  What is the home health agency?: NA (7/1/2024  9:47 AM)  What Durable Medical Equipment (DME) was ordered?: NA (7/1/2024  9:47 AM)    Patient Teaching  Does the patient have access to their discharge instructions?: Yes (7/1/2024  9:47 AM)  Care Management Interventions: Reviewed instructions with patient (7/1/2024  9:47 AM)  What is the patient's perception of their health status since discharge?: Improving (7/1/2024  9:47 AM)  Is the patient/caregiver able to teach back the hierarchy of who to call/visit for symptoms/problems? PCP, Specialist, Home Health nurse, Urgent Care, ED, 911: Yes (7/1/2024  9:47 AM)  Patient/Caregiver Education Comments: Patient aware to call providers for any questions concerns or change in condition . (7/1/2024  9:47 AM)

## 2024-07-08 NOTE — PROGRESS NOTES
Subjective   Patient ID: Millicent Najera is a 79 y.o. female who presents for No chief complaint on file..    HPI    Sherri Hanosn is seen for hospital follow-up.   She is here with her   and her daughter La.  I have seen her once as a new patient in April.  She has had 2 hospitalizations since then.  The first was for renal colic and  she was found to have nephrolithiasis and a UTI.  She had stent placed.  She then had urinary retention and sepsis.    She was discharged 3 days ago with C. difficile.   She presented at that time with weakness and loss of bowel control.    She is taking vancomycin.  She is also on Flomax and oxybutynin from the urologist.    She has had leukopenia and anemia since her first hospitalization.  She has been referred to hematology.  She needs a CBC in 1 week to confirm persistent low values, according to discharge summary.    Recent values are increasing.  Her most recent white count is 3.3.   She has a mild anemia with a hemoglobin of 9.9.   She has an appointment but not until September.   Hospital records were all reviewed.   She also had some lymphadenopathy on a abdominal CT scan which may be related to her C. difficile.    She missed an appointment with the urologist yesterday because she felt poorly.  She is trying to reschedule that.      I will be managing her diabetes, hypertension, hypercholesterolemia and depression ongoing.   She has run out of her Paxil during these hospitalizations.      Present concerns include weakness and fatigue.  She is having 2 small stools a day.  She is taking probiotics in addition to the vancomycin.  There is no blood in the stool.  She is able to eat.  She is not drinking a lot of liquids although she states that she can.  She has eaten small meals.  There has been no nausea or vomiting.   She has been checking her blood sugars and they are running around 120.  She is not having low blood sugars.    She is occasionally having chills.  No  documented fever.    She feels tightness across the back of her neck and her upper back.  They have been putting heat on it for 20 minutes at a time.  It is better today than it was yesterday.  She is able to find a comfortable position to sleep in.      Review of Systems    Objective   There were no vitals taken for this visit.    Physical Exam    She is alert and comfortable.  No distress.  She does appear pale.    Neck is supple without adenopathy.    Lungs are clear to auscultation.    Heart is regular in rhythm and rate.  Normal S1 and S2.    Abdomen is soft and nontender.    She has some tenderness across her trapezius muscles bilaterally.  No muscle spasm is appreciated.    Skin shows normal turgor and mucous membranes are moist.    Assessment/Plan   Diagnoses and all orders for this visit:  Hospital discharge follow-up  Nephrolithiasis  C. difficile diarrhea  Leukopenia, unspecified type  -     CBC and Auto Differential; Future  UTI (urinary tract infection), bacterial  -     Urine Culture; Future  Controlled type 2 diabetes mellitus without complication, without long-term current use of insulin (Multi)  Current moderate episode of major depressive disorder, unspecified whether recurrent (Multi)  -     PARoxetine (Paxil) 10 mg tablet; Take 1 tablet (10 mg) by mouth once daily.  Anxiety  -     PARoxetine (Paxil) 10 mg tablet; Take 1 tablet (10 mg) by mouth once daily.  Other orders  -     Follow Up In Primary Care - Established; Future    45 minutes was spent in consultation with the family.  She is feeling better today than she did yesterday.  I told her this will be a slow recovery with 2 hospitalizations.  She needs to increase her fluid intake.  She should use a brat diet and advance that as tolerated.  She may take Tylenol 2 extra strength 3 times daily for discomfort.  She should not use NSAIDs.  She may take melatonin for sleep.  Her paroxetine is refilled.  She should use heat for 20-minute increments  on her neck followed by light massage.    She needs to listen to her body and rest when she needs to.  Will check a urine culture to make sure that is resolved.  I will notify her of the CBC and urine culture results when available.  She should schedule a routine appointment with me in a couple months to review her chronic medical issues.    She needs to follow-up with the urologist.

## 2024-07-09 ENCOUNTER — TELEPHONE (OUTPATIENT)
Dept: PRIMARY CARE | Facility: CLINIC | Age: 79
End: 2024-07-09

## 2024-07-09 ENCOUNTER — OFFICE VISIT (OUTPATIENT)
Dept: PRIMARY CARE | Facility: CLINIC | Age: 79
End: 2024-07-09
Payer: MEDICARE

## 2024-07-09 VITALS
HEART RATE: 96 BPM | DIASTOLIC BLOOD PRESSURE: 60 MMHG | TEMPERATURE: 97.7 F | SYSTOLIC BLOOD PRESSURE: 100 MMHG | OXYGEN SATURATION: 96 %

## 2024-07-09 DIAGNOSIS — D72.819 LEUKOPENIA, UNSPECIFIED TYPE: ICD-10-CM

## 2024-07-09 DIAGNOSIS — E78.5 HYPERLIPIDEMIA, UNSPECIFIED HYPERLIPIDEMIA TYPE: ICD-10-CM

## 2024-07-09 DIAGNOSIS — N20.0 NEPHROLITHIASIS: ICD-10-CM

## 2024-07-09 DIAGNOSIS — A04.72 C. DIFFICILE DIARRHEA: ICD-10-CM

## 2024-07-09 DIAGNOSIS — F32.1 CURRENT MODERATE EPISODE OF MAJOR DEPRESSIVE DISORDER, UNSPECIFIED WHETHER RECURRENT (MULTI): ICD-10-CM

## 2024-07-09 DIAGNOSIS — Z09 HOSPITAL DISCHARGE FOLLOW-UP: Primary | ICD-10-CM

## 2024-07-09 DIAGNOSIS — F41.9 ANXIETY: ICD-10-CM

## 2024-07-09 DIAGNOSIS — E11.9 CONTROLLED TYPE 2 DIABETES MELLITUS WITHOUT COMPLICATION, WITHOUT LONG-TERM CURRENT USE OF INSULIN (MULTI): ICD-10-CM

## 2024-07-09 DIAGNOSIS — N39.0 UTI (URINARY TRACT INFECTION), BACTERIAL: ICD-10-CM

## 2024-07-09 DIAGNOSIS — A49.9 UTI (URINARY TRACT INFECTION), BACTERIAL: ICD-10-CM

## 2024-07-09 PROCEDURE — 1159F MED LIST DOCD IN RCRD: CPT | Performed by: FAMILY MEDICINE

## 2024-07-09 PROCEDURE — 1160F RVW MEDS BY RX/DR IN RCRD: CPT | Performed by: FAMILY MEDICINE

## 2024-07-09 PROCEDURE — 1036F TOBACCO NON-USER: CPT | Performed by: FAMILY MEDICINE

## 2024-07-09 PROCEDURE — 1111F DSCHRG MED/CURRENT MED MERGE: CPT | Performed by: FAMILY MEDICINE

## 2024-07-09 PROCEDURE — 3074F SYST BP LT 130 MM HG: CPT | Performed by: FAMILY MEDICINE

## 2024-07-09 PROCEDURE — 3078F DIAST BP <80 MM HG: CPT | Performed by: FAMILY MEDICINE

## 2024-07-09 PROCEDURE — 1125F AMNT PAIN NOTED PAIN PRSNT: CPT | Performed by: FAMILY MEDICINE

## 2024-07-09 PROCEDURE — 99496 TRANSJ CARE MGMT HIGH F2F 7D: CPT | Performed by: FAMILY MEDICINE

## 2024-07-09 PROCEDURE — 1123F ACP DISCUSS/DSCN MKR DOCD: CPT | Performed by: FAMILY MEDICINE

## 2024-07-09 RX ORDER — PAROXETINE 10 MG/1
10 TABLET, FILM COATED ORAL DAILY
Qty: 90 TABLET | Refills: 1 | Status: SHIPPED | OUTPATIENT
Start: 2024-07-09 | End: 2025-01-05

## 2024-07-09 RX ORDER — PHENAZOPYRIDINE HYDROCHLORIDE 100 MG/1
1 TABLET, FILM COATED ORAL
COMMUNITY
Start: 2024-06-30

## 2024-07-09 ASSESSMENT — PATIENT HEALTH QUESTIONNAIRE - PHQ9
2. FEELING DOWN, DEPRESSED OR HOPELESS: NOT AT ALL
1. LITTLE INTEREST OR PLEASURE IN DOING THINGS: NOT AT ALL
SUM OF ALL RESPONSES TO PHQ9 QUESTIONS 1 AND 2: 0

## 2024-07-09 ASSESSMENT — PAIN SCALES - GENERAL: PAINLEVEL: 7

## 2024-07-09 NOTE — SIGNIFICANT EVENT
"Follow Up Phone Call    Outgoing phone call    Spoke to: Sherri Najera Relationship:self   Phone number: [phone]      Outcome: contacted patient/ family   Chief Complaint   Patient presents with    Back Pain     Worsening over the last 24hrs. Hurts \"all over\" denies injury took 10mg Percocet at 0600. Ambulated to room with cane           Diagnosis:Not applicable    States she is having back/shoulder pain like you get from having abdominal surgery. She sees her PCP this afternoon and will discuss this. Her diarrhea is improving. No further questions or concerns.          "

## 2024-07-09 NOTE — PATIENT INSTRUCTIONS
Melatonin 10 mg  Increase water!!  Heat and massage to neck  Tylenol extra  strength 2 tablets three times daily as needed  Slow advance of diet  No milk, cheese , ice cream for 10 days  Listen to your body

## 2024-07-10 ENCOUNTER — PATIENT OUTREACH (OUTPATIENT)
Dept: PRIMARY CARE | Facility: CLINIC | Age: 79
End: 2024-07-10
Payer: MEDICARE

## 2024-07-10 NOTE — PROGRESS NOTES
Call regarding appt. with PCP on 7/9/2024 after hospitalization.    At time of outreach patients spouse Moose feels as if Sherri Alejandre condition has    Improved slightly  since last visit.    Reviewed the PCP appointment with the pt and addressed any questions or concerns.     Encouraged patient to call providers for any questions concerns or change in condition

## 2024-07-15 ENCOUNTER — LAB (OUTPATIENT)
Dept: LAB | Facility: LAB | Age: 79
End: 2024-07-15
Payer: MEDICARE

## 2024-07-15 DIAGNOSIS — D72.819 LEUKOPENIA, UNSPECIFIED TYPE: ICD-10-CM

## 2024-07-15 DIAGNOSIS — A49.9 UTI (URINARY TRACT INFECTION), BACTERIAL: ICD-10-CM

## 2024-07-15 DIAGNOSIS — N39.0 UTI (URINARY TRACT INFECTION), BACTERIAL: ICD-10-CM

## 2024-07-15 PROCEDURE — 36415 COLL VENOUS BLD VENIPUNCTURE: CPT

## 2024-07-15 PROCEDURE — 87086 URINE CULTURE/COLONY COUNT: CPT

## 2024-07-15 PROCEDURE — 87186 SC STD MICRODIL/AGAR DIL: CPT

## 2024-07-15 PROCEDURE — 85025 COMPLETE CBC W/AUTO DIFF WBC: CPT

## 2024-07-16 LAB
BASOPHILS # BLD AUTO: 0.01 X10*3/UL (ref 0–0.1)
BASOPHILS NFR BLD AUTO: 0.4 %
EOSINOPHIL # BLD AUTO: 0.03 X10*3/UL (ref 0–0.4)
EOSINOPHIL NFR BLD AUTO: 1.1 %
ERYTHROCYTE [DISTWIDTH] IN BLOOD BY AUTOMATED COUNT: 15.3 % (ref 11.5–14.5)
HCT VFR BLD AUTO: 26 % (ref 36–46)
HGB BLD-MCNC: 9.4 G/DL (ref 12–16)
IMM GRANULOCYTES # BLD AUTO: 0.01 X10*3/UL (ref 0–0.5)
IMM GRANULOCYTES NFR BLD AUTO: 0.4 % (ref 0–0.9)
LYMPHOCYTES # BLD AUTO: 0.78 X10*3/UL (ref 0.8–3)
LYMPHOCYTES NFR BLD AUTO: 28.8 %
MCH RBC QN AUTO: 35.6 PG (ref 26–34)
MCHC RBC AUTO-ENTMCNC: 36.2 G/DL (ref 32–36)
MCV RBC AUTO: 99 FL (ref 80–100)
MONOCYTES # BLD AUTO: 0.25 X10*3/UL (ref 0.05–0.8)
MONOCYTES NFR BLD AUTO: 9.2 %
NEUTROPHILS # BLD AUTO: 1.63 X10*3/UL (ref 1.6–5.5)
NEUTROPHILS NFR BLD AUTO: 60.1 %
NRBC BLD-RTO: 0 /100 WBCS (ref 0–0)
PLATELET # BLD AUTO: 76 X10*3/UL (ref 150–450)
RBC # BLD AUTO: 2.64 X10*6/UL (ref 4–5.2)
WBC # BLD AUTO: 5.4 X10*3/UL (ref 4.4–11.3)

## 2024-07-17 ENCOUNTER — TELEPHONE (OUTPATIENT)
Dept: PRIMARY CARE | Facility: CLINIC | Age: 79
End: 2024-07-17
Payer: MEDICARE

## 2024-07-17 DIAGNOSIS — E11.9 CONTROLLED TYPE 2 DIABETES MELLITUS WITHOUT COMPLICATION, WITHOUT LONG-TERM CURRENT USE OF INSULIN (MULTI): Primary | ICD-10-CM

## 2024-07-17 NOTE — TELEPHONE ENCOUNTER
Patients  Moose calling to see if a RX for Zofran can be called in to [facility] in [place]. Patient has been dealing with a lot of health issues recently, just got off a medication for C- Diff, and all day today so far she has been nauseous and vomiting. She can not keep anything down. Aware that EAK is out today.

## 2024-07-18 LAB — BACTERIA UR CULT: ABNORMAL

## 2024-07-19 ENCOUNTER — TELEPHONE (OUTPATIENT)
Dept: PRIMARY CARE | Facility: CLINIC | Age: 79
End: 2024-07-19
Payer: MEDICARE

## 2024-07-19 DIAGNOSIS — I10 BENIGN ESSENTIAL HTN: ICD-10-CM

## 2024-07-19 NOTE — TELEPHONE ENCOUNTER
Patient's  called requesting a refill on Lisinopril 20 mg going through [facility] for patient    Call : [phone]

## 2024-07-22 RX ORDER — LISINOPRIL 20 MG/1
20 TABLET ORAL DAILY
Qty: 90 TABLET | Refills: 1 | Status: SHIPPED | OUTPATIENT
Start: 2024-07-22 | End: 2025-01-18

## 2024-07-24 NOTE — PROGRESS NOTES
Subjective   Patient ID: Millicent Najera is a 79 y.o. female who presents for Follow-up (C-diff and lab review. States she feels weak and tired ).    HPI     Sherri Hanson is here for follow-up from her C. difficile infection.   She is here with her  and her daughter La again. She was treated with vancomycin. She was already seen for hospital follow-up from this.  She has follow-up with urology for her kidney stones.  She has a stent in place.    She was found to have some lymphadenopathy on her abdominal CT which was felt to be related to her C. difficile infection.  She also has had a low white count and a mild anemia and was referred to hematology.  She has an appointment there in September.  Most recent white count shows improvement with continued mild anemia and thrombocytopenia.    She has a routine follow-up with me next month.   She never have lab work completed as ordered by me for her initial visit in April.  I will be managing her diabetes, hypertension, hypercholesterolemia and depression.   she is here today because she had some questions overheat her C. difficile infection.  She is having normal formed stools and no abdominal cramping.  She is eating smaller meals.  Her appetite is improving.  She is drinking a lot of liquids.  She is concerned because she still has weakness  and fatigue.  she feels better than she did when she was seen a couple weeks ago.  She is no longer in a wheelchair.  She has not had losing control of her bowel movements.    She has an appointment with the urologist next week.    She is not checking her blood sugar regularly.    She is requesting Remeron for sleep.  I had recommended melatonin which she states made her sleep worse.  She has been on trazodone in the past and Ambien which neither agreed with her    Review of Systems    She denies fever or chills.    Objective   /78 (BP Location: Left arm)   Pulse 78   Wt 66 kg (145 lb 6.4 oz)   SpO2 94%   BMI 25.76  kg/m²     Physical Exam    She is alert and appears comfortable.  She is a little pale.    Lungs are clear to auscultation.    Heart is regular in rhythm and rate.    Abdomen is soft and nontender.    Skin turgor is normal and mucous membranes are moist.    Assessment/Plan   Diagnoses and all orders for this visit:  Other post infection and related fatigue syndromes  Primary insomnia  -     mirtazapine (Remeron) 15 mg tablet; Take 1 tablet (15 mg) by mouth once daily at bedtime.    She feels much better than she did 2 weeks ago.  I told her that she is on the right path and needs to be patient.  She needs to follow-up with the hematologist as was recommended at her hospital stay as well as by me.  She has an anemia of an unknown cause.  She needs to check her blood sugar daily and bring those numbers in with her.  She should get fasting blood work completed a week before her visit for routine health care coming up.  Remeron was refilled for her   total of 30 minutes was spent with the patient

## 2024-07-26 ENCOUNTER — APPOINTMENT (OUTPATIENT)
Dept: PRIMARY CARE | Facility: CLINIC | Age: 79
End: 2024-07-26
Payer: MEDICARE

## 2024-07-26 VITALS
HEART RATE: 78 BPM | OXYGEN SATURATION: 94 % | BODY MASS INDEX: 25.76 KG/M2 | DIASTOLIC BLOOD PRESSURE: 78 MMHG | SYSTOLIC BLOOD PRESSURE: 116 MMHG | WEIGHT: 145.4 LBS

## 2024-07-26 DIAGNOSIS — G93.39 OTHER POST INFECTION AND RELATED FATIGUE SYNDROMES: Primary | ICD-10-CM

## 2024-07-26 DIAGNOSIS — F51.01 PRIMARY INSOMNIA: ICD-10-CM

## 2024-07-26 PROBLEM — A49.9 UTI (URINARY TRACT INFECTION), BACTERIAL: Status: RESOLVED | Noted: 2024-07-02 | Resolved: 2024-07-26

## 2024-07-26 PROBLEM — N39.0 UTI (URINARY TRACT INFECTION), BACTERIAL: Status: RESOLVED | Noted: 2024-07-02 | Resolved: 2024-07-26

## 2024-07-26 PROBLEM — K52.9 CHRONIC DIARRHEA: Status: RESOLVED | Noted: 2024-06-19 | Resolved: 2024-07-26

## 2024-07-26 RX ORDER — MIRTAZAPINE 15 MG/1
15 TABLET, FILM COATED ORAL NIGHTLY
COMMUNITY
End: 2024-07-26 | Stop reason: SDUPTHER

## 2024-07-26 RX ORDER — MIRTAZAPINE 15 MG/1
15 TABLET, FILM COATED ORAL NIGHTLY
Qty: 30 TABLET | Refills: 1 | Status: SHIPPED | OUTPATIENT
Start: 2024-07-26 | End: 2024-09-24

## 2024-07-26 ASSESSMENT — PATIENT HEALTH QUESTIONNAIRE - PHQ9
2. FEELING DOWN, DEPRESSED OR HOPELESS: NOT AT ALL
SUM OF ALL RESPONSES TO PHQ9 QUESTIONS 1 AND 2: 0
2. FEELING DOWN, DEPRESSED OR HOPELESS: NOT AT ALL
1. LITTLE INTEREST OR PLEASURE IN DOING THINGS: NOT AT ALL
1. LITTLE INTEREST OR PLEASURE IN DOING THINGS: NOT AT ALL
SUM OF ALL RESPONSES TO PHQ9 QUESTIONS 1 AND 2: 0

## 2024-07-26 ASSESSMENT — PAIN SCALES - GENERAL: PAINLEVEL: 0-NO PAIN

## 2024-07-31 ENCOUNTER — APPOINTMENT (OUTPATIENT)
Dept: UROLOGY | Facility: CLINIC | Age: 79
End: 2024-07-31
Payer: MEDICARE

## 2024-07-31 ENCOUNTER — LAB (OUTPATIENT)
Dept: LAB | Facility: LAB | Age: 79
End: 2024-07-31
Payer: MEDICARE

## 2024-07-31 DIAGNOSIS — D64.9 ANEMIA, UNSPECIFIED TYPE: ICD-10-CM

## 2024-07-31 DIAGNOSIS — N39.0 URINARY TRACT INFECTION WITHOUT HEMATURIA, SITE UNSPECIFIED: Primary | ICD-10-CM

## 2024-07-31 DIAGNOSIS — N20.0 LEFT RENAL STONE: ICD-10-CM

## 2024-07-31 LAB
ANION GAP SERPL CALC-SCNC: 11 MMOL/L (ref 10–20)
BUN SERPL-MCNC: 15 MG/DL (ref 6–23)
CALCIUM SERPL-MCNC: 9 MG/DL (ref 8.6–10.3)
CHLORIDE SERPL-SCNC: 106 MMOL/L (ref 98–107)
CO2 SERPL-SCNC: 29 MMOL/L (ref 21–32)
CREAT SERPL-MCNC: 0.78 MG/DL (ref 0.5–1.05)
EGFRCR SERPLBLD CKD-EPI 2021: 77 ML/MIN/1.73M*2
ERYTHROCYTE [DISTWIDTH] IN BLOOD BY AUTOMATED COUNT: 15.2 % (ref 11.5–14.5)
GLUCOSE SERPL-MCNC: 106 MG/DL (ref 74–99)
HCT VFR BLD AUTO: 29.9 % (ref 36–46)
HGB BLD-MCNC: 9.7 G/DL (ref 12–16)
MCH RBC QN AUTO: 31.3 PG (ref 26–34)
MCHC RBC AUTO-ENTMCNC: 32.4 G/DL (ref 32–36)
MCV RBC AUTO: 97 FL (ref 80–100)
NRBC BLD-RTO: 0 /100 WBCS (ref 0–0)
PLATELET # BLD AUTO: 184 X10*3/UL (ref 150–450)
POTASSIUM SERPL-SCNC: 4.2 MMOL/L (ref 3.5–5.3)
RBC # BLD AUTO: 3.1 X10*6/UL (ref 4–5.2)
SODIUM SERPL-SCNC: 142 MMOL/L (ref 136–145)
WBC # BLD AUTO: 5.1 X10*3/UL (ref 4.4–11.3)

## 2024-07-31 PROCEDURE — 80048 BASIC METABOLIC PNL TOTAL CA: CPT

## 2024-07-31 PROCEDURE — 85027 COMPLETE CBC AUTOMATED: CPT

## 2024-07-31 RX ORDER — NITROFURANTOIN 25; 75 MG/1; MG/1
100 CAPSULE ORAL 2 TIMES DAILY
Qty: 16 CAPSULE | Refills: 0 | Status: SHIPPED | OUTPATIENT
Start: 2024-07-31 | End: 2024-08-08

## 2024-07-31 NOTE — PROGRESS NOTES
Subjective   Patient ID: Millicent Najera is a 79 y.o. female.    HPI  80 y/o F s/p cystoscopy and insertion of ureteral stent, retrograde pyelogram for left renal stone 6/20/2024.     After discharge patient states she was in severe pain all night. She states she could not get relief and was up and down all night. Was seen in ED 7/2/24.     Patient was diagnosed with Cdiff and started on PO vancomycin with improvement of symptoms. She was also seen by urology, imaging reviewed imaging and stent is in correct position and her pain is likely from stent irritation. Patient had improvement of symptoms and was discharged.        She denies any pain from stent at this point. Can feel this is there but no pain.     CT abdomen pelvis wo IV contrast  Result Date: 7/2/2024    1. There is a nonobstructing 17 mm stone in the left kidney. A left ureteral stent is in place. There is no hydronephrosis. 2. There are enlarged bilateral inguinal lymph nodes. The largest measures 1.7 x 1.4 cm in the left inguinal region. There are also prominent lymph nodes along the left periaortic region, left pelvic sidewall, and left iliac chain. These may be reactive, but I cannot exclude a neoplastic process.    Review of Systems  A complete review of systems was performed. All systems are noted to be negative unless indicated in the history of present illness, impression, active problem list, or past histories.     Objective   Physical Exam  General: Well developed, well nourished, alert and cooperative, appears in no acute distress  Eyes: Non-injected conjunctiva, sclera clear, no proptosis  Cardiac: Extremities are warm and well perfused. No edema, cyanosis or pallor.   Lungs: Breathing is easy, non-labored. Speaking in clear and complete sentences. Normal diaphragmatic movement.  Abdomen: soft, non-tender. No CVA tenderness.   MSK: Ambulatory with steady gait, unassisted  Neuro: alert and oriented to person, place and time  Psych:  Demonstrates good judgement and reason, without hallucinations, abnormal affect or abnormal behaviors.  Skin: no obvious lesions, no rashes.    Assessment/Plan   80 y/o F s/p cystoscopy and insertion of ureteral stent, retrograde pyelogram for left renal stone 6/20/2024.     After discharge patient states she was in severe pain all night. She states she could not get relief and was up and down all night. Was seen in ED 7/2/24.     Patient was diagnosed with Cdiff and started on PO vancomycin with improvement of symptoms. She was also seen by urology, imaging reviewed imaging and stent is in correct position and her pain is likely from stent irritation. Patient had improvement of symptoms and was discharged.      She denies any pain from stent at this point. Can feel this is there but no pain.     I personally reviewed the CT results. There is a nonobstructing 17 mm stone in the left kidney. A left ureteral stent is in place. There is no hydronephrosis. There are enlarged bilateral inguinal lymph nodes. The largest measures 1.7 x 1.4 cm in the left inguinal region. There are also prominent lymph nodes along the left periaortic region, left pelvic sidewall, and left iliac chain. These may be reactive, but I cannot exclude a neoplastic process.    I discussed with the patient the management options for kidney and ureteral stones, including observation, medical expulsive therapy, stent placement, rigid or flexible ureteroscopy with stone extraction or laser stone fragmentation, ESWL (extracorporeal shock wave lithotripsy), or more advanced options for bigger stones such as percutaneous or surgical approaches. The approach depends on the size and location of the stone as well as any associated infection.  For ureteral stones, medical expulsive therapy is attempted for smaller stones however intervention is indicated in severe pain not controlled by medical therapy, intractable nausea or vomiting, and fever or  chills.    ESWL is noninvasive and requires no stent in the majority of cases, however the stone free rate is lower than ureteroscopy which is more invasive and requires a stent uniformity.    The patient agreed to proceed with left ureteroscopy + stent exchange. Will order CBC and CMP due to prior recent anemia. Will start on antibiotics as outlined below.     Plan:  Start nitrofurantoin 100 mg twice daily.   CBC, CMP due to previous anemia.   Left ureteroscopy + laser lithotripsy + stent exchange.     Diagnoses and all orders for this visit:  Urinary tract infection without hematuria, site unspecified  Left renal stone  -     CBC; Future  -     Basic Metabolic Panel; Future  -     nitrofurantoin, macrocrystal-monohydrate, (Macrobid) 100 mg capsule; Take 1 capsule (100 mg) by mouth 2 times a day for 8 days.  -     Case Request Operating Room: Cystoscopy with Lithotripsy Laser; Standing  Anemia, unspecified type  Other orders  -     Weigh patient; Standing  -     Measure height and length; Standing  -     Place in outpatient/hospital ambulatory surgery; Standing  -     Full code; Standing  -     gentamicin (Garamycin) 80 mg in sodium chloride (PF) 0.9% 100 mL IV    Scribe Attestation  By signing my name below, ISue Scribe attest that this documentation has been prepared under the direction and in the presence of Elena Leal MD.

## 2024-08-01 ENCOUNTER — PATIENT OUTREACH (OUTPATIENT)
Dept: PRIMARY CARE | Facility: CLINIC | Age: 79
End: 2024-08-01
Payer: MEDICARE

## 2024-08-01 NOTE — PROGRESS NOTES
Call regarding  F/U     At time of outreach call the patient feels as if their condition has returned to baseline) since last visit Patient will see Hem/Onc in 9/24/2024 and Urology 8/29/2024 , SSX UTI reviewed, patient is increasing water intake work in progress      Encouraged patient to call providers for any questions concerns or change in condition

## 2024-08-06 ENCOUNTER — APPOINTMENT (OUTPATIENT)
Dept: ORTHOPEDIC SURGERY | Facility: CLINIC | Age: 79
End: 2024-08-06
Payer: MEDICARE

## 2024-08-06 ENCOUNTER — HOSPITAL ENCOUNTER (OUTPATIENT)
Dept: RADIOLOGY | Facility: HOSPITAL | Age: 79
Discharge: HOME | End: 2024-08-06
Payer: MEDICARE

## 2024-08-06 DIAGNOSIS — M17.12 ARTHRITIS OF KNEE, LEFT: Primary | ICD-10-CM

## 2024-08-06 DIAGNOSIS — Z00.6 RESEARCH STUDY PATIENT: Primary | ICD-10-CM

## 2024-08-06 DIAGNOSIS — M17.12 ARTHRITIS OF KNEE, LEFT: ICD-10-CM

## 2024-08-06 PROCEDURE — 73564 X-RAY EXAM KNEE 4 OR MORE: CPT | Mod: LT

## 2024-08-06 NOTE — PROGRESS NOTES
This is a consultation from Dr. Yesi Lam MD for   Chief Complaint   Patient presents with    Left Knee - Follow-up     2/26/24 LT TKA       This is a 79 y.o. female who presents for follow-up of her left total knee.  Patient about 5 months out from her left total knee, she is doing great.  She has had no drainage from incision no fevers no chills.  She has excellent relief of her symptoms.  She has no pain in the knee she is not using any assistive devices or medications for it.  She is back to all her normal activities.    Physical Exam    There has been no interval change in this patient's past medical, surgical, medications, allergies, family history or social history since the most recent visit to a provider within our department. 14 point review of systems was performed, reviewed, and negative except for pertinent positives documented in the history of present illness.     Constitutional: well developed, well nourished female in no acute distress  Psychiatric: normal mood, appropriate affect  Eyes: sclera anicteric  HENT: normocephalic/atraumatic  CV: regular rate and rhythm   Respiratory: non labored breathing  Integumentary: no rash  Neurological: moves all extremities    Left knee examination: Well-healed surgical incision erythema no drainage range of motion of 0 to 120 degrees stable to varus and valgus tress neurovascular tact distally    Xrays were ordered by me, they were reviewed and independently interpreted by me today, they show stable left total knee arthroplasty no fracture dislocation or loosening    Procedures      Impression/Plan: This is a 79 y.o. female status post left total knee doing well.  Weightbearing as tolerated activity as tolerated routine radiographic follow-up    BMI Readings from Last 1 Encounters:   07/26/24 25.76 kg/m²      Lab Results   Component Value Date    CREATININE 0.78 07/31/2024     Tobacco Use: Low Risk  (8/6/2024)    Patient History     Smoking Tobacco Use:  Never     Smokeless Tobacco Use: Never     Passive Exposure: Not on file      Computed MELD 3.0 unavailable. One or more values for this score either were not found within the given timeframe or did not fit some other criterion.  Computed MELD-Na unavailable. One or more values for this score either were not found within the given timeframe or did not fit some other criterion.       Lab Results   Component Value Date    HGBA1C 6.1 (H) 02/09/2024     Lab Results   Component Value Date    STAPHMRSASCR No Staphylococcus aureus isolated 02/09/2024

## 2024-08-07 ENCOUNTER — ANESTHESIA EVENT (OUTPATIENT)
Dept: OPERATING ROOM | Facility: HOSPITAL | Age: 79
End: 2024-08-07
Payer: MEDICARE

## 2024-08-08 ENCOUNTER — HOSPITAL ENCOUNTER (OUTPATIENT)
Facility: HOSPITAL | Age: 79
Setting detail: OUTPATIENT SURGERY
Discharge: HOME | End: 2024-08-08
Attending: STUDENT IN AN ORGANIZED HEALTH CARE EDUCATION/TRAINING PROGRAM | Admitting: STUDENT IN AN ORGANIZED HEALTH CARE EDUCATION/TRAINING PROGRAM
Payer: MEDICARE

## 2024-08-08 ENCOUNTER — PHARMACY VISIT (OUTPATIENT)
Dept: PHARMACY | Facility: CLINIC | Age: 79
End: 2024-08-08
Payer: COMMERCIAL

## 2024-08-08 ENCOUNTER — ANESTHESIA (OUTPATIENT)
Dept: OPERATING ROOM | Facility: HOSPITAL | Age: 79
End: 2024-08-08
Payer: MEDICARE

## 2024-08-08 ENCOUNTER — APPOINTMENT (OUTPATIENT)
Dept: RADIOLOGY | Facility: HOSPITAL | Age: 79
End: 2024-08-08
Payer: MEDICARE

## 2024-08-08 VITALS
RESPIRATION RATE: 13 BRPM | WEIGHT: 143.08 LBS | HEIGHT: 63 IN | OXYGEN SATURATION: 92 % | TEMPERATURE: 96.8 F | BODY MASS INDEX: 25.35 KG/M2 | SYSTOLIC BLOOD PRESSURE: 162 MMHG | DIASTOLIC BLOOD PRESSURE: 69 MMHG | HEART RATE: 82 BPM

## 2024-08-08 DIAGNOSIS — N20.0 LEFT RENAL STONE: ICD-10-CM

## 2024-08-08 DIAGNOSIS — N39.0 URINARY TRACT INFECTION WITHOUT HEMATURIA, SITE UNSPECIFIED: Primary | ICD-10-CM

## 2024-08-08 LAB — GLUCOSE BLD MANUAL STRIP-MCNC: 104 MG/DL (ref 74–99)

## 2024-08-08 PROCEDURE — 3700000002 HC GENERAL ANESTHESIA TIME - EACH INCREMENTAL 1 MINUTE: Performed by: STUDENT IN AN ORGANIZED HEALTH CARE EDUCATION/TRAINING PROGRAM

## 2024-08-08 PROCEDURE — 7100000002 HC RECOVERY ROOM TIME - EACH INCREMENTAL 1 MINUTE: Performed by: STUDENT IN AN ORGANIZED HEALTH CARE EDUCATION/TRAINING PROGRAM

## 2024-08-08 PROCEDURE — 52356 CYSTO/URETERO W/LITHOTRIPSY: CPT | Performed by: STUDENT IN AN ORGANIZED HEALTH CARE EDUCATION/TRAINING PROGRAM

## 2024-08-08 PROCEDURE — C1769 GUIDE WIRE: HCPCS | Performed by: STUDENT IN AN ORGANIZED HEALTH CARE EDUCATION/TRAINING PROGRAM

## 2024-08-08 PROCEDURE — 3600000003 HC OR TIME - INITIAL BASE CHARGE - PROCEDURE LEVEL THREE: Performed by: STUDENT IN AN ORGANIZED HEALTH CARE EDUCATION/TRAINING PROGRAM

## 2024-08-08 PROCEDURE — 74420 UROGRAPHY RTRGR +-KUB: CPT

## 2024-08-08 PROCEDURE — 2720000007 HC OR 272 NO HCPCS: Performed by: STUDENT IN AN ORGANIZED HEALTH CARE EDUCATION/TRAINING PROGRAM

## 2024-08-08 PROCEDURE — 2500000004 HC RX 250 GENERAL PHARMACY W/ HCPCS (ALT 636 FOR OP/ED): Performed by: NURSE ANESTHETIST, CERTIFIED REGISTERED

## 2024-08-08 PROCEDURE — 2500000004 HC RX 250 GENERAL PHARMACY W/ HCPCS (ALT 636 FOR OP/ED): Performed by: STUDENT IN AN ORGANIZED HEALTH CARE EDUCATION/TRAINING PROGRAM

## 2024-08-08 PROCEDURE — 82947 ASSAY GLUCOSE BLOOD QUANT: CPT

## 2024-08-08 PROCEDURE — 2780000003 HC OR 278 NO HCPCS: Performed by: STUDENT IN AN ORGANIZED HEALTH CARE EDUCATION/TRAINING PROGRAM

## 2024-08-08 PROCEDURE — C1894 INTRO/SHEATH, NON-LASER: HCPCS | Performed by: STUDENT IN AN ORGANIZED HEALTH CARE EDUCATION/TRAINING PROGRAM

## 2024-08-08 PROCEDURE — 7100000009 HC PHASE TWO TIME - INITIAL BASE CHARGE: Performed by: STUDENT IN AN ORGANIZED HEALTH CARE EDUCATION/TRAINING PROGRAM

## 2024-08-08 PROCEDURE — 2500000005 HC RX 250 GENERAL PHARMACY W/O HCPCS: Performed by: NURSE ANESTHETIST, CERTIFIED REGISTERED

## 2024-08-08 PROCEDURE — RXMED WILLOW AMBULATORY MEDICATION CHARGE

## 2024-08-08 PROCEDURE — 82365 CALCULUS SPECTROSCOPY: CPT | Performed by: STUDENT IN AN ORGANIZED HEALTH CARE EDUCATION/TRAINING PROGRAM

## 2024-08-08 PROCEDURE — 2550000001 HC RX 255 CONTRASTS: Performed by: STUDENT IN AN ORGANIZED HEALTH CARE EDUCATION/TRAINING PROGRAM

## 2024-08-08 PROCEDURE — 74420 UROGRAPHY RTRGR +-KUB: CPT | Performed by: RADIOLOGY

## 2024-08-08 PROCEDURE — 7100000001 HC RECOVERY ROOM TIME - INITIAL BASE CHARGE: Performed by: STUDENT IN AN ORGANIZED HEALTH CARE EDUCATION/TRAINING PROGRAM

## 2024-08-08 PROCEDURE — 3700000001 HC GENERAL ANESTHESIA TIME - INITIAL BASE CHARGE: Performed by: STUDENT IN AN ORGANIZED HEALTH CARE EDUCATION/TRAINING PROGRAM

## 2024-08-08 PROCEDURE — 7100000010 HC PHASE TWO TIME - EACH INCREMENTAL 1 MINUTE: Performed by: STUDENT IN AN ORGANIZED HEALTH CARE EDUCATION/TRAINING PROGRAM

## 2024-08-08 PROCEDURE — 3600000008 HC OR TIME - EACH INCREMENTAL 1 MINUTE - PROCEDURE LEVEL THREE: Performed by: STUDENT IN AN ORGANIZED HEALTH CARE EDUCATION/TRAINING PROGRAM

## 2024-08-08 PROCEDURE — C2617 STENT, NON-COR, TEM W/O DEL: HCPCS | Performed by: STUDENT IN AN ORGANIZED HEALTH CARE EDUCATION/TRAINING PROGRAM

## 2024-08-08 PROCEDURE — 2500000004 HC RX 250 GENERAL PHARMACY W/ HCPCS (ALT 636 FOR OP/ED): Performed by: ANESTHESIOLOGY

## 2024-08-08 DEVICE — STENT KIT, IMAJIN HYDRO, 6FR X 24CM, POSITIONER, NO GUIDEWIRE: Type: IMPLANTABLE DEVICE | Site: URETER | Status: FUNCTIONAL

## 2024-08-08 RX ORDER — ONDANSETRON HYDROCHLORIDE 2 MG/ML
8 INJECTION, SOLUTION INTRAVENOUS ONCE
Status: DISCONTINUED | OUTPATIENT
Start: 2024-08-08 | End: 2024-08-08 | Stop reason: HOSPADM

## 2024-08-08 RX ORDER — ACETAMINOPHEN 325 MG/1
650 TABLET ORAL EVERY 4 HOURS PRN
Status: DISCONTINUED | OUTPATIENT
Start: 2024-08-08 | End: 2024-08-08 | Stop reason: HOSPADM

## 2024-08-08 RX ORDER — HYDRALAZINE HYDROCHLORIDE 20 MG/ML
5 INJECTION INTRAMUSCULAR; INTRAVENOUS ONCE
Status: COMPLETED | OUTPATIENT
Start: 2024-08-08 | End: 2024-08-08

## 2024-08-08 RX ORDER — CEFAZOLIN 1 G/1
INJECTION, POWDER, FOR SOLUTION INTRAVENOUS AS NEEDED
Status: DISCONTINUED | OUTPATIENT
Start: 2024-08-08 | End: 2024-08-08

## 2024-08-08 RX ORDER — ONDANSETRON HYDROCHLORIDE 2 MG/ML
INJECTION, SOLUTION INTRAVENOUS AS NEEDED
Status: DISCONTINUED | OUTPATIENT
Start: 2024-08-08 | End: 2024-08-08

## 2024-08-08 RX ORDER — PHENAZOPYRIDINE HYDROCHLORIDE 100 MG/1
100 TABLET, FILM COATED ORAL 3 TIMES DAILY PRN
Qty: 10 TABLET | Refills: 0 | Status: SHIPPED | OUTPATIENT
Start: 2024-08-08

## 2024-08-08 RX ORDER — LIDOCAINE HYDROCHLORIDE 10 MG/ML
0.1 INJECTION, SOLUTION EPIDURAL; INFILTRATION; INTRACAUDAL; PERINEURAL ONCE
Status: DISCONTINUED | OUTPATIENT
Start: 2024-08-08 | End: 2024-08-08 | Stop reason: HOSPADM

## 2024-08-08 RX ORDER — ALBUTEROL SULFATE 0.83 MG/ML
2.5 SOLUTION RESPIRATORY (INHALATION) ONCE AS NEEDED
Status: DISCONTINUED | OUTPATIENT
Start: 2024-08-08 | End: 2024-08-08 | Stop reason: HOSPADM

## 2024-08-08 RX ORDER — PROPOFOL 10 MG/ML
INJECTION, EMULSION INTRAVENOUS AS NEEDED
Status: DISCONTINUED | OUTPATIENT
Start: 2024-08-08 | End: 2024-08-08

## 2024-08-08 RX ORDER — FENTANYL CITRATE 50 UG/ML
INJECTION, SOLUTION INTRAMUSCULAR; INTRAVENOUS AS NEEDED
Status: DISCONTINUED | OUTPATIENT
Start: 2024-08-08 | End: 2024-08-08

## 2024-08-08 RX ORDER — MIDAZOLAM HYDROCHLORIDE 1 MG/ML
INJECTION INTRAMUSCULAR; INTRAVENOUS AS NEEDED
Status: DISCONTINUED | OUTPATIENT
Start: 2024-08-08 | End: 2024-08-08

## 2024-08-08 RX ORDER — LIDOCAINE HYDROCHLORIDE 20 MG/ML
INJECTION, SOLUTION INFILTRATION; PERINEURAL AS NEEDED
Status: DISCONTINUED | OUTPATIENT
Start: 2024-08-08 | End: 2024-08-08

## 2024-08-08 RX ORDER — SUCCINYLCHOLINE CHLORIDE 20 MG/ML
INJECTION INTRAMUSCULAR; INTRAVENOUS AS NEEDED
Status: DISCONTINUED | OUTPATIENT
Start: 2024-08-08 | End: 2024-08-08

## 2024-08-08 RX ORDER — KETOROLAC TROMETHAMINE 15 MG/ML
15 INJECTION, SOLUTION INTRAMUSCULAR; INTRAVENOUS ONCE
Status: COMPLETED | OUTPATIENT
Start: 2024-08-08 | End: 2024-08-08

## 2024-08-08 RX ORDER — SODIUM CHLORIDE, SODIUM LACTATE, POTASSIUM CHLORIDE, CALCIUM CHLORIDE 600; 310; 30; 20 MG/100ML; MG/100ML; MG/100ML; MG/100ML
100 INJECTION, SOLUTION INTRAVENOUS CONTINUOUS
Status: DISCONTINUED | OUTPATIENT
Start: 2024-08-08 | End: 2024-08-08 | Stop reason: HOSPADM

## 2024-08-08 ASSESSMENT — PAIN SCALES - PAIN ASSESSMENT IN ADVANCED DEMENTIA (PAINAD)
TOTALSCORE: MEDICATION (SEE MAR)

## 2024-08-08 ASSESSMENT — PAIN SCALES - GENERAL
PAINLEVEL_OUTOF10: 6
PAINLEVEL_OUTOF10: 6
PAIN_LEVEL: 1
PAINLEVEL_OUTOF10: 0 - NO PAIN
PAINLEVEL_OUTOF10: 3
PAINLEVEL_OUTOF10: 5 - MODERATE PAIN
PAINLEVEL_OUTOF10: 5 - MODERATE PAIN

## 2024-08-08 NOTE — ANESTHESIA PROCEDURE NOTES
Airway  Date/Time: 8/8/2024 4:31 PM  Urgency: elective    Airway not difficult    Staffing  Performed: CRNA   Authorized by: CECILIO Larson    Performed by: CECILIO Larson  Patient location during procedure: OR    Indications and Patient Condition  Indications for airway management: anesthesia and airway protection  Spontaneous ventilation: present  Sedation level: deep  Preoxygenated: yes  Patient position: sniffing  MILS maintained throughout  Mask difficulty assessment: 0 - not attempted  No planned trial extubation    Final Airway Details  Final airway type: supraglottic airway      Successful airway: classic  Size 3     Number of attempts at approach: 1  Number of other approaches attempted: 0

## 2024-08-08 NOTE — DISCHARGE INSTRUCTIONS
DEPARTMENT OF UROLOGY  DISCHARGE INSTRUCTIONS ES  Outpatient Surgery    C O N F I D E N T I A L   I N F O R M A T I O N    Call [phone] during regular daytime business hours (8:00 am - 5:00 pm) and after 5:00 pm ask for the Urology resident with any questions or concerns.    If it is a life-threatening situation, proceed to the nearest emergency department.        Follow-up appointment:  Follow up with Dr. Leal as scheduled or call office for appointment  Thank you for the opportunity to care for you today.  Your health and healing are very important to us.  We hope we made you feel as comfortable as possible and are committed to your recovery and continued well-being.      The following is a brief overview of your procedure today. Some of the information contained on this summary may be confidential.  This information should be kept in your records and should be shared with your regular doctor.    Physicians:   Dr. Leal    Procedure performed: Extracorporeal Shockwave Lithotripsy    What to Expect During your Recovery and Home Care  Anesthesia Side Effects   You received anesthesia today.  You may feel sleepy, tired, or have a sore throat.   You may also feel drowsiness, dizziness, or inability to think clearly.  For your safety, do not drive, drink alcoholic beverages, take any unprescribed medication or make any important decisions for 24 hours.  A responsible adult should be with you for 24 hours.        Activity and Recovery    Rest and take it easy today, you may return to your normal activities tomorrow. Expect to be sore for 1-2 weeks.     Pain Control  Unfortunately, you may experience pain after your procedure.  Adequate management can include alternative measures to help ease your pain and can include heating pad to your lower back, over the counter Tylenol. Do not take more than 4,000 mg of Tylenol in 24 hours. You may have also been prescribed tramadol and pyridium for pain control if you  had a stent placed today. If prescribed pyridium this will turn your urine bright orange.     Nausea/Vomiting   Clear liquids are best tolerated at first. Start slow, advance your diet as tolerated to normal foods. Avoid spicy, greasy, heavy foods at first. Also, you may feel nauseous or like you need to vomit if you take any type of medication on an empty stomach.  Call your physician if you are unable to eat or drink and have persistent vomiting.          Treatment/wound care:   You may observe redness over the area of your skin that the shockwave machine was used. This will diminish over the next 12-24 hours.   It is okay to shower 24 hours after time of surgery.     Signs of Infection:  Fever of 100.4 F(38C) or higher, chills, blood in your urine and pain with passing urine    Additional Instructions:   Pieces of your kidney stone may pass in the urine for a few days and may cause some mild pain. Drink 8-10 glasses of water each day. This will help flush out the broken kidney stones. You may be asked to strain our urine using a filter, to collect stone pieces that could be used for testing.    You may have had a stent placed today from your kidney down into your bladder. This helps keep the urinary tract open and prevents blockages of urine flow. The stent can be irritating and can cause some frequency, urgency and blood in your urine when you go to the bathroom. It is important to drink plenty of water and take medications as prescribed. You may be sent home with pyridium which turns your urine bright orange. Applying a heating pad to your back will also help with this kind of pain.

## 2024-08-08 NOTE — ANESTHESIA POSTPROCEDURE EVALUATION
"Patient: Sherri Hanson Stone \"Millicent\"    Procedure Summary       Date: 08/08/24 Room / Location: [facility] / [facility]    Anesthesia Start: 1610 Anesthesia Stop: 1813    Procedure: URETEROSCOPY WITH LITHOTRIPSY LASER, DOUBLE J STENT EXCHANGE, RETROGRADE PYELOGRAM (Left) Diagnosis:       Left renal stone      (Left renal stone [N20.0])    Surgeons: Elena Leal MD Responsible Provider: Pradeep Pires DO    Anesthesia Type: general ASA Status: 3            Anesthesia Type: general    Vitals Value Taken Time   /86    Temp 36 08/08/24 1817   Pulse 84 08/08/24 1810   Resp 16 08/08/24 1817   SpO2 93 % 08/08/24 1810   Vitals shown include unfiled device data.    Anesthesia Post Evaluation    Patient location during evaluation: PACU  Patient participation: complete - patient participated  Level of consciousness: awake  Pain score: 1  Pain management: adequate  Multimodal analgesia pain management approach  Airway patency: patent  Cardiovascular status: acceptable  Respiratory status: acceptable  Hydration status: acceptable  Postoperative Nausea and Vomiting: none        No notable events documented.    "

## 2024-08-08 NOTE — ANESTHESIA PREPROCEDURE EVALUATION
"Patient: Sherri Hanson Stone \"Millicent\"    Procedure Information       Date/Time: 08/08/24 1530    Procedure: CYSTOSCOPY WITH LITHOTRIPSY LASER (Left)    Location: [facility] / [facility]    Surgeons: Elena Leal MD            Relevant Problems   Cardiac   (+) Aortic regurgitation   (+) Essential hypertension   (+) Hypercholesterolemia      Neuro   (+) Anxiety   (+) Cervical radiculopathy   (+) Depression   (+) Headache, chronic daily   (+) Lumbar radiculopathy      /Renal   (+) Left renal stone   (+) Nephrolithiasis      Endocrine   (+) Controlled diabetes mellitus type II without complication (Multi)      Hematology   (+) Anemia      Musculoskeletal   (+) Osteoarthritis      Skin   (+) Skin cancer     Echo 4/2024:  CONCLUSIONS:   1. Left ventricular systolic function is normal with a 65-70% estimated ejection fraction.   2. Spectral Doppler shows a pseudonormal pattern of left ventricular diastolic filling.   3. Slightly elevated RVSP.   4. Mild aortic valve regurgitation.      Clinical information reviewed:   Tobacco  Allergies  Meds   Med Hx  Surg Hx   Fam Hx  Soc Hx        NPO Detail:  NPO/Void Status  Date of Last Liquid: 08/07/24  Time of Last Liquid: 1030  Date of Last Solid: 08/08/24  Time of Last Solid: 1800  Last Intake Type: Clear fluids; Solid meal  Time of Last Void: 1358         Physical Exam    Airway  Mallampati: III  TM distance: >3 FB  Neck ROM: full     Cardiovascular - normal exam     Dental    Pulmonary - normal exam     Abdominal - normal exam         Anesthesia Plan    History of general anesthesia?: yes  History of complications of general anesthesia?: no    ASA 3     general   (Had 2 previous anesthetics that required glide scopes unable to visualize with DL )  intravenous induction   Postoperative administration of opioids is intended.  Trial extubation is planned.  Anesthetic plan and risks discussed with patient.  Use of blood products discussed with patient who.    Plan " discussed with CRNA and attending.

## 2024-08-08 NOTE — OP NOTE
"URETEROSCOPY WITH LITHOTRIPSY LASER, DOUBLE J STENT EXCHANGE, RETROGRADE PYELOGRAM (L) Operative Note     Date: 2024  OR Location: [facility]    Name: Sherri Hanson Stone \"Millicent\", : 1945, Age: 79 y.o., MRN: [id number], Sex: female    Diagnosis  Pre-op Diagnosis      * Left renal stone [N20.0] Post-op Diagnosis     * Left renal stone [N20.0]     Procedures  URETEROSCOPY WITH LITHOTRIPSY LASER, DOUBLE J STENT EXCHANGE, RETROGRADE PYELOGRAM  73765 - LA CYSTO/URETERO W/LITHOTRIPSY &INDWELL STENT INSRT    CHG UROGRAPHY RETROGRADE WITH/WO KUB [39930]  Surgeons      * Elena Leal - Primary    Resident/Fellow/Other Assistant:  Surgeons and Role:  * No surgeons found with a matching role *    Procedure Summary  Anesthesia: Anesthesia type not filed in the log.  ASA: III  Anesthesia Staff: Anesthesiologist: Pradeep Pires DO  CRNA: ANJANA Larson-CRNA  Estimated Blood Loss: 1mL  Intra-op Medications: Administrations occurring from 1530 to 1700 on 24:  * No intraprocedure medications in log *           Anesthesia Record               Intraprocedure I/O Totals       None           Specimen:   ID Type Source Tests Collected by Time   A : LEFT KIDNEY STONE Calculus Urine, Clean Catch CALCULI (STONE) ANALYSIS Elena Leal MD 2024 1744        Staff:   Circulator: Valentina Saeed Person: Missy         Drains and/or Catheters:   Ureteral Drain/Stent Left ureter 6 Fr. (Active)       Tourniquet Times:         Implants:  Implants       Type Name Action Serial No.      Implant STENT KIT, IMAJIN HYDRO, 6FR X 24CM, POSITIONER, NO GUIDEWIRE - [id number] Implanted               Findings: Large left renal pelvis stone    Indications: Millicent Najera is an 79 y.o. female who is having surgery for Left renal stone [N20.0]. Presented a month ago with severe pain, stent was placed, and now presenting for definitive stone management.    The patient was seen in the preoperative area. The risks, benefits, " complications, treatment options, non-operative alternatives, expected recovery and outcomes were discussed with the patient. The possibilities of reaction to medication, pulmonary aspiration, injury to surrounding structures, bleeding, recurrent infection, the need for additional procedures, failure to diagnose a condition, and creating a complication requiring transfusion or operation were discussed with the patient. The patient concurred with the proposed plan, giving informed consent.  The site of surgery was properly noted/marked if necessary per policy. The patient has been actively warmed in preoperative area. Preoperative antibiotics have been ordered and given within 1 hours of incision. Venous thrombosis prophylaxis have been ordered including bilateral sequential compression devices    Procedure Details: Patient was consented and antibiotics were started on call to OR.  In the operating room, under general anesthesia, with the patient in dorsolithotomy position, genitalia was prepped and draped in the usual manner.  #22 cystoscope was inserted down the urethra into the bladder, and cystoscopy revealed no stones or tumors. The old ureteral stent was coming out of the left ureteral orifice. Stent grasper was used to pull out the stent, then ureteral catheter was advanced through the cystoscope and a guidewire was inserted through the left ureteral orifice.  The cystoscope was removed and a ureteral access sheath was advanced over the wire until it was positioned in the mid-ureter.  The inner sheath was removed.  The flexible ureteroscope was inserted through and pyeloscopy was performed revealing the stone in the renal pelvis.  Using the 365 micron laser fiber, the stone was fragmented into small pieces, however because of the size of the stone and the hard composition, the procedure required nearly 50% more time than the usual.  We continued working on the stone until there were no large fragments, and  most of the stone fragments were extracted using the basket.    Contrast was injected through the ureteroscope, showing minimal residual hydronephrosis, and no filling defects.    Flexible ureteroscope and the ureteral access sheath were extracted under vision.  The cystoscope was reinserted over the wire.  A 6-24 Coloplast double-J stent was inserted over the wire and position was adjusted by the pusher.  The proximal curl was seen by fluoroscopy to be in the topography of the kidney, while the distal curl was visualized directly in the bladder.    The bladder was emptied.  This concluded the procedure.  Patient tolerated the procedure well, was awakened and transferred to PACU in stable condition    Complications:  None; patient tolerated the procedure well.    Disposition: PACU - hemodynamically stable.  Condition: stable         Additional Details:     Attending Attestation: I performed the procedure.    Elena Leal  Phone Number: [phone]

## 2024-08-12 LAB
APPEARANCE STONE: NORMAL
COMPN STONE: NORMAL
SPECIMEN WT: 240 MG

## 2024-08-13 ENCOUNTER — APPOINTMENT (OUTPATIENT)
Dept: LAB | Facility: LAB | Age: 79
End: 2024-08-13
Payer: MEDICARE

## 2024-08-13 DIAGNOSIS — R32 URINARY INCONTINENCE, UNSPECIFIED TYPE: ICD-10-CM

## 2024-08-13 RX ORDER — SOLIFENACIN SUCCINATE 5 MG/1
5 TABLET, FILM COATED ORAL DAILY
Qty: 14 TABLET | Refills: 0 | Status: SHIPPED | OUTPATIENT
Start: 2024-08-13 | End: 2024-08-27

## 2024-08-29 ENCOUNTER — APPOINTMENT (OUTPATIENT)
Dept: UROLOGY | Facility: CLINIC | Age: 79
End: 2024-08-29
Payer: MEDICARE

## 2024-08-29 DIAGNOSIS — Z96.0 URETERAL STENT PRESENT: Primary | ICD-10-CM

## 2024-08-29 DIAGNOSIS — N20.0 KIDNEY STONE ON LEFT SIDE: ICD-10-CM

## 2024-08-29 NOTE — PROGRESS NOTES
"Patient ID: Sherri Hanson Stone \"Millicent\" is a 79 y.o. female.    Procedures  Patient ID: Sherri Hanson Stone \"Millicent\" is a 79 y.o. female ureteroscopy with lithotripsy, double J stent exchange, retrograde pyelogram 8/8/24. Recovering well from procedure. No issues.      Procedures  PROCEDURE NOTE:     PREOPERATIVE DIAGNOSIS:  left ureteral stone     POSTOPERATIVE DIAGNOSIS:  Same     OPERATION:  Flexible Cystourethroscopy  Removal of left double J stent     SURGEON:  Elena Leal MD     ANESTHESIA:  2%  lidocaine jelly     COMPLICATIONS:  None     EBL: Minimal     SPECIMEN:  Voided urine was not collected and submitted for culture.     DISPOSITION:  The patient was discharged home after the procedure, per routine.     INDICATIONS: :  Patient with a history of left ureteral stone who underwent left ureteroscopy and laser stone fragmentation with stent placement,  who presents today for Cystoscopy and stent removal.      The indications, risks and benefits of this procedure were discussed with the patient, consent was obtained prior to the procedure, and to the best of my judgement the patient seemed to understand and agree to the procedure.     PROCEDURE:  The patient  was brought into the procedure suite and informed consent was reviewed and confirmed. Vital signs were obtained prior to the procedure: There were no vitals taken for this visit.  The patient was escorted onto the stretcher, placed supine, prepped with betadine and draped in the usual standard surgical fashion.  Intraurethral 2% viscous lidocaine jelly was used for local analgesia.  A 16 Jamaican flexible cystourethroscope was inserted into the urethra.   The urethra was normal.  Upon entering the bladder the entire bladder was surveyed in a 360 degree fashion.  The left and right ureteral orifices were in normal orthotopic position with the stent protruding out of the left ureteral orifice.   Using the stent grasper, the left double J stent was " grasped and pulled out of the urethra.   The patient tolerated the procedure well.  Vitals were stable after the procedure.  The patient was able to void and was discharged home.  Verbal and written Post procedure instructions were reviewed with the patient.     IMPRESSION:  Left ureteral stent removed smoothly     PLAN:  Encourage hydration  Follow up as needed

## 2024-09-03 ENCOUNTER — OFFICE VISIT (OUTPATIENT)
Dept: PRIMARY CARE | Facility: CLINIC | Age: 79
End: 2024-09-03
Payer: MEDICARE

## 2024-09-03 ENCOUNTER — LAB (OUTPATIENT)
Dept: LAB | Facility: LAB | Age: 79
End: 2024-09-03
Payer: MEDICARE

## 2024-09-03 VITALS
TEMPERATURE: 98.4 F | BODY MASS INDEX: 24.45 KG/M2 | SYSTOLIC BLOOD PRESSURE: 116 MMHG | HEART RATE: 97 BPM | OXYGEN SATURATION: 94 % | WEIGHT: 138 LBS | DIASTOLIC BLOOD PRESSURE: 64 MMHG

## 2024-09-03 DIAGNOSIS — A49.9 UTI (URINARY TRACT INFECTION), BACTERIAL: Primary | ICD-10-CM

## 2024-09-03 DIAGNOSIS — R53.1 WEAKNESS: ICD-10-CM

## 2024-09-03 DIAGNOSIS — I10 ESSENTIAL HYPERTENSION: ICD-10-CM

## 2024-09-03 DIAGNOSIS — R10.84 ABDOMINAL DISCOMFORT, GENERALIZED: ICD-10-CM

## 2024-09-03 DIAGNOSIS — E11.9 CONTROLLED TYPE 2 DIABETES MELLITUS WITHOUT COMPLICATION, WITHOUT LONG-TERM CURRENT USE OF INSULIN (MULTI): ICD-10-CM

## 2024-09-03 DIAGNOSIS — E78.5 HYPERLIPIDEMIA, UNSPECIFIED HYPERLIPIDEMIA TYPE: ICD-10-CM

## 2024-09-03 DIAGNOSIS — K21.9 GASTROESOPHAGEAL REFLUX DISEASE WITHOUT ESOPHAGITIS: ICD-10-CM

## 2024-09-03 DIAGNOSIS — N39.0 UTI (URINARY TRACT INFECTION), BACTERIAL: Primary | ICD-10-CM

## 2024-09-03 DIAGNOSIS — E78.00 HYPERCHOLESTEROLEMIA: ICD-10-CM

## 2024-09-03 DIAGNOSIS — R53.1 WEAKNESS: Primary | ICD-10-CM

## 2024-09-03 DIAGNOSIS — F41.9 ANXIETY: ICD-10-CM

## 2024-09-03 LAB
ALBUMIN SERPL-MCNC: 2.9 G/DL (ref 3.5–5)
ALP BLD-CCNC: 173 U/L (ref 35–125)
ALT SERPL-CCNC: 19 U/L (ref 5–40)
ANION GAP SERPL CALC-SCNC: 16 MMOL/L
AST SERPL-CCNC: 35 U/L (ref 5–40)
BASOPHILS # BLD AUTO: 0.05 X10*3/UL (ref 0–0.1)
BASOPHILS NFR BLD AUTO: 0.3 %
BILIRUB SERPL-MCNC: 0.4 MG/DL (ref 0.1–1.2)
BUN SERPL-MCNC: 42 MG/DL (ref 8–25)
BURR CELLS BLD QL SMEAR: NORMAL
CALCIUM SERPL-MCNC: 9.1 MG/DL (ref 8.5–10.4)
CHLORIDE SERPL-SCNC: 98 MMOL/L (ref 97–107)
CHOLEST SERPL-MCNC: 111 MG/DL (ref 133–200)
CHOLEST/HDLC SERPL: 7.4 {RATIO}
CO2 SERPL-SCNC: 24 MMOL/L (ref 24–31)
CREAT SERPL-MCNC: 1.7 MG/DL (ref 0.4–1.6)
CRP SERPL-MCNC: >70 MG/DL (ref 0–2)
EGFRCR SERPLBLD CKD-EPI 2021: 30 ML/MIN/1.73M*2
EOSINOPHIL # BLD AUTO: 0.03 X10*3/UL (ref 0–0.4)
EOSINOPHIL NFR BLD AUTO: 0.2 %
ERYTHROCYTE [DISTWIDTH] IN BLOOD BY AUTOMATED COUNT: 15.6 % (ref 11.5–14.5)
ERYTHROCYTE [SEDIMENTATION RATE] IN BLOOD BY WESTERGREN METHOD: 94 MM/H (ref 0–30)
EST. AVERAGE GLUCOSE BLD GHB EST-MCNC: 108 MG/DL
GLUCOSE SERPL-MCNC: 122 MG/DL (ref 65–99)
HBA1C MFR BLD: 5.4 %
HCT VFR BLD AUTO: 26.3 % (ref 36–46)
HDLC SERPL-MCNC: 15 MG/DL
HGB BLD-MCNC: 9.4 G/DL (ref 12–16)
IMM GRANULOCYTES # BLD AUTO: 0.22 X10*3/UL (ref 0–0.5)
IMM GRANULOCYTES NFR BLD AUTO: 1.2 % (ref 0–0.9)
LDLC SERPL CALC-MCNC: 38 MG/DL (ref 65–130)
LYMPHOCYTES # BLD AUTO: 1.52 X10*3/UL (ref 0.8–3)
LYMPHOCYTES NFR BLD AUTO: 8 %
MCH RBC QN AUTO: 33.1 PG (ref 26–34)
MCHC RBC AUTO-ENTMCNC: 35.7 G/DL (ref 32–36)
MCV RBC AUTO: 93 FL (ref 80–100)
MONOCYTES # BLD AUTO: 0.87 X10*3/UL (ref 0.05–0.8)
MONOCYTES NFR BLD AUTO: 4.6 %
NEUTROPHILS # BLD AUTO: 16.26 X10*3/UL (ref 1.6–5.5)
NEUTROPHILS NFR BLD AUTO: 85.7 %
NEUTS VAC BLD QL SMEAR: PRESENT
NRBC BLD-RTO: 0 /100 WBCS (ref 0–0)
PLATELET # BLD AUTO: 441 X10*3/UL (ref 150–450)
POTASSIUM SERPL-SCNC: 3.2 MMOL/L (ref 3.4–5.1)
PROT SERPL-MCNC: 6.1 G/DL (ref 5.9–7.9)
RBC # BLD AUTO: 2.84 X10*6/UL (ref 4–5.2)
RBC MORPH BLD: NORMAL
SCHISTOCYTES BLD QL SMEAR: NORMAL
SODIUM SERPL-SCNC: 138 MMOL/L (ref 133–145)
TRIGL SERPL-MCNC: 292 MG/DL (ref 40–150)
TSH SERPL DL<=0.05 MIU/L-ACNC: 1.42 MIU/L (ref 0.27–4.2)
WBC # BLD AUTO: 19 X10*3/UL (ref 4.4–11.3)

## 2024-09-03 PROCEDURE — 1159F MED LIST DOCD IN RCRD: CPT | Performed by: FAMILY MEDICINE

## 2024-09-03 PROCEDURE — 83036 HEMOGLOBIN GLYCOSYLATED A1C: CPT

## 2024-09-03 PROCEDURE — 1125F AMNT PAIN NOTED PAIN PRSNT: CPT | Performed by: FAMILY MEDICINE

## 2024-09-03 PROCEDURE — 99214 OFFICE O/P EST MOD 30 MIN: CPT | Performed by: FAMILY MEDICINE

## 2024-09-03 PROCEDURE — 84443 ASSAY THYROID STIM HORMONE: CPT

## 2024-09-03 PROCEDURE — 1036F TOBACCO NON-USER: CPT | Performed by: FAMILY MEDICINE

## 2024-09-03 PROCEDURE — 1123F ACP DISCUSS/DSCN MKR DOCD: CPT | Performed by: FAMILY MEDICINE

## 2024-09-03 PROCEDURE — 3078F DIAST BP <80 MM HG: CPT | Performed by: FAMILY MEDICINE

## 2024-09-03 PROCEDURE — 85652 RBC SED RATE AUTOMATED: CPT

## 2024-09-03 PROCEDURE — 86140 C-REACTIVE PROTEIN: CPT

## 2024-09-03 PROCEDURE — 3074F SYST BP LT 130 MM HG: CPT | Performed by: FAMILY MEDICINE

## 2024-09-03 PROCEDURE — 85025 COMPLETE CBC W/AUTO DIFF WBC: CPT

## 2024-09-03 PROCEDURE — 80053 COMPREHEN METABOLIC PANEL: CPT

## 2024-09-03 PROCEDURE — 1160F RVW MEDS BY RX/DR IN RCRD: CPT | Performed by: FAMILY MEDICINE

## 2024-09-03 PROCEDURE — 36415 COLL VENOUS BLD VENIPUNCTURE: CPT

## 2024-09-03 PROCEDURE — 80061 LIPID PANEL: CPT

## 2024-09-03 RX ORDER — LORAZEPAM 0.5 MG/1
0.5 TABLET ORAL 2 TIMES DAILY PRN
Qty: 30 TABLET | Refills: 0 | Status: SHIPPED | OUTPATIENT
Start: 2024-09-03 | End: 2024-10-03

## 2024-09-03 RX ORDER — OMEPRAZOLE 40 MG/1
40 CAPSULE, DELAYED RELEASE ORAL
Qty: 30 CAPSULE | Refills: 0 | Status: SHIPPED | OUTPATIENT
Start: 2024-09-03 | End: 2024-10-03

## 2024-09-03 ASSESSMENT — PATIENT HEALTH QUESTIONNAIRE - PHQ9
SUM OF ALL RESPONSES TO PHQ9 QUESTIONS 1 AND 2: 2
1. LITTLE INTEREST OR PLEASURE IN DOING THINGS: SEVERAL DAYS
SUM OF ALL RESPONSES TO PHQ9 QUESTIONS 1 AND 2: 2
1. LITTLE INTEREST OR PLEASURE IN DOING THINGS: SEVERAL DAYS
2. FEELING DOWN, DEPRESSED OR HOPELESS: SEVERAL DAYS
2. FEELING DOWN, DEPRESSED OR HOPELESS: SEVERAL DAYS

## 2024-09-03 ASSESSMENT — PAIN SCALES - GENERAL: PAINLEVEL: 3

## 2024-09-03 NOTE — PROGRESS NOTES
Subjective   Patient ID: Millicent Najera is a 79 y.o. female who presents for Diarrhea (With vomiting and low appetite x 8/30).    HPI      Millicent is seen as an acute for decreased appetite and nausea.  She vomited 2 or 3 times.   She is here with her son Ricky.   She states a week ago she started to feel some abdominal discomfort.  She continued to eat fairly normally.  However for the past 4 days her appetite has been decreased and she did vomit 2 or 3 times.  Her son thinks it is because she was stressed related to her acute illness.  She has had this reaction before.   However over the past 4 days she has had overwhelming weakness.  She has been using a wheelchair and/or a walker at home.  Her son put a bedside commode next to her bed so she would not have to walk all the way to the bathroom.   She was in the hospital 2 months ago with a kidney stone and subsequently got C. difficile colitis.  She has been seen in follow-up with resolution of the diarrhea.   She is having soft stools which she has had since she was in the hospital.  No diarrhea.    She has eaten some toast and watermelon and a cup of tea this morning.    She has diabetes.  She was seen as a new patient a few months ago and never had lab work completed as ordered for her chronic illnesses.  She does have an upcoming appointment with me next month.  Her blood sugars have been around the 100-120.    She has chronic sinus headaches but nothing that is new.   She had a ureteral stent removed about 5 days ago from a kidney stone.   He is no longer taking the Flomax.    Her son works in the emergency care and there is a program that can do home visits with vital signs which she feels she may benefit from.  He is also wondering if she would benefit from some low-dose Ativan to take very intermittently for anxiety.      Review of Systems    No fever or chills.    No chest tightness or shortness of breath.    No difficulty swallowing.    No localized  weakness.  She does not offer that she has myalgia but does admit to it when I ask the question.    Objective   /64 (BP Location: Left arm)   Pulse 97   Temp 36.9 °C (98.4 °F) (Temporal)   Wt 62.6 kg (138 lb)   SpO2 94%   BMI 24.45 kg/m²     Physical Exam    She initially is sitting in the chair and seems alert and well-hydrated.  She needs assistance to get up on the exam table due to her weakness.    She is able to support her own weight.    Face shows no asymmetry.   Speech is not slurred.    Neck is supple without adenopathy.    Lungs are clear to auscultation.    Heart is regular in rhythm and rate.    Abdomen is soft with diffuse mild tenderness without rebound.  No guarding.  No organomegaly or masses.    Neuro is intact and nonfocal.    Skin turgor is normal and mucous membranes are moist.    Assessment/Plan   Diagnoses and all orders for this visit:  Weakness  -     Comprehensive Metabolic Panel; Future  -     CBC and Auto Differential; Future  -     TSH with reflex to Free T4 if abnormal; Future  -     Sedimentation Rate; Future  -     C-reactive protein; Future  -     Urinalysis with Reflex Microscopic; Future  Gastroesophageal reflux disease without esophagitis  -     omeprazole (PriLOSEC) 40 mg DR capsule; Take 1 capsule (40 mg) by mouth once daily in the morning. Take before meals. Do not crush or chew.  Anxiety  -     LORazepam (Ativan) 0.5 mg tablet; Take 1 tablet (0.5 mg) by mouth 2 times a day as needed for anxiety.  Abdominal discomfort, generalized  Controlled type 2 diabetes mellitus without complication, without long-term current use of insulin (Multi)  -     Hemoglobin A1C; Future   she will get lab work as ordered when she leaves here.  Will add omeprazole to take 1 daily.  Low-dose Ativan was provided to take only as needed.    I recommend an increase in fluids and a brat diet with advancement as tolerated.  Will follow-up based on the test results.    A total of 45 minutes was  spent on the encounter.

## 2024-09-03 NOTE — PATIENT INSTRUCTIONS
Please get blood work as ordered.    I suggest you increase your intake of clear liquids.  You should stick with bland  foods  for a few days. BRAT diet

## 2024-09-04 ENCOUNTER — APPOINTMENT (OUTPATIENT)
Dept: RADIOLOGY | Facility: HOSPITAL | Age: 79
DRG: 690 | End: 2024-09-04
Payer: MEDICARE

## 2024-09-04 ENCOUNTER — HOSPITAL ENCOUNTER (INPATIENT)
Facility: HOSPITAL | Age: 79
LOS: 3 days | Discharge: HOME | End: 2024-09-08
Attending: EMERGENCY MEDICINE | Admitting: STUDENT IN AN ORGANIZED HEALTH CARE EDUCATION/TRAINING PROGRAM
Payer: MEDICARE

## 2024-09-04 DIAGNOSIS — K59.00 CONSTIPATION, UNSPECIFIED CONSTIPATION TYPE: ICD-10-CM

## 2024-09-04 DIAGNOSIS — N10 ACUTE PYELONEPHRITIS: ICD-10-CM

## 2024-09-04 DIAGNOSIS — N12 PYELONEPHRITIS: Primary | ICD-10-CM

## 2024-09-04 DIAGNOSIS — D50.8 IRON DEFICIENCY ANEMIA SECONDARY TO INADEQUATE DIETARY IRON INTAKE: ICD-10-CM

## 2024-09-04 DIAGNOSIS — K52.9 COLITIS: ICD-10-CM

## 2024-09-04 DIAGNOSIS — E87.6 HYPOKALEMIA: ICD-10-CM

## 2024-09-04 LAB
ALBUMIN SERPL BCP-MCNC: 2.8 G/DL (ref 3.4–5)
ALP SERPL-CCNC: 164 U/L (ref 33–136)
ALT SERPL W P-5'-P-CCNC: 18 U/L (ref 7–45)
AMORPH CRY #/AREA UR COMP ASSIST: ABNORMAL /HPF
ANION GAP SERPL CALC-SCNC: 15 MMOL/L (ref 10–20)
APPEARANCE UR: ABNORMAL
AST SERPL W P-5'-P-CCNC: 32 U/L (ref 9–39)
BACTERIA #/AREA URNS AUTO: ABNORMAL /HPF
BASOPHILS # BLD AUTO: 0.05 X10*3/UL (ref 0–0.1)
BASOPHILS NFR BLD AUTO: 0.2 %
BILIRUB SERPL-MCNC: 0.6 MG/DL (ref 0–1.2)
BILIRUB UR STRIP.AUTO-MCNC: NEGATIVE MG/DL
BUN SERPL-MCNC: 32 MG/DL (ref 6–23)
CALCIUM SERPL-MCNC: 9.3 MG/DL (ref 8.6–10.3)
CHLORIDE SERPL-SCNC: 98 MMOL/L (ref 98–107)
CO2 SERPL-SCNC: 27 MMOL/L (ref 21–32)
COLOR UR: YELLOW
CREAT SERPL-MCNC: 1.35 MG/DL (ref 0.5–1.05)
EGFRCR SERPLBLD CKD-EPI 2021: 40 ML/MIN/1.73M*2
EOSINOPHIL # BLD AUTO: 0.06 X10*3/UL (ref 0–0.4)
EOSINOPHIL NFR BLD AUTO: 0.3 %
ERYTHROCYTE [DISTWIDTH] IN BLOOD BY AUTOMATED COUNT: 15.5 % (ref 11.5–14.5)
GLUCOSE SERPL-MCNC: 104 MG/DL (ref 74–99)
GLUCOSE UR STRIP.AUTO-MCNC: NORMAL MG/DL
HCT VFR BLD AUTO: 30.4 % (ref 36–46)
HGB BLD-MCNC: 9.4 G/DL (ref 12–16)
IMM GRANULOCYTES # BLD AUTO: 0.16 X10*3/UL (ref 0–0.5)
IMM GRANULOCYTES NFR BLD AUTO: 0.8 % (ref 0–0.9)
KETONES UR STRIP.AUTO-MCNC: NEGATIVE MG/DL
LACTATE SERPL-SCNC: 1.4 MMOL/L (ref 0.4–2)
LEUKOCYTE ESTERASE UR QL STRIP.AUTO: ABNORMAL
LIPASE SERPL-CCNC: 25 U/L (ref 9–82)
LYMPHOCYTES # BLD AUTO: 1.81 X10*3/UL (ref 0.8–3)
LYMPHOCYTES NFR BLD AUTO: 8.9 %
MAGNESIUM SERPL-MCNC: 2.73 MG/DL (ref 1.6–2.4)
MCH RBC QN AUTO: 27.6 PG (ref 26–34)
MCHC RBC AUTO-ENTMCNC: 30.9 G/DL (ref 32–36)
MCV RBC AUTO: 89 FL (ref 80–100)
MONOCYTES # BLD AUTO: 0.84 X10*3/UL (ref 0.05–0.8)
MONOCYTES NFR BLD AUTO: 4.2 %
NEUTROPHILS # BLD AUTO: 17.31 X10*3/UL (ref 1.6–5.5)
NEUTROPHILS NFR BLD AUTO: 85.6 %
NITRITE UR QL STRIP.AUTO: ABNORMAL
NRBC BLD-RTO: 0 /100 WBCS (ref 0–0)
PH UR STRIP.AUTO: 6 [PH]
PLATELET # BLD AUTO: 469 X10*3/UL (ref 150–450)
POTASSIUM SERPL-SCNC: 3.1 MMOL/L (ref 3.5–5.3)
PROT SERPL-MCNC: 7 G/DL (ref 6.4–8.2)
PROT UR STRIP.AUTO-MCNC: ABNORMAL MG/DL
RBC # BLD AUTO: 3.4 X10*6/UL (ref 4–5.2)
RBC # UR STRIP.AUTO: ABNORMAL /UL
RBC #/AREA URNS AUTO: ABNORMAL /HPF
SARS-COV-2 RNA RESP QL NAA+PROBE: NOT DETECTED
SODIUM SERPL-SCNC: 137 MMOL/L (ref 136–145)
SP GR UR STRIP.AUTO: 1.04
UROBILINOGEN UR STRIP.AUTO-MCNC: NORMAL MG/DL
WBC # BLD AUTO: 20.2 X10*3/UL (ref 4.4–11.3)
WBC #/AREA URNS AUTO: >50 /HPF

## 2024-09-04 PROCEDURE — 83605 ASSAY OF LACTIC ACID: CPT

## 2024-09-04 PROCEDURE — 83690 ASSAY OF LIPASE: CPT

## 2024-09-04 PROCEDURE — 84075 ASSAY ALKALINE PHOSPHATASE: CPT

## 2024-09-04 PROCEDURE — 96367 TX/PROPH/DG ADDL SEQ IV INF: CPT

## 2024-09-04 PROCEDURE — 96375 TX/PRO/DX INJ NEW DRUG ADDON: CPT

## 2024-09-04 PROCEDURE — 2550000001 HC RX 255 CONTRASTS

## 2024-09-04 PROCEDURE — 99285 EMERGENCY DEPT VISIT HI MDM: CPT | Mod: 25

## 2024-09-04 PROCEDURE — 81001 URINALYSIS AUTO W/SCOPE: CPT

## 2024-09-04 PROCEDURE — 87077 CULTURE AEROBIC IDENTIFY: CPT | Mod: GEALAB

## 2024-09-04 PROCEDURE — G0378 HOSPITAL OBSERVATION PER HR: HCPCS

## 2024-09-04 PROCEDURE — 36415 COLL VENOUS BLD VENIPUNCTURE: CPT

## 2024-09-04 PROCEDURE — 87186 SC STD MICRODIL/AGAR DIL: CPT | Mod: GEALAB

## 2024-09-04 PROCEDURE — 96365 THER/PROPH/DIAG IV INF INIT: CPT

## 2024-09-04 PROCEDURE — 99223 1ST HOSP IP/OBS HIGH 75: CPT | Performed by: STUDENT IN AN ORGANIZED HEALTH CARE EDUCATION/TRAINING PROGRAM

## 2024-09-04 PROCEDURE — 96372 THER/PROPH/DIAG INJ SC/IM: CPT | Performed by: STUDENT IN AN ORGANIZED HEALTH CARE EDUCATION/TRAINING PROGRAM

## 2024-09-04 PROCEDURE — 2500000001 HC RX 250 WO HCPCS SELF ADMINISTERED DRUGS (ALT 637 FOR MEDICARE OP): Performed by: STUDENT IN AN ORGANIZED HEALTH CARE EDUCATION/TRAINING PROGRAM

## 2024-09-04 PROCEDURE — 83735 ASSAY OF MAGNESIUM: CPT

## 2024-09-04 PROCEDURE — 2500000004 HC RX 250 GENERAL PHARMACY W/ HCPCS (ALT 636 FOR OP/ED)

## 2024-09-04 PROCEDURE — 2500000004 HC RX 250 GENERAL PHARMACY W/ HCPCS (ALT 636 FOR OP/ED): Performed by: STUDENT IN AN ORGANIZED HEALTH CARE EDUCATION/TRAINING PROGRAM

## 2024-09-04 PROCEDURE — 71045 X-RAY EXAM CHEST 1 VIEW: CPT

## 2024-09-04 PROCEDURE — 85025 COMPLETE CBC W/AUTO DIFF WBC: CPT

## 2024-09-04 PROCEDURE — 87635 SARS-COV-2 COVID-19 AMP PRB: CPT

## 2024-09-04 PROCEDURE — 87086 URINE CULTURE/COLONY COUNT: CPT | Mod: GEALAB

## 2024-09-04 PROCEDURE — 71045 X-RAY EXAM CHEST 1 VIEW: CPT | Mod: FOREIGN READ | Performed by: RADIOLOGY

## 2024-09-04 PROCEDURE — 96366 THER/PROPH/DIAG IV INF ADDON: CPT

## 2024-09-04 PROCEDURE — 74177 CT ABD & PELVIS W/CONTRAST: CPT | Mod: FOREIGN READ | Performed by: RADIOLOGY

## 2024-09-04 PROCEDURE — 74177 CT ABD & PELVIS W/CONTRAST: CPT

## 2024-09-04 RX ORDER — ENOXAPARIN SODIUM 100 MG/ML
30 INJECTION SUBCUTANEOUS EVERY 24 HOURS
Status: DISCONTINUED | OUTPATIENT
Start: 2024-09-04 | End: 2024-09-08 | Stop reason: HOSPADM

## 2024-09-04 RX ORDER — PAROXETINE HYDROCHLORIDE 20 MG/1
10 TABLET, FILM COATED ORAL DAILY
Status: DISCONTINUED | OUTPATIENT
Start: 2024-09-05 | End: 2024-09-08 | Stop reason: HOSPADM

## 2024-09-04 RX ORDER — DEXTROSE 50 % IN WATER (D50W) INTRAVENOUS SYRINGE
12.5
Status: DISCONTINUED | OUTPATIENT
Start: 2024-09-04 | End: 2024-09-08 | Stop reason: HOSPADM

## 2024-09-04 RX ORDER — PANTOPRAZOLE SODIUM 40 MG/1
40 TABLET, DELAYED RELEASE ORAL
Status: DISCONTINUED | OUTPATIENT
Start: 2024-09-05 | End: 2024-09-08 | Stop reason: HOSPADM

## 2024-09-04 RX ORDER — ONDANSETRON HYDROCHLORIDE 2 MG/ML
4 INJECTION, SOLUTION INTRAVENOUS EVERY 6 HOURS PRN
Status: DISCONTINUED | OUTPATIENT
Start: 2024-09-04 | End: 2024-09-08 | Stop reason: HOSPADM

## 2024-09-04 RX ORDER — DEXTROSE 50 % IN WATER (D50W) INTRAVENOUS SYRINGE
25
Status: DISCONTINUED | OUTPATIENT
Start: 2024-09-04 | End: 2024-09-08 | Stop reason: HOSPADM

## 2024-09-04 RX ORDER — SODIUM CHLORIDE 9 MG/ML
75 INJECTION, SOLUTION INTRAVENOUS CONTINUOUS
Status: DISCONTINUED | OUTPATIENT
Start: 2024-09-04 | End: 2024-09-07

## 2024-09-04 RX ORDER — KETOROLAC TROMETHAMINE 15 MG/ML
15 INJECTION, SOLUTION INTRAMUSCULAR; INTRAVENOUS ONCE
Status: DISCONTINUED | OUTPATIENT
Start: 2024-09-04 | End: 2024-09-04

## 2024-09-04 RX ORDER — MORPHINE SULFATE 4 MG/ML
4 INJECTION INTRAVENOUS ONCE
Status: COMPLETED | OUTPATIENT
Start: 2024-09-04 | End: 2024-09-04

## 2024-09-04 RX ORDER — INSULIN LISPRO 100 [IU]/ML
0-15 INJECTION, SOLUTION INTRAVENOUS; SUBCUTANEOUS
Status: DISCONTINUED | OUTPATIENT
Start: 2024-09-04 | End: 2024-09-08 | Stop reason: HOSPADM

## 2024-09-04 RX ORDER — ATORVASTATIN CALCIUM 80 MG/1
80 TABLET, FILM COATED ORAL DAILY
Status: DISCONTINUED | OUTPATIENT
Start: 2024-09-05 | End: 2024-09-08 | Stop reason: HOSPADM

## 2024-09-04 RX ORDER — LORAZEPAM 0.5 MG/1
0.5 TABLET ORAL 2 TIMES DAILY PRN
Status: DISCONTINUED | OUTPATIENT
Start: 2024-09-04 | End: 2024-09-08 | Stop reason: HOSPADM

## 2024-09-04 RX ORDER — MIRTAZAPINE 15 MG/1
15 TABLET, FILM COATED ORAL NIGHTLY
Status: DISCONTINUED | OUTPATIENT
Start: 2024-09-04 | End: 2024-09-08 | Stop reason: HOSPADM

## 2024-09-04 RX ORDER — POTASSIUM CHLORIDE 14.9 MG/ML
20 INJECTION INTRAVENOUS ONCE
Status: COMPLETED | OUTPATIENT
Start: 2024-09-04 | End: 2024-09-04

## 2024-09-04 RX ORDER — VANCOMYCIN HYDROCHLORIDE 1 G/20ML
INJECTION, POWDER, LYOPHILIZED, FOR SOLUTION INTRAVENOUS DAILY PRN
Status: DISCONTINUED | OUTPATIENT
Start: 2024-09-04 | End: 2024-09-04

## 2024-09-04 RX ORDER — ONDANSETRON HYDROCHLORIDE 2 MG/ML
4 INJECTION, SOLUTION INTRAVENOUS ONCE
Status: COMPLETED | OUTPATIENT
Start: 2024-09-04 | End: 2024-09-04

## 2024-09-04 SDOH — SOCIAL STABILITY: SOCIAL INSECURITY: WERE YOU ABLE TO COMPLETE ALL THE BEHAVIORAL HEALTH SCREENINGS?: YES

## 2024-09-04 SDOH — SOCIAL STABILITY: SOCIAL INSECURITY: ARE THERE ANY APPARENT SIGNS OF INJURIES/BEHAVIORS THAT COULD BE RELATED TO ABUSE/NEGLECT?: NO

## 2024-09-04 SDOH — SOCIAL STABILITY: SOCIAL INSECURITY: HAVE YOU HAD ANY THOUGHTS OF HARMING ANYONE ELSE?: NO

## 2024-09-04 SDOH — SOCIAL STABILITY: SOCIAL INSECURITY: HAS ANYONE EVER THREATENED TO HURT YOUR FAMILY OR YOUR PETS?: NO

## 2024-09-04 SDOH — SOCIAL STABILITY: SOCIAL INSECURITY: ARE YOU OR HAVE YOU BEEN THREATENED OR ABUSED PHYSICALLY, EMOTIONALLY, OR SEXUALLY BY ANYONE?: NO

## 2024-09-04 SDOH — SOCIAL STABILITY: SOCIAL INSECURITY: DOES ANYONE TRY TO KEEP YOU FROM HAVING/CONTACTING OTHER FRIENDS OR DOING THINGS OUTSIDE YOUR HOME?: NO

## 2024-09-04 SDOH — SOCIAL STABILITY: SOCIAL INSECURITY: ABUSE: ADULT

## 2024-09-04 SDOH — SOCIAL STABILITY: SOCIAL INSECURITY: DO YOU FEEL UNSAFE GOING BACK TO THE PLACE WHERE YOU ARE LIVING?: NO

## 2024-09-04 SDOH — SOCIAL STABILITY: SOCIAL INSECURITY: HAVE YOU HAD THOUGHTS OF HARMING ANYONE ELSE?: NO

## 2024-09-04 SDOH — SOCIAL STABILITY: SOCIAL INSECURITY: DO YOU FEEL ANYONE HAS EXPLOITED OR TAKEN ADVANTAGE OF YOU FINANCIALLY OR OF YOUR PERSONAL PROPERTY?: NO

## 2024-09-04 ASSESSMENT — LIFESTYLE VARIABLES
AUDIT-C TOTAL SCORE: 0
HAVE PEOPLE ANNOYED YOU BY CRITICIZING YOUR DRINKING: NO
AUDIT-C TOTAL SCORE: 0
EVER FELT BAD OR GUILTY ABOUT YOUR DRINKING: NO
HAVE YOU EVER FELT YOU SHOULD CUT DOWN ON YOUR DRINKING: NO
HOW OFTEN DO YOU HAVE A DRINK CONTAINING ALCOHOL: NEVER
SKIP TO QUESTIONS 9-10: 1
EVER HAD A DRINK FIRST THING IN THE MORNING TO STEADY YOUR NERVES TO GET RID OF A HANGOVER: NO
HOW MANY STANDARD DRINKS CONTAINING ALCOHOL DO YOU HAVE ON A TYPICAL DAY: PATIENT DOES NOT DRINK
TOTAL SCORE: 0
HOW OFTEN DO YOU HAVE 6 OR MORE DRINKS ON ONE OCCASION: NEVER

## 2024-09-04 ASSESSMENT — ENCOUNTER SYMPTOMS
SHORTNESS OF BREATH: 0
CHILLS: 1
APPETITE CHANGE: 1
FEVER: 1
HEADACHES: 0
DIARRHEA: 0
ABDOMINAL PAIN: 1
VOMITING: 0
ACTIVITY CHANGE: 1
NAUSEA: 1

## 2024-09-04 ASSESSMENT — ACTIVITIES OF DAILY LIVING (ADL)
BATHING: INDEPENDENT
JUDGMENT_ADEQUATE_SAFELY_COMPLETE_DAILY_ACTIVITIES: YES
DRESSING YOURSELF: INDEPENDENT
LACK_OF_TRANSPORTATION: NO
TOILETING: INDEPENDENT
FEEDING YOURSELF: INDEPENDENT
HEARING - RIGHT EAR: FUNCTIONAL
PATIENT'S MEMORY ADEQUATE TO SAFELY COMPLETE DAILY ACTIVITIES?: YES
GROOMING: INDEPENDENT
WALKS IN HOME: NEEDS ASSISTANCE
ASSISTIVE_DEVICE: WALKER
HEARING - LEFT EAR: FUNCTIONAL
ADEQUATE_TO_COMPLETE_ADL: YES

## 2024-09-04 ASSESSMENT — COGNITIVE AND FUNCTIONAL STATUS - GENERAL
MOBILITY SCORE: 20
DRESSING REGULAR LOWER BODY CLOTHING: A LITTLE
CLIMB 3 TO 5 STEPS WITH RAILING: A LITTLE
PATIENT BASELINE BEDBOUND: NO
WALKING IN HOSPITAL ROOM: A LITTLE
STANDING UP FROM CHAIR USING ARMS: A LITTLE
MOVING TO AND FROM BED TO CHAIR: A LITTLE
DAILY ACTIVITIY SCORE: 23

## 2024-09-04 ASSESSMENT — PAIN SCALES - GENERAL
PAINLEVEL_OUTOF10: 0 - NO PAIN
PAINLEVEL_OUTOF10: 0 - NO PAIN

## 2024-09-04 ASSESSMENT — PAIN - FUNCTIONAL ASSESSMENT
PAIN_FUNCTIONAL_ASSESSMENT: 0-10
PAIN_FUNCTIONAL_ASSESSMENT: 0-10

## 2024-09-04 NOTE — RESULT ENCOUNTER NOTE
Patient has an elevated white count to 19,000 with a left shift.  Her GFR is dropped from 77-30.  She has a persistent anemia.   I called and spoke to her  and recommended that they go to the emergency room.  [facility] as their hospital of choice.  Emergency room room was called and a report was given.    There is a concern over intra-abdominal infection.

## 2024-09-04 NOTE — PROGRESS NOTES
"Pharmacy Medication History Review    Sherri Najera \"Elza" is a 79 y.o. female admitted for Acute pyelonephritis. Pharmacy reviewed the patient's okgin-pv-yulukobpv medications and allergies for accuracy.    The list below reflectives the updated PTA list. Please review each medication in order reconciliation for additional clarification and justification.  Prior to Admission Medications   Prescriptions Last Dose Informant Patient Reported? Taking?   LORazepam (Ativan) 0.5 mg tablet Unknown Self No Yes   Sig: Take 1 tablet (0.5 mg) by mouth 2 times a day as needed for anxiety.   MULTIVITAMIN ORAL 9/4/2024 at am Self Yes Yes   Sig: Take 1 tablet by mouth once daily.   OneTouch Delica Plus Lancet 33 gauge misc  Self Yes    Sig: 3 times a day.   OneTouch Verio test strips strip  Self Yes    Sig: 3 times a day. BEFORE MEALS AND AT BEDTIME. - USE 1 STRIP TO CHECK BLOOD SUGAR.   PARoxetine (Paxil) 10 mg tablet 9/4/2024 at am Self No Yes   Sig: Take 1 tablet (10 mg) by mouth once daily.   atorvastatin (Lipitor) 80 mg tablet 9/4/2024 at am Self No Yes   Sig: Take 1 tablet (80 mg) by mouth once daily.   cyanocobalamin (Vitamin B-12) 1,000 mcg tablet 9/4/2024 at am Self Yes Yes   Sig: Take 1 tablet (1,000 mcg) by mouth once daily.   fluticasone (Flonase Allergy Relief) 50 mcg/actuation nasal spray Unknown Self Yes Unknown   Sig: Administer 1 spray into each nostril 2 times a day as needed.   lisinopril 20 mg tablet 9/4/2024 at am Self No Yes   Sig: Take 1 tablet (20 mg) by mouth once daily. as directed   metFORMIN (Glucophage) 500 mg tablet 9/4/2024 at am Self Yes Yes   Sig: Take 1 tablet (500 mg) by mouth once daily. TAKE WITH A MEAL   mirtazapine (Remeron) 15 mg tablet 9/3/2024 at pm Self No Yes   Sig: Take 1 tablet (15 mg) by mouth once daily at bedtime.   omeprazole (PriLOSEC) 40 mg DR capsule 9/4/2024 at am Self No Yes   Sig: Take 1 capsule (40 mg) by mouth once daily in the morning. Take before meals. Do not " crush or chew.   pioglitazone (Actos) 15 mg tablet 9/4/2024 at am Self No Yes   Sig: Take 1 tablet (15 mg) by mouth once daily.   saccharomyces boulardii (Florastor) 250 mg capsule 9/4/2024 at am Self Yes Yes   Sig: Take 1 capsule (250 mg) by mouth once daily.   zinc gluconate 50 mg tablet 9/4/2024 at am Self Yes Yes   Sig: Take 1 tablet (50 mg) by mouth once daily.      Facility-Administered Medications: None           The list below reflectives the updated allergy list. Please review each documented allergy for additional clarification and justification.  Allergies  Reviewed by Pio Prescott RN on 9/4/2024        Severity Reactions Comments    Fenofibrate Low Unknown     Sulfa (sulfonamide Antibiotics) Low Rash             Below are additional concerns with the patient's PTA list.      Kellen Pinedo

## 2024-09-04 NOTE — PROGRESS NOTES
09/04/24 1444   Discharge Planning   Living Arrangements Spouse/significant other   Support Systems Spouse/significant other   Assistance Needed A&OX3; independent with ADLs with walker at times; drives; room air baseline and room air currently   Type of Residence Private residence   Number of Stairs to Enter Residence 3   Number of Stairs Within Residence 14   Do you have animals or pets at home? No   Who is requesting discharge planning? Provider   Expected Discharge Disposition Home  (TBD pending urology and ID(?) workup)   Does the patient need discharge transport arranged? No   Financial Resource Strain   How hard is it for you to pay for the very basics like food, housing, medical care, and heating? Not hard   Housing Stability   In the last 12 months, was there a time when you were not able to pay the mortgage or rent on time? N   In the past 12 months, how many times have you moved where you were living? 1   At any time in the past 12 months, were you homeless or living in a shelter (including now)? N   Transportation Needs   In the past 12 months, has lack of transportation kept you from medical appointments or from getting medications? no   In the past 12 months, has lack of transportation kept you from meetings, work, or from getting things needed for daily living? No     09/04/2024 1446pm  Spoke with patient and patient's spouse bedside in ED

## 2024-09-04 NOTE — ED TRIAGE NOTES
Patient sent in by her PCP for abnormal labs, patient states she has had increased weakness and fatigue for the past few weeks and her lab work resulted with elevated kidney function, patient was advised to come to the ED.

## 2024-09-04 NOTE — ED PROVIDER NOTES
HPI   Chief Complaint   Patient presents with    Abnormal Labs       Patient is a 79-year-old female with significant PMH of type 2 diabetes, hypertension, hyperlipidemia presents to the ED with cc of abnormal labs from primary doctor from yesterday.  Patient states she has had abdominal pain weakness nausea since August 25.  Patient endorses 5 episodes of emesis in total.  Patient has had decreased p.o. intake and decreased appetite.  Patient states abdominal pain is lower abdomen is a sharp stabbing pressure aching sensation.  Patient states it is more constant worse with certain foods.  Patient denies any history of abdominal surgeries.  Patient denies any abnormal vaginal bleeding.  Patient denies any dysuria or hematuria.  Patient denies any fever.  Patient does endorse chills.  Patient endorses diarrhea endorses around 2 episodes of loose stool daily denies any melena or hematochezia patient denies any chest pain shortness of breath congestion cough pharyngitis.  Patient denies any tobacco alcohol or street drug abuse              Patient History   Past Medical History:   Diagnosis Date    Aneurysm, splenic artery (CMS-HCC)     Ankylosing spondylitis (Multi)     Anxiety     Arthritis     Body mass index (BMI) 27.0-27.9, adult 12/07/2021    BMI 27.0-27.9,adult    BPPV (benign paroxysmal positional vertigo)     Cervical radiculitis     Chronic kidney disease     Depression     Diabetes mellitus (Multi)     Effusion, left knee 02/11/2019    Effusion of left knee    Exocrine pancreatic insufficiency ([facility])     GERD (gastroesophageal reflux disease)     Hearing aid worn     HL (hearing loss)     HLD (hyperlipidemia)     Hypertension     Injury of tendon of rotator cuff 06/19/2024    Irritable bowel syndrome with diarrhea 01/16/2020    Irritable bowel syndrome with diarrhea    Kidney stone     Kidney stone 08/21/2023    Nephrolithiasis     Other conditions influencing health status 06/08/2020    History of  chronic diarrhea    Other hemorrhoids 06/28/2017    Internal hemorrhoid    Other nonspecific abnormal finding of lung field 03/26/2019    Ground glass opacity present on imaging of lung    Pain in left knee 04/30/2019    Left knee pain    Pain in unspecified knee 02/11/2019    Knee pain    Personal history of other diseases of the circulatory system 08/12/2017    History of hypertension    Personal history of other diseases of the musculoskeletal system and connective tissue 05/21/2015    History of low back pain    Personal history of other specified conditions 11/03/2020    History of fatigue    Personal history of other specified conditions 05/04/2021    History of diarrhea    Personal history of other specified conditions 03/07/2014    History of chest pain    Personal history of other specified conditions 01/31/2013    History of headache    Skin cancer     Unspecified injury of muscle(s) and tendon(s) of the rotator cuff of right shoulder, initial encounter 10/11/2016    Injury of right rotator cuff    Urinary tract infection     Wears glasses      Past Surgical History:   Procedure Laterality Date    BREAST BIOPSY Right 2004    benign    CARPAL TUNNEL RELEASE Bilateral     COLONOSCOPY  04/11/2018    no further needed-normal    CYSTOSCOPY W/ URETERAL STENT PLACEMENT Left 06/30/2024    HAND SURGERY Left 03/07/2014    Hand Surgery                                                                                                                                                          MR HEAD ANGIO WO IV CONTRAST  04/15/2023    MR HEAD ANGIO WO IV CONTRAST GEA MRI    MR NECK ANGIO WO IV CONTRAST  04/15/2023    MR NECK ANGIO WO IV CONTRAST GEA MRI    TOTAL KNEE ARTHROPLASTY Left 02/26/2024    TOTAL SHOULDER ARTHROPLASTY Bilateral 07/11/2022     Family History   Problem Relation Name Age of Onset    Diabetes Mother Sherri     Asthma Mother Sherri     Other (CRF) Mother Sherri     Heart failure Father       Social History      Tobacco Use    Smoking status: Never    Smokeless tobacco: Never   Vaping Use    Vaping status: Never Used   Substance Use Topics    Alcohol use: Never    Drug use: Never       Physical Exam   ED Triage Vitals [09/04/24 1258]   Temperature Heart Rate Respirations BP   36.6 °C (97.9 °F) 98 18 156/69      Pulse Ox Temp Source Heart Rate Source Patient Position   98 % Temporal Monitor --      BP Location FiO2 (%)     -- --       Physical Exam  HENT:      Head: Normocephalic.   Cardiovascular:      Rate and Rhythm: Normal rate and regular rhythm.      Pulses: Normal pulses.   Pulmonary:      Effort: Pulmonary effort is normal.      Breath sounds: No wheezing, rhonchi or rales.   Abdominal:      Palpations: Abdomen is soft.      Tenderness: There is abdominal tenderness. There is guarding. There is no rebound.      Comments: Left and right lower quadrants   Musculoskeletal:      Right lower leg: No edema.      Left lower leg: No edema.   Neurological:      General: No focal deficit present.      Mental Status: She is alert and oriented to person, place, and time. Mental status is at baseline.      Motor: Weakness (Bilateral lower extremity and upper extremity weakness) present.           ED Course & MDM   Diagnoses as of 09/04/24 1645   Pyelonephritis   Colitis   Hypokalemia                 No data recorded     Cassandra Coma Scale Score: 15 (09/04/24 1259 : Pio Prescott RN)                           Medical Decision Making  Medical Decision Making:  Patient presented as described in HPI. Patient case including ROS, PE, and treatment and plan discussed with ED attending if attached as cosigner. Due to patients presentation orders completed include as documented.  Patient presents to the ED with cc of lower abdominal pain weakness nausea vomiting times August 25.  Patient saw her primary yesterday who did blood work and today was told to go to the ER because of elevated white count and abnormal kidney function.   Patient is nontoxic-appearing abdomen is soft and tender to the lower quadrants with some guarding.  Patient has no focal deficits however weakness in bilateral upper and lower extremities.  No peripheral edema lung sounds are clear no CVA tenderness.  Patient has leukocytosis of 20 and started on IV antibiotics fluids nausea meds and pain medication.  Pending labs and imaging.  Magnesium is elevated 2.73.  Potassium is 3.1 given potassium replacement.  Repeat vitals remained stable.  Lactate within normal limits.  CT scan shows advanced left renal pyelonephritis with mild prominence of the left renal collecting system and periureteral stranding.  No definitive ureteral calculi.  Inflammatory changes in cecum concerning for typhlitis.  Component of colitis sigmoid colon extending into the rectum.  Educated on these findings.  Patient is agreeable to admission.  I spoke with the hospitalist who accepts the patient.  Patient remained stable.        Patient care discussed with: N/A  Social Determinants affecting care: N/A    Final diagnosis and disposition as below.  See CI    Hospitalize. I discussed the differential; results and admit plan with the patient and/or family/friend/caregiver if present.  I emphasized the importance of hospitalization need for re-evaluation/continued monitoring/interventions.. They agreed that if they feel their condition is worsening or if they have any other concern they should alert staff immediately for further assistance. I gave the patient an opportunity to ask all questions they had and answered all of them accordingly. The patient and/or family/friend/caregiver expressed understanding verbally and that they would comply.       Disposition:  admit    Hospitalize. Discussed findings and treatment done here in ED with admitting physician. It would be a risk to discharge the patient in their condition due to possibility of deterioration in their condition and the need for urgent  interventions.    This note has been transcribed using voice recognition and may contain grammatical errors, misplaced words, incorrect words, incorrect phrases or other errors.        Labs Reviewed   URINALYSIS WITH REFLEX CULTURE AND MICROSCOPIC - Abnormal       Result Value    Color, Urine Yellow      Appearance, Urine Turbid (*)     Specific Gravity, Urine 1.041 (*)     pH, Urine 6.0      Protein, Urine 100 (2+) (*)     Glucose, Urine Normal      Blood, Urine 0.06 (1+) (*)     Ketones, Urine NEGATIVE      Bilirubin, Urine NEGATIVE      Urobilinogen, Urine Normal      Nitrite, Urine 2+ (*)     Leukocyte Esterase, Urine 500 Debra/µL (*)    CBC WITH AUTO DIFFERENTIAL - Abnormal    WBC 20.2 (*)     nRBC 0.0      RBC 3.40 (*)     Hemoglobin 9.4 (*)     Hematocrit 30.4 (*)     MCV 89      MCH 27.6      MCHC 30.9 (*)     RDW 15.5 (*)     Platelets 469 (*)     Neutrophils % 85.6      Immature Granulocytes %, Automated 0.8      Lymphocytes % 8.9      Monocytes % 4.2      Eosinophils % 0.3      Basophils % 0.2      Neutrophils Absolute 17.31 (*)     Immature Granulocytes Absolute, Automated 0.16      Lymphocytes Absolute 1.81      Monocytes Absolute 0.84 (*)     Eosinophils Absolute 0.06      Basophils Absolute 0.05     COMPREHENSIVE METABOLIC PANEL - Abnormal    Glucose 104 (*)     Sodium 137      Potassium 3.1 (*)     Chloride 98      Bicarbonate 27      Anion Gap 15      Urea Nitrogen 32 (*)     Creatinine 1.35 (*)     eGFR 40 (*)     Calcium 9.3      Albumin 2.8 (*)     Alkaline Phosphatase 164 (*)     Total Protein 7.0      AST 32      Bilirubin, Total 0.6      ALT 18     MAGNESIUM - Abnormal    Magnesium 2.73 (*)    MICROSCOPIC ONLY, URINE - Abnormal    WBC, Urine >50 (*)     RBC, Urine 1-2      Bacteria, Urine 4+ (*)     Amorphous Crystals, Urine 3+ (*)    LACTATE - Normal    Lactate 1.4      Narrative:     Venipuncture immediately after or during the administration of Metamizole may lead to falsely low results.  Testing should be performed immediately  prior to Metamizole dosing.   LIPASE - Normal    Lipase 25      Narrative:     Venipuncture immediately after or during the administration of Metamizole may lead to falsely low results. Testing should be performed immediately prior to Metamizole dosing.   SARS-COV-2 PCR - Normal    Coronavirus 2019, PCR Not Detected      Narrative:     This assay has received FDA Emergency Use Authorization (EUA) and is only authorized for the duration of time that circumstances exist to justify the authorization of the emergency use of in vitro diagnostic tests for the detection of SARS-CoV-2 virus and/or diagnosis of COVID-19 infection under section 564(b)(1) of the Act, 21 U.S.C. 360bbb-3(b)(1). This assay is an in vitro diagnostic nucleic acid amplification test for the qualitative detection of SARS-CoV-2 from nasopharyngeal specimens and has been validated for use at [facility]. Negative results do not preclude COVID-19 infections and should not be used as the sole basis for diagnosis, treatment, or other management decisions.     BLOOD CULTURE   BLOOD CULTURE   URINE CULTURE   URINALYSIS WITH REFLEX CULTURE AND MICROSCOPIC    Narrative:     The following orders were created for panel order Urinalysis with Reflex Culture and Microscopic.  Procedure                               Abnormality         Status                     ---------                               -----------         ------                     Urinalysis with Reflex C...[[id number]]  Abnormal            Final result               Extra Urine Gray Tube[[id number]]                            In process                   Please view results for these tests on the individual orders.   EXTRA URINE GRAY TUBE      CT abdomen pelvis w IV contrast   Final Result   Advanced left renal pyelonephritis with mild prominence of the left   renal collecting system and periureteral stranding.  No definitive   ureteral  calculi.   Small punctate nonobstructive calculi left kidney.   Inflammatory changes in the cecum concerning for typhlitis.  Component   of colitis sigmoid colon extending into the rectum.   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  This appears stable.   Signed by Roderick Cisse DO      XR chest 1 view   Final Result   No acute process.   Signed by Moose Mercado MD         Procedure  Procedures     Nellie Orellana PA-C  09/04/24 1784

## 2024-09-04 NOTE — PROGRESS NOTES
The patient was seen by the midlevel/resident.  I have personally saw the patient and made/approved the management plan and take responsibility for the patient management.  I reviewed the EKG's (when done) and agree with the interpretation.  I have seen and examined the patient; agree with the workup, evaluation, MDM, and diagnosis.  The care plan has been discussed with the midlevel/resident; I have reviewed the note and agree with the documented findings.     Seen in the emergency room for feeling weak and urinary symptoms found to have pyelonephritis.  She has had infections and stones before.  Currently she is being treated with opiates for her low potassium for her infection.  Also found a little colitis with some diarrhea.  Plan is to hospitalize.  She does not qualify for critical care at this time.  Spoke to the patient and her son and significant other in the room.  Awaiting hospitalization.  Diagnoses as of 09/04/24 1930   Pyelonephritis   Colitis   Hypokalemia     Alejandro Martinez MD

## 2024-09-04 NOTE — H&P
History and Physical Note  Patient: Sherri Najera   Age: 79 y.o. Gender: female     History of Present Illness:   Admission Reason:   Chief Complaint   Patient presents with    Abnormal Labs     HPI:   Sherri Najera is a 79 y.o. year old female with PMH DM, HTN, HLD, GERD, Depression/Anxiety, Recurrent renal calculi presented to the ED for abnormal labs. She said since 8/25, she has been feeling weak, decreased PO intake, abdominal pain. She said abdominal pain is constant, dull and crampy in nature with nausea. Denies vomiting, diarrhea, dysuria, CP, SOB. She does have subjective fever, chills. She saw her PCP who ordered labs and she is found to have leukocytosis. In the ED, she is found to have acute pyelonephritis on CT A/P. She is given zosyn. Hospitalist is contacted for admission.     Review of Systems:  Review of Systems   Constitutional:  Positive for activity change, appetite change, chills and fever.   Respiratory:  Negative for shortness of breath.    Cardiovascular:  Negative for chest pain.   Gastrointestinal:  Positive for abdominal pain and nausea. Negative for diarrhea and vomiting.   Neurological:  Negative for headaches.   All other systems reviewed and are negative.      Allergies:   Allergies   Allergen Reactions    Fenofibrate Unknown    Sulfa (Sulfonamide Antibiotics) Rash       Past Medical History:  Past Medical History:   Diagnosis Date    Aneurysm, splenic artery (CMS-HCC)     Ankylosing spondylitis (Multi)     Anxiety     Arthritis     Body mass index (BMI) 27.0-27.9, adult 12/07/2021    BMI 27.0-27.9,adult    BPPV (benign paroxysmal positional vertigo)     Cervical radiculitis     Chronic kidney disease     Depression     Diabetes mellitus (Multi)     Effusion, left knee 02/11/2019    Effusion of left knee    Exocrine pancreatic insufficiency (HHS-HCC)     GERD (gastroesophageal reflux disease)     Hearing aid worn     HL (hearing loss)     HLD (hyperlipidemia)     Hypertension      Injury of tendon of rotator cuff 06/19/2024    Irritable bowel syndrome with diarrhea 01/16/2020    Irritable bowel syndrome with diarrhea    Kidney stone     Kidney stone 08/21/2023    Nephrolithiasis     Other conditions influencing health status 06/08/2020    History of chronic diarrhea    Other hemorrhoids 06/28/2017    Internal hemorrhoid    Other nonspecific abnormal finding of lung field 03/26/2019    Ground glass opacity present on imaging of lung    Pain in left knee 04/30/2019    Left knee pain    Pain in unspecified knee 02/11/2019    Knee pain    Personal history of other diseases of the circulatory system 08/12/2017    History of hypertension    Personal history of other diseases of the musculoskeletal system and connective tissue 05/21/2015    History of low back pain    Personal history of other specified conditions 11/03/2020    History of fatigue    Personal history of other specified conditions 05/04/2021    History of diarrhea    Personal history of other specified conditions 03/07/2014    History of chest pain    Personal history of other specified conditions 01/31/2013    History of headache    Skin cancer     Unspecified injury of muscle(s) and tendon(s) of the rotator cuff of right shoulder, initial encounter 10/11/2016    Injury of right rotator cuff    Urinary tract infection     Wears glasses        Past Surgical History:   Past Surgical History:   Procedure Laterality Date    BREAST BIOPSY Right 2004    benign    CARPAL TUNNEL RELEASE Bilateral     COLONOSCOPY  04/11/2018    no further needed-normal    CYSTOSCOPY W/ URETERAL STENT PLACEMENT Left 06/30/2024    HAND SURGERY Left 03/07/2014    Hand Surgery                                                                                                                                                          MR HEAD ANGIO WO IV CONTRAST  04/15/2023    MR HEAD ANGIO WO IV CONTRAST [facility] MRI    MR NECK ANGIO WO IV CONTRAST  04/15/2023    MR NECK  "ANGIO WO IV CONTRAST [facility] MRI    TOTAL KNEE ARTHROPLASTY Left 02/26/2024    TOTAL SHOULDER ARTHROPLASTY Bilateral 07/11/2022       Family History:  Family History   Problem Relation Name Age of Onset    Diabetes Mother Sherri     Asthma Mother Sherri     Other (CRF) Mother Sherri     Heart failure Father         Social History:  Social History     Tobacco Use   Smoking Status Never   Smokeless Tobacco Never       Social History     Substance and Sexual Activity   Alcohol Use Never       Social History     Substance and Sexual Activity   Drug Use Never       Home Medications:  Current Outpatient Medications   Medication Instructions    ascorbic acid (Vitamin C) 500 mg tablet once daily    atorvastatin (LIPITOR) 80 mg, oral, Daily    cyanocobalamin (Vitamin B-12) 1,000 mcg tablet 1 tablet, oral, Daily    fluticasone (Flonase Allergy Relief) 50 mcg/actuation nasal spray 1 spray, Each Nostril, 2 times daily    lisinopril 20 mg, oral, Daily, as directed    LORazepam (ATIVAN) 0.5 mg, oral, 2 times daily PRN    metFORMIN (Glucophage) 500 mg tablet 1 tablet, oral, Daily, TAKE WITH A MEAL     mirtazapine (REMERON) 15 mg, oral, Nightly    MULTIVITAMIN ORAL 1 tablet, oral, Daily    omeprazole (PRILOSEC) 40 mg, oral, Daily before breakfast, Do not crush or chew.    OneTouch Delica Plus Lancet 33 gauge misc 3 times daily    OneTouch Verio test strips strip 3 times daily, BEFORE MEALS AND AT BEDTIME. - USE 1 STRIP TO CHECK BLOOD SUGAR.     PARoxetine (PAXIL) 10 mg, oral, Daily    pioglitazone (ACTOS) 15 mg, oral, Daily    saccharomyces boulardii (Florastor) 250 mg capsule 1 capsule, oral, 2 times daily    zinc gluconate 50 mg tablet 1 tablet, oral, Daily       Vitals:  Visit Vitals  /50   Pulse 99   Temp 36.6 °C (97.9 °F) (Temporal)   Resp 18   Ht 1.626 m (5' 4\")   Wt 62.6 kg (138 lb)   SpO2 95%   BMI 23.69 kg/m²   OB Status Postmenopausal   Smoking Status Never   BSA 1.68 m²       Physical Exam  Vitals and nursing note reviewed. "   Constitutional:       General: She is not in acute distress.     Appearance: She is ill-appearing. She is not toxic-appearing.   HENT:      Head: Normocephalic and atraumatic.      Nose: Nose normal.      Mouth/Throat:      Mouth: Mucous membranes are moist.   Eyes:      Extraocular Movements: Extraocular movements intact.      Conjunctiva/sclera: Conjunctivae normal.      Pupils: Pupils are equal, round, and reactive to light.   Cardiovascular:      Rate and Rhythm: Normal rate and regular rhythm.      Heart sounds: No murmur heard.     No gallop.   Pulmonary:      Effort: Pulmonary effort is normal. No respiratory distress.      Breath sounds: Normal breath sounds. No wheezing, rhonchi or rales.   Abdominal:      General: Abdomen is flat. Bowel sounds are normal. There is no distension.      Palpations: Abdomen is soft. There is no mass.      Tenderness: There is abdominal tenderness.   Musculoskeletal:         General: No swelling or tenderness. Normal range of motion.      Cervical back: Normal range of motion and neck supple.   Skin:     General: Skin is warm and dry.   Neurological:      Mental Status: She is alert and oriented to person, place, and time.      Motor: Weakness present.   Psychiatric:         Mood and Affect: Mood normal.         Behavior: Behavior normal.         Thought Content: Thought content normal.         Judgment: Judgment normal.         Labs and Imaging Results:  Lab Results   Component Value Date    WBC 20.2 (H) 09/04/2024       CT abdomen pelvis w IV contrast  Narrative: STUDY:  CT Abdomen and Pelvis with IV Contrast; 9/4/2024 at 3:06 PM.  INDICATION:  WBC 20, lower abdominal pain.  COMPARISON:  CT AP 7/2/2024; CTA CAP 6/29/2024.  ACCESSION NUMBER(S):  [id number]  ORDERING CLINICIAN:  JJ REEDER  TECHNIQUE:  CT of the abdomen and pelvis was performed.  Contiguous axial images  were obtained at 3 mm slice thickness through the abdomen and pelvis.   Coronal and sagittal  reconstructions at 3 mm slice thickness were  performed.  Omnipaque 350 75 mL was administered intravenously.    FINDINGS:  LOWER CHEST:  No cardiomegaly.  No pericardial effusion.  There is mild basilar  bronchiectasis and atelectasis.     ABDOMEN:     LIVER:  No hepatomegaly.  Smooth surface contour.  Normal attenuation.     BILE DUCTS:  No intrahepatic or extrahepatic biliary ductal dilatation.     GALLBLADDER:  The gallbladder is unremarkable.  STOMACH:  No abnormalities identified.     PANCREAS:  No masses or ductal dilatation.     SPLEEN:  No splenomegaly or focal splenic lesion.  Splenic artery aneurysm  measures 1.4 cm.     ADRENAL GLANDS:  No thickening or nodules.     KIDNEYS AND URETERS:  There is significant left renal edema and heterogeneous attenuation  suggesting pyelonephritis with uroepithelial enhancement and adjacent  proximal ureteral stranding.  There is heterogeneous areas of  attenuation greater in the upper pole.  There is no significant  ureterectasis.  Punctate calculi are demonstrated within the left  kidney.  Renal cysts are demonstrated.  Previously identified left  pigtail ureteral stent has been removed.  PELVIS:     BLADDER:  No abnormalities identified.     REPRODUCTIVE ORGANS:  Mass density in the uterus measures 4.3 x 4.3 cm suggesting uterine  fibroid.     BOWEL:  There is mild wall thickening and inflammatory changes in the cecum.   There are also mild inflammatory changes in the sigmoid colon  extending into the rectum.     VESSELS:  No abnormalities identified.  Abdominal aorta is normal in caliber.      PERITONEUM/RETROPERITONEUM/LYMPH NODES:  No free fluid.  No pneumoperitoneum.  No lymphadenopathy.     ABDOMINAL WALL:  No abnormalities identified.  SOFT TISSUES:   No abnormalities identified.     BONES:  No acute fracture or aggressive osseous lesion.  There is disc space  narrowing and endplate degenerative changes in the lumbar spine.   There is 3 mm anterolisthesis of L4  and L5.  There is narrowing of  central canal and neuroforamina greater at L3-4, L4-5 and L5-S1.  Impression: Advanced left renal pyelonephritis with mild prominence of the left  renal collecting system and periureteral stranding.  No definitive  ureteral calculi.  Small punctate nonobstructive calculi left kidney.  Inflammatory changes in the cecum concerning for typhlitis.  Component  of colitis sigmoid colon extending into the rectum.  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  This appears stable.  Signed by Roderick Cisse DO  XR chest 1 view  Narrative: STUDY:  Chest Radiograph;  9/4/2024 13:47  INDICATION:  Weakness.  COMPARISON:  7/5/2024 XR Chest  ACCESSION NUMBER(S):  [id number]  ORDERING CLINICIAN:  JJ REEDER  TECHNIQUE:  Frontal chest was obtained at 13:46 hours.  FINDINGS:  CARDIOMEDIASTINAL SILHOUETTE:  Cardiomediastinal silhouette is normal in size and configuration.     LUNGS:  Lungs are clear.     ABDOMEN:  No remarkable upper abdominal findings.     BONES:  No acute osseous changes. Bilateral shoulder arthroplasties noted.  Impression: No acute process.  Signed by Moose Mercado MD      Assessment and Plan:    Active Problems:  There are no active Hospital Problems.      Patient Active Problem List   Diagnosis    Anemia    Ankylosing spondylitis (Multi)    Anxiety    Aortic regurgitation    Essential hypertension    Depression    Headache, chronic daily    Insomnia    Hypercholesterolemia    Osteoarthritis    Skin cancer    Lumbar radiculopathy    Cervical radiculopathy    Splenic artery aneurysm (CMS-HCC)    Controlled diabetes mellitus type II without complication (Multi)    Benign paroxysmal positional vertigo of right ear    Status post total left knee replacement    Overweight with body mass index (BMI) 25.0-29.9    Personal history of other malignant neoplasm of skin    Nephrolithiasis    Left renal stone     Acute pyelonephritis  - obs to med/surg  - ID consult  - zofran PRN  -  IVF  - UCx pending  - zosyn    Concern for colitis based on CT A/P, but no diarrhea  - covered with zosyn  - ID consult    DANY  - IVF  - hold metformin, lisinopril    HypoK  - replace  - check Mg    Leukocytosis  - monitor    Normocytic anemia  - monitor    Uterine fibroid, splenic artery aneurysm 1.4cm on CT A/P  - outpt follow up    HTN  - hold lisinopril    DM  - hold metformin  - hold actos  - ISS    HLD  - statin    GERD  - ppi    Depression/Anxiety  - paxil  - ativan PRN  - remeron    Diet: diabetic  DVT ppx: lovenox  Code status: Full, confirmed with pt  Dispo: admit as an hourly observation patient anticipating less than forty-eight hours or two midnights stay.       Sarah Thomas MD  Hospitalist

## 2024-09-05 PROBLEM — N12 PYELONEPHRITIS: Status: ACTIVE | Noted: 2024-09-05

## 2024-09-05 LAB
ALBUMIN SERPL BCP-MCNC: 2.2 G/DL (ref 3.4–5)
ALP SERPL-CCNC: 126 U/L (ref 33–136)
ALT SERPL W P-5'-P-CCNC: 14 U/L (ref 7–45)
ANION GAP SERPL CALC-SCNC: 12 MMOL/L (ref 10–20)
AST SERPL W P-5'-P-CCNC: 24 U/L (ref 9–39)
BASOPHILS # BLD AUTO: 0.03 X10*3/UL (ref 0–0.1)
BASOPHILS NFR BLD AUTO: 0.2 %
BILIRUB SERPL-MCNC: 0.5 MG/DL (ref 0–1.2)
BNP SERPL-MCNC: 216 PG/ML (ref 0–99)
BUN SERPL-MCNC: 23 MG/DL (ref 6–23)
CALCIUM SERPL-MCNC: 8 MG/DL (ref 8.6–10.3)
CHLORIDE SERPL-SCNC: 103 MMOL/L (ref 98–107)
CO2 SERPL-SCNC: 27 MMOL/L (ref 21–32)
CREAT SERPL-MCNC: 1.23 MG/DL (ref 0.5–1.05)
EGFRCR SERPLBLD CKD-EPI 2021: 45 ML/MIN/1.73M*2
EOSINOPHIL # BLD AUTO: 0.06 X10*3/UL (ref 0–0.4)
EOSINOPHIL NFR BLD AUTO: 0.4 %
ERYTHROCYTE [DISTWIDTH] IN BLOOD BY AUTOMATED COUNT: 15.6 % (ref 11.5–14.5)
GLUCOSE BLD MANUAL STRIP-MCNC: 103 MG/DL (ref 74–99)
GLUCOSE BLD MANUAL STRIP-MCNC: 103 MG/DL (ref 74–99)
GLUCOSE BLD MANUAL STRIP-MCNC: 116 MG/DL (ref 74–99)
GLUCOSE BLD MANUAL STRIP-MCNC: 149 MG/DL (ref 74–99)
GLUCOSE SERPL-MCNC: 122 MG/DL (ref 74–99)
HCT VFR BLD AUTO: 23.6 % (ref 36–46)
HCT VFR BLD AUTO: 26.7 % (ref 36–46)
HEMOCCULT SP1 STL QL: NEGATIVE
HGB BLD-MCNC: 7.4 G/DL (ref 12–16)
HGB BLD-MCNC: 7.7 G/DL (ref 12–16)
HOLD SPECIMEN: NORMAL
IMM GRANULOCYTES # BLD AUTO: 0.16 X10*3/UL (ref 0–0.5)
IMM GRANULOCYTES NFR BLD AUTO: 1.1 % (ref 0–0.9)
IRON SATN MFR SERPL: ABNORMAL %
IRON SERPL-MCNC: <10 UG/DL (ref 35–150)
LYMPHOCYTES # BLD AUTO: 1.65 X10*3/UL (ref 0.8–3)
LYMPHOCYTES NFR BLD AUTO: 11.7 %
MAGNESIUM SERPL-MCNC: 2.24 MG/DL (ref 1.6–2.4)
MCH RBC QN AUTO: 27.7 PG (ref 26–34)
MCHC RBC AUTO-ENTMCNC: 31.4 G/DL (ref 32–36)
MCV RBC AUTO: 88 FL (ref 80–100)
MONOCYTES # BLD AUTO: 0.96 X10*3/UL (ref 0.05–0.8)
MONOCYTES NFR BLD AUTO: 6.8 %
NEUTROPHILS # BLD AUTO: 11.24 X10*3/UL (ref 1.6–5.5)
NEUTROPHILS NFR BLD AUTO: 79.8 %
NRBC BLD-RTO: 0 /100 WBCS (ref 0–0)
PLATELET # BLD AUTO: 408 X10*3/UL (ref 150–450)
POTASSIUM SERPL-SCNC: 2.8 MMOL/L (ref 3.5–5.3)
PROT SERPL-MCNC: 5.4 G/DL (ref 6.4–8.2)
RBC # BLD AUTO: 2.67 X10*6/UL (ref 4–5.2)
SODIUM SERPL-SCNC: 139 MMOL/L (ref 136–145)
TIBC SERPL-MCNC: ABNORMAL UG/DL
UIBC SERPL-MCNC: 97 UG/DL (ref 110–370)
WBC # BLD AUTO: 14.1 X10*3/UL (ref 4.4–11.3)

## 2024-09-05 PROCEDURE — 85025 COMPLETE CBC W/AUTO DIFF WBC: CPT | Performed by: STUDENT IN AN ORGANIZED HEALTH CARE EDUCATION/TRAINING PROGRAM

## 2024-09-05 PROCEDURE — 83880 ASSAY OF NATRIURETIC PEPTIDE: CPT | Performed by: PHYSICIAN ASSISTANT

## 2024-09-05 PROCEDURE — 1100000001 HC PRIVATE ROOM DAILY

## 2024-09-05 PROCEDURE — 83540 ASSAY OF IRON: CPT | Performed by: PHYSICIAN ASSISTANT

## 2024-09-05 PROCEDURE — 2500000002 HC RX 250 W HCPCS SELF ADMINISTERED DRUGS (ALT 637 FOR MEDICARE OP, ALT 636 FOR OP/ED): Performed by: STUDENT IN AN ORGANIZED HEALTH CARE EDUCATION/TRAINING PROGRAM

## 2024-09-05 PROCEDURE — 2500000002 HC RX 250 W HCPCS SELF ADMINISTERED DRUGS (ALT 637 FOR MEDICARE OP, ALT 636 FOR OP/ED): Performed by: PHYSICIAN ASSISTANT

## 2024-09-05 PROCEDURE — 36415 COLL VENOUS BLD VENIPUNCTURE: CPT | Performed by: STUDENT IN AN ORGANIZED HEALTH CARE EDUCATION/TRAINING PROGRAM

## 2024-09-05 PROCEDURE — 80053 COMPREHEN METABOLIC PANEL: CPT | Performed by: STUDENT IN AN ORGANIZED HEALTH CARE EDUCATION/TRAINING PROGRAM

## 2024-09-05 PROCEDURE — 82270 OCCULT BLOOD FECES: CPT | Performed by: PHYSICIAN ASSISTANT

## 2024-09-05 PROCEDURE — 2500000001 HC RX 250 WO HCPCS SELF ADMINISTERED DRUGS (ALT 637 FOR MEDICARE OP): Performed by: STUDENT IN AN ORGANIZED HEALTH CARE EDUCATION/TRAINING PROGRAM

## 2024-09-05 PROCEDURE — 82947 ASSAY GLUCOSE BLOOD QUANT: CPT

## 2024-09-05 PROCEDURE — 83735 ASSAY OF MAGNESIUM: CPT | Performed by: PHYSICIAN ASSISTANT

## 2024-09-05 PROCEDURE — 2500000004 HC RX 250 GENERAL PHARMACY W/ HCPCS (ALT 636 FOR OP/ED): Performed by: STUDENT IN AN ORGANIZED HEALTH CARE EDUCATION/TRAINING PROGRAM

## 2024-09-05 PROCEDURE — 82746 ASSAY OF FOLIC ACID SERUM: CPT | Mod: GEALAB | Performed by: PHYSICIAN ASSISTANT

## 2024-09-05 PROCEDURE — 85014 HEMATOCRIT: CPT | Performed by: PHYSICIAN ASSISTANT

## 2024-09-05 PROCEDURE — 82607 VITAMIN B-12: CPT | Mod: GEALAB | Performed by: PHYSICIAN ASSISTANT

## 2024-09-05 PROCEDURE — 99232 SBSQ HOSP IP/OBS MODERATE 35: CPT | Performed by: STUDENT IN AN ORGANIZED HEALTH CARE EDUCATION/TRAINING PROGRAM

## 2024-09-05 PROCEDURE — 36415 COLL VENOUS BLD VENIPUNCTURE: CPT | Performed by: PHYSICIAN ASSISTANT

## 2024-09-05 PROCEDURE — 2500000001 HC RX 250 WO HCPCS SELF ADMINISTERED DRUGS (ALT 637 FOR MEDICARE OP): Performed by: PHYSICIAN ASSISTANT

## 2024-09-05 PROCEDURE — 2500000004 HC RX 250 GENERAL PHARMACY W/ HCPCS (ALT 636 FOR OP/ED): Performed by: PHYSICIAN ASSISTANT

## 2024-09-05 PROCEDURE — 99233 SBSQ HOSP IP/OBS HIGH 50: CPT | Performed by: PHYSICIAN ASSISTANT

## 2024-09-05 RX ORDER — FERROUS SULFATE 325(65) MG
65 TABLET ORAL 2 TIMES DAILY
Status: DISCONTINUED | OUTPATIENT
Start: 2024-09-05 | End: 2024-09-08 | Stop reason: HOSPADM

## 2024-09-05 RX ORDER — DOCUSATE SODIUM 100 MG/1
100 CAPSULE, LIQUID FILLED ORAL 2 TIMES DAILY
Status: DISCONTINUED | OUTPATIENT
Start: 2024-09-05 | End: 2024-09-08 | Stop reason: HOSPADM

## 2024-09-05 RX ORDER — ACETAMINOPHEN 325 MG/1
650 TABLET ORAL EVERY 6 HOURS PRN
Status: DISCONTINUED | OUTPATIENT
Start: 2024-09-05 | End: 2024-09-08 | Stop reason: HOSPADM

## 2024-09-05 RX ORDER — POTASSIUM CHLORIDE 14.9 MG/ML
20 INJECTION INTRAVENOUS ONCE
Status: COMPLETED | OUTPATIENT
Start: 2024-09-05 | End: 2024-09-05

## 2024-09-05 RX ORDER — POLYETHYLENE GLYCOL 3350 17 G/17G
17 POWDER, FOR SOLUTION ORAL DAILY
Status: DISCONTINUED | OUTPATIENT
Start: 2024-09-05 | End: 2024-09-08 | Stop reason: HOSPADM

## 2024-09-05 RX ORDER — POTASSIUM CHLORIDE 20 MEQ/1
40 TABLET, EXTENDED RELEASE ORAL ONCE
Status: COMPLETED | OUTPATIENT
Start: 2024-09-05 | End: 2024-09-05

## 2024-09-05 ASSESSMENT — PAIN SCALES - GENERAL
PAINLEVEL_OUTOF10: 3
PAINLEVEL_OUTOF10: 0 - NO PAIN

## 2024-09-05 ASSESSMENT — COGNITIVE AND FUNCTIONAL STATUS - GENERAL
MOVING FROM LYING ON BACK TO SITTING ON SIDE OF FLAT BED WITH BEDRAILS: A LITTLE
MOBILITY SCORE: 17
WALKING IN HOSPITAL ROOM: A LITTLE
STANDING UP FROM CHAIR USING ARMS: A LITTLE
DRESSING REGULAR LOWER BODY CLOTHING: A LITTLE
MOVING TO AND FROM BED TO CHAIR: A LITTLE
DAILY ACTIVITIY SCORE: 19
TURNING FROM BACK TO SIDE WHILE IN FLAT BAD: A LITTLE
DRESSING REGULAR UPPER BODY CLOTHING: A LITTLE
HELP NEEDED FOR BATHING: A LITTLE
TOILETING: A LITTLE
CLIMB 3 TO 5 STEPS WITH RAILING: A LOT
PERSONAL GROOMING: A LITTLE

## 2024-09-05 ASSESSMENT — PAIN SCALES - WONG BAKER: WONGBAKER_NUMERICALRESPONSE: HURTS LITTLE BIT

## 2024-09-05 ASSESSMENT — PAIN DESCRIPTION - LOCATION: LOCATION: HEAD

## 2024-09-05 ASSESSMENT — PAIN SCALES - PAIN ASSESSMENT IN ADVANCED DEMENTIA (PAINAD): TOTALSCORE: MEDICATION (SEE MAR)

## 2024-09-05 ASSESSMENT — PAIN - FUNCTIONAL ASSESSMENT: PAIN_FUNCTIONAL_ASSESSMENT: 0-10

## 2024-09-05 NOTE — ASSESSMENT & PLAN NOTE
-continue Zosyn  -continue mIVF  -zofran PRN  -cultures pending  -ID consult    Possible colitis on CT  -had an episode of bowel incontinence this morning  -continue Zosyn  -ID consult

## 2024-09-05 NOTE — PROGRESS NOTES
"Sherri Hanson Stone \"Millicent\" is a 79 y.o. female on day 0 of admission presenting with Acute pyelonephritis.      Subjective    at bedside, both updated on medical plan.  She just had bowel incontinence.  She had been complaining of constipation at home.  Tolerated some breakfast. Denies CP, SOB         Objective     Last Recorded Vitals  /75 (BP Location: Left arm, Patient Position: Lying)   Pulse 104   Temp 37 °C (98.6 °F) (Temporal)   Resp 18   Wt 65.9 kg (145 lb 4.8 oz)   SpO2 96%   Intake/Output last 3 Shifts:    Intake/Output Summary (Last 24 hours) at 9/5/2024 1055  Last data filed at 9/5/2024 1013  Gross per 24 hour   Intake 2230.25 ml   Output --   Net 2230.25 ml       Admission Weight  Weight: 62.6 kg (138 lb) (09/04/24 1258)    Daily Weight  09/04/24 : 65.9 kg (145 lb 4.8 oz)    Image Results  CT abdomen pelvis w IV contrast  Narrative: STUDY:  CT Abdomen and Pelvis with IV Contrast; 9/4/2024 at 3:06 PM.  INDICATION:  WBC 20, lower abdominal pain.  COMPARISON:  CT AP 7/2/2024; CTA CAP 6/29/2024.  ACCESSION NUMBER(S):  [id number]  ORDERING CLINICIAN:  JJ REEDER  TECHNIQUE:  CT of the abdomen and pelvis was performed.  Contiguous axial images  were obtained at 3 mm slice thickness through the abdomen and pelvis.   Coronal and sagittal reconstructions at 3 mm slice thickness were  performed.  Omnipaque 350 75 mL was administered intravenously.    FINDINGS:  LOWER CHEST:  No cardiomegaly.  No pericardial effusion.  There is mild basilar  bronchiectasis and atelectasis.     ABDOMEN:     LIVER:  No hepatomegaly.  Smooth surface contour.  Normal attenuation.     BILE DUCTS:  No intrahepatic or extrahepatic biliary ductal dilatation.     GALLBLADDER:  The gallbladder is unremarkable.  STOMACH:  No abnormalities identified.     PANCREAS:  No masses or ductal dilatation.     SPLEEN:  No splenomegaly or focal splenic lesion.  Splenic artery aneurysm  measures 1.4 cm.     ADRENAL " GLANDS:  No thickening or nodules.     KIDNEYS AND URETERS:  There is significant left renal edema and heterogeneous attenuation  suggesting pyelonephritis with uroepithelial enhancement and adjacent  proximal ureteral stranding.  There is heterogeneous areas of  attenuation greater in the upper pole.  There is no significant  ureterectasis.  Punctate calculi are demonstrated within the left  kidney.  Renal cysts are demonstrated.  Previously identified left  pigtail ureteral stent has been removed.  PELVIS:     BLADDER:  No abnormalities identified.     REPRODUCTIVE ORGANS:  Mass density in the uterus measures 4.3 x 4.3 cm suggesting uterine  fibroid.     BOWEL:  There is mild wall thickening and inflammatory changes in the cecum.   There are also mild inflammatory changes in the sigmoid colon  extending into the rectum.     VESSELS:  No abnormalities identified.  Abdominal aorta is normal in caliber.      PERITONEUM/RETROPERITONEUM/LYMPH NODES:  No free fluid.  No pneumoperitoneum.  No lymphadenopathy.     ABDOMINAL WALL:  No abnormalities identified.  SOFT TISSUES:   No abnormalities identified.     BONES:  No acute fracture or aggressive osseous lesion.  There is disc space  narrowing and endplate degenerative changes in the lumbar spine.   There is 3 mm anterolisthesis of L4 and L5.  There is narrowing of  central canal and neuroforamina greater at L3-4, L4-5 and L5-S1.  Impression: Advanced left renal pyelonephritis with mild prominence of the left  renal collecting system and periureteral stranding.  No definitive  ureteral calculi.  Small punctate nonobstructive calculi left kidney.  Inflammatory changes in the cecum concerning for typhlitis.  Component  of colitis sigmoid colon extending into the rectum.  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  This appears stable.  Signed by Roderick Cisse DO  XR chest 1 view  Narrative: STUDY:  Chest Radiograph;  9/4/2024  13:47  INDICATION:  Weakness.  COMPARISON:  7/5/2024 XR Chest  ACCESSION NUMBER(S):  [id number]  ORDERING CLINICIAN:  JJ REEDER  TECHNIQUE:  Frontal chest was obtained at 13:46 hours.  FINDINGS:  CARDIOMEDIASTINAL SILHOUETTE:  Cardiomediastinal silhouette is normal in size and configuration.     LUNGS:  Lungs are clear.     ABDOMEN:  No remarkable upper abdominal findings.     BONES:  No acute osseous changes. Bilateral shoulder arthroplasties noted.  Impression: No acute process.  Signed by Moose Mercado MD      Physical Exam  Physical Exam  Gen: NAD, appears exhausted, pale  Eyes:  EOM intact  ENT: MMM  Neck: No JVD  Respiratory: CTAB, no wheezes/rhonchi  Cardiac: RRR, no murmurs rubs or gallops  Abdomen: distended but soft, generalized tenderness, +BS  Extremities: no edema or cyanosis  Neuro: No focal deficits, alert and oriented x 3  Psych:  appropriate mood and behavior    Assessment/Plan        Assessment & Plan  Acute pyelonephritis  -continue Zosyn  -continue mIVF  -zofran PRN  -cultures pending  -ID consult    Possible colitis on CT  -had an episode of bowel incontinence this morning  -continue Zosyn  -ID consult    Hypoxia  -CXR at time of admission clear  -CT shows bilateral atelectasis  -IS  -check BNP    DANY  -continue IVF  -hold nephrotoxins    Hypokalemia  -persistent  -replace  -Mag WNL  -recheck in AM    Leukocytosis  -improving on antibiotics    Continue meds for chronic conditions  Outpt follow up with uterine fibroid and splenic artery aneurysm    DVT prophy  -lovenox    Dispo:  Rec full inpatient admission for pyelonephritis with possible colitis  D/w Dr William Covarrubias PA-C

## 2024-09-05 NOTE — CONSULTS
"Consults  Referred by LARISSA Thomas MD: Yesi Lam MD    Reason For Consult  pyelonephritis    History Of Present Illness  Sherri Hanson Stone \"Millicent\" is a 79 y.o. female, hx of DM, hx of HTN, hx of GERD, hx of nephrolithiasis, hx of a recent lithotripsy / stent, the stent was removed 8/29, she was admitted for fever, chills, Lt flank pain, anorexia, weakness, no emesis, no dysuria or hematuria, her WBC were up, UA with pyuria, CT with edema and enhancement of the Lt kidney     Past Medical History  She has a past medical history of Aneurysm, splenic artery (CMS-HCC), Ankylosing spondylitis (Multi), Anxiety, Arthritis, Body mass index (BMI) 27.0-27.9, adult (12/07/2021), BPPV (benign paroxysmal positional vertigo), Cervical radiculitis, Chronic kidney disease, Depression, Diabetes mellitus (Multi), Effusion, left knee (02/11/2019), Exocrine pancreatic insufficiency ([facility]), GERD (gastroesophageal reflux disease), Hearing aid worn, HL (hearing loss), HLD (hyperlipidemia), Hypertension, Injury of tendon of rotator cuff (06/19/2024), Irritable bowel syndrome with diarrhea (01/16/2020), Kidney stone, Kidney stone (08/21/2023), Nephrolithiasis, Other conditions influencing health status (06/08/2020), Other hemorrhoids (06/28/2017), Other nonspecific abnormal finding of lung field (03/26/2019), Pain in left knee (04/30/2019), Pain in unspecified knee (02/11/2019), Personal history of other diseases of the circulatory system (08/12/2017), Personal history of other diseases of the musculoskeletal system and connective tissue (05/21/2015), Personal history of other specified conditions (11/03/2020), Personal history of other specified conditions (05/04/2021), Personal history of other specified conditions (03/07/2014), Personal history of other specified conditions (01/31/2013), Skin cancer, Unspecified injury of muscle(s) and tendon(s) of the rotator cuff of right shoulder, initial encounter (10/11/2016), " Urinary tract infection, and Wears glasses.    Surgical History  She has a past surgical history that includes Hand surgery (Left, 03/07/2014); Total shoulder arthroplasty (Bilateral, 07/11/2022); MR angio head wo IV contrast (04/15/2023); MR angio neck wo IV contrast (04/15/2023); Carpal tunnel release (Bilateral); Colonoscopy (04/11/2018); Breast biopsy (Right, 2004); Cystoscopy w/ ureteral stent placement (Left, 06/30/2024); and Total knee arthroplasty (Left, 02/26/2024).     Social History     Occupational History    Not on file   Tobacco Use    Smoking status: Never    Smokeless tobacco: Never   Vaping Use    Vaping status: Never Used   Substance and Sexual Activity    Alcohol use: Never    Drug use: Never    Sexual activity: Not Currently     Travel History   Travel since 08/05/24    No documented travel since 08/05/24          Family History  Family History   Problem Relation Name Age of Onset    Diabetes Mother Sherri     Asthma Mother Sherri     Other (CRF) Mother Sherri     Heart failure Father       Allergies  Fenofibrate and Sulfa (sulfonamide antibiotics)     Immunization History   Administered Date(s) Administered    Flu vaccine, quadrivalent, high-dose, preservative free, age 65y+ (FLUZONE) 10/15/2020, 11/22/2021, 09/26/2022    Flu vaccine, trivalent, preservative free, HIGH-DOSE, age 65y+ (Fluzone) 01/20/2014, 10/24/2017, 10/26/2018, 10/15/2020    Influenza, Unspecified 11/16/2009    Influenza, injectable, MDCK, quadrivalent 01/07/2020    Influenza, seasonal, injectable 11/16/2009    Pfizer Purple Cap SARS-CoV-2 02/19/2021, 03/19/2021, 10/26/2021    Pneumococcal conjugate vaccine, 13-valent (PREVNAR 13) 03/04/2020    Pneumococcal polysaccharide vaccine, 23-valent, age 2 years and older (PNEUMOVAX 23) 05/04/2021   Pneumonia vaccine is up to date  Zhou fall risk 50, preventive protocol was implemented  Depression screen is negative    Medications  Home medications:  Medications Prior to Admission    Medication Sig Dispense Refill Last Dose    atorvastatin (Lipitor) 80 mg tablet Take 1 tablet (80 mg) by mouth once daily. 90 tablet 1 9/4/2024 at am    cyanocobalamin (Vitamin B-12) 1,000 mcg tablet Take 1 tablet (1,000 mcg) by mouth once daily.   9/4/2024 at am    lisinopril 20 mg tablet Take 1 tablet (20 mg) by mouth once daily. as directed 90 tablet 1 9/4/2024 at am    metFORMIN (Glucophage) 500 mg tablet Take 1 tablet (500 mg) by mouth once daily. TAKE WITH A MEAL   9/4/2024 at am    mirtazapine (Remeron) 15 mg tablet Take 1 tablet (15 mg) by mouth once daily at bedtime. 30 tablet 1 9/3/2024 at pm    MULTIVITAMIN ORAL Take 1 tablet by mouth once daily.   9/4/2024 at am    omeprazole (PriLOSEC) 40 mg DR capsule Take 1 capsule (40 mg) by mouth once daily in the morning. Take before meals. Do not crush or chew. 30 capsule 0 9/4/2024 at am    PARoxetine (Paxil) 10 mg tablet Take 1 tablet (10 mg) by mouth once daily. 90 tablet 1 9/4/2024 at am    pioglitazone (Actos) 15 mg tablet Take 1 tablet (15 mg) by mouth once daily. 90 tablet 1 9/4/2024 at am    saccharomyces boulardii (Florastor) 250 mg capsule Take 1 capsule (250 mg) by mouth once daily.   9/4/2024 at am    zinc gluconate 50 mg tablet Take 1 tablet (50 mg) by mouth once daily.   9/4/2024 at am    fluticasone (Flonase Allergy Relief) 50 mcg/actuation nasal spray Administer 1 spray into each nostril 2 times a day as needed.   Unknown    LORazepam (Ativan) 0.5 mg tablet Take 1 tablet (0.5 mg) by mouth 2 times a day as needed for anxiety. 30 tablet 0 Unknown    OneTouch Delica Plus Lancet 33 gauge misc 3 times a day.       OneTouch Verio test strips strip 3 times a day. BEFORE MEALS AND AT BEDTIME. - USE 1 STRIP TO CHECK BLOOD SUGAR.        Current medications:  Scheduled medications  atorvastatin, 80 mg, oral, Daily  docusate sodium, 100 mg, oral, BID  enoxaparin, 30 mg, subcutaneous, q24h  insulin lispro, 0-15 Units, subcutaneous, TID  mirtazapine, 15 mg,  "oral, Nightly  pantoprazole, 40 mg, oral, Daily before breakfast  PARoxetine, 10 mg, oral, Daily  piperacillin-tazobactam, 3.375 g, intravenous, q6h  polyethylene glycol, 17 g, oral, Daily      Continuous medications  sodium chloride 0.9%, 75 mL/hr, Last Rate: 75 mL/hr (09/05/24 0937)      PRN medications  PRN medications: dextrose, dextrose, glucagon, glucagon, LORazepam, ondansetron    Review of Systems   All other systems reviewed and are negative.       Objective  Range of Vitals (last 24 hours)  Heart Rate:  []   Temp:  [36.6 °C (97.9 °F)-37 °C (98.6 °F)]   Resp:  [16-18]   BP: (123-159)/()   Height:  [160 cm (5' 3\")-162.6 cm (5' 4\")]   Weight:  [62.6 kg (138 lb)-65.9 kg (145 lb 4.8 oz)]   SpO2:  [87 %-100 %]   Daily Weight  09/04/24 : 65.9 kg (145 lb 4.8 oz)    Body mass index is 25.74 kg/m².   Nutritional consult    Physical Exam  Constitutional:       Appearance: Normal appearance.   HENT:      Head: Normocephalic and atraumatic.      Mouth/Throat:      Mouth: Mucous membranes are moist.      Pharynx: Oropharynx is clear.   Eyes:      Pupils: Pupils are equal, round, and reactive to light.   Cardiovascular:      Rate and Rhythm: Normal rate and regular rhythm.      Heart sounds: Normal heart sounds.   Pulmonary:      Effort: Pulmonary effort is normal.      Breath sounds: Normal breath sounds.   Abdominal:      General: Abdomen is flat. Bowel sounds are normal.      Palpations: Abdomen is soft.      Comments: Lt flank tenderness   Musculoskeletal:      Cervical back: Normal range of motion.   Neurological:      Mental Status: She is alert.          Relevant Results  Outside Hospital Results  reviewed  Labs  Results from last 72 hours   Lab Units 09/05/24  1149 09/05/24  0618 09/04/24  1303 09/03/24  1242   WBC AUTO x10*3/uL  --  14.1* 20.2* 19.0*   HEMOGLOBIN g/dL 7.7* 7.4* 9.4* 9.4*   HEMATOCRIT % 26.7* 23.6* 30.4* 26.3*   PLATELETS AUTO x10*3/uL  --  408 469* 441   NEUTROS PCT AUTO %  --  79.8 " "85.6 85.7   LYMPHS PCT AUTO %  --  11.7 8.9 8.0   MONOS PCT AUTO %  --  6.8 4.2 4.6   EOS PCT AUTO %  --  0.4 0.3 0.2     Results from last 72 hours   Lab Units 09/05/24  0618 09/04/24  1303 09/03/24  1242   SODIUM mmol/L 139 137 138   POTASSIUM mmol/L 2.8* 3.1* 3.2*   CHLORIDE mmol/L 103 98 98   CO2 mmol/L 27 27 24   BUN mg/dL 23 32* 42*   CREATININE mg/dL 1.23* 1.35* 1.70*   GLUCOSE mg/dL 122* 104* 122*   CALCIUM mg/dL 8.0* 9.3 9.1   ANION GAP mmol/L 12 15 16   EGFR mL/min/1.73m*2 45* 40* 30*     Results from last 72 hours   Lab Units 09/05/24  0618 09/04/24  1303 09/03/24  1242   ALK PHOS U/L 126 164* 173*   BILIRUBIN TOTAL mg/dL 0.5 0.6 0.4   PROTEIN TOTAL g/dL 5.4* 7.0 6.1   ALT U/L 14 18 19   AST U/L 24 32 35   ALBUMIN g/dL 2.2* 2.8* 2.9*     Estimated Creatinine Clearance: 33.8 mL/min (A) (by C-G formula based on SCr of 1.23 mg/dL (H)).  C-Reactive Protein   Date Value Ref Range Status   09/03/2024 >70.00 (H) 0.00 - 2.00 mg/dL Final     Comment:     Result rechecked     CRP   Date Value Ref Range Status   03/05/2020 0.59 mg/dL Final     Comment:     REF VALUE  < 1.00     03/11/2019 1.30 (A) mg/dL Final     Comment:     REF VALUE  < 1.00       Sedimentation Rate   Date Value Ref Range Status   09/03/2024 94 (H) 0 - 30 mm/h Final   03/05/2020 19 0 - 30 mm/h Final   03/11/2019 27 0 - 30 mm/h Final     No results found for: \"HIV1X2\", \"HIVCONF\", \"[id number]\"  Hepatitis C Ab   Date Value Ref Range Status   09/12/2023 NONREACTIVE NONREACTIVE Final     Comment:      Results from patients taking biotin supplements or receiving   high-dose biotin therapy should be interpreted with caution   due to possible interference with this test. Providers may    contact their local laboratory for further information.       Microbiology  Susceptibility data from last 90 days.  Collected Specimen Info Organism Amoxicillin/Clavulanate Ampicillin Ampicillin/Sulbactam Aztreonam Cefazolin Cefepime Ciprofloxacin Ertapenem Gentamicin " Meropenem Nitrofurantoin Piperacillin/Tazobactam   07/15/24 Urine from Clean Catch/Voided Citrobacter freundii complex  R  R  R  I  R  S  S  S  S  S  S  S     Collected Specimen Info Organism Trimethoprim/Sulfamethoxazole   07/15/24 Urine from Clean Catch/Voided Citrobacter freundii complex  S   Imaging  Reviewed      Assessment/Plan     Left pyelonephritis    Recommendations :  Continue Zosyn  Cultures  Follow the WBC    I spent minutes in the professional and overall care of this patient.      Meek Dick MD

## 2024-09-06 ENCOUNTER — APPOINTMENT (OUTPATIENT)
Dept: RADIOLOGY | Facility: HOSPITAL | Age: 79
DRG: 690 | End: 2024-09-06
Payer: MEDICARE

## 2024-09-06 LAB
ALBUMIN SERPL BCP-MCNC: 2.3 G/DL (ref 3.4–5)
ALP SERPL-CCNC: 125 U/L (ref 33–136)
ALT SERPL W P-5'-P-CCNC: 16 U/L (ref 7–45)
ANION GAP SERPL CALC-SCNC: 13 MMOL/L (ref 10–20)
AST SERPL W P-5'-P-CCNC: 29 U/L (ref 9–39)
BASOPHILS # BLD AUTO: 0.02 X10*3/UL (ref 0–0.1)
BASOPHILS NFR BLD AUTO: 0.2 %
BILIRUB SERPL-MCNC: 0.4 MG/DL (ref 0–1.2)
BUN SERPL-MCNC: 18 MG/DL (ref 6–23)
CALCIUM SERPL-MCNC: 8.1 MG/DL (ref 8.6–10.3)
CHLORIDE SERPL-SCNC: 107 MMOL/L (ref 98–107)
CO2 SERPL-SCNC: 25 MMOL/L (ref 21–32)
CREAT SERPL-MCNC: 1.17 MG/DL (ref 0.5–1.05)
EGFRCR SERPLBLD CKD-EPI 2021: 48 ML/MIN/1.73M*2
EOSINOPHIL # BLD AUTO: 0.13 X10*3/UL (ref 0–0.4)
EOSINOPHIL NFR BLD AUTO: 1.1 %
ERYTHROCYTE [DISTWIDTH] IN BLOOD BY AUTOMATED COUNT: 16 % (ref 11.5–14.5)
FOLATE SERPL-MCNC: 10.7 NG/ML
GLUCOSE BLD MANUAL STRIP-MCNC: 116 MG/DL (ref 74–99)
GLUCOSE BLD MANUAL STRIP-MCNC: 141 MG/DL (ref 74–99)
GLUCOSE BLD MANUAL STRIP-MCNC: 164 MG/DL (ref 74–99)
GLUCOSE BLD MANUAL STRIP-MCNC: 77 MG/DL (ref 74–99)
GLUCOSE SERPL-MCNC: 99 MG/DL (ref 74–99)
HCT VFR BLD AUTO: 26 % (ref 36–46)
HGB BLD-MCNC: 7.6 G/DL (ref 12–16)
IMM GRANULOCYTES # BLD AUTO: 0.21 X10*3/UL (ref 0–0.5)
IMM GRANULOCYTES NFR BLD AUTO: 1.8 % (ref 0–0.9)
LYMPHOCYTES # BLD AUTO: 1.87 X10*3/UL (ref 0.8–3)
LYMPHOCYTES NFR BLD AUTO: 15.7 %
MCH RBC QN AUTO: 27.5 PG (ref 26–34)
MCHC RBC AUTO-ENTMCNC: 29.2 G/DL (ref 32–36)
MCV RBC AUTO: 94 FL (ref 80–100)
MONOCYTES # BLD AUTO: 0.75 X10*3/UL (ref 0.05–0.8)
MONOCYTES NFR BLD AUTO: 6.3 %
NEUTROPHILS # BLD AUTO: 8.95 X10*3/UL (ref 1.6–5.5)
NEUTROPHILS NFR BLD AUTO: 74.9 %
NRBC BLD-RTO: 0 /100 WBCS (ref 0–0)
PLATELET # BLD AUTO: 396 X10*3/UL (ref 150–450)
POTASSIUM SERPL-SCNC: 3.3 MMOL/L (ref 3.5–5.3)
PROT SERPL-MCNC: 5.5 G/DL (ref 6.4–8.2)
RBC # BLD AUTO: 2.76 X10*6/UL (ref 4–5.2)
SODIUM SERPL-SCNC: 142 MMOL/L (ref 136–145)
VIT B12 SERPL-MCNC: >2000 PG/ML (ref 211–911)
WBC # BLD AUTO: 11.9 X10*3/UL (ref 4.4–11.3)

## 2024-09-06 PROCEDURE — 2500000004 HC RX 250 GENERAL PHARMACY W/ HCPCS (ALT 636 FOR OP/ED): Performed by: STUDENT IN AN ORGANIZED HEALTH CARE EDUCATION/TRAINING PROGRAM

## 2024-09-06 PROCEDURE — 74018 RADEX ABDOMEN 1 VIEW: CPT

## 2024-09-06 PROCEDURE — 85025 COMPLETE CBC W/AUTO DIFF WBC: CPT | Performed by: STUDENT IN AN ORGANIZED HEALTH CARE EDUCATION/TRAINING PROGRAM

## 2024-09-06 PROCEDURE — 2500000001 HC RX 250 WO HCPCS SELF ADMINISTERED DRUGS (ALT 637 FOR MEDICARE OP): Performed by: STUDENT IN AN ORGANIZED HEALTH CARE EDUCATION/TRAINING PROGRAM

## 2024-09-06 PROCEDURE — 2500000001 HC RX 250 WO HCPCS SELF ADMINISTERED DRUGS (ALT 637 FOR MEDICARE OP): Performed by: PHYSICIAN ASSISTANT

## 2024-09-06 PROCEDURE — 74018 RADEX ABDOMEN 1 VIEW: CPT | Performed by: RADIOLOGY

## 2024-09-06 PROCEDURE — 82947 ASSAY GLUCOSE BLOOD QUANT: CPT

## 2024-09-06 PROCEDURE — 2500000002 HC RX 250 W HCPCS SELF ADMINISTERED DRUGS (ALT 637 FOR MEDICARE OP, ALT 636 FOR OP/ED): Performed by: STUDENT IN AN ORGANIZED HEALTH CARE EDUCATION/TRAINING PROGRAM

## 2024-09-06 PROCEDURE — 99232 SBSQ HOSP IP/OBS MODERATE 35: CPT | Performed by: STUDENT IN AN ORGANIZED HEALTH CARE EDUCATION/TRAINING PROGRAM

## 2024-09-06 PROCEDURE — 84075 ASSAY ALKALINE PHOSPHATASE: CPT | Performed by: STUDENT IN AN ORGANIZED HEALTH CARE EDUCATION/TRAINING PROGRAM

## 2024-09-06 PROCEDURE — 2500000004 HC RX 250 GENERAL PHARMACY W/ HCPCS (ALT 636 FOR OP/ED): Performed by: PHYSICIAN ASSISTANT

## 2024-09-06 PROCEDURE — 36415 COLL VENOUS BLD VENIPUNCTURE: CPT | Performed by: STUDENT IN AN ORGANIZED HEALTH CARE EDUCATION/TRAINING PROGRAM

## 2024-09-06 PROCEDURE — 1100000001 HC PRIVATE ROOM DAILY

## 2024-09-06 RX ORDER — POTASSIUM CHLORIDE 20 MEQ/1
40 TABLET, EXTENDED RELEASE ORAL ONCE
Status: COMPLETED | OUTPATIENT
Start: 2024-09-06 | End: 2024-09-06

## 2024-09-06 RX ORDER — MAGNESIUM HYDROXIDE 2400 MG/10ML
10 SUSPENSION ORAL ONCE
Status: COMPLETED | OUTPATIENT
Start: 2024-09-06 | End: 2024-09-06

## 2024-09-06 ASSESSMENT — COGNITIVE AND FUNCTIONAL STATUS - GENERAL
MOBILITY SCORE: 22
WALKING IN HOSPITAL ROOM: A LITTLE
CLIMB 3 TO 5 STEPS WITH RAILING: A LITTLE
DAILY ACTIVITIY SCORE: 24
CLIMB 3 TO 5 STEPS WITH RAILING: A LITTLE
WALKING IN HOSPITAL ROOM: A LITTLE
MOBILITY SCORE: 22
DAILY ACTIVITIY SCORE: 24

## 2024-09-06 ASSESSMENT — PAIN - FUNCTIONAL ASSESSMENT: PAIN_FUNCTIONAL_ASSESSMENT: 0-10

## 2024-09-06 ASSESSMENT — PAIN SCALES - GENERAL
PAINLEVEL_OUTOF10: 0 - NO PAIN
PAINLEVEL_OUTOF10: 3
PAINLEVEL_OUTOF10: 0 - NO PAIN

## 2024-09-06 NOTE — PROGRESS NOTES
Millicent Najera is a 79 y.o. female on day 1 of admission presenting with Acute pyelonephritis.    Subjective   Interval History: no fever, no pain, no dysuria        Review of Systems    Objective   Range of Vitals (last 24 hours)  Heart Rate:  [81-99]   Temp:  [35.9 °C (96.6 °F)-36.5 °C (97.7 °F)]   Resp:  [15-20]   BP: (112-146)/(66-76)   SpO2:  [91 %-99 %]   Daily Weight  09/04/24 : 65.9 kg (145 lb 4.8 oz)    Body mass index is 25.74 kg/m².    Physical Exam  Constitutional:       Appearance: Normal appearance.   HENT:      Head: Normocephalic and atraumatic.      Mouth/Throat:      Mouth: Mucous membranes are moist.      Pharynx: Oropharynx is clear.   Eyes:      Pupils: Pupils are equal, round, and reactive to light.   Cardiovascular:      Rate and Rhythm: Normal rate and regular rhythm.      Heart sounds: Normal heart sounds.   Pulmonary:      Effort: Pulmonary effort is normal.      Breath sounds: Normal breath sounds.   Abdominal:      General: Abdomen is flat. Bowel sounds are normal.      Palpations: Abdomen is soft.   Musculoskeletal:      Cervical back: Normal range of motion.   Neurological:      Mental Status: She is alert.         Antibiotics  piperacillin-tazobactam - 3.375 gram/50 mL    Relevant Results  Labs  Results from last 72 hours   Lab Units 09/06/24  0644 09/05/24  1149 09/05/24  0618 09/04/24  1303   WBC AUTO x10*3/uL 11.9*  --  14.1* 20.2*   HEMOGLOBIN g/dL 7.6* 7.7* 7.4* 9.4*   HEMATOCRIT % 26.0* 26.7* 23.6* 30.4*   PLATELETS AUTO x10*3/uL 396  --  408 469*   NEUTROS PCT AUTO % 74.9  --  79.8 85.6   LYMPHS PCT AUTO % 15.7  --  11.7 8.9   MONOS PCT AUTO % 6.3  --  6.8 4.2   EOS PCT AUTO % 1.1  --  0.4 0.3     Results from last 72 hours   Lab Units 09/06/24  0645 09/05/24  0618 09/04/24  1303   SODIUM mmol/L 142 139 137   POTASSIUM mmol/L 3.3* 2.8* 3.1*   CHLORIDE mmol/L 107 103 98   CO2 mmol/L 25 27 27   BUN mg/dL 18 23 32*   CREATININE mg/dL 1.17* 1.23* 1.35*   GLUCOSE mg/dL 99 122* 104*    CALCIUM mg/dL 8.1* 8.0* 9.3   ANION GAP mmol/L 13 12 15   EGFR mL/min/1.73m*2 48* 45* 40*     Results from last 72 hours   Lab Units 09/06/24  0645 09/05/24  0618 09/04/24  1303   ALK PHOS U/L 125 126 164*   BILIRUBIN TOTAL mg/dL 0.4 0.5 0.6   PROTEIN TOTAL g/dL 5.5* 5.4* 7.0   ALT U/L 16 14 18   AST U/L 29 24 32   ALBUMIN g/dL 2.3* 2.2* 2.8*     Estimated Creatinine Clearance: 35.6 mL/min (A) (by C-G formula based on SCr of 1.17 mg/dL (H)).  C-Reactive Protein   Date Value Ref Range Status   09/03/2024 >70.00 (H) 0.00 - 2.00 mg/dL Final     Comment:     Result rechecked     CRP   Date Value Ref Range Status   03/05/2020 0.59 mg/dL Final     Comment:     REF VALUE  < 1.00     03/11/2019 1.30 (A) mg/dL Final     Comment:     REF VALUE  < 1.00       Microbiology  Susceptibility data from last 14 days.  Collected Specimen Info Organism   09/04/24 Urine from Clean Catch/Voided Enteric bacilli   09/04/24 Blood culture from Peripheral Venipuncture Klebsiella oxytoca/Raoultella species   Imaging  Reviewed        Assessment/Plan   Left pyelonephritis, the urine with g-ve bacilli  Bacteremia, Klebsiella     Recommendations :  Zandra Stoddard     I spent minutes in the professional and overall care of this patient.      Meek Dick MD

## 2024-09-06 NOTE — PROGRESS NOTES
09/06/24 1004   Discharge Planning   Living Arrangements Spouse/significant other   Support Systems Spouse/significant other   Assistance Needed A&OX3; independent with ADLs with walker at times; drives; room air baseline and room air currently   Type of Residence Private residence   Number of Stairs to Enter Residence 3   Number of Stairs Within Residence 14   Do you have animals or pets at home? No   Who is requesting discharge planning? Provider   Expected Discharge Disposition Home

## 2024-09-06 NOTE — PROGRESS NOTES
"Sherri Najera \"Millicent\" is a 79 y.o. female on day 1 of admission presenting with Acute pyelonephritis.    Subjective   Pt continues to have abdominal pain. She did have 1 BM this AM.        Objective     Physical Exam  Constitutional:       General: She is not in acute distress.     Appearance: She is ill-appearing. She is not toxic-appearing.   HENT:      Head: Normocephalic and atraumatic.      Nose: Nose normal.      Mouth/Throat:      Mouth: Mucous membranes are moist.   Eyes:      Extraocular Movements: Extraocular movements intact.      Conjunctiva/sclera: Conjunctivae normal.      Pupils: Pupils are equal, round, and reactive to light.   Cardiovascular:      Rate and Rhythm: Normal rate and regular rhythm.      Heart sounds: No murmur heard.     No gallop.   Pulmonary:      Effort: Pulmonary effort is normal. No respiratory distress.      Breath sounds: Normal breath sounds. No wheezing, rhonchi or rales.   Abdominal:      General: Abdomen is flat. Bowel sounds are normal. There is some distension     Palpations: Abdomen is soft. There is no mass.      Tenderness: There is abdominal tenderness.   Musculoskeletal:         General: No swelling or tenderness. Normal range of motion.      Cervical back: Normal range of motion and neck supple.   Skin:     General: Skin is warm and dry.   Neurological:      Mental Status: She is alert and oriented to person, place, and time.      Motor: Weakness present.   Psychiatric:         Mood and Affect: Mood normal.         Behavior: Behavior normal.         Thought Content: Thought content normal.         Judgment: Judgment normal.   Last Recorded Vitals:  /67 (BP Location: Right arm, Patient Position: Sitting)   Pulse 99   Temp 36.2 °C (97.1 °F) (Temporal)   Resp 16   Ht 1.6 m (5' 3\")   Wt 65.9 kg (145 lb 4.8 oz)   SpO2 99%   BMI 25.74 kg/m²      Scheduled medications:  atorvastatin, 80 mg, oral, Daily  docusate sodium, 100 mg, oral, BID  enoxaparin, 30 " mg, subcutaneous, q24h  ferrous sulfate (325 mg ferrous sulfate), 65 mg of iron, oral, BID  insulin lispro, 0-15 Units, subcutaneous, TID  mirtazapine, 15 mg, oral, Nightly  pantoprazole, 40 mg, oral, Daily before breakfast  PARoxetine, 10 mg, oral, Daily  piperacillin-tazobactam, 3.375 g, intravenous, q6h  polyethylene glycol, 17 g, oral, Daily      Continuous medications:  sodium chloride 0.9%, 75 mL/hr, Last Rate: 75 mL/hr (09/05/24 0937)      PRN medications:  PRN medications: acetaminophen, dextrose, dextrose, glucagon, glucagon, LORazepam, ondansetron     Relevant Results:  Results for orders placed or performed during the hospital encounter of 09/04/24 (from the past 24 hour(s))   POCT GLUCOSE   Result Value Ref Range    POCT Glucose 103 (H) 74 - 99 mg/dL   Hemoglobin and hematocrit, blood   Result Value Ref Range    Hemoglobin 7.7 (L) 12.0 - 16.0 g/dL    Hematocrit 26.7 (L) 36.0 - 46.0 %   Iron and TIBC   Result Value Ref Range    Iron <10 (L) 35 - 150 ug/dL    UIBC 97 (L) 110 - 370 ug/dL    TIBC      % Saturation     Vitamin B12   Result Value Ref Range    Vitamin B12 >2,000 (H) 211 - 911 pg/mL   Folate   Result Value Ref Range    Folate, Serum 10.7 >5.0 ng/mL   POCT GLUCOSE   Result Value Ref Range    POCT Glucose 103 (H) 74 - 99 mg/dL   Occult Blood, Stool    Specimen: Stool   Result Value Ref Range    Occult Blood, Stool X1 Negative Negative   POCT GLUCOSE   Result Value Ref Range    POCT Glucose 149 (H) 74 - 99 mg/dL   CBC and Auto Differential   Result Value Ref Range    WBC 11.9 (H) 4.4 - 11.3 x10*3/uL    nRBC 0.0 0.0 - 0.0 /100 WBCs    RBC 2.76 (L) 4.00 - 5.20 x10*6/uL    Hemoglobin 7.6 (L) 12.0 - 16.0 g/dL    Hematocrit 26.0 (L) 36.0 - 46.0 %    MCV 94 80 - 100 fL    MCH 27.5 26.0 - 34.0 pg    MCHC 29.2 (L) 32.0 - 36.0 g/dL    RDW 16.0 (H) 11.5 - 14.5 %    Platelets 396 150 - 450 x10*3/uL    Neutrophils % 74.9 40.0 - 80.0 %    Immature Granulocytes %, Automated 1.8 (H) 0.0 - 0.9 %    Lymphocytes %  15.7 13.0 - 44.0 %    Monocytes % 6.3 2.0 - 10.0 %    Eosinophils % 1.1 0.0 - 6.0 %    Basophils % 0.2 0.0 - 2.0 %    Neutrophils Absolute 8.95 (H) 1.60 - 5.50 x10*3/uL    Immature Granulocytes Absolute, Automated 0.21 0.00 - 0.50 x10*3/uL    Lymphocytes Absolute 1.87 0.80 - 3.00 x10*3/uL    Monocytes Absolute 0.75 0.05 - 0.80 x10*3/uL    Eosinophils Absolute 0.13 0.00 - 0.40 x10*3/uL    Basophils Absolute 0.02 0.00 - 0.10 x10*3/uL   Comprehensive Metabolic Panel   Result Value Ref Range    Glucose 99 74 - 99 mg/dL    Sodium 142 136 - 145 mmol/L    Potassium 3.3 (L) 3.5 - 5.3 mmol/L    Chloride 107 98 - 107 mmol/L    Bicarbonate 25 21 - 32 mmol/L    Anion Gap 13 10 - 20 mmol/L    Urea Nitrogen 18 6 - 23 mg/dL    Creatinine 1.17 (H) 0.50 - 1.05 mg/dL    eGFR 48 (L) >60 mL/min/1.73m*2    Calcium 8.1 (L) 8.6 - 10.3 mg/dL    Albumin 2.3 (L) 3.4 - 5.0 g/dL    Alkaline Phosphatase 125 33 - 136 U/L    Total Protein 5.5 (L) 6.4 - 8.2 g/dL    AST 29 9 - 39 U/L    Bilirubin, Total 0.4 0.0 - 1.2 mg/dL    ALT 16 7 - 45 U/L   POCT GLUCOSE   Result Value Ref Range    POCT Glucose 77 74 - 99 mg/dL       CT abdomen pelvis w IV contrast    Result Date: 9/4/2024  STUDY: CT Abdomen and Pelvis with IV Contrast; 9/4/2024 at 3:06 PM. INDICATION: WBC 20, lower abdominal pain. COMPARISON: CT AP 7/2/2024; CTA CAP 6/29/2024. ACCESSION NUMBER(S): [id number] ORDERING CLINICIAN: JJ REEDER TECHNIQUE: CT of the abdomen and pelvis was performed.  Contiguous axial images were obtained at 3 mm slice thickness through the abdomen and pelvis. Coronal and sagittal reconstructions at 3 mm slice thickness were performed.  Omnipaque 350 75 mL was administered intravenously.  FINDINGS: LOWER CHEST: No cardiomegaly.  No pericardial effusion.  There is mild basilar bronchiectasis and atelectasis.  ABDOMEN:  LIVER: No hepatomegaly.  Smooth surface contour.  Normal attenuation.  BILE DUCTS: No intrahepatic or extrahepatic biliary ductal dilatation.   GALLBLADDER: The gallbladder is unremarkable. STOMACH: No abnormalities identified.  PANCREAS: No masses or ductal dilatation.  SPLEEN: No splenomegaly or focal splenic lesion.  Splenic artery aneurysm measures 1.4 cm.  ADRENAL GLANDS: No thickening or nodules.  KIDNEYS AND URETERS: There is significant left renal edema and heterogeneous attenuation suggesting pyelonephritis with uroepithelial enhancement and adjacent proximal ureteral stranding.  There is heterogeneous areas of attenuation greater in the upper pole.  There is no significant ureterectasis.  Punctate calculi are demonstrated within the left kidney.  Renal cysts are demonstrated.  Previously identified left pigtail ureteral stent has been removed. PELVIS:  BLADDER: No abnormalities identified.  REPRODUCTIVE ORGANS: Mass density in the uterus measures 4.3 x 4.3 cm suggesting uterine fibroid.  BOWEL: There is mild wall thickening and inflammatory changes in the cecum. There are also mild inflammatory changes in the sigmoid colon extending into the rectum.  VESSELS: No abnormalities identified.  Abdominal aorta is normal in caliber.  PERITONEUM/RETROPERITONEUM/LYMPH NODES: No free fluid.  No pneumoperitoneum. No lymphadenopathy.  ABDOMINAL WALL: No abnormalities identified. SOFT TISSUES: No abnormalities identified.  BONES: No acute fracture or aggressive osseous lesion.  There is disc space narrowing and endplate degenerative changes in the lumbar spine. There is 3 mm anterolisthesis of L4 and L5.  There is narrowing of central canal and neuroforamina greater at L3-4, L4-5 and L5-S1.    Advanced left renal pyelonephritis with mild prominence of the left renal collecting system and periureteral stranding.  No definitive ureteral calculi. Small punctate nonobstructive calculi left kidney. Inflammatory changes in the cecum concerning for typhlitis.  Component of colitis sigmoid colon extending into the rectum. 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   This appears stable. Signed by Roderick Cisse DO    XR chest 1 view    Result Date: 9/4/2024  STUDY: Chest Radiograph;  9/4/2024 13:47 INDICATION: Weakness. COMPARISON: 7/5/2024 XR Chest ACCESSION NUMBER(S): [id number] ORDERING CLINICIAN: JJ REEDER TECHNIQUE:  Frontal chest was obtained at 13:46 hours. FINDINGS: CARDIOMEDIASTINAL SILHOUETTE: Cardiomediastinal silhouette is normal in size and configuration.  LUNGS: Lungs are clear.  ABDOMEN: No remarkable upper abdominal findings.  BONES: No acute osseous changes. Bilateral shoulder arthroplasties noted.    No acute process. Signed by Moose Mercado MD                    Assessment/Plan   Principal Problem:    Acute pyelonephritis  Active Problems:    Pyelonephritis    Acute pyelonephritis with now positive blood culture x1 growing klebsiella oxytoca/raoultella  - ID following  - zofran PRN  - IVF  - UCx pending  - zosyn     Concern for colitis based on CT A/P, but no diarrhea  Unlikely infectious in abdomen  - pt is having abdominal pain  - kub     Hypoxia  resolved     DANY  - IVF  - hold metformin, lisinopril     HypoK  - replace     Leukocytosis  - monitor     Normocytic anemia  - monitor     Uterine fibroid, splenic artery aneurysm 1.4cm on CT A/P  - outpt follow up     HTN  - hold lisinopril     DM  - hold metformin  - hold actos  - ISS     HLD  - statin     GERD  - ppi     Depression/Anxiety  - paxil  - ativan PRN  - remeron     Diet: diabetic  DVT ppx: lovenox  Code status: Full, confirmed with pt  Dispo: monitor clinically          Sarah Thomas MD  Hospitalist

## 2024-09-06 NOTE — CONSULTS
Patient has Malnutrition Diagnosis: No  Nutrition Assessment    Reason for Assessment: Provider consult order    Patient is a 79 y.o. female presenting with: Acute pyelonephritis,   Past Medical History:   Diagnosis Date    Aneurysm, splenic artery (CMS-[facility])     Ankylosing spondylitis (Multi)     Anxiety     Arthritis     Body mass index (BMI) 27.0-27.9, adult 12/07/2021    BMI 27.0-27.9,adult    BPPV (benign paroxysmal positional vertigo)     Cervical radiculitis     Chronic kidney disease     Depression     Diabetes mellitus (Multi)     Effusion, left knee 02/11/2019    Effusion of left knee    Exocrine pancreatic insufficiency ([facility])     GERD (gastroesophageal reflux disease)     Hearing aid worn     HL (hearing loss)     HLD (hyperlipidemia)     Hypertension     Injury of tendon of rotator cuff 06/19/2024    Irritable bowel syndrome with diarrhea 01/16/2020    Irritable bowel syndrome with diarrhea    Kidney stone     Kidney stone 08/21/2023    Nephrolithiasis     Other conditions influencing health status 06/08/2020    History of chronic diarrhea    Other hemorrhoids 06/28/2017    Internal hemorrhoid    Other nonspecific abnormal finding of lung field 03/26/2019    Ground glass opacity present on imaging of lung    Pain in left knee 04/30/2019    Left knee pain    Pain in unspecified knee 02/11/2019    Knee pain    Personal history of other diseases of the circulatory system 08/12/2017    History of hypertension    Personal history of other diseases of the musculoskeletal system and connective tissue 05/21/2015    History of low back pain    Personal history of other specified conditions 11/03/2020    History of fatigue    Personal history of other specified conditions 05/04/2021    History of diarrhea    Personal history of other specified conditions 03/07/2014    History of chest pain    Personal history of other specified conditions 01/31/2013    History of headache    Skin cancer     Unspecified injury  "of muscle(s) and tendon(s) of the rotator cuff of right shoulder, initial encounter 10/11/2016    Injury of right rotator cuff    Urinary tract infection     Wears glasses       Past Surgical History:   Procedure Laterality Date    BREAST BIOPSY Right 2004    benign    CARPAL TUNNEL RELEASE Bilateral     COLONOSCOPY  04/11/2018    no further needed-normal    CYSTOSCOPY W/ URETERAL STENT PLACEMENT Left 06/30/2024    HAND SURGERY Left 03/07/2014    Hand Surgery                                                                                                                                                          MR HEAD ANGIO WO IV CONTRAST  04/15/2023    MR HEAD ANGIO WO IV CONTRAST GEA MRI    MR NECK ANGIO WO IV CONTRAST  04/15/2023    MR NECK ANGIO WO IV CONTRAST GEA MRI    TOTAL KNEE ARTHROPLASTY Left 02/26/2024    TOTAL SHOULDER ARTHROPLASTY Bilateral 07/11/2022      Nutrition History:  Food and Nutrient History: Pt reports her appetite is fair, has nausea which is inhibiting oral intakes. Pt wiht occasional vomiting, loose stool since June, 2024. Pt also reports its been taking her a little longer to eat, but declines need for mechanically altered diet.  Energy Intake: Fair 50-75 %  Allergies   Allergen Reactions    Fenofibrate Unknown    Sulfa (Sulfonamide Antibiotics) Rash      GI Symptoms: Diarrhea, Nausea, and Vomiting     Oral Problems: None          Anthropometrics:  Height: 160 cm (5' 3\")   Weight: 65.9 kg (145 lb 4.8 oz)   BMI (Calculated): 25.75  IBW/kg (Dietitian Calculated): 52.3 kg  Percent of IBW: 124 %       Weight History:     Daily Weight  09/04/24 : 65.9 kg (145 lb 4.8 oz)  09/03/24 : 62.6 kg (138 lb)  08/08/24 : 64.9 kg (143 lb 1.3 oz)  07/26/24 : 66 kg (145 lb 6.4 oz)  07/03/24 : 68.2 kg (150 lb 6.4 oz)  06/30/24 : 68.1 kg (150 lb 0.8 oz)  06/19/24 : 69.9 kg (154 lb)  04/30/24 : 69.4 kg (153 lb)  04/23/24 : 69.4 kg (153 lb)  02/26/24 : 70 kg (154 lb 5.2 oz)    Weight         9/4/2024  1258 " 9/4/2024  1904          Weight: 62.6 kg (138 lb) 65.9 kg (145 lb 4.8 oz)              Weight Change %:  Weight History / % Weight Change: 65.9 kg (9/4/24); 64.9 kg (8/8/24); 69.9 kg (6/19/24); 71.2 kg (10/6/23)  Significant Weight Loss: No     Significant Weight Gain: Non-fluid related    Nutrition Focused Physical Exam Findings:    Subcutaneous Fat Loss  Orbital Fat Pads: Well nourished (slightly bulging fat pads)  Buccal Fat Pads: Well nourished (full, rounded cheeks)  Muscle Wasting  Temporalis: Well nourished (well-defined muscle)  Edema  Edema: +1 trace  Edema Location: BLE  Physical Findings (Nutrition Deficiency/Toxicity)  Skin: Positive (L knee incision)    Nutrition Significant Labs:    Results from last 7 days   Lab Units 09/06/24  0645 09/05/24  0618 09/04/24  1303   GLUCOSE mg/dL 99 122* 104*   SODIUM mmol/L 142 139 137   POTASSIUM mmol/L 3.3* 2.8* 3.1*   CHLORIDE mmol/L 107 103 98   CO2 mmol/L 25 27 27   BUN mg/dL 18 23 32*   CREATININE mg/dL 1.17* 1.23* 1.35*   EGFR mL/min/1.73m*2 48* 45* 40*   CALCIUM mg/dL 8.1* 8.0* 9.3   MAGNESIUM mg/dL  --  2.24 2.73*     Lab Results   Component Value Date    HGBA1C 5.4 09/03/2024    HGBA1C 6.1 (H) 02/09/2024    HGBA1C 6.7 (A) 10/06/2023     Results from last 7 days   Lab Units 09/06/24  1149 09/06/24  0758 09/05/24  1957 09/05/24  1653 09/05/24  1059 09/05/24  0725   POCT GLUCOSE mg/dL 141* 77 149* 103* 103* 116*     Lab Results   Component Value Date    ALBUMIN 2.3 (L) 09/06/2024      Lab Results   Component Value Date    CRP >70.00 (H) 09/03/2024       Nutrition Specific Medications:   Scheduled medications:  atorvastatin, 80 mg, oral, Daily  docusate sodium, 100 mg, oral, BID  enoxaparin, 30 mg, subcutaneous, q24h  ferrous sulfate (325 mg ferrous sulfate), 65 mg of iron, oral, BID  insulin lispro, 0-15 Units, subcutaneous, TID  magnesium hydroxide, 10 mL, oral, Once  mirtazapine, 15 mg, oral, Nightly  pantoprazole, 40 mg, oral, Daily before  breakfast  PARoxetine, 10 mg, oral, Daily  piperacillin-tazobactam, 3.375 g, intravenous, q6h  polyethylene glycol, 17 g, oral, Daily      Continuous medications:  sodium chloride 0.9%, 75 mL/hr, Last Rate: 75 mL/hr (09/05/24 0937)      PRN medications:  PRN medications: acetaminophen, dextrose, dextrose, glucagon, glucagon, LORazepam, ondansetron, phenyleph-min oil-petrolatum     Nursing Data Per flowsheet:   Stool Appearance: Loose (09/06/24 0900)  Gastrointestinal  Gastrointestinal (WDL): Exceptions to WDL  Abdomen Inspection: Rounded  Abdominal Tenderness: Nontender  Bowel Sounds: All quadrants  Bowel Sounds (All Quadrants): Active  Last BM Date: 09/03/24  Bowel Incontinence: No  Stool Appearance: Loose  Gastrointestinal Symptoms: Bloating, Distention, Nausea  Nausea Precipitating Factors:  (stated eating is difficult d/t onset of nausea during meals)  Feeding assistance level:      Intake/Output Summary (Last 24 hours) at 9/6/2024 1301  Last data filed at 9/6/2024 1003  Gross per 24 hour   Intake 720 ml   Output --   Net 720 ml      0-10 (Numeric) Pain Score: 3  Vanegas-Baker FACES Pain Rating: Hurts little bit   Dietary Orders (From admission, onward)       Start     Ordered    09/06/24 1104  Oral nutritional supplements  Until discontinued        Question Answer Comment   Deliver with Breakfast chocolate   Select supplement: Ensure High Protein        09/06/24 1104    09/06/24 1104  Oral nutritional supplements  Until discontinued        Question Answer Comment   Deliver with Dinner strawberry   Select supplement: Glucerna Shake        09/06/24 1104    09/04/24 1817  Adult diet Consistent Carb; CCD 75 gm/meal  Diet effective now        Question Answer Comment   Diet type Consistent Carb    Carb diet selection: CCD 75 gm/meal        09/04/24 1817                     Estimated Needs:   Total Energy Estimated Needs (kCal): 1650 kCal     Total Protein Estimated Needs (g): 79 g     Total Fluid Estimated Needs (mL):   (1 ml/kcal)          Nutrition Diagnosis   Malnutrition Diagnosis  Patient has Malnutrition Diagnosis: No    Nutrition Diagnosis  Patient has Nutrition Diagnosis: Yes  Diagnosis Status (1): New  Nutrition Diagnosis 1: Inadequate protein energy intake  Related to (1): pyelonephritis  As Evidenced by (1): self reports of decreased appetite over past 2-3 months  Additional Assessment Information (1): ONS added for additional kcal and protein   Noted low Albumin at 2.3- suspect low 2/2 elevated CRP- would not anticipate improvement in albumin level until inflammatory response subsides    Nutrition Interventions/Recommendations   Nutrition Prescription:  diet, fluids    Nutrition Interventions:   Interventions: Medical food supplement  Goal: Ensure HP daily= 160 kcal 16 g protein; Glucerna daily= 220 kcal, 10 g protein      Coordination of Care: n/a  Nutrition Education:   N/A  Recommendations:  Keep therapeutic diet restrictions liberal  Weights: daily or every other day  RFP, Mg daily; replete lytes prn         Nutrition Monitoring and Evaluation   Food/Nutrient Related History Monitoring  Monitoring and Evaluation Plan: Amount of food  Criteria: Pt will consume 50-75% of meals and ONS provided    Body Composition/Growth/Weight History  Monitoring and Evaluation Plan: Weight  Weight: Measured weight  Criteria: Monitor weights    Biochemical Data, Medical Tests and Procedures  Monitoring and Evaluation Plan: Electrolyte/renal panel, Glucose/endocrine profile  Criteria: Monitor labs                 Time Spent (min): 60 minutes

## 2024-09-07 LAB
ALBUMIN SERPL BCP-MCNC: 2.3 G/DL (ref 3.4–5)
ALP SERPL-CCNC: 122 U/L (ref 33–136)
ALT SERPL W P-5'-P-CCNC: 22 U/L (ref 7–45)
ANION GAP SERPL CALC-SCNC: 12 MMOL/L (ref 10–20)
AST SERPL W P-5'-P-CCNC: 34 U/L (ref 9–39)
BACTERIA UR CULT: ABNORMAL
BASOPHILS # BLD AUTO: 0.03 X10*3/UL (ref 0–0.1)
BASOPHILS NFR BLD AUTO: 0.3 %
BILIRUB SERPL-MCNC: 0.4 MG/DL (ref 0–1.2)
BUN SERPL-MCNC: 13 MG/DL (ref 6–23)
CALCIUM SERPL-MCNC: 8.1 MG/DL (ref 8.6–10.3)
CHLORIDE SERPL-SCNC: 108 MMOL/L (ref 98–107)
CO2 SERPL-SCNC: 25 MMOL/L (ref 21–32)
CREAT SERPL-MCNC: 1.07 MG/DL (ref 0.5–1.05)
EGFRCR SERPLBLD CKD-EPI 2021: 53 ML/MIN/1.73M*2
EOSINOPHIL # BLD AUTO: 0.11 X10*3/UL (ref 0–0.4)
EOSINOPHIL NFR BLD AUTO: 1.1 %
ERYTHROCYTE [DISTWIDTH] IN BLOOD BY AUTOMATED COUNT: 16 % (ref 11.5–14.5)
GLUCOSE BLD MANUAL STRIP-MCNC: 123 MG/DL (ref 74–99)
GLUCOSE BLD MANUAL STRIP-MCNC: 153 MG/DL (ref 74–99)
GLUCOSE BLD MANUAL STRIP-MCNC: 174 MG/DL (ref 74–99)
GLUCOSE BLD MANUAL STRIP-MCNC: 91 MG/DL (ref 74–99)
GLUCOSE SERPL-MCNC: 118 MG/DL (ref 74–99)
HCT VFR BLD AUTO: 25 % (ref 36–46)
HGB BLD-MCNC: 7.4 G/DL (ref 12–16)
IMM GRANULOCYTES # BLD AUTO: 0.14 X10*3/UL (ref 0–0.5)
IMM GRANULOCYTES NFR BLD AUTO: 1.4 % (ref 0–0.9)
LYMPHOCYTES # BLD AUTO: 1.65 X10*3/UL (ref 0.8–3)
LYMPHOCYTES NFR BLD AUTO: 16.8 %
MAGNESIUM SERPL-MCNC: 2.1 MG/DL (ref 1.6–2.4)
MCH RBC QN AUTO: 27.7 PG (ref 26–34)
MCHC RBC AUTO-ENTMCNC: 29.6 G/DL (ref 32–36)
MCV RBC AUTO: 94 FL (ref 80–100)
MONOCYTES # BLD AUTO: 0.72 X10*3/UL (ref 0.05–0.8)
MONOCYTES NFR BLD AUTO: 7.3 %
NEUTROPHILS # BLD AUTO: 7.2 X10*3/UL (ref 1.6–5.5)
NEUTROPHILS NFR BLD AUTO: 73.1 %
NRBC BLD-RTO: 0 /100 WBCS (ref 0–0)
PLATELET # BLD AUTO: 400 X10*3/UL (ref 150–450)
POTASSIUM SERPL-SCNC: 3.1 MMOL/L (ref 3.5–5.3)
PROT SERPL-MCNC: 5.5 G/DL (ref 6.4–8.2)
RBC # BLD AUTO: 2.67 X10*6/UL (ref 4–5.2)
SODIUM SERPL-SCNC: 142 MMOL/L (ref 136–145)
WBC # BLD AUTO: 9.9 X10*3/UL (ref 4.4–11.3)

## 2024-09-07 PROCEDURE — 82947 ASSAY GLUCOSE BLOOD QUANT: CPT

## 2024-09-07 PROCEDURE — 36415 COLL VENOUS BLD VENIPUNCTURE: CPT | Performed by: STUDENT IN AN ORGANIZED HEALTH CARE EDUCATION/TRAINING PROGRAM

## 2024-09-07 PROCEDURE — 2500000004 HC RX 250 GENERAL PHARMACY W/ HCPCS (ALT 636 FOR OP/ED): Performed by: STUDENT IN AN ORGANIZED HEALTH CARE EDUCATION/TRAINING PROGRAM

## 2024-09-07 PROCEDURE — 87040 BLOOD CULTURE FOR BACTERIA: CPT | Mod: GEALAB | Performed by: STUDENT IN AN ORGANIZED HEALTH CARE EDUCATION/TRAINING PROGRAM

## 2024-09-07 PROCEDURE — 85025 COMPLETE CBC W/AUTO DIFF WBC: CPT | Performed by: STUDENT IN AN ORGANIZED HEALTH CARE EDUCATION/TRAINING PROGRAM

## 2024-09-07 PROCEDURE — 2500000004 HC RX 250 GENERAL PHARMACY W/ HCPCS (ALT 636 FOR OP/ED): Performed by: INTERNAL MEDICINE

## 2024-09-07 PROCEDURE — 2500000004 HC RX 250 GENERAL PHARMACY W/ HCPCS (ALT 636 FOR OP/ED): Performed by: PHYSICIAN ASSISTANT

## 2024-09-07 PROCEDURE — 99232 SBSQ HOSP IP/OBS MODERATE 35: CPT | Performed by: STUDENT IN AN ORGANIZED HEALTH CARE EDUCATION/TRAINING PROGRAM

## 2024-09-07 PROCEDURE — 2500000001 HC RX 250 WO HCPCS SELF ADMINISTERED DRUGS (ALT 637 FOR MEDICARE OP): Performed by: PHYSICIAN ASSISTANT

## 2024-09-07 PROCEDURE — 1100000001 HC PRIVATE ROOM DAILY

## 2024-09-07 PROCEDURE — 2500000002 HC RX 250 W HCPCS SELF ADMINISTERED DRUGS (ALT 637 FOR MEDICARE OP, ALT 636 FOR OP/ED): Performed by: STUDENT IN AN ORGANIZED HEALTH CARE EDUCATION/TRAINING PROGRAM

## 2024-09-07 PROCEDURE — 80053 COMPREHEN METABOLIC PANEL: CPT | Performed by: STUDENT IN AN ORGANIZED HEALTH CARE EDUCATION/TRAINING PROGRAM

## 2024-09-07 PROCEDURE — 94760 N-INVAS EAR/PLS OXIMETRY 1: CPT

## 2024-09-07 PROCEDURE — 2500000001 HC RX 250 WO HCPCS SELF ADMINISTERED DRUGS (ALT 637 FOR MEDICARE OP): Performed by: STUDENT IN AN ORGANIZED HEALTH CARE EDUCATION/TRAINING PROGRAM

## 2024-09-07 PROCEDURE — 83735 ASSAY OF MAGNESIUM: CPT | Performed by: STUDENT IN AN ORGANIZED HEALTH CARE EDUCATION/TRAINING PROGRAM

## 2024-09-07 RX ORDER — CEFTRIAXONE 1 G/50ML
1 INJECTION, SOLUTION INTRAVENOUS EVERY 24 HOURS
Status: DISCONTINUED | OUTPATIENT
Start: 2024-09-07 | End: 2024-09-08 | Stop reason: HOSPADM

## 2024-09-07 RX ORDER — POTASSIUM CHLORIDE 20 MEQ/1
40 TABLET, EXTENDED RELEASE ORAL ONCE
Status: DISCONTINUED | OUTPATIENT
Start: 2024-09-07 | End: 2024-09-07

## 2024-09-07 RX ORDER — POTASSIUM CHLORIDE 1.5 G/1.58G
40 POWDER, FOR SOLUTION ORAL ONCE
Status: COMPLETED | OUTPATIENT
Start: 2024-09-07 | End: 2024-09-07

## 2024-09-07 ASSESSMENT — COGNITIVE AND FUNCTIONAL STATUS - GENERAL
MOVING TO AND FROM BED TO CHAIR: A LITTLE
DRESSING REGULAR UPPER BODY CLOTHING: A LITTLE
DAILY ACTIVITIY SCORE: 22
CLIMB 3 TO 5 STEPS WITH RAILING: A LITTLE
WALKING IN HOSPITAL ROOM: A LITTLE
MOBILITY SCORE: 20
HELP NEEDED FOR BATHING: A LITTLE
DRESSING REGULAR LOWER BODY CLOTHING: A LITTLE
CLIMB 3 TO 5 STEPS WITH RAILING: A LITTLE
DAILY ACTIVITIY SCORE: 20
HELP NEEDED FOR BATHING: A LITTLE
MOBILITY SCORE: 20
STANDING UP FROM CHAIR USING ARMS: A LITTLE
TOILETING: A LITTLE
DRESSING REGULAR LOWER BODY CLOTHING: A LITTLE
STANDING UP FROM CHAIR USING ARMS: A LITTLE
MOVING TO AND FROM BED TO CHAIR: A LITTLE
WALKING IN HOSPITAL ROOM: A LITTLE

## 2024-09-07 ASSESSMENT — PAIN SCALES - GENERAL
PAINLEVEL_OUTOF10: 10 - WORST POSSIBLE PAIN
PAINLEVEL_OUTOF10: 0 - NO PAIN
PAINLEVEL_OUTOF10: 4

## 2024-09-07 ASSESSMENT — PAIN - FUNCTIONAL ASSESSMENT
PAIN_FUNCTIONAL_ASSESSMENT: 0-10
PAIN_FUNCTIONAL_ASSESSMENT: 0-10

## 2024-09-07 NOTE — PROGRESS NOTES
"Sherri Najera \"Millicent\" is a 79 y.o. female on day 2 of admission presenting with Acute pyelonephritis.    Subjective   Pt says she is feeling better. She has multiple BM yesterday.       Objective     Physical Exam  Vitals and nursing note reviewed.   Constitutional:       General: She is not in acute distress.     Appearance: Normal appearance. She is not ill-appearing or toxic-appearing.   HENT:      Head: Normocephalic and atraumatic.      Nose: Nose normal.      Mouth/Throat:      Mouth: Mucous membranes are moist.   Eyes:      Extraocular Movements: Extraocular movements intact.      Conjunctiva/sclera: Conjunctivae normal.      Pupils: Pupils are equal, round, and reactive to light.   Cardiovascular:      Rate and Rhythm: Normal rate and regular rhythm.      Heart sounds: No murmur heard.     No gallop.   Pulmonary:      Effort: Pulmonary effort is normal. No respiratory distress.      Breath sounds: Normal breath sounds. No wheezing, rhonchi or rales.   Abdominal:      General: Abdomen is flat. Bowel sounds are normal. There is no distension.      Palpations: Abdomen is soft. There is no mass.      Tenderness: There is no abdominal tenderness.   Musculoskeletal:         General: No swelling or tenderness. Normal range of motion.      Cervical back: Normal range of motion and neck supple.   Skin:     General: Skin is warm and dry.   Neurological:      Mental Status: She is alert and oriented to person, place, and time.      Motor: Weakness present.   Psychiatric:         Mood and Affect: Mood normal.         Behavior: Behavior normal.         Thought Content: Thought content normal.         Judgment: Judgment normal.         Last Recorded Vitals:  /74 (BP Location: Right arm, Patient Position: Lying)   Pulse 80   Temp 36.1 °C (97 °F) (Temporal)   Resp 17   Ht 1.6 m (5' 3\")   Wt 65.9 kg (145 lb 4.8 oz)   SpO2 91%   BMI 25.74 kg/m²      Scheduled medications:  atorvastatin, 80 mg, oral, " Daily  docusate sodium, 100 mg, oral, BID  enoxaparin, 30 mg, subcutaneous, q24h  ferrous sulfate (325 mg ferrous sulfate), 65 mg of iron, oral, BID  insulin lispro, 0-15 Units, subcutaneous, TID  mirtazapine, 15 mg, oral, Nightly  pantoprazole, 40 mg, oral, Daily before breakfast  PARoxetine, 10 mg, oral, Daily  piperacillin-tazobactam, 3.375 g, intravenous, q6h  polyethylene glycol, 17 g, oral, Daily  potassium chloride CR, 40 mEq, oral, Once      Continuous medications:  sodium chloride 0.9%, 75 mL/hr, Last Rate: 75 mL/hr (09/07/24 0572)      PRN medications:  PRN medications: acetaminophen, dextrose, dextrose, glucagon, glucagon, LORazepam, ondansetron, phenyleph-min oil-petrolatum     Relevant Results:  Results for orders placed or performed during the hospital encounter of 09/04/24 (from the past 24 hour(s))   POCT GLUCOSE   Result Value Ref Range    POCT Glucose 141 (H) 74 - 99 mg/dL   POCT GLUCOSE   Result Value Ref Range    POCT Glucose 116 (H) 74 - 99 mg/dL   POCT GLUCOSE   Result Value Ref Range    POCT Glucose 164 (H) 74 - 99 mg/dL   Comprehensive Metabolic Panel   Result Value Ref Range    Glucose 118 (H) 74 - 99 mg/dL    Sodium 142 136 - 145 mmol/L    Potassium 3.1 (L) 3.5 - 5.3 mmol/L    Chloride 108 (H) 98 - 107 mmol/L    Bicarbonate 25 21 - 32 mmol/L    Anion Gap 12 10 - 20 mmol/L    Urea Nitrogen 13 6 - 23 mg/dL    Creatinine 1.07 (H) 0.50 - 1.05 mg/dL    eGFR 53 (L) >60 mL/min/1.73m*2    Calcium 8.1 (L) 8.6 - 10.3 mg/dL    Albumin 2.3 (L) 3.4 - 5.0 g/dL    Alkaline Phosphatase 122 33 - 136 U/L    Total Protein 5.5 (L) 6.4 - 8.2 g/dL    AST 34 9 - 39 U/L    Bilirubin, Total 0.4 0.0 - 1.2 mg/dL    ALT 22 7 - 45 U/L   CBC and Auto Differential   Result Value Ref Range    WBC 9.9 4.4 - 11.3 x10*3/uL    nRBC 0.0 0.0 - 0.0 /100 WBCs    RBC 2.67 (L) 4.00 - 5.20 x10*6/uL    Hemoglobin 7.4 (L) 12.0 - 16.0 g/dL    Hematocrit 25.0 (L) 36.0 - 46.0 %    MCV 94 80 - 100 fL    MCH 27.7 26.0 - 34.0 pg    MCHC  29.6 (L) 32.0 - 36.0 g/dL    RDW 16.0 (H) 11.5 - 14.5 %    Platelets 400 150 - 450 x10*3/uL    Neutrophils % 73.1 40.0 - 80.0 %    Immature Granulocytes %, Automated 1.4 (H) 0.0 - 0.9 %    Lymphocytes % 16.8 13.0 - 44.0 %    Monocytes % 7.3 2.0 - 10.0 %    Eosinophils % 1.1 0.0 - 6.0 %    Basophils % 0.3 0.0 - 2.0 %    Neutrophils Absolute 7.20 (H) 1.60 - 5.50 x10*3/uL    Immature Granulocytes Absolute, Automated 0.14 0.00 - 0.50 x10*3/uL    Lymphocytes Absolute 1.65 0.80 - 3.00 x10*3/uL    Monocytes Absolute 0.72 0.05 - 0.80 x10*3/uL    Eosinophils Absolute 0.11 0.00 - 0.40 x10*3/uL    Basophils Absolute 0.03 0.00 - 0.10 x10*3/uL   Magnesium   Result Value Ref Range    Magnesium 2.10 1.60 - 2.40 mg/dL   POCT GLUCOSE   Result Value Ref Range    POCT Glucose 123 (H) 74 - 99 mg/dL   POCT GLUCOSE   Result Value Ref Range    POCT Glucose 174 (H) 74 - 99 mg/dL       XR abdomen 1 view    Result Date: 9/6/2024  Interpreted By:  Melodie Hutchison, STUDY: XR ABDOMEN 1 VIEW 9/6/2024 10:25 am   INDICATION: Abdominal pain   COMPARISON: 04/10/2024   ACCESSION NUMBER(S): [id number]   ORDERING CLINICIAN: TORSTEN WAYNE   TECHNIQUE: Recumbent abdominal view   FINDINGS: Recumbent view of the abdomen shows a nonspecific bowel gas pattern. No organomegaly or soft tissue masses are seen. Splenic artery calcification is demonstrated in left upper quadrant with a peripherally calcified 1.6 cm splenic artery aneurysm seen.   A left renal calculus seen on prior study is no longer present.   There is mild left basilar discoid atelectasis.       No significant intra-abdominal abnormality.   Stable 1.6 cm peripherally calcified splenic artery aneurysm.   Minimal left basilar discoid atelectasis.   Signed by: Melodie Hutchison 9/6/2024 11:58 AM Dictation workstation:   [id number]                    Assessment/Plan   Principal Problem:    Acute pyelonephritis  Active Problems:    Pyelonephritis    Acute pyelonephritis with now Klebsiella bacteremia    positive blood culture x1 growing klebsiella oxytoca/raoultella  - ID following  - zofran PRN  - IVF  - UCx pending  - zosyn  - repeat Bcx    constipation  - bowel regimen     Hypoxia  resolved     DANY  Cr improved  - dc IVF  - hold metformin, lisinopril     HypoK  - replace     Leukocytosis  resolved     Normocytic anemia  Iron deficiency  - Fe supplement  - monitor     Uterine fibroid, splenic artery aneurysm 1.4cm on CT A/P  - outpt follow up     HTN  BP improved  - low threshold to resume lisinopril     DM  - hold metformin  - hold actos  - ISS     HLD  - statin     GERD  - ppi     Depression/Anxiety  - paxil  - ativan PRN  - remeron     Diet: diabetic  DVT ppx: lovenox  Code status: Full, confirmed with pt  Dispo: monitor clinically, IV abx, repeat bcx pending         Sarah Thomas MD  Hospitalist

## 2024-09-07 NOTE — PROGRESS NOTES
Millicent Najera is a 79 y.o. female on day 2 of admission presenting with Acute pyelonephritis.    Subjective   Interval History: no fever, no pain, no dysuria        Review of Systems    Objective   Range of Vitals (last 24 hours)  Heart Rate:  [69-93]   Temp:  [36.1 °C (97 °F)-36.6 °C (97.9 °F)]   Resp:  [14-17]   BP: (120-146)/(71-74)   SpO2:  [90 %-91 %]   Daily Weight  09/04/24 : 65.9 kg (145 lb 4.8 oz)    Body mass index is 25.74 kg/m².    Physical Exam  Constitutional:       Appearance: Normal appearance.   HENT:      Head: Normocephalic and atraumatic.      Mouth/Throat:      Mouth: Mucous membranes are moist.      Pharynx: Oropharynx is clear.   Eyes:      Pupils: Pupils are equal, round, and reactive to light.   Cardiovascular:      Rate and Rhythm: Normal rate and regular rhythm.      Heart sounds: Normal heart sounds.   Pulmonary:      Effort: Pulmonary effort is normal.      Breath sounds: Normal breath sounds.   Abdominal:      General: Abdomen is flat. Bowel sounds are normal.      Palpations: Abdomen is soft.   Musculoskeletal:      Cervical back: Normal range of motion.   Neurological:      Mental Status: She is alert.         Antibiotics  piperacillin-tazobactam - 3.375 gram/50 mL    Relevant Results  Labs  Results from last 72 hours   Lab Units 09/07/24  0520 09/06/24  0644 09/05/24  1149 09/05/24  0618   WBC AUTO x10*3/uL 9.9 11.9*  --  14.1*   HEMOGLOBIN g/dL 7.4* 7.6* 7.7* 7.4*   HEMATOCRIT % 25.0* 26.0* 26.7* 23.6*   PLATELETS AUTO x10*3/uL 400 396  --  408   NEUTROS PCT AUTO % 73.1 74.9  --  79.8   LYMPHS PCT AUTO % 16.8 15.7  --  11.7   MONOS PCT AUTO % 7.3 6.3  --  6.8   EOS PCT AUTO % 1.1 1.1  --  0.4     Results from last 72 hours   Lab Units 09/07/24  0520 09/06/24  0645 09/05/24  0618   SODIUM mmol/L 142 142 139   POTASSIUM mmol/L 3.1* 3.3* 2.8*   CHLORIDE mmol/L 108* 107 103   CO2 mmol/L 25 25 27   BUN mg/dL 13 18 23   CREATININE mg/dL 1.07* 1.17* 1.23*   GLUCOSE mg/dL 118* 99 122*    CALCIUM mg/dL 8.1* 8.1* 8.0*   ANION GAP mmol/L 12 13 12   EGFR mL/min/1.73m*2 53* 48* 45*     Results from last 72 hours   Lab Units 09/07/24  0520 09/06/24  0645 09/05/24  0618   ALK PHOS U/L 122 125 126   BILIRUBIN TOTAL mg/dL 0.4 0.4 0.5   PROTEIN TOTAL g/dL 5.5* 5.5* 5.4*   ALT U/L 22 16 14   AST U/L 34 29 24   ALBUMIN g/dL 2.3* 2.3* 2.2*     Estimated Creatinine Clearance: 38.9 mL/min (A) (by C-G formula based on SCr of 1.07 mg/dL (H)).  C-Reactive Protein   Date Value Ref Range Status   09/03/2024 >70.00 (H) 0.00 - 2.00 mg/dL Final     Comment:     Result rechecked     CRP   Date Value Ref Range Status   03/05/2020 0.59 mg/dL Final     Comment:     REF VALUE  < 1.00     03/11/2019 1.30 (A) mg/dL Final     Comment:     REF VALUE  < 1.00       Microbiology  Susceptibility data from last 14 days.  Collected Specimen Info Organism Amoxicillin/Clavulanate Ampicillin Ampicillin/Sulbactam Cefazolin Ceftriaxone Cefuroxime Ciprofloxacin Gentamicin Levofloxacin Nitrofurantoin Piperacillin/Tazobactam Trimethoprim/Sulfamethoxazole   09/04/24 Urine from Clean Catch/Voided Klebsiella oxytoca/Raoultella species  S  R  I  R  S   S  S   S  S  S   09/04/24 Blood culture from Peripheral Venipuncture Klebsiella oxytoca/Raoultella species               09/04/24 Blood culture from Peripheral Venipuncture Klebsiella oxytoca/Raoultella species  S  R  I  R  S  S  S  S  S   S  S   Imaging  Reviewed        Assessment/Plan   Left pyelonephritis, the urine with Klebsiella  Bacteremia, Klebsiella     Recommendations :  Can deescalate to Rocephin  Discussed with the medical team     I spent minutes in the professional and overall care of this patient.      Meek Dick MD

## 2024-09-07 NOTE — CARE PLAN
The patient's goals for the shift include  patient will continue to get her antibiotics during this shift     The clinical goals for the shift include patient will have formed BM by the end of this shift      Problem: Pain - Adult  Goal: Verbalizes/displays adequate comfort level or baseline comfort level  Outcome: Progressing     Problem: Safety - Adult  Goal: Free from fall injury  Outcome: Progressing     Problem: Discharge Planning  Goal: Discharge to home or other facility with appropriate resources  Outcome: Progressing     Problem: Chronic Conditions and Co-morbidities  Goal: Patient's chronic conditions and co-morbidity symptoms are monitored and maintained or improved  Outcome: Progressing

## 2024-09-08 VITALS
OXYGEN SATURATION: 90 % | HEART RATE: 78 BPM | SYSTOLIC BLOOD PRESSURE: 149 MMHG | RESPIRATION RATE: 16 BRPM | BODY MASS INDEX: 25.75 KG/M2 | HEIGHT: 63 IN | WEIGHT: 145.3 LBS | TEMPERATURE: 97.3 F | DIASTOLIC BLOOD PRESSURE: 82 MMHG

## 2024-09-08 LAB
ALBUMIN SERPL BCP-MCNC: 2.4 G/DL (ref 3.4–5)
ALP SERPL-CCNC: 118 U/L (ref 33–136)
ALT SERPL W P-5'-P-CCNC: 21 U/L (ref 7–45)
ANION GAP SERPL CALC-SCNC: 14 MMOL/L (ref 10–20)
AST SERPL W P-5'-P-CCNC: 26 U/L (ref 9–39)
BACTERIA BLD AEROBE CULT: ABNORMAL
BACTERIA BLD AEROBE CULT: ABNORMAL
BACTERIA BLD CULT: ABNORMAL
BACTERIA BLD CULT: ABNORMAL
BACTERIA BLD CULT: NORMAL
BACTERIA BLD CULT: NORMAL
BASOPHILS # BLD AUTO: 0.02 X10*3/UL (ref 0–0.1)
BASOPHILS NFR BLD AUTO: 0.2 %
BILIRUB SERPL-MCNC: 0.2 MG/DL (ref 0–1.2)
BUN SERPL-MCNC: 12 MG/DL (ref 6–23)
CALCIUM SERPL-MCNC: 8.1 MG/DL (ref 8.6–10.3)
CHLORIDE SERPL-SCNC: 107 MMOL/L (ref 98–107)
CO2 SERPL-SCNC: 26 MMOL/L (ref 21–32)
CREAT SERPL-MCNC: 0.91 MG/DL (ref 0.5–1.05)
EGFRCR SERPLBLD CKD-EPI 2021: 64 ML/MIN/1.73M*2
EOSINOPHIL # BLD AUTO: 0.11 X10*3/UL (ref 0–0.4)
EOSINOPHIL NFR BLD AUTO: 1.1 %
ERYTHROCYTE [DISTWIDTH] IN BLOOD BY AUTOMATED COUNT: 15.9 % (ref 11.5–14.5)
ERYTHROCYTE [DISTWIDTH] IN BLOOD BY AUTOMATED COUNT: 15.9 % (ref 11.5–14.5)
GLUCOSE BLD MANUAL STRIP-MCNC: 110 MG/DL (ref 74–99)
GLUCOSE BLD MANUAL STRIP-MCNC: 116 MG/DL (ref 74–99)
GLUCOSE SERPL-MCNC: 108 MG/DL (ref 74–99)
GRAM STN SPEC: ABNORMAL
HCT VFR BLD AUTO: 23.9 % (ref 36–46)
HCT VFR BLD AUTO: 24.4 % (ref 36–46)
HGB BLD-MCNC: 7 G/DL (ref 12–16)
HGB BLD-MCNC: 7.3 G/DL (ref 12–16)
IMM GRANULOCYTES # BLD AUTO: 0.16 X10*3/UL (ref 0–0.5)
IMM GRANULOCYTES NFR BLD AUTO: 1.6 % (ref 0–0.9)
LYMPHOCYTES # BLD AUTO: 1.81 X10*3/UL (ref 0.8–3)
LYMPHOCYTES NFR BLD AUTO: 18.4 %
MCH RBC QN AUTO: 27.3 PG (ref 26–34)
MCH RBC QN AUTO: 27.5 PG (ref 26–34)
MCHC RBC AUTO-ENTMCNC: 29.3 G/DL (ref 32–36)
MCHC RBC AUTO-ENTMCNC: 29.9 G/DL (ref 32–36)
MCV RBC AUTO: 91 FL (ref 80–100)
MCV RBC AUTO: 94 FL (ref 80–100)
MONOCYTES # BLD AUTO: 0.65 X10*3/UL (ref 0.05–0.8)
MONOCYTES NFR BLD AUTO: 6.6 %
NEUTROPHILS # BLD AUTO: 7.08 X10*3/UL (ref 1.6–5.5)
NEUTROPHILS NFR BLD AUTO: 72.1 %
NRBC BLD-RTO: 0 /100 WBCS (ref 0–0)
NRBC BLD-RTO: 0 /100 WBCS (ref 0–0)
PLATELET # BLD AUTO: 404 X10*3/UL (ref 150–450)
PLATELET # BLD AUTO: 430 X10*3/UL (ref 150–450)
POTASSIUM SERPL-SCNC: 3.2 MMOL/L (ref 3.5–5.3)
PROT SERPL-MCNC: 5.4 G/DL (ref 6.4–8.2)
RBC # BLD AUTO: 2.55 X10*6/UL (ref 4–5.2)
RBC # BLD AUTO: 2.67 X10*6/UL (ref 4–5.2)
SODIUM SERPL-SCNC: 144 MMOL/L (ref 136–145)
WBC # BLD AUTO: 11.2 X10*3/UL (ref 4.4–11.3)
WBC # BLD AUTO: 9.8 X10*3/UL (ref 4.4–11.3)

## 2024-09-08 PROCEDURE — 2500000001 HC RX 250 WO HCPCS SELF ADMINISTERED DRUGS (ALT 637 FOR MEDICARE OP): Performed by: STUDENT IN AN ORGANIZED HEALTH CARE EDUCATION/TRAINING PROGRAM

## 2024-09-08 PROCEDURE — 36415 COLL VENOUS BLD VENIPUNCTURE: CPT | Performed by: STUDENT IN AN ORGANIZED HEALTH CARE EDUCATION/TRAINING PROGRAM

## 2024-09-08 PROCEDURE — 85027 COMPLETE CBC AUTOMATED: CPT | Performed by: STUDENT IN AN ORGANIZED HEALTH CARE EDUCATION/TRAINING PROGRAM

## 2024-09-08 PROCEDURE — 2500000001 HC RX 250 WO HCPCS SELF ADMINISTERED DRUGS (ALT 637 FOR MEDICARE OP): Performed by: PHYSICIAN ASSISTANT

## 2024-09-08 PROCEDURE — 85025 COMPLETE CBC W/AUTO DIFF WBC: CPT | Performed by: STUDENT IN AN ORGANIZED HEALTH CARE EDUCATION/TRAINING PROGRAM

## 2024-09-08 PROCEDURE — 82947 ASSAY GLUCOSE BLOOD QUANT: CPT

## 2024-09-08 PROCEDURE — 2500000002 HC RX 250 W HCPCS SELF ADMINISTERED DRUGS (ALT 637 FOR MEDICARE OP, ALT 636 FOR OP/ED): Performed by: STUDENT IN AN ORGANIZED HEALTH CARE EDUCATION/TRAINING PROGRAM

## 2024-09-08 PROCEDURE — 84075 ASSAY ALKALINE PHOSPHATASE: CPT | Performed by: STUDENT IN AN ORGANIZED HEALTH CARE EDUCATION/TRAINING PROGRAM

## 2024-09-08 PROCEDURE — 2500000004 HC RX 250 GENERAL PHARMACY W/ HCPCS (ALT 636 FOR OP/ED): Performed by: PHYSICIAN ASSISTANT

## 2024-09-08 PROCEDURE — 99239 HOSP IP/OBS DSCHRG MGMT >30: CPT | Performed by: STUDENT IN AN ORGANIZED HEALTH CARE EDUCATION/TRAINING PROGRAM

## 2024-09-08 RX ORDER — CIPROFLOXACIN 500 MG/1
500 TABLET ORAL 2 TIMES DAILY
Qty: 14 TABLET | Refills: 0 | Status: SHIPPED | OUTPATIENT
Start: 2024-09-08 | End: 2024-09-15

## 2024-09-08 RX ORDER — FERROUS SULFATE 325(65) MG
65 TABLET ORAL 2 TIMES DAILY
Qty: 60 TABLET | Refills: 0 | Status: SHIPPED | OUTPATIENT
Start: 2024-09-08 | End: 2024-10-08

## 2024-09-08 RX ORDER — POTASSIUM CHLORIDE 1.5 G/1.58G
40 POWDER, FOR SOLUTION ORAL ONCE
Status: COMPLETED | OUTPATIENT
Start: 2024-09-08 | End: 2024-09-08

## 2024-09-08 RX ORDER — DOCUSATE SODIUM 100 MG/1
100 CAPSULE, LIQUID FILLED ORAL 2 TIMES DAILY
Qty: 60 CAPSULE | Refills: 0 | Status: SHIPPED | OUTPATIENT
Start: 2024-09-08 | End: 2024-10-08

## 2024-09-08 RX ORDER — POTASSIUM CHLORIDE 1.5 G/1.58G
20 POWDER, FOR SOLUTION ORAL 2 TIMES DAILY
Status: DISCONTINUED | OUTPATIENT
Start: 2024-09-08 | End: 2024-09-08

## 2024-09-08 RX ORDER — POTASSIUM CHLORIDE 20 MEQ/1
40 TABLET, EXTENDED RELEASE ORAL ONCE
Status: DISCONTINUED | OUTPATIENT
Start: 2024-09-08 | End: 2024-09-08

## 2024-09-08 NOTE — PROGRESS NOTES
Millicent Najera is a 79 y.o. female on day 3 of admission presenting with Acute pyelonephritis.    Subjective   Interval History: no fever, no pain, no dysuria        Review of Systems    Objective   Range of Vitals (last 24 hours)  Heart Rate:  [78-91]   Temp:  [36.3 °C (97.3 °F)-36.4 °C (97.5 °F)]   Resp:  [15-20]   BP: (137-149)/(64-83)   SpO2:  [90 %-97 %]   Daily Weight  09/04/24 : 65.9 kg (145 lb 4.8 oz)    Body mass index is 25.74 kg/m².    Physical Exam  Constitutional:       Appearance: Normal appearance.   HENT:      Head: Normocephalic and atraumatic.      Mouth/Throat:      Mouth: Mucous membranes are moist.      Pharynx: Oropharynx is clear.   Eyes:      Pupils: Pupils are equal, round, and reactive to light.   Cardiovascular:      Rate and Rhythm: Normal rate and regular rhythm.      Heart sounds: Normal heart sounds.   Pulmonary:      Effort: Pulmonary effort is normal.      Breath sounds: Normal breath sounds.   Abdominal:      General: Abdomen is flat. Bowel sounds are normal.      Palpations: Abdomen is soft.   Musculoskeletal:      Cervical back: Normal range of motion.   Neurological:      Mental Status: She is alert.         Antibiotics  cefTRIAXone - 1 gram/50 mL    Relevant Results  Labs  Results from last 72 hours   Lab Units 09/08/24  0554 09/07/24  0520 09/06/24  0644   WBC AUTO x10*3/uL 9.8 9.9 11.9*   HEMOGLOBIN g/dL 7.0* 7.4* 7.6*   HEMATOCRIT % 23.9* 25.0* 26.0*   PLATELETS AUTO x10*3/uL 404 400 396   NEUTROS PCT AUTO % 72.1 73.1 74.9   LYMPHS PCT AUTO % 18.4 16.8 15.7   MONOS PCT AUTO % 6.6 7.3 6.3   EOS PCT AUTO % 1.1 1.1 1.1     Results from last 72 hours   Lab Units 09/08/24  0554 09/07/24  0520 09/06/24  0645   SODIUM mmol/L 144 142 142   POTASSIUM mmol/L 3.2* 3.1* 3.3*   CHLORIDE mmol/L 107 108* 107   CO2 mmol/L 26 25 25   BUN mg/dL 12 13 18   CREATININE mg/dL 0.91 1.07* 1.17*   GLUCOSE mg/dL 108* 118* 99   CALCIUM mg/dL 8.1* 8.1* 8.1*   ANION GAP mmol/L 14 12 13   EGFR  mL/min/1.73m*2 64 53* 48*     Results from last 72 hours   Lab Units 09/08/24  0554 09/07/24  0520 09/06/24  0645   ALK PHOS U/L 118 122 125   BILIRUBIN TOTAL mg/dL 0.2 0.4 0.4   PROTEIN TOTAL g/dL 5.4* 5.5* 5.5*   ALT U/L 21 22 16   AST U/L 26 34 29   ALBUMIN g/dL 2.4* 2.3* 2.3*     Estimated Creatinine Clearance: 45.7 mL/min (by C-G formula based on SCr of 0.91 mg/dL).  C-Reactive Protein   Date Value Ref Range Status   09/03/2024 >70.00 (H) 0.00 - 2.00 mg/dL Final     Comment:     Result rechecked     CRP   Date Value Ref Range Status   03/05/2020 0.59 mg/dL Final     Comment:     REF VALUE  < 1.00     03/11/2019 1.30 (A) mg/dL Final     Comment:     REF VALUE  < 1.00       Microbiology  Susceptibility data from last 14 days.  Collected Specimen Info Organism Amoxicillin/Clavulanate Ampicillin Ampicillin/Sulbactam Cefazolin Ceftriaxone Cefuroxime Ciprofloxacin Gentamicin Levofloxacin Nitrofurantoin Piperacillin/Tazobactam Trimethoprim/Sulfamethoxazole   09/04/24 Urine from Clean Catch/Voided Klebsiella oxytoca/Raoultella species  S  R  I  R  S   S  S   S  S  S   09/04/24 Blood culture from Peripheral Venipuncture Klebsiella oxytoca/Raoultella species               09/04/24 Blood culture from Peripheral Venipuncture Klebsiella oxytoca/Raoultella species  S  R  I  R  S  S  S  S  S   S  S   Imaging  Reviewed        Assessment/Plan   Left pyelonephritis, the urine with Klebsiella  Bacteremia, Klebsiella     Recommendations :  Plan on oral Cipro x 1 week with discharge   Discussed with the medical team     I spent minutes in the professional and overall care of this patient.      Meek Dick MD

## 2024-09-08 NOTE — DISCHARGE SUMMARY
Discharge Diagnosis  Acute pyelonephritis    Issues Requiring Follow-Up  Post hospital follow up    Discharge Meds     Medication List      START taking these medications     ciprofloxacin 500 mg tablet; Commonly known as: Cipro; Take 1 tablet   (500 mg) by mouth 2 times a day for 7 days.   docusate sodium 100 mg capsule; Commonly known as: Colace; Take 1   capsule (100 mg) by mouth 2 times a day.   ferrous sulfate (325 mg ferrous sulfate) tablet; Take 1 tablet (325 mg)   by mouth 2 times a day.     CONTINUE taking these medications     atorvastatin 80 mg tablet; Commonly known as: Lipitor; Take 1 tablet (80   mg) by mouth once daily.   Flonase Allergy Relief 50 mcg/actuation nasal spray; Generic drug:   fluticasone   lisinopril 20 mg tablet; Take 1 tablet (20 mg) by mouth once daily. as   directed   LORazepam 0.5 mg tablet; Commonly known as: Ativan; Take 1 tablet (0.5   mg) by mouth 2 times a day as needed for anxiety.   metFORMIN 500 mg tablet; Commonly known as: Glucophage   mirtazapine 15 mg tablet; Commonly known as: Remeron; Take 1 tablet (15   mg) by mouth once daily at bedtime.   MULTIVITAMIN ORAL   omeprazole 40 mg DR capsule; Commonly known as: PriLOSEC; Take 1 capsule   (40 mg) by mouth once daily in the morning. Take before meals. Do not   crush or chew.   OneTouch Delica Plus Lancet 33 gauge misc; Generic drug: lancets   OneTouch Verio test strips strip; Generic drug: blood sugar diagnostic   PARoxetine 10 mg tablet; Commonly known as: Paxil; Take 1 tablet (10 mg)   by mouth once daily.   saccharomyces boulardii 250 mg capsule; Commonly known as: Florastor   Vitamin B-12 1,000 mcg tablet; Generic drug: cyanocobalamin   zinc gluconate 50 mg tablet     STOP taking these medications     pioglitazone 15 mg tablet; Commonly known as: Actos       Test Results Pending At Discharge  Pending Labs       Order Current Status    Blood Culture Preliminary result    Blood Culture Preliminary result    Blood Culture  Preliminary result    Blood Culture Preliminary result            Hospital Course   Sherri Najera is a 79 y.o. year old female with PMH DM, HTN, HLD, GERD, Depression/Anxiety, Recurrent renal calculi presented to the ED for abnormal labs. She said since 8/25, she has been feeling weak, decreased PO intake, abdominal pain. She said abdominal pain is constant, dull and crampy in nature with nausea. Denies vomiting, diarrhea, dysuria, CP, SOB. She does have subjective fever, chills. She saw her PCP who ordered labs and she is found to have leukocytosis. In the ED, she is found to have acute pyelonephritis on CT A/P. She is treated for    Acute pyelonephritis with now Klebsiella bacteremia   positive blood culture x1 growing klebsiella oxytoca/raoultella  - ID following  - zofran PRN  - zosyn change to cipro upon dc  - repeat BCX NGTD     constipation  - bowel regimen     Hypoxia  resolved     DANY  Cr improved  - resume metformin, lisinopril     HypoK  - replaced     Leukocytosis  resolved     Normocytic anemia  Iron deficiency  - Fe supplement  - monitor     Uterine fibroid, splenic artery aneurysm 1.4cm on CT A/P  - outpt follow up     HTN  BP improved  - resume lisinopril     DM A1c 5.1  - resume metformin  - dc actos as it is likely contributing to UTI  - ISS     HLD  - statin     GERD  - ppi     Depression/Anxiety  - paxil  - ativan PRN  - remeron     Pt is hemodynamically stable for discharge at this time with close outpatient follow ups.     The patient was discharged in satisfactory condition.   Medications and side effect profile reviewed with patient.  More than 30 minutes were spent in coordinating patient discharge       Pertinent Physical Exam At Time of Discharge  Physical Exam  Vitals and nursing note reviewed.   Constitutional:       General: She is not in acute distress.     Appearance: Normal appearance. She is not ill-appearing or toxic-appearing.   HENT:      Head: Normocephalic and atraumatic.       Nose: Nose normal.      Mouth/Throat:      Mouth: Mucous membranes are moist.   Eyes:      Extraocular Movements: Extraocular movements intact.      Conjunctiva/sclera: Conjunctivae normal.      Pupils: Pupils are equal, round, and reactive to light.   Cardiovascular:      Rate and Rhythm: Normal rate and regular rhythm.      Heart sounds: No murmur heard.     No gallop.   Pulmonary:      Effort: Pulmonary effort is normal. No respiratory distress.      Breath sounds: Normal breath sounds. No wheezing, rhonchi or rales.   Abdominal:      General: Abdomen is flat. Bowel sounds are normal. There is no distension.      Palpations: Abdomen is soft. There is no mass.      Tenderness: There is no abdominal tenderness.   Musculoskeletal:         General: No swelling or tenderness. Normal range of motion.      Cervical back: Normal range of motion and neck supple.   Skin:     General: Skin is warm and dry.   Neurological:      Mental Status: She is alert and oriented to person, place, and time.      Motor: Weakness present.   Psychiatric:         Mood and Affect: Mood normal.         Behavior: Behavior normal.         Thought Content: Thought content normal.         Judgment: Judgment normal.     Outpatient Follow-Up  Future Appointments   Date Time Provider Department Center   9/24/2024  2:00 PM Issa Xie MD [id number] [facility]   10/11/2024 10:15 AM Yesi Lam MD [id number] [facility]         Sarah Thomas MD  Hospitalist

## 2024-09-08 NOTE — CARE PLAN
The clinical goals for the shift include Patient will have formed BM by the end of this shift     PIV removed from left arm.

## 2024-09-09 ENCOUNTER — DOCUMENTATION (OUTPATIENT)
Dept: PRIMARY CARE | Facility: CLINIC | Age: 79
End: 2024-09-09
Payer: MEDICARE

## 2024-09-09 ENCOUNTER — PATIENT OUTREACH (OUTPATIENT)
Dept: PRIMARY CARE | Facility: CLINIC | Age: 79
End: 2024-09-09
Payer: MEDICARE

## 2024-09-09 NOTE — PROGRESS NOTES
Discharge Facility: [facility]     Discharge Diagnosis:    Acute pyelonephritis     Issues Requiring Follow-Up  Post hospital follow up    Admission Date: 9/4/2024   Discharge Date:  9/8/2024     PCP Appointment Date:    Specialist Appointment Date:     [facility] referral     Hem /ON 9/24/2024 [facility] Encounter and Summary Linked: Yes    See discharge assessment below for further details     Engagement  Call Start Time: 1133 (9/9/2024 11:33 AM)    Medications  Medications reviewed with patient/caregiver?: Yes (9/9/2024 11:33 AM)  Is the patient having any side effects they believe may be caused by any medication additions or changes?: No (9/9/2024 11:33 AM)  Does the patient have all medications ordered at discharge?: Yes (9/9/2024 11:33 AM)  Care Management Interventions: No intervention needed (9/9/2024 11:33 AM)  Prescription Comments: New Reviewed,ciprofloxacin 500 mg tablet; Commonly known as: Cipro; Take 1 tablet   (500 mg) by mouth 2 times a day for 7 days.   docusate sodium 100 mg capsule; Commonly known as: Colace; Take 1   capsule (100 mg) by mouth 2 times a day.   ferrous sulfate (325 mg ferrous sulfate) tablet; Take 1 tablet (325 mg)   by mouth 2 times a day. (9/9/2024 11:33 AM)  Is the patient taking all medications as directed (includes completed medication regime)?: -- (PT states has all meds) (9/9/2024 11:33 AM)  Medication Comments: Patient aware STOP taking these medications     pioglitazone 15 mg tablet; Commonly known as: Actos (9/9/2024 11:33 AM)    Appointments  Does the patient have a primary care provider?: Yes (9/9/2024 11:33 AM)  Care Management Interventions: Educated patient on importance of making appointment (9/9/2024 11:33 AM)    Self Management  What is the home health agency?: NA (9/9/2024 11:33 AM)  What Durable Medical Equipment (DME) was ordered?: NA (9/9/2024 11:33 AM)    Patient Teaching  Does the patient have access to their discharge instructions?: Yes  (9/9/2024 11:33 AM)  Care Management Interventions: Reviewed instructions with patient (9/9/2024 11:33 AM)  What is the patient's perception of their health status since discharge?: Improving (9/9/2024 11:33 AM)  Is the patient/caregiver able to teach back the hierarchy of who to call/visit for symptoms/problems? PCP, Specialist, Home Health nurse, Urgent Care, ED, 911: Yes (9/9/2024 11:33 AM)  Patient/Caregiver Education Comments: Patient states she is doing okay , aware [facility] at home referral , message sent to PCP F/U , patient new and DC med reviewed. Patient aware to call providers for any questions concerns  or change in condition (9/9/2024 11:33 AM)

## 2024-09-10 LAB
BACTERIA BLD AEROBE CULT: ABNORMAL
BACTERIA BLD CULT: ABNORMAL
GRAM STN SPEC: ABNORMAL

## 2024-09-11 LAB
BACTERIA BLD CULT: NORMAL
BACTERIA BLD CULT: NORMAL

## 2024-09-11 NOTE — SIGNIFICANT EVENT
Follow Up Phone Call    Outgoing phone call    Spoke to: Mary Stone Relationship:self   Phone number: [phone]      Outcome: contacted patient/ family   Chief Complaint   Patient presents with    Abnormal Labs          Diagnosis:Not applicable    States she is feeling better but very weak.  No further questions or concerns.

## 2024-09-12 ENCOUNTER — APPOINTMENT (OUTPATIENT)
Dept: PRIMARY CARE | Facility: CLINIC | Age: 79
End: 2024-09-12
Payer: MEDICARE

## 2024-09-14 ENCOUNTER — PATIENT OUTREACH (OUTPATIENT)
Dept: HOME HEALTH SERVICES | Age: 79
End: 2024-09-14
Payer: MEDICARE

## 2024-09-14 VITALS
OXYGEN SATURATION: 92 % | SYSTOLIC BLOOD PRESSURE: 152 MMHG | BODY MASS INDEX: 24.73 KG/M2 | HEART RATE: 85 BPM | WEIGHT: 139.6 LBS | DIASTOLIC BLOOD PRESSURE: 80 MMHG

## 2024-09-14 NOTE — PROGRESS NOTES
"Pt states she is feeling \"alright.\"Glucose this morning was 105, Wt 139.63 lbs, /80, Pulse ox 92% room air, HR 85. Encourage pt and her  to please reach out to [facility] with any questions/concerns at any time. Reminded pt [facility] on 9/16 and PCP appt on 9/17.            Patient's Address:   [address] [place] [address] [place] [address]  **  If this is not the address patient will receive services - alert team and address in EMR**       Patient Contacts:  Extended Emergency Contact Information  Primary Emergency Contact: Moose Najera  Home Phone: [phone]  Work Phone: [phone]  Relation: Spouse  Secondary Emergency Contact: SHELBYRADHA  Mobile Phone: [phone]  Relation: Son  Preferred language: English   needed? No                                Patient's Preferred Phone: [phone]  Patient's E-mail: [email or web address]                                      "

## 2024-09-16 ENCOUNTER — APPOINTMENT (OUTPATIENT)
Dept: CARE COORDINATION | Age: 79
End: 2024-09-16
Payer: MEDICARE

## 2024-09-16 DIAGNOSIS — I10 ESSENTIAL HYPERTENSION: Primary | ICD-10-CM

## 2024-09-16 DIAGNOSIS — N12 PYELONEPHRITIS: ICD-10-CM

## 2024-09-16 DIAGNOSIS — E11.9 CONTROLLED TYPE 2 DIABETES MELLITUS WITHOUT COMPLICATION, WITHOUT LONG-TERM CURRENT USE OF INSULIN (MULTI): ICD-10-CM

## 2024-09-16 NOTE — PROGRESS NOTES
Subjective   Patient ID: Millicent Najera is a 79 y.o. female who presents for Follow-up (Was seen in the ER 9/4 for acute pyelonephritis. Doing ok //Declines flu shot ).    JOANA Polanco is seen for hospital follow-up.  She was seen by myself on September 4 with weakness, abdominal pain, nausea and decreased appetite.  She was found to have a white count of 19,000 with a left shift as well as elevated sed rate and C-reactive protein.  She was instructed to go to the hospital where she was diagnosed with pyelonephritis.  She is already seeing a urologist and recently had a ureteral stent removed for kidney stones.  She had C. difficile colitis this summer when she was in the hospital with a bladder infection.  I saw her as a new patient earlier this year.  She never got blood work as ordered by me but has had a lot of lab work since then.  She is supposed to have a routine visit with me next month.  She was discharged on Cipro and iron supplement due to a mild anemia.  Her hemoglobin level is 7.3.  She has been anemic since February when she had a knee replacement.  She has follow-up with a hematologist.  Her Actos was discontinued as it was felt this may contribute to her UTIs infections.  She is eating 3 meals daily.  She still feels weak.  Her  wants to exercise her by going up and down the stairs.  Have only seen her for 1 routine visit which was her initial visit in April.  The rest of her visits have been acute or hospital follow-ups.  She does not have further follow-up with the urologist.    Review of Systems  Denies fever or chills.  No abdominal pain, nausea or vomiting.  No dysuria or hematuria.  She has had a dry mouth for about 4 to 6 weeks.    Objective   /82 (BP Location: Left arm)   Pulse 83   Wt 63 kg (139 lb)   SpO2 96%   BMI 24.62 kg/m²     Physical Exam  She is alert and seems comfortable.  No respiratory distress.    Assessment/Plan   Diagnoses and all orders for this  visit:  Hospital discharge follow-up  Clostridium difficile colitis  -     Referral to Primary Care  Acute cystitis without hematuria  -     Urinalysis with Reflex Microscopic; Future  -     Urine Culture; Future  Will check a UA and a culture to ensure that infection has resolved as she has had multiple infections this summer.  I suggest that her  not walk her up and down the stairs but walk on a flat surface.  Will discuss some physical therapy at her next visit.  She will follow-up with a hematologist as scheduled.  She will follow-up with me next month as scheduled for more of a routine visit.

## 2024-09-17 ENCOUNTER — PATIENT OUTREACH (OUTPATIENT)
Dept: HOME HEALTH SERVICES | Age: 79
End: 2024-09-17

## 2024-09-17 ENCOUNTER — APPOINTMENT (OUTPATIENT)
Dept: PRIMARY CARE | Facility: CLINIC | Age: 79
End: 2024-09-17
Payer: MEDICARE

## 2024-09-17 ENCOUNTER — TELEPHONE (OUTPATIENT)
Dept: PRIMARY CARE | Facility: CLINIC | Age: 79
End: 2024-09-17

## 2024-09-17 VITALS — HEART RATE: 96 BPM | SYSTOLIC BLOOD PRESSURE: 176 MMHG | OXYGEN SATURATION: 96 % | DIASTOLIC BLOOD PRESSURE: 86 MMHG

## 2024-09-17 VITALS
BODY MASS INDEX: 24.62 KG/M2 | WEIGHT: 139 LBS | OXYGEN SATURATION: 96 % | DIASTOLIC BLOOD PRESSURE: 82 MMHG | HEART RATE: 83 BPM | SYSTOLIC BLOOD PRESSURE: 152 MMHG

## 2024-09-17 DIAGNOSIS — A04.72 CLOSTRIDIUM DIFFICILE COLITIS: ICD-10-CM

## 2024-09-17 DIAGNOSIS — E11.9 CONTROLLED TYPE 2 DIABETES MELLITUS WITHOUT COMPLICATION, WITHOUT LONG-TERM CURRENT USE OF INSULIN (MULTI): Primary | ICD-10-CM

## 2024-09-17 DIAGNOSIS — Z09 HOSPITAL DISCHARGE FOLLOW-UP: Primary | ICD-10-CM

## 2024-09-17 DIAGNOSIS — N30.00 ACUTE CYSTITIS WITHOUT HEMATURIA: ICD-10-CM

## 2024-09-17 DIAGNOSIS — D64.9 ANEMIA, UNSPECIFIED TYPE: ICD-10-CM

## 2024-09-17 DIAGNOSIS — E78.00 HYPERCHOLESTEROLEMIA: ICD-10-CM

## 2024-09-17 DIAGNOSIS — I10 ESSENTIAL HYPERTENSION: ICD-10-CM

## 2024-09-17 ASSESSMENT — PAIN SCALES - GENERAL: PAINLEVEL: 0-NO PAIN

## 2024-09-17 ASSESSMENT — PATIENT HEALTH QUESTIONNAIRE - PHQ9
1. LITTLE INTEREST OR PLEASURE IN DOING THINGS: NOT AT ALL
SUM OF ALL RESPONSES TO PHQ9 QUESTIONS 1 AND 2: 0
2. FEELING DOWN, DEPRESSED OR HOPELESS: NOT AT ALL

## 2024-09-17 NOTE — PROGRESS NOTES
Daily call completed. Pt states she is doing fairly well. Denies any new symptoms or concerns. Denies need for med refills at this time. She is going to see PCP today at 1100. Denies any further questions/concerns/needs at this time. Aware of upcoming appts. [facility] 9/19 @ 0900.    Bgt 99  176/86 before meds  Hr 96  O2 96    Patient's Address:   [address] [place] [address] [place] [address]  **  If this is not the address patient will receive services - alert team and address in EMR**       Patient Contacts:  Extended Emergency Contact Information  Primary Emergency Contact: Moose Najera  Home Phone: [phone]  Work Phone: [phone]  Relation: Spouse  Secondary Emergency Contact: ARTUR NAJERAMY  Mobile Phone: [phone]  Relation: Son  Preferred language: English   needed? No                                Patient's Preferred Phone: [phone]  Patient's E-mail: [email or web address]

## 2024-09-18 ENCOUNTER — PATIENT OUTREACH (OUTPATIENT)
Dept: HOME HEALTH SERVICES | Age: 79
End: 2024-09-18

## 2024-09-18 ENCOUNTER — LAB (OUTPATIENT)
Dept: LAB | Facility: LAB | Age: 79
End: 2024-09-18
Payer: MEDICARE

## 2024-09-18 VITALS
DIASTOLIC BLOOD PRESSURE: 85 MMHG | HEART RATE: 99 BPM | WEIGHT: 138.4 LBS | SYSTOLIC BLOOD PRESSURE: 161 MMHG | OXYGEN SATURATION: 93 % | BODY MASS INDEX: 24.52 KG/M2

## 2024-09-18 DIAGNOSIS — N30.00 ACUTE CYSTITIS WITHOUT HEMATURIA: ICD-10-CM

## 2024-09-18 LAB
APPEARANCE UR: ABNORMAL
BILIRUB UR STRIP.AUTO-MCNC: NEGATIVE MG/DL
COLOR UR: COLORLESS
GLUCOSE UR STRIP.AUTO-MCNC: NORMAL MG/DL
KETONES UR STRIP.AUTO-MCNC: NEGATIVE MG/DL
LEUKOCYTE ESTERASE UR QL STRIP.AUTO: ABNORMAL
NITRITE UR QL STRIP.AUTO: NEGATIVE
PH UR STRIP.AUTO: 5.5 [PH]
PROT UR STRIP.AUTO-MCNC: ABNORMAL MG/DL
RBC # UR STRIP.AUTO: ABNORMAL /UL
RBC #/AREA URNS AUTO: ABNORMAL /HPF
SP GR UR STRIP.AUTO: 1.01
SQUAMOUS #/AREA URNS AUTO: ABNORMAL /HPF
UROBILINOGEN UR STRIP.AUTO-MCNC: NORMAL MG/DL
WBC #/AREA URNS AUTO: >50 /HPF

## 2024-09-18 PROCEDURE — 87086 URINE CULTURE/COLONY COUNT: CPT

## 2024-09-18 PROCEDURE — 81001 URINALYSIS AUTO W/SCOPE: CPT

## 2024-09-18 NOTE — PROGRESS NOTES
Daily Call Note:   Patient states she is feeling weak.  Patient saw her PCP, Yesi Lam, and discussed symptoms with her.   Patient states her pcp was resassuring about her symptoms and that made her feel a little better.      Pt's PCP discussed PT, pt declined at this time.  Pt stated she felt she was too weak to have PT    Patient denies fever or chills.       Wt 138.4   Blood sugar 102  /85, oxygen 93, HR 99    Initial [facility] 9/19/24 @ 09:00, pt aware.    Pt Education:   Barriers:   Topics for Daily Review:   Pt demonstrates clear understanding:     Daily Weight:  There were no vitals filed for this visit.   Last 3 Weights:  Wt Readings from Last 7 Encounters:   09/17/24 63 kg (139 lb)   09/14/24 63.3 kg (139 lb 9.6 oz)   09/04/24 65.9 kg (145 lb 4.8 oz)   09/03/24 62.6 kg (138 lb)   08/08/24 64.9 kg (143 lb 1.3 oz)   07/26/24 66 kg (145 lb 6.4 oz)   07/03/24 68.2 kg (150 lb 6.4 oz)       Masimo Device:    Masimo Clinical Impression:     Virtual Visits--Scheduled (Most Recent Date at Top)  Follow up Appointments  Recent Visits  Date Type Provider Dept   09/17/24 Office Visit Yesi Lam MD Do [id number] [facility]   09/03/24 Office Visit Yesi Lam MD Do [id number] [facility]   Showing recent visits within past 30 days and meeting all other requirements  Future Appointments  Date Type Provider Dept   10/11/24 Appointment Yesi Lam MD Do [id number] [facility]   Showing future appointments within next 90 days and meeting all other requirements       Frequency of RN Calls & Virtual Visits per Team Agreement:     Medication issues Addressed (what was done):     Follow up appointments scheduled by [facility] Staff:   Referrals made by [facility] staff:

## 2024-09-19 ENCOUNTER — PATIENT OUTREACH (OUTPATIENT)
Dept: HOME HEALTH SERVICES | Age: 79
End: 2024-09-19

## 2024-09-19 ENCOUNTER — TELEMEDICINE (OUTPATIENT)
Dept: PHARMACY | Facility: HOSPITAL | Age: 79
End: 2024-09-19
Payer: MEDICARE

## 2024-09-19 ENCOUNTER — TELEMEDICINE CLINICAL SUPPORT (OUTPATIENT)
Dept: CARE COORDINATION | Age: 79
End: 2024-09-19
Payer: MEDICARE

## 2024-09-19 VITALS — DIASTOLIC BLOOD PRESSURE: 85 MMHG | SYSTOLIC BLOOD PRESSURE: 166 MMHG

## 2024-09-19 DIAGNOSIS — D64.9 ANEMIA, UNSPECIFIED TYPE: ICD-10-CM

## 2024-09-19 DIAGNOSIS — I10 ESSENTIAL HYPERTENSION: ICD-10-CM

## 2024-09-19 DIAGNOSIS — N12 PYELONEPHRITIS: ICD-10-CM

## 2024-09-19 DIAGNOSIS — I10 BENIGN ESSENTIAL HTN: ICD-10-CM

## 2024-09-19 DIAGNOSIS — N10 ACUTE PYELONEPHRITIS: ICD-10-CM

## 2024-09-19 DIAGNOSIS — E11.9 CONTROLLED TYPE 2 DIABETES MELLITUS WITHOUT COMPLICATION, WITHOUT LONG-TERM CURRENT USE OF INSULIN (MULTI): ICD-10-CM

## 2024-09-19 DIAGNOSIS — I10 ESSENTIAL HYPERTENSION: Primary | ICD-10-CM

## 2024-09-19 DIAGNOSIS — D53.8 OTHER SPECIFIED NUTRITIONAL ANEMIAS: ICD-10-CM

## 2024-09-19 LAB — BACTERIA UR CULT: NORMAL

## 2024-09-19 RX ORDER — WITCH HAZEL 50 %
1 PADS, MEDICATED (EA) TOPICAL 2 TIMES DAILY
Qty: 60 TABLET | Refills: 0 | Status: SHIPPED | OUTPATIENT
Start: 2024-09-19

## 2024-09-19 RX ORDER — LISINOPRIL 30 MG/1
30 TABLET ORAL DAILY
Qty: 90 TABLET | Refills: 1 | Status: SHIPPED | OUTPATIENT
Start: 2024-09-19 | End: 2025-03-18

## 2024-09-19 RX ORDER — VANCOMYCIN HYDROCHLORIDE 125 MG/1
125 CAPSULE ORAL 4 TIMES DAILY
Qty: 40 CAPSULE | Refills: 0 | Status: SHIPPED | OUTPATIENT
Start: 2024-09-19 | End: 2024-09-29

## 2024-09-19 RX ORDER — CIPROFLOXACIN 500 MG/1
500 TABLET ORAL 2 TIMES DAILY
Qty: 10 TABLET | Refills: 0 | Status: SHIPPED | OUTPATIENT
Start: 2024-09-19 | End: 2024-09-24

## 2024-09-19 NOTE — PROGRESS NOTES
"Pharmacy Post-Discharge Visit    Sherri Najera \"Elza" is a 79 y.o. female was referred to Clinical Pharmacy Team to complete a post-discharge medication optimization and monitoring visit.  The patient was referred for their blood pressure and diabetes management after being in the hospital for pyelonephritis and now recently enrolled in our [facility].     Admission Date: 9/4/24  Discharge Date: 9/8/24    Referring Provider: Sarah Thomas MD  PCP: Yesi Lam MD - last visit: 9/17/24, next visit: 10/11      Subjective   Allergies   Allergen Reactions    Fenofibrate Unknown    Sulfa (Sulfonamide Antibiotics) [id number] - [place], [place] - [address] [place] [address]  [address]  [facility] [address]  Phone: [phone] Fax: [phone]    [facility] - [place]AN [place] - [facility] AT [facility]  [place] [address]  Phone: [phone] Fax: [phone]    [facility]  [address] [facility] [address]  Phone: [phone] Fax: [phone]      Medication System Management:  Affordability/Accessibility: no concerns   Adherence/Organization: no issues       Social History     Social History Narrative    Not on file        Notable Medication changes following discharge:  Start: cipro, docusate and iron  Stop: pioglitazone   Change: none    Diabetes  She presents for her initial diabetic visit. She has type 2 diabetes mellitus. Her disease course has been stable. There are no hypoglycemic associated symptoms. There are no hypoglycemic complications. Risk factors for coronary artery disease include diabetes mellitus and hypertension. Current diabetic treatment includes oral agent (monotherapy). She is compliant with treatment all of the time. Her overall blood glucose range is  mg/dl. An ACE inhibitor/angiotensin II receptor blocker is being taken.         Review of " Systems        Objective     There were no vitals taken for this visit.   BP Readings from Last 4 Encounters:   09/18/24 161/85   09/17/24 152/82   09/17/24 176/86   09/14/24 152/80      There were no vitals filed for this visit.     LAB  Lab Results   Component Value Date    BILITOT 0.2 09/08/2024    CALCIUM 8.1 (L) 09/08/2024    CO2 26 09/08/2024     09/08/2024    CREATININE 0.91 09/08/2024    GLUCOSE 108 (H) 09/08/2024    ALKPHOS 118 09/08/2024    K 3.2 (L) 09/08/2024    PROT 5.4 (L) 09/08/2024     09/08/2024    AST 26 09/08/2024    ALT 21 09/08/2024    BUN 12 09/08/2024    ANIONGAP 14 09/08/2024    MG 2.10 09/07/2024    PHOS 4.0 07/03/2024    ALBUMIN 2.4 (L) 09/08/2024    LIPASE 25 09/04/2024    GFRF 75 09/12/2023     Lab Results   Component Value Date    TRIG 292 (H) 09/03/2024    CHOL 111 (L) 09/03/2024    LDLCALC 38 (L) 09/03/2024    HDL 15.0 (L) 09/03/2024     Lab Results   Component Value Date    HGBA1C 5.4 09/03/2024         Current Outpatient Medications   Medication Instructions    atorvastatin (LIPITOR) 80 mg, oral, Daily    cyanocobalamin (Vitamin B-12) 1,000 mcg tablet 1 tablet, oral, Daily    docusate sodium (COLACE) 100 mg, oral, 2 times daily    ferrous sulfate (325 mg ferrous sulfate) 325 mg, oral, 2 times daily    fluticasone (Flonase Allergy Relief) 50 mcg/actuation nasal spray 1 spray, Each Nostril, 2 times daily PRN    lisinopril 20 mg, oral, Daily, as directed    LORazepam (ATIVAN) 0.5 mg, oral, 2 times daily PRN    metFORMIN (Glucophage) 500 mg tablet 1 tablet, oral, Daily, TAKE WITH A MEAL     mirtazapine (REMERON) 15 mg, oral, Nightly    MULTIVITAMIN ORAL 1 tablet, oral, Daily    omeprazole (PRILOSEC) 40 mg, oral, Daily before breakfast, Do not crush or chew.    OneTouch Delica Plus Lancet 33 gauge misc 3 times daily    OneTouch Verio test strips strip 3 times daily, BEFORE MEALS AND AT BEDTIME. - USE 1 STRIP TO CHECK BLOOD SUGAR.     PARoxetine (PAXIL) 10 mg, oral, Daily     saccharomyces boulardii (Florastor) 250 mg capsule 1 capsule, oral, Daily    zinc gluconate 50 mg tablet 1 tablet, oral, Daily        HISTORICAL PHARMACOTHERAPY  N/a    DRUG INTERACTIONS  None at time of review      Assessment/Plan   Problem List Items Addressed This Visit       Essential hypertension    Controlled diabetes mellitus type II without complication (Multi)    Pyelonephritis     HTN  Checking BP's daily and keeping log   BP's have been more elevated the past few days   Currently taking lisinopril 20mg daily --> will increase to 30mg daily   Nursing will continue with daily calls to help with monitoring in case any further adjustments need made before next visit   DM  Most recent A1c was 5.4% from 9/3  She does check her sugars daily and keeps log   Fasting today was 95  She ranges from 's   Currently only taking Metformin 500mg once daily --> told her to hold this since she has been having diarrhea   UTI  Was discharged on cipro 500mg BID x 7 days --> she has completed this  Had a repeat UA done yesterday and still coming back positive for infection but culture is still pending  Dr. Guillermo would like to extend the Cipro for another 5 days   She also was treated for C.diff recently and her diarrhea still never really resolved  She is still having loose stools  Told her to stop taking the Docusate   Hold her Metformin   Also Dr. Guillermo will order for a new stool sample but would like to start a new 10 day course of oral Vanco    Follow Up: 1 week     Continue all meds under the continuation of care with the referring provider and clinical pharmacy team.    Amilcar Grewal, Toya     Verbal consent to manage patient's drug therapy was obtained from the patient and an individual authorized to act on behalf of a patient. They were informed they may decline to participate or withdraw from participation in pharmacy services at any time.

## 2024-09-19 NOTE — PROGRESS NOTES
[facility] with Amilcar Mendez, Pt,  and myself. Per pt, UA results showing another infection so she is upset. Pt finished antibiotic a few days ago. Pt was admitted for L kidney infection and was on cipro. PCP ordered follow up UC and still showing pos. Pt states she is still very weak, tired and fell at home recently, no urinary symptoms though. Intestinal symptoms have subsided as well, but still having loose stool (has been for a few weeks now)- Pt was c diff positive while hospitalized. Pt advised to stop colace and continue probiotics. We are not sure c diff has resolved. Glucose this morning was 95- A1C was WNL- Stop metformin for now. Today's BP was 166/85- Increase lisinopril to 30 mg daily (1.5 tabs). Pt just recently started on iron pill.    wrap up: Plan to extend cipro (5 days) for ongoing UTI and give vanco (10 days) for c diff infection. Also stop colace and metformin. Increase lisinopril to 30 mg daily (1.5 tabs). Will order labs (blood work and stool sample) for Tuesday 9/24. Next [facility] 9/26 @ 0516.           Patient's Address:   [address] [place], [place] [address]  [place] [address]  **  If this is not the address patient will receive services - alert team and address in EMR**       Patient Contacts:  Extended Emergency Contact Information  Primary Emergency Contact: Moose Najera  Home Phone: [phone]  Work Phone: [phone]  Relation: Spouse  Secondary Emergency Contact: RADHA NAJERA  Mobile Phone: [phone]  Relation: Son  Preferred language: English   needed? No                                Patient's Preferred Phone: [phone]  Patient's E-mail: [email or web address]

## 2024-09-20 ENCOUNTER — TELEPHONE (OUTPATIENT)
Dept: PRIMARY CARE | Facility: CLINIC | Age: 79
End: 2024-09-20
Payer: MEDICARE

## 2024-09-20 ENCOUNTER — PATIENT OUTREACH (OUTPATIENT)
Dept: HOME HEALTH SERVICES | Age: 79
End: 2024-09-20
Payer: MEDICARE

## 2024-09-20 NOTE — TELEPHONE ENCOUNTER
Millicent's  called. He was returning my voicemail yesterday of negative culture results. He was upset saying Dr. Guillermo called about something unrelated so he happened to ask about patients urine results. The Dr told them the c. Diff and UTI was back so he prescribed her cipro and vancomycin. He wanted to know where I got the information and that it was wrong and states if he would have listened to me, it could've ended badly.

## 2024-09-20 NOTE — PROGRESS NOTES
Patients   called in they had an HHVC yesterday and were told that she still has a UTI a repeat cdiff was ordered and antibiotics were re ordered. Patient got a call from PCP today and she states the Ua was negative and she should not have been re started on antibiotics. Will look into this and get back to the patient

## 2024-09-21 ENCOUNTER — PATIENT OUTREACH (OUTPATIENT)
Dept: HOME HEALTH SERVICES | Age: 79
End: 2024-09-21
Payer: MEDICARE

## 2024-09-21 NOTE — PROGRESS NOTES
Daily call completed. Pt states she is doing quite well. Denies any new symptoms or concerns. Denies need for med refills at this time. Weight or vitals not taken yet this morning, aware to call in if any numbers are outside of normal limits. Denies any further questions/concerns/needs at this time. Aware of upcoming appts. [facility] 9/26 @ 0930.    Bgt [address]    Patient's Address:   [address] [place] [address] [place] [address]  **  If this is not the address patient will receive services - alert team and address in EMR**       Patient Contacts:  Extended Emergency Contact Information  Primary Emergency Contact: Moose Najera  Home Phone: [phone]  Work Phone: [phone]  Relation: Spouse  Secondary Emergency Contact: ARTUR NAJERAMY  Mobile Phone: [phone]  Relation: Son  Preferred language: English   needed? No                                Patient's Preferred Phone: [phone]  Patient's E-mail: [email or web address]

## 2024-09-23 ENCOUNTER — PATIENT OUTREACH (OUTPATIENT)
Dept: HOME HEALTH SERVICES | Age: 79
End: 2024-09-23
Payer: MEDICARE

## 2024-09-23 ENCOUNTER — PATIENT OUTREACH (OUTPATIENT)
Dept: INTERNAL MEDICINE | Facility: HOSPITAL | Age: 79
End: 2024-09-23
Payer: MEDICARE

## 2024-09-23 ENCOUNTER — DOCUMENTATION (OUTPATIENT)
Dept: INTERNAL MEDICINE | Facility: HOSPITAL | Age: 79
End: 2024-09-23

## 2024-09-23 VITALS — WEIGHT: 132.8 LBS | BODY MASS INDEX: 23.52 KG/M2 | DIASTOLIC BLOOD PRESSURE: 83 MMHG | SYSTOLIC BLOOD PRESSURE: 157 MMHG

## 2024-09-23 NOTE — PROGRESS NOTES
Daily Call Note: Daily call completed with the patient and  Ricky. The patient stated she was feeling a little better today. She stated she had a long talk this morning with Dr. Guillermo, who called her and went over the medications again. She stated he told her that she should not take the Cipro or the oral Vancomycin, and she should resume taking the Metformin. She stated that now she has a better understanding based on his personal instructions to her, and has no further questions. Her BG at 0830 was 87 - /83 - weight 132.8. She stated that she has been losing weight due to lack of appetite and energy.  They were instructed on slowly increasing activity, safely, and interspersed with adequate periods of rest. The patient will begin adding Boost or Ensure to her diet to supplement caloric intake and supply enough calories to support increased activities.  She stated that she is having soft formed bowel movements every other day  /83   Wt 60.2 kg (132 lb 12.8 oz)   BMI 23.52 kg/m²       Daily Weight:  There were no vitals filed for this visit.   Last 3 Weights:  Wt Readings from Last 7 Encounters:   09/18/24 62.8 kg (138 lb 6.4 oz)   09/17/24 63 kg (139 lb)   09/14/24 63.3 kg (139 lb 9.6 oz)   09/04/24 65.9 kg (145 lb 4.8 oz)   09/03/24 62.6 kg (138 lb)   08/08/24 64.9 kg (143 lb 1.3 oz)   07/26/24 66 kg (145 lb 6.4 oz)         Virtual Visits--Scheduled (Most Recent Date at Top)  Follow up Appointments  Recent Visits  Date Type Provider Dept   09/17/24 Office Visit Yesi Lam MD Do [id number]   09/03/24 Office Visit Yesi Lam MD Do [id number]   Showing recent visits within past 30 days and meeting all other requirements  Future Appointments  Date Type Provider Dept   10/11/24 Appointment Yesi Lam MD Do [id number] [facility]   Showing future appointments within next 90 days and meeting all other requirements       F

## 2024-09-24 ENCOUNTER — PATIENT OUTREACH (OUTPATIENT)
Dept: HOME HEALTH SERVICES | Age: 79
End: 2024-09-24

## 2024-09-24 ENCOUNTER — LAB (OUTPATIENT)
Dept: LAB | Facility: LAB | Age: 79
End: 2024-09-24
Payer: MEDICARE

## 2024-09-24 ENCOUNTER — PATIENT OUTREACH (OUTPATIENT)
Dept: HEMATOLOGY/ONCOLOGY | Facility: CLINIC | Age: 79
End: 2024-09-24
Payer: MEDICARE

## 2024-09-24 ENCOUNTER — OFFICE VISIT (OUTPATIENT)
Dept: HEMATOLOGY/ONCOLOGY | Facility: CLINIC | Age: 79
End: 2024-09-24
Payer: MEDICARE

## 2024-09-24 VITALS
HEART RATE: 83 BPM | DIASTOLIC BLOOD PRESSURE: 81 MMHG | WEIGHT: 134 LBS | SYSTOLIC BLOOD PRESSURE: 160 MMHG | BODY MASS INDEX: 23.74 KG/M2

## 2024-09-24 VITALS
HEART RATE: 85 BPM | DIASTOLIC BLOOD PRESSURE: 72 MMHG | SYSTOLIC BLOOD PRESSURE: 162 MMHG | OXYGEN SATURATION: 98 % | WEIGHT: 134.48 LBS | BODY MASS INDEX: 24.75 KG/M2 | TEMPERATURE: 99.1 F | RESPIRATION RATE: 18 BRPM | HEIGHT: 62 IN

## 2024-09-24 DIAGNOSIS — D53.8 OTHER SPECIFIED NUTRITIONAL ANEMIAS: ICD-10-CM

## 2024-09-24 DIAGNOSIS — I10 ESSENTIAL HYPERTENSION: ICD-10-CM

## 2024-09-24 DIAGNOSIS — D72.819 LEUKOPENIA, UNSPECIFIED TYPE: ICD-10-CM

## 2024-09-24 DIAGNOSIS — D64.9 ANEMIA, UNSPECIFIED TYPE: ICD-10-CM

## 2024-09-24 LAB
ALBUMIN SERPL BCP-MCNC: 3.2 G/DL (ref 3.4–5)
ALP SERPL-CCNC: 121 U/L (ref 33–136)
ALT SERPL W P-5'-P-CCNC: 11 U/L (ref 7–45)
ANION GAP SERPL CALC-SCNC: 16 MMOL/L (ref 10–20)
AST SERPL W P-5'-P-CCNC: 12 U/L (ref 9–39)
BASOPHILS # BLD AUTO: 0.03 X10*3/UL (ref 0–0.1)
BASOPHILS NFR BLD AUTO: 0.4 %
BILIRUB DIRECT SERPL-MCNC: 0.1 MG/DL (ref 0–0.3)
BILIRUB SERPL-MCNC: 0.3 MG/DL (ref 0–1.2)
BUN SERPL-MCNC: 10 MG/DL (ref 6–23)
CALCIUM SERPL-MCNC: 8.9 MG/DL (ref 8.6–10.6)
CHLORIDE SERPL-SCNC: 104 MMOL/L (ref 98–107)
CO2 SERPL-SCNC: 28 MMOL/L (ref 21–32)
CREAT SERPL-MCNC: 0.82 MG/DL (ref 0.5–1.05)
EGFRCR SERPLBLD CKD-EPI 2021: 73 ML/MIN/1.73M*2
EOSINOPHIL # BLD AUTO: 0.17 X10*3/UL (ref 0–0.4)
EOSINOPHIL NFR BLD AUTO: 2.2 %
ERYTHROCYTE [DISTWIDTH] IN BLOOD BY AUTOMATED COUNT: 15.4 % (ref 11.5–14.5)
FERRITIN SERPL-MCNC: 258 NG/ML (ref 8–150)
FOLATE SERPL-MCNC: 17.3 NG/ML
GLUCOSE SERPL-MCNC: 99 MG/DL (ref 74–99)
HCT VFR BLD AUTO: 26.3 % (ref 36–46)
HGB BLD-MCNC: 8.1 G/DL (ref 12–16)
HGB RETIC QN: 39 PG (ref 28–38)
IMM GRANULOCYTES # BLD AUTO: 0.05 X10*3/UL (ref 0–0.5)
IMM GRANULOCYTES NFR BLD AUTO: 0.7 % (ref 0–0.9)
IMMATURE RETIC FRACTION: 22.2 %
IRON SATN MFR SERPL: 12 % (ref 25–45)
IRON SERPL-MCNC: 26 UG/DL (ref 35–150)
LDH SERPL L TO P-CCNC: 156 U/L (ref 84–246)
LYMPHOCYTES # BLD AUTO: 2.13 X10*3/UL (ref 0.8–3)
LYMPHOCYTES NFR BLD AUTO: 28 %
MCH RBC QN AUTO: 29 PG (ref 26–34)
MCHC RBC AUTO-ENTMCNC: 30.8 G/DL (ref 32–36)
MCV RBC AUTO: 94 FL (ref 80–100)
MONOCYTES # BLD AUTO: 0.62 X10*3/UL (ref 0.05–0.8)
MONOCYTES NFR BLD AUTO: 8.2 %
NEUTROPHILS # BLD AUTO: 4.6 X10*3/UL (ref 1.6–5.5)
NEUTROPHILS NFR BLD AUTO: 60.5 %
NRBC BLD-RTO: 0 /100 WBCS (ref 0–0)
PLATELET # BLD AUTO: 374 X10*3/UL (ref 150–450)
POTASSIUM SERPL-SCNC: 4.1 MMOL/L (ref 3.5–5.3)
PROT SERPL-MCNC: 6.6 G/DL (ref 6.4–8.2)
PROT SERPL-MCNC: 6.6 G/DL (ref 6.4–8.2)
RBC # BLD AUTO: 2.79 X10*6/UL (ref 4–5.2)
RETICS #: 0.05 X10*6/UL (ref 0.02–0.11)
RETICS/RBC NFR AUTO: 5 % (ref 0.5–2)
SODIUM SERPL-SCNC: 144 MMOL/L (ref 136–145)
TIBC SERPL-MCNC: 219 UG/DL (ref 240–445)
UIBC SERPL-MCNC: 193 UG/DL (ref 110–370)
VIT B12 SERPL-MCNC: 1484 PG/ML (ref 211–911)
WBC # BLD AUTO: 7.6 X10*3/UL (ref 4.4–11.3)

## 2024-09-24 PROCEDURE — 87493 C DIFF AMPLIFIED PROBE: CPT

## 2024-09-24 PROCEDURE — 1160F RVW MEDS BY RX/DR IN RCRD: CPT | Performed by: INTERNAL MEDICINE

## 2024-09-24 PROCEDURE — 3077F SYST BP >= 140 MM HG: CPT | Performed by: INTERNAL MEDICINE

## 2024-09-24 PROCEDURE — 99215 OFFICE O/P EST HI 40 MIN: CPT | Performed by: INTERNAL MEDICINE

## 2024-09-24 PROCEDURE — 83521 IG LIGHT CHAINS FREE EACH: CPT

## 2024-09-24 PROCEDURE — 82728 ASSAY OF FERRITIN: CPT

## 2024-09-24 PROCEDURE — 1036F TOBACCO NON-USER: CPT | Performed by: INTERNAL MEDICINE

## 2024-09-24 PROCEDURE — 82248 BILIRUBIN DIRECT: CPT

## 2024-09-24 PROCEDURE — 82607 VITAMIN B-12: CPT

## 2024-09-24 PROCEDURE — 80053 COMPREHEN METABOLIC PANEL: CPT

## 2024-09-24 PROCEDURE — 1159F MED LIST DOCD IN RCRD: CPT | Performed by: INTERNAL MEDICINE

## 2024-09-24 PROCEDURE — 82746 ASSAY OF FOLIC ACID SERUM: CPT

## 2024-09-24 PROCEDURE — 83540 ASSAY OF IRON: CPT

## 2024-09-24 PROCEDURE — 82232 ASSAY OF BETA-2 PROTEIN: CPT

## 2024-09-24 PROCEDURE — 83615 LACTATE (LD) (LDH) ENZYME: CPT

## 2024-09-24 PROCEDURE — 85025 COMPLETE CBC W/AUTO DIFF WBC: CPT

## 2024-09-24 PROCEDURE — 1123F ACP DISCUSS/DSCN MKR DOCD: CPT | Performed by: INTERNAL MEDICINE

## 2024-09-24 PROCEDURE — 99205 OFFICE O/P NEW HI 60 MIN: CPT | Performed by: INTERNAL MEDICINE

## 2024-09-24 PROCEDURE — 85045 AUTOMATED RETICULOCYTE COUNT: CPT

## 2024-09-24 PROCEDURE — 3078F DIAST BP <80 MM HG: CPT | Performed by: INTERNAL MEDICINE

## 2024-09-24 PROCEDURE — 1111F DSCHRG MED/CURRENT MED MERGE: CPT | Performed by: INTERNAL MEDICINE

## 2024-09-24 PROCEDURE — 1126F AMNT PAIN NOTED NONE PRSNT: CPT | Performed by: INTERNAL MEDICINE

## 2024-09-24 PROCEDURE — 82784 ASSAY IGA/IGD/IGG/IGM EACH: CPT

## 2024-09-24 PROCEDURE — 36415 COLL VENOUS BLD VENIPUNCTURE: CPT

## 2024-09-24 PROCEDURE — 83550 IRON BINDING TEST: CPT

## 2024-09-24 PROCEDURE — 84155 ASSAY OF PROTEIN SERUM: CPT

## 2024-09-24 PROCEDURE — 82668 ASSAY OF ERYTHROPOIETIN: CPT

## 2024-09-24 SDOH — HEALTH STABILITY: PHYSICAL HEALTH: ON AVERAGE, HOW MANY MINUTES DO YOU ENGAGE IN EXERCISE AT THIS LEVEL?: 0 MIN

## 2024-09-24 SDOH — ECONOMIC STABILITY: INCOME INSECURITY: IN THE LAST 12 MONTHS, WAS THERE A TIME WHEN YOU WERE NOT ABLE TO PAY THE MORTGAGE OR RENT ON TIME?: NO

## 2024-09-24 SDOH — ECONOMIC STABILITY: GENERAL
WHICH OF THE FOLLOWING DO YOU KNOW HOW TO USE AND HAVE ACCESS TO EVERY DAY? (CHOOSE ALL THAT APPLY): DESKTOP COMPUTER, LAPTOP COMPUTER, OR TABLET WITH BROADBAND INTERNET CONNECTION;SMARTPHONE WITH CELLULAR DATA PLAN

## 2024-09-24 SDOH — ECONOMIC STABILITY: FOOD INSECURITY: WITHIN THE PAST 12 MONTHS, YOU WORRIED THAT YOUR FOOD WOULD RUN OUT BEFORE YOU GOT MONEY TO BUY MORE.: NEVER TRUE

## 2024-09-24 SDOH — ECONOMIC STABILITY: FOOD INSECURITY: WITHIN THE PAST 12 MONTHS, THE FOOD YOU BOUGHT JUST DIDN'T LAST AND YOU DIDN'T HAVE MONEY TO GET MORE.: NEVER TRUE

## 2024-09-24 SDOH — ECONOMIC STABILITY: TRANSPORTATION INSECURITY
IN THE PAST 12 MONTHS, HAS LACK OF TRANSPORTATION KEPT YOU FROM MEETINGS, WORK, OR FROM GETTING THINGS NEEDED FOR DAILY LIVING?: NO

## 2024-09-24 SDOH — ECONOMIC STABILITY: TRANSPORTATION INSECURITY
IN THE PAST 12 MONTHS, HAS THE LACK OF TRANSPORTATION KEPT YOU FROM MEDICAL APPOINTMENTS OR FROM GETTING MEDICATIONS?: NO

## 2024-09-24 SDOH — HEALTH STABILITY: PHYSICAL HEALTH: ON AVERAGE, HOW MANY DAYS PER WEEK DO YOU ENGAGE IN MODERATE TO STRENUOUS EXERCISE (LIKE A BRISK WALK)?: 0 DAYS

## 2024-09-24 ASSESSMENT — LIFESTYLE VARIABLES
SKIP TO QUESTIONS 9-10: 1
HOW OFTEN DO YOU HAVE SIX OR MORE DRINKS ON ONE OCCASION: NEVER
HOW MANY STANDARD DRINKS CONTAINING ALCOHOL DO YOU HAVE ON A TYPICAL DAY: PATIENT DOES NOT DRINK
HOW OFTEN DO YOU HAVE A DRINK CONTAINING ALCOHOL: NEVER
AUDIT-C TOTAL SCORE: 0

## 2024-09-24 ASSESSMENT — PATIENT HEALTH QUESTIONNAIRE - PHQ9
SUM OF ALL RESPONSES TO PHQ9 QUESTIONS 1 AND 2: 2
2. FEELING DOWN, DEPRESSED OR HOPELESS: SEVERAL DAYS
10. IF YOU CHECKED OFF ANY PROBLEMS, HOW DIFFICULT HAVE THESE PROBLEMS MADE IT FOR YOU TO DO YOUR WORK, TAKE CARE OF THINGS AT HOME, OR GET ALONG WITH OTHER PEOPLE: NOT DIFFICULT AT ALL
1. LITTLE INTEREST OR PLEASURE IN DOING THINGS: SEVERAL DAYS

## 2024-09-24 ASSESSMENT — ENCOUNTER SYMPTOMS
DEPRESSION: 1
OCCASIONAL FEELINGS OF UNSTEADINESS: 1
LOSS OF SENSATION IN FEET: 0

## 2024-09-24 ASSESSMENT — COLUMBIA-SUICIDE SEVERITY RATING SCALE - C-SSRS
1. IN THE PAST MONTH, HAVE YOU WISHED YOU WERE DEAD OR WISHED YOU COULD GO TO SLEEP AND NOT WAKE UP?: NO
2. HAVE YOU ACTUALLY HAD ANY THOUGHTS OF KILLING YOURSELF?: NO
6. HAVE YOU EVER DONE ANYTHING, STARTED TO DO ANYTHING, OR PREPARED TO DO ANYTHING TO END YOUR LIFE?: NO

## 2024-09-24 ASSESSMENT — SOCIAL DETERMINANTS OF HEALTH (SDOH)
WITHIN THE LAST YEAR, HAVE TO BEEN RAPED OR FORCED TO HAVE ANY KIND OF SEXUAL ACTIVITY BY YOUR PARTNER OR EX-PARTNER?: NO
WITHIN THE LAST YEAR, HAVE YOU BEEN KICKED, HIT, SLAPPED, OR OTHERWISE PHYSICALLY HURT BY YOUR PARTNER OR EX-PARTNER?: NO
HOW OFTEN DO YOU ATTENT MEETINGS OF THE CLUB OR ORGANIZATION YOU BELONG TO?: MORE THAN 4 TIMES PER YEAR
WITHIN THE LAST YEAR, HAVE YOU BEEN AFRAID OF YOUR PARTNER OR EX-PARTNER?: NO
IN A TYPICAL WEEK, HOW MANY TIMES DO YOU TALK ON THE PHONE WITH FAMILY, FRIENDS, OR NEIGHBORS?: MORE THAN THREE TIMES A WEEK
DO YOU BELONG TO ANY CLUBS OR ORGANIZATIONS SUCH AS CHURCH GROUPS UNIONS, FRATERNAL OR ATHLETIC GROUPS, OR SCHOOL GROUPS?: YES
HOW OFTEN DO YOU GET TOGETHER WITH FRIENDS OR RELATIVES?: MORE THAN THREE TIMES A WEEK
WITHIN THE LAST YEAR, HAVE YOU BEEN HUMILIATED OR EMOTIONALLY ABUSED IN OTHER WAYS BY YOUR PARTNER OR EX-PARTNER?: NO
HOW HARD IS IT FOR YOU TO PAY FOR THE VERY BASICS LIKE FOOD, HOUSING, MEDICAL CARE, AND HEATING?: NOT HARD AT ALL
IN THE PAST 12 MONTHS, HAS THE ELECTRIC, GAS, OIL, OR WATER COMPANY THREATENED TO SHUT OFF SERVICE IN YOUR HOME?: NO
HOW OFTEN DO YOU ATTEND CHURCH OR RELIGIOUS SERVICES?: MORE THAN 4 TIMES PER YEAR

## 2024-09-24 ASSESSMENT — PAIN SCALES - GENERAL: PAINLEVEL: 0-NO PAIN

## 2024-09-24 NOTE — PROGRESS NOTES
I spoke to the patient today. She is feeling slightly better overall everyday, although still with some malaise, loose stools, etc. She took a day or two of the reordered cipro and vanc, but then stopped. Her urine culture from last week came back negative (although UA had pyuria), but I wonder if this is because it was obtained right after she was on antibiotics. She has continued metformin - even thought we discussed putting it temporarily on hold - because she was not comfortable changing her routine; glucose has been 80s-90s. She took higher dose (30 mg) of lisinopril for 2 days, but then went back to 20 mg - also because she didn't want to change from dose she was used to. Her SBP has been running 150s-170s, so I did encourage her to reconsider the higher dose; she would like to first discuss this with her PCP. She is planning to get basic labs, iron studies, and stool test (for c diff) tomorrow.

## 2024-09-24 NOTE — PROGRESS NOTES
Patient ID: Millicent Najera is a 79 y.o. female.  Referring Physician: [place] Game[place]  [address] [place] [address]  Primary Care Provider: Yesi Lam MD  Referral Reason:     Subjective: anemia      Heme/Onc History:  Sherri Najera is a 79 y.o. year old female with PMH DM, HTN, HLD, GERD, Depression/Anxiety, Recurrent renal calculi. she was found to have acute pyelonephritis     Past Medical History:   Past Medical History:   Diagnosis Date    Aneurysm, splenic artery (CMS-HCC)     Ankylosing spondylitis (Multi)     Anxiety     Arthritis     Body mass index (BMI) 27.0-27.9, adult 12/07/2021    BMI 27.0-27.9,adult    BPPV (benign paroxysmal positional vertigo)     Cervical radiculitis     Chronic kidney disease     Depression     Diabetes mellitus (Multi)     Effusion, left knee 02/11/2019    Effusion of left knee    Exocrine pancreatic insufficiency ([facility])     GERD (gastroesophageal reflux disease)     Hearing aid worn     HL (hearing loss)     HLD (hyperlipidemia)     Hypertension     Injury of tendon of rotator cuff 06/19/2024    Irritable bowel syndrome with diarrhea 01/16/2020    Irritable bowel syndrome with diarrhea    Kidney stone     Kidney stone 08/21/2023    Nephrolithiasis     Other conditions influencing health status 06/08/2020    History of chronic diarrhea    Other hemorrhoids 06/28/2017    Internal hemorrhoid    Other nonspecific abnormal finding of lung field 03/26/2019    Ground glass opacity present on imaging of lung    Pain in left knee 04/30/2019    Left knee pain    Pain in unspecified knee 02/11/2019    Knee pain    Personal history of other diseases of the circulatory system 08/12/2017    History of hypertension    Personal history of other diseases of the musculoskeletal system and connective tissue 05/21/2015    History of low back pain    Personal history of other specified conditions 11/03/2020    History of fatigue    Personal history of other specified  conditions 05/04/2021    History of diarrhea    Personal history of other specified conditions 03/07/2014    History of chest pain    Personal history of other specified conditions 01/31/2013    History of headache    Skin cancer     Unspecified injury of muscle(s) and tendon(s) of the rotator cuff of right shoulder, initial encounter 10/11/2016    Injury of right rotator cuff    Urinary tract infection     Wears glasses      Social History:   Social History     Socioeconomic History    Marital status:      Spouse name: Not on file    Number of children: Not on file    Years of education: Not on file    Highest education level: Not on file   Occupational History    Not on file   Tobacco Use    Smoking status: Never    Smokeless tobacco: Never   Vaping Use    Vaping status: Never Used   Substance and Sexual Activity    Alcohol use: Never    Drug use: Never    Sexual activity: Not Currently   Other Topics Concern    Not on file   Social History Narrative    Not on file     Social Determinants of Health     Financial Resource Strain: Low Risk  (9/24/2024)    Overall Financial Resource Strain (CARDIA)     Difficulty of Paying Living Expenses: Not hard at all   Food Insecurity: No Food Insecurity (9/24/2024)    Hunger Vital Sign     Worried About Running Out of Food in the Last Year: Never true     Ran Out of Food in the Last Year: Never true   Transportation Needs: No Transportation Needs (9/24/2024)    PRAPARE - Transportation     Lack of Transportation (Medical): No     Lack of Transportation (Non-Medical): No   Physical Activity: Inactive (9/24/2024)    Exercise Vital Sign     Days of Exercise per Week: 0 days     Minutes of Exercise per Session: 0 min   Stress: No Stress Concern Present (9/24/2024)    [employer] - Occupational Stress Questionnaire     Feeling of Stress : Only a little   Social Connections: Socially Integrated (9/24/2024)    Social Connection and Isolation Panel  [NHANES]     Frequency of Communication with Friends and Family: More than three times a week     Frequency of Social Gatherings with Friends and Family: More than three times a week     Attends [employer] Services: More than 4 times per year     Active Member of Clubs or Organizations: Yes     Attends Club or Organization Meetings: More than 4 times per year     Marital Status:    Intimate Partner Violence: Not At Risk (9/24/2024)    Humiliation, Afraid, Rape, and Kick questionnaire     Fear of Current or Ex-Partner: No     Emotionally Abused: No     Physically Abused: No     Sexually Abused: No   Housing Stability: Low Risk  (9/24/2024)    Housing Stability Vital Sign     Unable to Pay for Housing in the Last Year: No     Number of Times Moved in the Last Year: 1     Homeless in the Last Year: No     Surgical History:   Past Surgical History:   Procedure Laterality Date    BREAST BIOPSY Right 2004    benign    CARPAL TUNNEL RELEASE Bilateral     COLONOSCOPY  04/11/2018    no further needed-normal    CYSTOSCOPY W/ URETERAL STENT PLACEMENT Left 06/30/2024    HAND SURGERY Left 03/07/2014    Hand Surgery                                                                                                                                                          MR HEAD ANGIO WO IV CONTRAST  04/15/2023    MR HEAD ANGIO WO IV CONTRAST [facility] MRI    MR NECK ANGIO WO IV CONTRAST  04/15/2023    MR NECK ANGIO WO IV CONTRAST [facility] MRI    TOTAL KNEE ARTHROPLASTY Left 02/26/2024    TOTAL SHOULDER ARTHROPLASTY Bilateral 07/11/2022     Family History:   Family History   Problem Relation Name Age of Onset    Diabetes Mother Sherri     Asthma Mother Sherri     Other (CRF) Mother Sherri     Heart failure Father        reports that she has never smoked. She has never used smokeless tobacco.  Oncology Family history: Cancer-related family history is not on file.    Review Of Systems:  As stated per in HPI; otherwise all other 12 point ROS are  "negative    Physical Exam:  /72 (BP Location: Left arm, Patient Position: Sitting, BP Cuff Size: Adult long)   Pulse 85   Temp 37.3 °C (99.1 °F) (Temporal)   Resp 18   Ht (S) 1.582 m (5' 2.28\")   Wt 61 kg (134 lb 7.7 oz)   SpO2 98%   BMI 24.37 kg/m²   BSA: 1.64 meters squared  General: awake/alert/oriented x3, no distress, alert and cooperative  Head: Short hair fully covering scalp. Symmetric facial expressions  Eyes: PERRL, EOMI, clear sclera, eyebrows present.  Ears/Nose/Mouth/Throat:  Oral mucous membranes moist. No oral ulcers. No palpable pre/post-auricular lymph nodes  Neck: No palpable cervical chain lymph nodes  Respiratory: unlabored breathing on room air, good chest expansion, thorax symmetric  Cardio: Regular rate and rhythm, normal S1 and S2, radial pulses symmetric  GI: Nondistended, soft, non-tender abdomen  Musculoskeletal: Normal muscle bulk and tone, ROM intact, no joint swelling.  Rises from chair and walks unassisted.  Extremities: No ankle swelling, no arm or leg wounds  Neuro: Alert, cognition intact, speech normal. Facial expressions symmetric.  No motor deficits noted. Sensation intact to touch and hot/cold.   Able to stand from seated position unassisted and walks around the room unassisted.  Psychological: Appropriate mood and behavior.  Skin: Warm and dry, no lesions, no rashes    Results:  Diagnostic Results   Lab Results   Component Value Date    WBC 11.2 09/08/2024    HGB 7.3 (L) 09/08/2024    HCT 24.4 (L) 09/08/2024    MCV 91 09/08/2024     09/08/2024     Lab Results   Component Value Date    CALCIUM 8.1 (L) 09/08/2024     09/08/2024    K 3.2 (L) 09/08/2024    CO2 26 09/08/2024     09/08/2024    BUN 12 09/08/2024    CREATININE 0.91 09/08/2024    ALT 21 09/08/2024    AST 26 09/08/2024       Current Outpatient Medications:     atorvastatin (Lipitor) 80 mg tablet, Take 1 tablet (80 mg) by mouth once daily., Disp: 90 tablet, Rfl: 1    ciprofloxacin (Cipro) " 500 mg tablet, Take 1 tablet (500 mg) by mouth 2 times a day for 5 days., Disp: 10 tablet, Rfl: 0    cyanocobalamin (Vitamin B-12) 1,000 mcg tablet, Take 1 tablet (1,000 mcg) by mouth once daily., Disp: , Rfl:     ferrous sulfate, 325 mg ferrous sulfate, tablet, Take 1 tablet (325 mg) by mouth 2 times a day., Disp: 60 tablet, Rfl: 0    fluticasone (Flonase Allergy Relief) 50 mcg/actuation nasal spray, Administer 1 spray into each nostril 2 times a day as needed., Disp: , Rfl:     Lactobacillus acidophilus 1 billion cell tablet, Take 1 tablet by mouth 2 times a day., Disp: 60 tablet, Rfl: 0    lisinopril 30 mg tablet, Take 1 tablet (30 mg) by mouth once daily., Disp: 90 tablet, Rfl: 1    LORazepam (Ativan) 0.5 mg tablet, Take 1 tablet (0.5 mg) by mouth 2 times a day as needed for anxiety., Disp: 30 tablet, Rfl: 0    mirtazapine (Remeron) 15 mg tablet, Take 1 tablet (15 mg) by mouth once daily at bedtime., Disp: 30 tablet, Rfl: 1    MULTIVITAMIN ORAL, Take 1 tablet by mouth once daily., Disp: , Rfl:     omeprazole (PriLOSEC) 40 mg DR capsule, Take 1 capsule (40 mg) by mouth once daily in the morning. Take before meals. Do not crush or chew., Disp: 30 capsule, Rfl: 0    OneTouch Delica Plus Lancet 33 gauge misc, 3 times a day., Disp: , Rfl:     OneTouch Verio test strips strip, 3 times a day. BEFORE MEALS AND AT BEDTIME. - USE 1 STRIP TO CHECK BLOOD SUGAR., Disp: , Rfl:     PARoxetine (Paxil) 10 mg tablet, Take 1 tablet (10 mg) by mouth once daily., Disp: 90 tablet, Rfl: 1    saccharomyces boulardii (Florastor) 250 mg capsule, Take 1 capsule (250 mg) by mouth once daily., Disp: , Rfl:     vancomycin (Vancocin) 125 mg capsule, Take 1 capsule (125 mg) by mouth 4 times a day for 10 days., Disp: 40 capsule, Rfl: 0    zinc gluconate 50 mg tablet, Take 1 tablet (50 mg) by mouth once daily., Disp: , Rfl:      Radiology:    Pathology:    Assessment/Plan:  ? Anemia:    Normocytic. Could be ACD. Could be EVELIN.     Ngs ordered.  Myeloma panel ordered. Will see her in 2 weeks to discuss the results    Diagnoses and all orders for this visit:  Leukopenia, unspecified type  -     Referral to Hematology and Oncology  -     Ferritin; Future  -     Lactate Dehydrogenase; Future  -     Comprehensive Metabolic Panel; Future  -     Folate; Future  -     CBC and Auto Differential; Future  -     Iron and TIBC; Future  -     Vitamin B12; Future  -     Reticulocytes; Future  -     Erythropoietin; Future  -     *CBC and Auto Differential; Future  -     *Comprehensive Metabolic Panel; Future  -     Serum Protein Electrophoresis + Immunofixation; Future  -     [place]/Lambda Free Light Chain, Serum (Includes Kappa, Lambda and K/L ratio); Future  -     Beta 2 Microglobuin; Future  -     Immunoglobulins (IgG, IgA, IgM); Future  -     Myeloid Malignancies Panel - Myeloid NGS; Future       Performance Status: Symptomatic; fully ambulatory    I spent more than 30 minutes for the patient today, including face-to-face conversation, pre-visit preparation, post-visit orders, and others.   Issa Xie MD

## 2024-09-24 NOTE — PROGRESS NOTES
Pt seen in office today for a NPV with Dr. Issa Xie for management of her leukopenia.  She has been referred to our office by Dr. Gamez. She is without complaints today and denies pain. She is accompanied to her visit today by her , Moose.    Medications, pharmacy preference and allergies were reviewed with patient and updated in the medical record by MD.     Per orders, labs are to be collected and patient would like to have them drawn at [address] along with the other labs that have been ordered by Dr. Guillermo, including a stool sample. She will return to clinic in 2 weeks  to review results.    Our contact information was given to patient and they were encouraged to contact us with any questions or concerns.     Patient verbalized understanding and agreement regarding discussed information via verbal feedback. Pt escorted to scheduling.

## 2024-09-24 NOTE — PROGRESS NOTES
Daily Call Note:     Ms. Najera will drop off stool sample and have lab drawn today.   Patient denies diarrhea.    /81 HR 83   blood sugar 95  Weigh 134.2    Patient stopped Cipro and Vancomycin.  Patient resumed Metformin    Advised patient to contact the [facility] Team for question or concerns 24/7.       Pt Education:   Barriers:   Topics for Daily Review:   Pt demonstrates clear understanding:     Daily Weight:  There were no vitals filed for this visit.   Last 3 Weights:  Wt Readings from Last 7 Encounters:   09/23/24 60.2 kg (132 lb 12.8 oz)   09/18/24 62.8 kg (138 lb 6.4 oz)   09/17/24 63 kg (139 lb)   09/14/24 63.3 kg (139 lb 9.6 oz)   09/04/24 65.9 kg (145 lb 4.8 oz)   09/03/24 62.6 kg (138 lb)   08/08/24 64.9 kg (143 lb 1.3 oz)       Masimo Device:    Masimo Clinical Impression:     Virtual Visits--Scheduled (Most Recent Date at Top)  Follow up Appointments  Recent Visits  Date Type Provider Dept   09/17/24 Office Visit Yesi Lam MD Do [id number] [facility]   09/03/24 Office Visit Yesi Lam MD Do [id number] [facility]   Showing recent visits within past 30 days and meeting all other requirements  Future Appointments  Date Type Provider Dept   10/11/24 Appointment Yesi Lam MD Do [id number] [facility]   Showing future appointments within next 90 days and meeting all other requirements       Frequency of RN Calls & Virtual Visits per Team Agreement:     Medication issues Addressed (what was done):     Follow up appointments scheduled by [facility] Staff:   Referrals made by [facility] staff:

## 2024-09-25 ENCOUNTER — PATIENT OUTREACH (OUTPATIENT)
Dept: HOME HEALTH SERVICES | Age: 79
End: 2024-09-25
Payer: MEDICARE

## 2024-09-25 VITALS — DIASTOLIC BLOOD PRESSURE: 83 MMHG | SYSTOLIC BLOOD PRESSURE: 153 MMHG | HEART RATE: 85 BPM

## 2024-09-25 LAB
B2 MICROGLOB SERPL-MCNC: 5.3 MG/L (ref 0.7–2.2)
IGA SERPL-MCNC: 366 MG/DL (ref 70–400)
IGG SERPL-MCNC: 1120 MG/DL (ref 700–1600)
IGM SERPL-MCNC: 241 MG/DL (ref 40–230)
KAPPA LC SERPL-MCNC: 4.72 MG/DL (ref 0.33–1.94)
KAPPA LC/LAMBDA SER: 0.81 {RATIO} (ref 0.26–1.65)
LAMBDA LC SERPL-MCNC: 5.82 MG/DL (ref 0.57–2.63)

## 2024-09-25 NOTE — PROGRESS NOTES
Daily Call Note:     Patient would like to discuss lab results from yesterday with the provider.  [facility] 9/26/24 @ 09:30, I advised patient we would discuss labs and medication at that time.  Patient agrees with plan.      Mrs. Najera states she was advised by her provider to take Ferrous Sulfate 325 mg daily instead of BID.      MEDICATION VERIFICATION:   Metformin was resumed.   Lisinopril 20 mg daily,   (Pt was adjusted her dose to 30 mg daily for 2 days)    Blood sugar 99    /83    Pt Education:   Barriers:   Topics for Daily Review:   Pt demonstrates clear understanding:     Daily Weight:  There were no vitals filed for this visit.   Last 3 Weights:  Wt Readings from Last 7 Encounters:   09/24/24 61 kg (134 lb 7.7 oz)   09/24/24 60.8 kg (134 lb)   09/23/24 60.2 kg (132 lb 12.8 oz)   09/18/24 62.8 kg (138 lb 6.4 oz)   09/17/24 63 kg (139 lb)   09/14/24 63.3 kg (139 lb 9.6 oz)   09/04/24 65.9 kg (145 lb 4.8 oz)       Masimo Device:    Masimo Clinical Impression:     Virtual Visits--Scheduled (Most Recent Date at Top)  Follow up Appointments  Recent Visits  Date Type Provider Dept   09/17/24 Office Visit Yesi Lam MD Do [id number] [facility]   09/03/24 Office Visit Yesi Lam MD Do [id number] [facility]   Showing recent visits within past 30 days and meeting all other requirements  Future Appointments  Date Type Provider Dept   10/11/24 Appointment Yesi Lam MD Do [id number] [facility]   Showing future appointments within next 90 days and meeting all other requirements       Frequency of RN Calls & Virtual Visits per Team Agreement:     Medication issues Addressed (what was done):     Follow up appointments scheduled by [facility] Staff:   Referrals made by [facility] staff:

## 2024-09-26 ENCOUNTER — TELEMEDICINE (OUTPATIENT)
Dept: PHARMACY | Facility: HOSPITAL | Age: 79
End: 2024-09-26
Payer: MEDICARE

## 2024-09-26 ENCOUNTER — PATIENT OUTREACH (OUTPATIENT)
Dept: HOME HEALTH SERVICES | Age: 79
End: 2024-09-26

## 2024-09-26 ENCOUNTER — APPOINTMENT (OUTPATIENT)
Dept: CARE COORDINATION | Age: 79
End: 2024-09-26
Payer: MEDICARE

## 2024-09-26 ENCOUNTER — TELEPHONE (OUTPATIENT)
Dept: PRIMARY CARE | Facility: CLINIC | Age: 79
End: 2024-09-26

## 2024-09-26 VITALS — SYSTOLIC BLOOD PRESSURE: 156 MMHG | DIASTOLIC BLOOD PRESSURE: 81 MMHG | HEART RATE: 87 BPM

## 2024-09-26 DIAGNOSIS — N10 ACUTE PYELONEPHRITIS: ICD-10-CM

## 2024-09-26 DIAGNOSIS — E11.9 CONTROLLED TYPE 2 DIABETES MELLITUS WITHOUT COMPLICATION, WITHOUT LONG-TERM CURRENT USE OF INSULIN (MULTI): ICD-10-CM

## 2024-09-26 DIAGNOSIS — A04.72 C. DIFFICILE COLITIS: Primary | ICD-10-CM

## 2024-09-26 DIAGNOSIS — I10 ESSENTIAL HYPERTENSION: Primary | ICD-10-CM

## 2024-09-26 LAB — EPO SERPL-ACNC: 47 MU/ML (ref 4–27)

## 2024-09-26 RX ORDER — VANCOMYCIN HYDROCHLORIDE 125 MG/1
125 CAPSULE ORAL 4 TIMES DAILY
Qty: 40 CAPSULE | Refills: 0 | Status: SHIPPED | OUTPATIENT
Start: 2024-09-26 | End: 2024-10-06

## 2024-09-26 RX ORDER — METFORMIN HYDROCHLORIDE 500 MG/1
500 TABLET, EXTENDED RELEASE ORAL DAILY
Qty: 90 TABLET | Refills: 1 | Status: SHIPPED | OUTPATIENT
Start: 2024-09-26 | End: 2025-03-25

## 2024-09-26 NOTE — PROGRESS NOTES
"Pharmacy Post-Discharge Visit - Follow Up     Sherri Najera \"Elza" is a 79 y.o. female was referred to Clinical Pharmacy Team to complete a post-discharge medication optimization and monitoring visit.  The patient was referred for close monitoring while also enrolled in [facility].     Referring Provider: Gigi Nguyen DO  PCP: Yesi Lam MD - last visit: 9/17/24, next visit: 10/11      Subjective   Allergies   Allergen Reactions    Fenofibrate Unknown    Sulfa (Sulfonamide Antibiotics) Rash       [id number] - [facility] [place] - [address] [facility] [facility] [address] [address]  [address] [facility] [address]  Phone: [phone] Fax: [phone]    [facility] - [place] - [facility] AT [address] [facility]  [place] [address]  Phone: [phone] Fax: [phone]    [facility]  [address] [facility] [address]  Phone: [phone] Fax: [phone]      Medication System Management:  Affordability/Accessibility: no concerns   Adherence/Organization: some concern       Social History     Social History Narrative    Not on file          HPI  Diabetes  She presents for her initial diabetic visit. She has type 2 diabetes mellitus. Her disease course has been stable. There are no hypoglycemic associated symptoms. There are no hypoglycemic complications. Risk factors for coronary artery disease include diabetes mellitus and hypertension. Current diabetic treatment includes oral agent (monotherapy). She is compliant with treatment all of the time. Her overall blood glucose range is  mg/dl. An ACE inhibitor/angiotensin II receptor blocker is being taken.     Review of Systems        Objective     There were no vitals taken for this visit.   BP Readings from Last 4 Encounters:   09/25/24 153/83   09/24/24 162/72   09/24/24 160/81   09/23/24 157/83      There were no vitals filed for this visit.     LAB  Lab Results   Component " Value Date    BILITOT 0.3 09/24/2024    CALCIUM 8.9 09/24/2024    CO2 28 09/24/2024     09/24/2024    CREATININE 0.82 09/24/2024    GLUCOSE 99 09/24/2024    ALKPHOS 121 09/24/2024    K 4.1 09/24/2024    PROT 6.6 09/24/2024    PROT 6.6 09/24/2024     09/24/2024    AST 12 09/24/2024    ALT 11 09/24/2024    BUN 10 09/24/2024    ANIONGAP 16 09/24/2024    MG 2.10 09/07/2024    PHOS 4.0 07/03/2024     09/24/2024    ALBUMIN 3.2 (L) 09/24/2024    LIPASE 25 09/04/2024    GFRF 75 09/12/2023     Lab Results   Component Value Date    TRIG 292 (H) 09/03/2024    CHOL 111 (L) 09/03/2024    LDLCALC 38 (L) 09/03/2024    HDL 15.0 (L) 09/03/2024     Lab Results   Component Value Date    HGBA1C 5.4 09/03/2024         Current Outpatient Medications   Medication Instructions    atorvastatin (LIPITOR) 80 mg, oral, Daily    cyanocobalamin (Vitamin B-12) 1,000 mcg tablet 1 tablet, oral, Daily    ferrous sulfate (325 mg ferrous sulfate) 325 mg, oral, 2 times daily    fluticasone (Flonase Allergy Relief) 50 mcg/actuation nasal spray 1 spray, Each Nostril, 2 times daily PRN    Lactobacillus acidophilus 1 billion cell tablet 1 tablet, oral, 2 times daily    lisinopril 30 mg, oral, Daily    LORazepam (ATIVAN) 0.5 mg, oral, 2 times daily PRN    mirtazapine (REMERON) 15 mg, oral, Nightly    MULTIVITAMIN ORAL 1 tablet, oral, Daily    omeprazole (PRILOSEC) 40 mg, oral, Daily before breakfast, Do not crush or chew.    OneTouch Delica Plus Lancet 33 gauge misc 3 times daily    OneTouch Verio test strips strip 3 times daily, BEFORE MEALS AND AT BEDTIME. - USE 1 STRIP TO CHECK BLOOD SUGAR.     PARoxetine (PAXIL) 10 mg, oral, Daily    saccharomyces boulardii (Florastor) 250 mg capsule 1 capsule, oral, Daily    vancomycin (VANCOCIN) 125 mg, oral, 4 times daily    zinc gluconate 50 mg tablet 1 tablet, oral, Daily            Assessment/Plan   Problem List Items Addressed This Visit    None    HTN  BP today 156/81  After visit last week we  wanted to increase lisinopril to 30mg daily with having elevated BP's but she is still taking the 20mg daily   She is willing to take the higher dose to get better control of her BP  The prescription was already sent to their pharmacy  Nursing will continue with daily calls to help with monitoring in case any further adjustments need made before next visit   DM  Most recent A1c was 5.4% from 9/3  She does check her sugars daily and keeps log   Fasting today was 97  She ranges from 's   Still has been taking her Metformin 500mg daily   UTI/C.Diff  No longer taking any antibiotics  Not having any UTI symptoms  No fevers or chills   After our visit last week we wanted her to do another course of vanco with having loose stools still and possibly still having the C.diff infection, however she only took maybe 2 days then stopped  She did give another stool sample on 9/24 and it did come back positive for C.Diff  Discussed that she does need to be on the 10 day course to treat the infection  Will need to send new prescription because they cannot find the bottle they had    Update (9/27): Dr. Nguyen and I reached out to Dr. Lam (pcp) and she would like us to defer changing any medications to her moving forward. Ms. Najera would like to stay in the program for close monitoring which we are happy to have her then and continue our weekly visits.     Follow Up: 1 week     Continue all meds under the continuation of care with the referring provider and clinical pharmacy team.    Amilcar Grewal, Toya     Verbal consent to manage patient's drug therapy was obtained from the patient and an individual authorized to act on behalf of a patient. They were informed they may decline to participate or withdraw from participation in pharmacy services at any time.

## 2024-09-26 NOTE — TELEPHONE ENCOUNTER
Pt called this am. She has [facility] that calls on her daily. They reported today that stool shows c-diff, again, they want to start her on vancomycin x 10 days.  Also they want her to increase her lisinopril from 20 to 30 mg. Bp today was 156/81.   Pt wants to make sure [facility] agrees with this. Please advise [phone] or [phone]. May speak with  .

## 2024-09-26 NOTE — PROGRESS NOTES
Daily Call Note:   [facility] COMPLETED WITH DR. NGUYEN AND PATRICIA.     Patient completed atbx for pylonephritis, denies fever or pain.     Patient labs detected C-Diff yesterday.  Patient took 2 days of Vanco in the past.  Dr. Nguyen recommended 10 supply of oral Vancomycin.    The [facility] team will send a new script.     Ms. Najera saw hem/onc yesterday and was advised she was anemia and to make a follow appointment in 2 weeks.  Patient c/o of dark stools.  Patient is taking iron pills.   Patient is not having a not of diarrhea lately.      /81  HR 87  Blood Sugar 97.  Patient is taking Lisinopril 20 mg.  A prescription was sent for Lisinopril 30 mg on 9/19/24 but patient stated she discarded the medication.    There are refills on the Lisinopril, if patient has an out of pocket expense it will be around $5.00.    A new Prescription sent for Vancomycin 125 mg 4 x's daily for C-Diff     Dr. Nguyen advised patient to talk to [facility] about lab work done on 9/24.      Referral placed for infectious disease. The [facility] team will assist in scheduling appointment.   UPDATE:  Infectious Disease with Asrahy Eliud  November 1, 2024 @ 8:20  [address] [address] 10/04/26 @ 09:30        Pt Education:   Barriers:   Topics for Daily Review:   Pt demonstrates clear understanding:     Daily Weight:  There were no vitals filed for this visit.   Last 3 Weights:  Wt Readings from Last 7 Encounters:   09/24/24 61 kg (134 lb 7.7 oz)   09/24/24 60.8 kg (134 lb)   09/23/24 60.2 kg (132 lb 12.8 oz)   09/18/24 62.8 kg (138 lb 6.4 oz)   09/17/24 63 kg (139 lb)   09/14/24 63.3 kg (139 lb 9.6 oz)   09/04/24 65.9 kg (145 lb 4.8 oz)       Masimo Device:    Masimo Clinical Impression:     Virtual Visits--Scheduled (Most Recent Date at Top)  Follow up Appointments  Recent Visits  Date Type Provider Dept   09/17/24 Office Visit [id number]   09/03/24 Office Visit Yesi Lam MD Do  [id number] [facility]   Showing recent visits within past 30 days and meeting all other requirements  Future Appointments  Date Type Provider Dept   10/11/24 Appointment Yesi Lam MD Do [id number] [facility]   Showing future appointments within next 90 days and meeting all other requirements       Frequency of RN Calls & Virtual Visits per Team Agreement:     Medication issues Addressed (what was done):     Follow up appointments scheduled by [facility] Staff:   Referrals made by [facility] staff:

## 2024-09-27 ENCOUNTER — PATIENT OUTREACH (OUTPATIENT)
Dept: HOME HEALTH SERVICES | Age: 79
End: 2024-09-27
Payer: MEDICARE

## 2024-09-27 ENCOUNTER — TELEPHONE (OUTPATIENT)
Dept: HEMATOLOGY/ONCOLOGY | Facility: CLINIC | Age: 79
End: 2024-09-27
Payer: MEDICARE

## 2024-09-27 ENCOUNTER — TELEPHONE (OUTPATIENT)
Dept: PRIMARY CARE | Facility: CLINIC | Age: 79
End: 2024-09-27
Payer: MEDICARE

## 2024-09-27 DIAGNOSIS — K21.9 GASTROESOPHAGEAL REFLUX DISEASE WITHOUT ESOPHAGITIS: ICD-10-CM

## 2024-09-27 LAB
ELECTRONICALLY SIGNED BY: NORMAL
MYELOID NGS RESULTS: NORMAL

## 2024-09-27 RX ORDER — OMEPRAZOLE 40 MG/1
40 CAPSULE, DELAYED RELEASE ORAL EVERY MORNING
Qty: 30 CAPSULE | Refills: 0 | Status: SHIPPED | OUTPATIENT
Start: 2024-09-27

## 2024-09-27 NOTE — TELEPHONE ENCOUNTER
Patient's Son Miguel would like to speak to Patient's nurse regarding results before scheduling appointment.    Miguel can be reached at [phone]

## 2024-09-27 NOTE — PROGRESS NOTES
Daily call completed Spoke with Mr and Mrs Najera. Patient states she is feeling ok today she states her loose stools are improving. She is taking the Vanco. BP today 183/98 this am before meds HR 93 PoX 91% she did a recheck while on the phone it has been about 2 hours since her medications and her BP is 129/18 HR 74. Spoke to them about moving forward we would defer medications changes to her PCP. They do want to stay in the healthy at home program for the other benefits they fele they are getting out of the program. Questions and concerns addressed no medication needs

## 2024-09-28 ENCOUNTER — PATIENT OUTREACH (OUTPATIENT)
Dept: HOME HEALTH SERVICES | Age: 79
End: 2024-09-28
Payer: MEDICARE

## 2024-09-28 VITALS
BODY MASS INDEX: 23.6 KG/M2 | SYSTOLIC BLOOD PRESSURE: 169 MMHG | DIASTOLIC BLOOD PRESSURE: 84 MMHG | WEIGHT: 130.2 LBS | HEART RATE: 88 BPM | OXYGEN SATURATION: 96 %

## 2024-09-28 LAB
ALBUMIN: 2.5 G/DL (ref 3.4–5)
ALPHA 1 GLOBULIN: 0.6 G/DL (ref 0.2–0.6)
ALPHA 2 GLOBULIN: 1.4 G/DL (ref 0.4–1.1)
BETA GLOBULIN: 0.8 G/DL (ref 0.5–1.2)
GAMMA GLOBULIN: 1.3 G/DL (ref 0.5–1.4)
IMMUNOFIXATION COMMENT: ABNORMAL
M-PROTEIN 1: 0.2 G/DL
PATH REVIEW - SERUM IMMUNOFIXATION: ABNORMAL
PATH REVIEW-SERUM PROTEIN ELECTROPHORESIS: ABNORMAL
PROTEIN ELECTROPHORESIS COMMENT: ABNORMAL

## 2024-09-28 NOTE — PROGRESS NOTES
Daily Call Note:  Daily call completed with patient and spouse. She states she's feeling ok, not to bad. States still feels weak. Patient's VS: /84 (before meds), HR 88, 96% RA, weight 130.2 lb, BS 95 (before breakfast). Patient's  states she was able to eat a 300 dat. Breakfast this morning. Denies any N/V/D. Patient ID appointment 11/1 @ 0820 reviewed with patient as well as her other upcoming appointments and [facility] 10/4 @ 8084. Patient and spouse have no other questions at this time.   Pt Education:    Barriers: None  Topics for Daily Review:   Pt demonstrates clear understanding: Yes    Daily Weight:  Vitals:    09/28/24 1000   Weight: 59.1 kg (130 lb 3.2 oz)      Last 3 Weights:  Wt Readings from Last 7 Encounters:   09/28/24 59.1 kg (130 lb 3.2 oz)   09/24/24 61 kg (134 lb 7.7 oz)   09/24/24 60.8 kg (134 lb)   09/23/24 60.2 kg (132 lb 12.8 oz)   09/18/24 62.8 kg (138 lb 6.4 oz)   09/17/24 63 kg (139 lb)   09/14/24 63.3 kg (139 lb 9.6 oz)       Masimo Device: No   Masimo Clinical Impression: N/A    Virtual Visits--Scheduled (Most Recent Date at Top)  Follow up Appointments  Recent Visits  Date Type Provider Dept   09/17/24 Office Visit Yesi Lam MD Do [id number] [facility]   09/03/24 Office Visit Yesi Lam MD Do [id number] [facility]   Showing recent visits within past 30 days and meeting all other requirements  Future Appointments  Date Type Provider Dept   10/11/24 Appointment Yesi Lam MD Do [id number] [facility]   Showing future appointments within next 90 days and meeting all other requirements       Frequency of RN Calls & Virtual Visits per Team Agreement: [facility] Frequency: Daily    Medication issues Addressed (what was done): None    Follow up appointments scheduled by [facility] Staff: None  Referrals made by [facility] staff: None

## 2024-09-30 NOTE — TELEPHONE ENCOUNTER
Spoke with son per Dr. Xie: patient is able to come by herself for review of labs and there is nothing of concern.    Son did a teach back and verbalized understanding.

## 2024-10-01 PROBLEM — N20.0 LEFT RENAL STONE: Status: RESOLVED | Noted: 2024-06-29 | Resolved: 2024-10-01

## 2024-10-01 PROBLEM — A04.72 C. DIFFICILE COLITIS: Status: RESOLVED | Noted: 2024-09-26 | Resolved: 2024-10-01

## 2024-10-01 PROBLEM — E66.3 OVERWEIGHT WITH BODY MASS INDEX (BMI) 25.0-29.9: Status: RESOLVED | Noted: 2024-06-19 | Resolved: 2024-10-01

## 2024-10-01 PROBLEM — N10 ACUTE PYELONEPHRITIS: Status: RESOLVED | Noted: 2024-09-04 | Resolved: 2024-10-01

## 2024-10-01 PROBLEM — N12 PYELONEPHRITIS: Status: RESOLVED | Noted: 2024-09-05 | Resolved: 2024-10-01

## 2024-10-01 PROBLEM — H81.11 BENIGN PAROXYSMAL POSITIONAL VERTIGO OF RIGHT EAR: Status: RESOLVED | Noted: 2023-08-21 | Resolved: 2024-10-01

## 2024-10-01 NOTE — PROGRESS NOTES
Subjective   Reason for Visit: Sherri Najera is an 79 y.o. female here for a Medicare Wellness visit.     Past Medical, Surgical, and Family History reviewed and updated in chart.    Reviewed all medications by prescribing practitioner or clinical pharmacist (such as prescriptions, OTCs, herbal therapies and supplements) and documented in the medical record.    [facility]    Patient Care Team:  Yesi Lam MD as PCP - General (Family Medicine)  Rosita Chandler MD as PCP - [employer]age PCP  Issa Xie MD as Medical Oncologist (Hematology and Oncology)  Kathie Harris RN as Nurse Navigator (Hematology and Oncology)  Shazia Gill as Care Manager (Case Management)     # 1. Sherri Hanson presents for a CPE. She was previously a patient of Dr. Chandler.    I saw her as a new patient in April.  She never had blood work done as ordered.. She lives with her .     She has had a very stormy year healthwise.  She had a knee replacement in March.   She has had a persistent anemia since that time.  She was seeing Dr. Puente for diagnosis of kidney stones.  She had an obstructing stone in the summertime and was hospitalized and had a stent placed.  She then developed a UTI which was treated in the hospital and subsequently got C. difficile and was treated with vancomycin.   She was readmitted with a pyelonephritis after she had stent removed.    There has been some interference with posthospitalization remote care from a  team that wanted to represcribed vancomycin because she was still having diarrhea.  They referred her to an infectious disease specialist.   I have subsequently asked them to step away from her care.    She was back in the ER for a UTI earlier this week.  She is also ready followed up with the urologist.  She is now be on suppressive antibiotics for 3 months.    She has seen a hematologist for her persistent anemia.   Her most recent hemoglobin is 9.5.  Has a history of aortic  regurgitation and  had no recent surveillance.   I ordered an echocardiogram which was done in May.  It showed mild aortic regurg and a normal LV ejection fraction.  She had a coronary calcium score of 0 in 2019.      She had a normal mammogram in April 2024.   Her last colonoscopy was in 2018 with no further scopes recommended.    She has a concern over some weakness and gait instability from her weight loss and health issues over the summer.  Consultants:   Urology   Hematology   Dermatology for history of skin cancer.  She has seen Dr. Gray.    # 2.  She has type 2 diabetes.   When I initially saw her she was on metformin and Actos.  Both medications have been discontinued.  She is checking her blood sugars at home.  Her most recent A1c is 5.4.   She is lost approximately 20 pounds this year.  # 3. She has hypercholesterolemia and takes atorvastatin 80 mg daily.   Her cholesterol is 111 with an LDL of 38.  # 4. She also has hypertension and takes lisinopril 20 mg daily.   She has normal renal function.  # 5. She has depression and takes  Paxil 20 mg and Remeron 15 mg nightly.   She was last given #30 lorazepam on September 3.  # 6. She has chronic sinus issues and uses Flonase.    # 7.   She has indigestion and chronically takes omeprazole.  She has not had symptoms recently.    Review of Systems  Constitutional Symptoms: negative for loss of appetite, headaches or dizziness.   Eyes: negative for loss and blurring of vision, no double vision.   Cardiovascular: negative for chest , edema, claudication.   Respiratory: negative for shortness of breath, dyspnea on exertion, pain with breathing, coughing.   Breast: negative for tenderness, or masses   Gastrointestinal: negative for abdominal pain, change in bowel habits, blood in stool.   Musculoskeletal: negative for joint swelling, myalgias, cramps.   Integumentary: negative for change in mole, skin trouble or rash.   Neurological: negative for numbness,  "tingling,  tremors.   She does have weakness.  Psychiatric:   Depression is controlled with the Paxil.   Endocrine: negative for weight gain, heat or cold intolerance, polyuria, polydipsia      Objective   Vitals:  /86 (BP Location: Left arm)   Pulse 79   Ht 1.61 m (5' 3.39\")   Wt 59.9 kg (132 lb)   SpO2 96%   BMI 23.10 kg/m²       Physical Exam  General Appearance: Comfortable. She is well nourished, and well developed.  Eyes: PERRLA, EOMI, conjunctiva and sclerae clear.   Ears: Bilateral canals are normal. Both tympanic membranes are pearly gray and landmarks are normal .   Nose is not congested. Throat is noninjected without exudate.  Neck: Neck is supple, no adenopathy or thyromegaly. No carotid bruits.  Chest: Lungs are clear to auscultation bilaterally with no wheezes, rales, or rhonchi.  Cardiovascular: RRR without MRG. Peripheral pulses are intact. Extremities without edema.  Breasts: Bilateral breasts are symmetrical without masses or discharge. No skin changes.   Abdomen: Abdomen is soft, NT, ND with no masses or organomegaly.  Lymph Nodes: Bilateral axillary lymph nodes are unremarkable. Bilateral inguinal lymph nodes are unremarkable.  Musculoskeletal: 5/5 and equal strength in bilateral upper and lower extremities.   Skin: Skin shows good turgor and no rashes .   Neurological: Neuro: Intact and non-focal. Cranial nerves II - XII are grossly intact.  Psychiatric: Patient's mood is appropriate.      Assessment & Plan  Well adult exam    She is not sure what she needs refills on.  She will notify me.  I recommend that she increase her fluid intake.    She should follow-up with me in 3 months.       Hypercholesterolemia    She will continue the atorvastatin.       Benign essential HTN    Orders:  •  lisinopril 30 mg tablet; Take 1 tablet (30 mg) by mouth once daily.    Controlled type 2 diabetes mellitus without complication, without long-term current use of insulin (Multi)    She should stay off " her diabetic medications.       Skin cancer         Anxiety    Continue Paxil.       Primary insomnia         Gait instability    Orders:  •  Referral to Physical Therapy; Future

## 2024-10-02 ENCOUNTER — PATIENT OUTREACH (OUTPATIENT)
Dept: HOME HEALTH SERVICES | Age: 79
End: 2024-10-02
Payer: MEDICARE

## 2024-10-02 VITALS
HEART RATE: 82 BPM | OXYGEN SATURATION: 93 % | SYSTOLIC BLOOD PRESSURE: 167 MMHG | BODY MASS INDEX: 23.92 KG/M2 | WEIGHT: 132 LBS | DIASTOLIC BLOOD PRESSURE: 84 MMHG

## 2024-10-02 DIAGNOSIS — Z00.6 RESEARCH STUDY PATIENT: ICD-10-CM

## 2024-10-02 NOTE — PROGRESS NOTES
Daily Call Note:     Wt. 132 lb, Blood sugar 102, /84, oxygen 93, pulse 82.     Ms. Najera is taking only 20 mg of Lisinopril.      Patient is taking Vancomycin.  Patient states she feeling better overall.  Patient is still having loose stools.      Patient has no questions, concerns, or needs at this time.      [facility] 10/4/24 @ 09:30.  Patient is aware.       Pt Education:   Barriers:   Topics for Daily Review:   Pt demonstrates clear understanding:     Daily Weight:  There were no vitals filed for this visit.   Last 3 Weights:  Wt Readings from Last 7 Encounters:   09/28/24 59.1 kg (130 lb 3.2 oz)   09/24/24 61 kg (134 lb 7.7 oz)   09/24/24 60.8 kg (134 lb)   09/23/24 60.2 kg (132 lb 12.8 oz)   09/18/24 62.8 kg (138 lb 6.4 oz)   09/17/24 63 kg (139 lb)   09/14/24 63.3 kg (139 lb 9.6 oz)       Masimo Device:    Masimo Clinical Impression:     Virtual Visits--Scheduled (Most Recent Date at Top)  Follow up Appointments  Recent Visits  Date Type Provider Dept   09/17/24 Office Visit Yesi Lam MD Do [id number] [facility]   09/03/24 Office Visit Yesi Lam MD Do [id number] [facility]   Showing recent visits within past 30 days and meeting all other requirements  Future Appointments  Date Type Provider Dept   10/11/24 Appointment Yesi Lam MD Do [id number] [facility]   Showing future appointments within next 90 days and meeting all other requirements       Frequency of RN Calls & Virtual Visits per Team Agreement:     Medication issues Addressed (what was done):     Follow up appointments scheduled by [facility] Staff:   Referrals made by [facility] staff:

## 2024-10-03 ENCOUNTER — APPOINTMENT (OUTPATIENT)
Dept: PRIMARY CARE | Facility: CLINIC | Age: 79
End: 2024-10-03
Payer: MEDICARE

## 2024-10-03 ENCOUNTER — PATIENT OUTREACH (OUTPATIENT)
Dept: HOME HEALTH SERVICES | Age: 79
End: 2024-10-03

## 2024-10-03 VITALS — WEIGHT: 133.5 LBS | BODY MASS INDEX: 24.2 KG/M2

## 2024-10-03 NOTE — PROGRESS NOTES
Daily call completed. Pt states she is beginning to feel much better. Denies any new symptoms or concerns. Denies need for med refills at this time. Denies any further questions/concerns/needs at this time. Aware of upcoming appts. [facility] 10/4 @ 8057. **please schedule following [facility] for following Thursday.    133.5lb  Bgt am 95    Patient's Address:   [address] [place] [address]   [place] [address]  **  If this is not the address patient will receive services - alert team and address in EMR**       Patient Contacts:  Extended Emergency Contact Information  Primary Emergency Contact: Moose Najera  Home Phone: [phone]  Work Phone: [phone]  Relation: Spouse  Secondary Emergency Contact: ARTUR NAJERAMY  Mobile Phone: [phone]  Relation: Son  Preferred language: English   needed? No                                Patient's Preferred Phone: [phone]  Patient's E-mail: [email or web address]

## 2024-10-04 ENCOUNTER — HOSPITAL ENCOUNTER (INPATIENT)
Facility: HOSPITAL | Age: 79
End: 2024-10-04
Attending: EMERGENCY MEDICINE | Admitting: INTERNAL MEDICINE
Payer: MEDICARE

## 2024-10-04 ENCOUNTER — APPOINTMENT (OUTPATIENT)
Dept: CARE COORDINATION | Age: 79
End: 2024-10-04
Payer: MEDICARE

## 2024-10-04 ENCOUNTER — TELEPHONE (OUTPATIENT)
Dept: PRIMARY CARE | Facility: CLINIC | Age: 79
End: 2024-10-04

## 2024-10-04 ENCOUNTER — APPOINTMENT (OUTPATIENT)
Dept: RADIOLOGY | Facility: HOSPITAL | Age: 79
DRG: 690 | End: 2024-10-04
Payer: MEDICARE

## 2024-10-04 ENCOUNTER — PATIENT OUTREACH (OUTPATIENT)
Dept: CARE COORDINATION | Age: 79
End: 2024-10-04

## 2024-10-04 ENCOUNTER — APPOINTMENT (OUTPATIENT)
Dept: CARDIOLOGY | Facility: HOSPITAL | Age: 79
DRG: 690 | End: 2024-10-04
Payer: MEDICARE

## 2024-10-04 VITALS
SYSTOLIC BLOOD PRESSURE: 134 MMHG | HEART RATE: 88 BPM | WEIGHT: 134 LBS | BODY MASS INDEX: 24.29 KG/M2 | DIASTOLIC BLOOD PRESSURE: 75 MMHG | OXYGEN SATURATION: 96 %

## 2024-10-04 DIAGNOSIS — A04.72 C. DIFFICILE COLITIS: ICD-10-CM

## 2024-10-04 DIAGNOSIS — N12 PYELONEPHRITIS: Primary | ICD-10-CM

## 2024-10-04 DIAGNOSIS — I10 ESSENTIAL HYPERTENSION: ICD-10-CM

## 2024-10-04 DIAGNOSIS — D64.9 ANEMIA, UNSPECIFIED TYPE: Primary | ICD-10-CM

## 2024-10-04 LAB
ALBUMIN SERPL BCP-MCNC: 3.3 G/DL (ref 3.4–5)
ALP SERPL-CCNC: 106 U/L (ref 33–136)
ALT SERPL W P-5'-P-CCNC: 15 U/L (ref 7–45)
ANION GAP SERPL CALC-SCNC: 20 MMOL/L (ref 10–20)
APPEARANCE UR: ABNORMAL
AST SERPL W P-5'-P-CCNC: 21 U/L (ref 9–39)
BASOPHILS # BLD AUTO: 0.03 X10*3/UL (ref 0–0.1)
BASOPHILS NFR BLD AUTO: 0.2 %
BILIRUB SERPL-MCNC: 0.3 MG/DL (ref 0–1.2)
BILIRUB UR STRIP.AUTO-MCNC: NEGATIVE MG/DL
BUN SERPL-MCNC: 12 MG/DL (ref 6–23)
CALCIUM SERPL-MCNC: 9.1 MG/DL (ref 8.6–10.3)
CHLORIDE SERPL-SCNC: 101 MMOL/L (ref 98–107)
CO2 SERPL-SCNC: 27 MMOL/L (ref 21–32)
COLOR UR: ABNORMAL
CREAT SERPL-MCNC: 0.86 MG/DL (ref 0.5–1.05)
EGFRCR SERPLBLD CKD-EPI 2021: 69 ML/MIN/1.73M*2
EOSINOPHIL # BLD AUTO: 0.1 X10*3/UL (ref 0–0.4)
EOSINOPHIL NFR BLD AUTO: 0.7 %
ERYTHROCYTE [DISTWIDTH] IN BLOOD BY AUTOMATED COUNT: 15.7 % (ref 11.5–14.5)
FLUAV RNA RESP QL NAA+PROBE: NOT DETECTED
FLUBV RNA RESP QL NAA+PROBE: NOT DETECTED
GLUCOSE SERPL-MCNC: 151 MG/DL (ref 74–99)
GLUCOSE UR STRIP.AUTO-MCNC: NORMAL MG/DL
HCT VFR BLD AUTO: 30.7 % (ref 36–46)
HGB BLD-MCNC: 9.4 G/DL (ref 12–16)
IMM GRANULOCYTES # BLD AUTO: 0.04 X10*3/UL (ref 0–0.5)
IMM GRANULOCYTES NFR BLD AUTO: 0.3 % (ref 0–0.9)
KETONES UR STRIP.AUTO-MCNC: NEGATIVE MG/DL
LACTATE SERPL-SCNC: 2 MMOL/L (ref 0.4–2)
LEUKOCYTE ESTERASE UR QL STRIP.AUTO: ABNORMAL
LIPASE SERPL-CCNC: 30 U/L (ref 9–82)
LYMPHOCYTES # BLD AUTO: 1.86 X10*3/UL (ref 0.8–3)
LYMPHOCYTES NFR BLD AUTO: 13.2 %
MAGNESIUM SERPL-MCNC: 1.61 MG/DL (ref 1.6–2.4)
MCH RBC QN AUTO: 26.9 PG (ref 26–34)
MCHC RBC AUTO-ENTMCNC: 30.6 G/DL (ref 32–36)
MCV RBC AUTO: 88 FL (ref 80–100)
MONOCYTES # BLD AUTO: 0.7 X10*3/UL (ref 0.05–0.8)
MONOCYTES NFR BLD AUTO: 5 %
MUCOUS THREADS #/AREA URNS AUTO: ABNORMAL /LPF
NEUTROPHILS # BLD AUTO: 11.31 X10*3/UL (ref 1.6–5.5)
NEUTROPHILS NFR BLD AUTO: 80.6 %
NITRITE UR QL STRIP.AUTO: NEGATIVE
NRBC BLD-RTO: 0 /100 WBCS (ref 0–0)
PH UR STRIP.AUTO: 6.5 [PH]
PLATELET # BLD AUTO: 381 X10*3/UL (ref 150–450)
POTASSIUM SERPL-SCNC: 3.5 MMOL/L (ref 3.5–5.3)
PROT SERPL-MCNC: 7.5 G/DL (ref 6.4–8.2)
PROT UR STRIP.AUTO-MCNC: ABNORMAL MG/DL
RBC # BLD AUTO: 3.5 X10*6/UL (ref 4–5.2)
RBC # UR STRIP.AUTO: ABNORMAL /UL
RBC #/AREA URNS AUTO: >20 /HPF
SARS-COV-2 RNA RESP QL NAA+PROBE: NOT DETECTED
SODIUM SERPL-SCNC: 144 MMOL/L (ref 136–145)
SP GR UR STRIP.AUTO: 1.04
UROBILINOGEN UR STRIP.AUTO-MCNC: NORMAL MG/DL
WBC # BLD AUTO: 14 X10*3/UL (ref 4.4–11.3)
WBC #/AREA URNS AUTO: >50 /HPF

## 2024-10-04 PROCEDURE — 80053 COMPREHEN METABOLIC PANEL: CPT | Performed by: PHYSICIAN ASSISTANT

## 2024-10-04 PROCEDURE — 85025 COMPLETE CBC W/AUTO DIFF WBC: CPT | Performed by: PHYSICIAN ASSISTANT

## 2024-10-04 PROCEDURE — 2500000004 HC RX 250 GENERAL PHARMACY W/ HCPCS (ALT 636 FOR OP/ED)

## 2024-10-04 PROCEDURE — 2500000001 HC RX 250 WO HCPCS SELF ADMINISTERED DRUGS (ALT 637 FOR MEDICARE OP): Performed by: INTERNAL MEDICINE

## 2024-10-04 PROCEDURE — 96375 TX/PRO/DX INJ NEW DRUG ADDON: CPT

## 2024-10-04 PROCEDURE — 96361 HYDRATE IV INFUSION ADD-ON: CPT

## 2024-10-04 PROCEDURE — 96365 THER/PROPH/DIAG IV INF INIT: CPT

## 2024-10-04 PROCEDURE — 87086 URINE CULTURE/COLONY COUNT: CPT | Mod: GEALAB | Performed by: PHYSICIAN ASSISTANT

## 2024-10-04 PROCEDURE — 83735 ASSAY OF MAGNESIUM: CPT

## 2024-10-04 PROCEDURE — 93005 ELECTROCARDIOGRAM TRACING: CPT

## 2024-10-04 PROCEDURE — 99285 EMERGENCY DEPT VISIT HI MDM: CPT | Mod: 25

## 2024-10-04 PROCEDURE — 2060000001 HC INTERMEDIATE ICU ROOM DAILY

## 2024-10-04 PROCEDURE — 36415 COLL VENOUS BLD VENIPUNCTURE: CPT | Performed by: EMERGENCY MEDICINE

## 2024-10-04 PROCEDURE — 96366 THER/PROPH/DIAG IV INF ADDON: CPT

## 2024-10-04 PROCEDURE — 2500000004 HC RX 250 GENERAL PHARMACY W/ HCPCS (ALT 636 FOR OP/ED): Performed by: EMERGENCY MEDICINE

## 2024-10-04 PROCEDURE — 87040 BLOOD CULTURE FOR BACTERIA: CPT | Mod: GEALAB | Performed by: INTERNAL MEDICINE

## 2024-10-04 PROCEDURE — 83605 ASSAY OF LACTIC ACID: CPT | Performed by: EMERGENCY MEDICINE

## 2024-10-04 PROCEDURE — 2500000001 HC RX 250 WO HCPCS SELF ADMINISTERED DRUGS (ALT 637 FOR MEDICARE OP)

## 2024-10-04 PROCEDURE — 99223 1ST HOSP IP/OBS HIGH 75: CPT | Performed by: INTERNAL MEDICINE

## 2024-10-04 PROCEDURE — 74177 CT ABD & PELVIS W/CONTRAST: CPT | Mod: FOREIGN READ | Performed by: RADIOLOGY

## 2024-10-04 PROCEDURE — 74177 CT ABD & PELVIS W/CONTRAST: CPT

## 2024-10-04 PROCEDURE — 36415 COLL VENOUS BLD VENIPUNCTURE: CPT | Performed by: PHYSICIAN ASSISTANT

## 2024-10-04 PROCEDURE — 81001 URINALYSIS AUTO W/SCOPE: CPT | Performed by: PHYSICIAN ASSISTANT

## 2024-10-04 PROCEDURE — 96374 THER/PROPH/DIAG INJ IV PUSH: CPT

## 2024-10-04 PROCEDURE — 83690 ASSAY OF LIPASE: CPT

## 2024-10-04 PROCEDURE — 87636 SARSCOV2 & INF A&B AMP PRB: CPT

## 2024-10-04 PROCEDURE — 2550000001 HC RX 255 CONTRASTS: Performed by: EMERGENCY MEDICINE

## 2024-10-04 RX ORDER — ONDANSETRON 4 MG/1
4 TABLET, FILM COATED ORAL EVERY 8 HOURS PRN
Status: ACTIVE | OUTPATIENT
Start: 2024-10-04

## 2024-10-04 RX ORDER — ONDANSETRON HYDROCHLORIDE 2 MG/ML
4 INJECTION, SOLUTION INTRAVENOUS EVERY 8 HOURS PRN
Status: ACTIVE | OUTPATIENT
Start: 2024-10-04

## 2024-10-04 RX ORDER — LORAZEPAM 2 MG/ML
1 INJECTION INTRAMUSCULAR ONCE
Status: COMPLETED | OUTPATIENT
Start: 2024-10-04 | End: 2024-10-04

## 2024-10-04 RX ORDER — ACETAMINOPHEN 650 MG/1
650 SUPPOSITORY RECTAL EVERY 4 HOURS PRN
Status: ACTIVE | OUTPATIENT
Start: 2024-10-04

## 2024-10-04 RX ORDER — ENOXAPARIN SODIUM 100 MG/ML
40 INJECTION SUBCUTANEOUS EVERY 24 HOURS
Status: DISPENSED | OUTPATIENT
Start: 2024-10-05

## 2024-10-04 RX ORDER — FERROUS SULFATE 325(65) MG
65 TABLET ORAL 2 TIMES DAILY
Status: DISPENSED | OUTPATIENT
Start: 2024-10-04

## 2024-10-04 RX ORDER — MORPHINE SULFATE 4 MG/ML
4 INJECTION INTRAVENOUS ONCE
Status: COMPLETED | OUTPATIENT
Start: 2024-10-04 | End: 2024-10-04

## 2024-10-04 RX ORDER — FLUTICASONE PROPIONATE 50 MCG
1 SPRAY, SUSPENSION (ML) NASAL 2 TIMES DAILY PRN
Status: ACTIVE | OUTPATIENT
Start: 2024-10-04

## 2024-10-04 RX ORDER — GUAIFENESIN 600 MG/1
600 TABLET, EXTENDED RELEASE ORAL EVERY 12 HOURS PRN
Status: ACTIVE | OUTPATIENT
Start: 2024-10-04

## 2024-10-04 RX ORDER — ACETAMINOPHEN 160 MG/5ML
650 SOLUTION ORAL EVERY 4 HOURS PRN
Status: ACTIVE | OUTPATIENT
Start: 2024-10-04

## 2024-10-04 RX ORDER — ATORVASTATIN CALCIUM 80 MG/1
80 TABLET, FILM COATED ORAL DAILY
Status: DISPENSED | OUTPATIENT
Start: 2024-10-05

## 2024-10-04 RX ORDER — ONDANSETRON HYDROCHLORIDE 2 MG/ML
4 INJECTION, SOLUTION INTRAVENOUS ONCE
Status: COMPLETED | OUTPATIENT
Start: 2024-10-04 | End: 2024-10-04

## 2024-10-04 RX ORDER — GUAIFENESIN/DEXTROMETHORPHAN 100-10MG/5
5 SYRUP ORAL EVERY 4 HOURS PRN
Status: ACTIVE | OUTPATIENT
Start: 2024-10-04

## 2024-10-04 RX ORDER — PAROXETINE HYDROCHLORIDE 20 MG/1
10 TABLET, FILM COATED ORAL DAILY
Status: DISPENSED | OUTPATIENT
Start: 2024-10-05

## 2024-10-04 RX ORDER — ALUMINUM HYDROXIDE, MAGNESIUM HYDROXIDE, AND SIMETHICONE 1200; 120; 1200 MG/30ML; MG/30ML; MG/30ML
30 SUSPENSION ORAL EVERY 6 HOURS PRN
Status: ACTIVE | OUTPATIENT
Start: 2024-10-04

## 2024-10-04 RX ORDER — MIRTAZAPINE 15 MG/1
15 TABLET, FILM COATED ORAL ONCE
Status: COMPLETED | OUTPATIENT
Start: 2024-10-04 | End: 2024-10-04

## 2024-10-04 RX ORDER — CEFTRIAXONE 1 G/50ML
1 INJECTION, SOLUTION INTRAVENOUS ONCE
Status: COMPLETED | OUTPATIENT
Start: 2024-10-04 | End: 2024-10-04

## 2024-10-04 RX ORDER — CALCIUM CARBONATE 200(500)MG
500 TABLET,CHEWABLE ORAL 4 TIMES DAILY PRN
Status: ACTIVE | OUTPATIENT
Start: 2024-10-04

## 2024-10-04 RX ORDER — ACETAMINOPHEN 325 MG/1
650 TABLET ORAL EVERY 4 HOURS PRN
Status: DISPENSED | OUTPATIENT
Start: 2024-10-04

## 2024-10-04 RX ORDER — LORAZEPAM 0.5 MG/1
0.5 TABLET ORAL ONCE
Status: DISCONTINUED | OUTPATIENT
Start: 2024-10-04 | End: 2024-10-04

## 2024-10-04 RX ORDER — DICYCLOMINE HYDROCHLORIDE 10 MG/1
20 CAPSULE ORAL ONCE
Status: COMPLETED | OUTPATIENT
Start: 2024-10-04 | End: 2024-10-04

## 2024-10-04 RX ORDER — LORAZEPAM 0.5 MG/1
0.5 TABLET ORAL 2 TIMES DAILY PRN
Status: ACTIVE | OUTPATIENT
Start: 2024-10-04

## 2024-10-04 RX ORDER — CEFTRIAXONE 1 G/50ML
1 INJECTION, SOLUTION INTRAVENOUS EVERY 12 HOURS
Status: DISCONTINUED | OUTPATIENT
Start: 2024-10-05 | End: 2024-10-05

## 2024-10-04 RX ORDER — PANTOPRAZOLE SODIUM 40 MG/1
40 TABLET, DELAYED RELEASE ORAL
Status: DISPENSED | OUTPATIENT
Start: 2024-10-05

## 2024-10-04 RX ORDER — DOCUSATE SODIUM 100 MG/1
100 CAPSULE, LIQUID FILLED ORAL 2 TIMES DAILY
Status: DISPENSED | OUTPATIENT
Start: 2024-10-04

## 2024-10-04 RX ORDER — LISINOPRIL 20 MG/1
30 TABLET ORAL DAILY
Status: DISPENSED | OUTPATIENT
Start: 2024-10-05

## 2024-10-04 SDOH — ECONOMIC STABILITY: FOOD INSECURITY: WITHIN THE PAST 12 MONTHS, THE FOOD YOU BOUGHT JUST DIDN'T LAST AND YOU DIDN'T HAVE MONEY TO GET MORE.: NEVER TRUE

## 2024-10-04 SDOH — SOCIAL STABILITY: SOCIAL INSECURITY: ABUSE: ADULT

## 2024-10-04 SDOH — SOCIAL STABILITY: SOCIAL INSECURITY
WITHIN THE LAST YEAR, HAVE YOU BEEN KICKED, HIT, SLAPPED, OR OTHERWISE PHYSICALLY HURT BY YOUR PARTNER OR EX-PARTNER?: NO

## 2024-10-04 SDOH — SOCIAL STABILITY: SOCIAL INSECURITY: HAVE YOU HAD THOUGHTS OF HARMING ANYONE ELSE?: NO

## 2024-10-04 SDOH — SOCIAL STABILITY: SOCIAL INSECURITY: DO YOU FEEL ANYONE HAS EXPLOITED OR TAKEN ADVANTAGE OF YOU FINANCIALLY OR OF YOUR PERSONAL PROPERTY?: NO

## 2024-10-04 SDOH — ECONOMIC STABILITY: FOOD INSECURITY: WITHIN THE PAST 12 MONTHS, YOU WORRIED THAT YOUR FOOD WOULD RUN OUT BEFORE YOU GOT MONEY TO BUY MORE.: NEVER TRUE

## 2024-10-04 SDOH — HEALTH STABILITY: MENTAL HEALTH: HOW OFTEN DO YOU HAVE A DRINK CONTAINING ALCOHOL?: NEVER

## 2024-10-04 SDOH — ECONOMIC STABILITY: INCOME INSECURITY: IN THE LAST 12 MONTHS, WAS THERE A TIME WHEN YOU WERE NOT ABLE TO PAY THE MORTGAGE OR RENT ON TIME?: NO

## 2024-10-04 SDOH — SOCIAL STABILITY: SOCIAL INSECURITY: WITHIN THE LAST YEAR, HAVE YOU BEEN AFRAID OF YOUR PARTNER OR EX-PARTNER?: NO

## 2024-10-04 SDOH — SOCIAL STABILITY: SOCIAL INSECURITY
WITHIN THE LAST YEAR, HAVE TO BEEN RAPED OR FORCED TO HAVE ANY KIND OF SEXUAL ACTIVITY BY YOUR PARTNER OR EX-PARTNER?: NO

## 2024-10-04 SDOH — HEALTH STABILITY: PHYSICAL HEALTH: ON AVERAGE, HOW MANY MINUTES DO YOU ENGAGE IN EXERCISE AT THIS LEVEL?: 0 MIN

## 2024-10-04 SDOH — SOCIAL STABILITY: SOCIAL INSECURITY: WITHIN THE LAST YEAR, HAVE YOU BEEN HUMILIATED OR EMOTIONALLY ABUSED IN OTHER WAYS BY YOUR PARTNER OR EX-PARTNER?: NO

## 2024-10-04 SDOH — SOCIAL STABILITY: SOCIAL INSECURITY: HAS ANYONE EVER THREATENED TO HURT YOUR FAMILY OR YOUR PETS?: NO

## 2024-10-04 SDOH — ECONOMIC STABILITY: HOUSING INSECURITY: AT ANY TIME IN THE PAST 12 MONTHS, WERE YOU HOMELESS OR LIVING IN A SHELTER (INCLUDING NOW)?: NO

## 2024-10-04 SDOH — SOCIAL STABILITY: SOCIAL INSECURITY: ARE YOU OR HAVE YOU BEEN THREATENED OR ABUSED PHYSICALLY, EMOTIONALLY, OR SEXUALLY BY ANYONE?: NO

## 2024-10-04 SDOH — ECONOMIC STABILITY: INCOME INSECURITY: HOW HARD IS IT FOR YOU TO PAY FOR THE VERY BASICS LIKE FOOD, HOUSING, MEDICAL CARE, AND HEATING?: NOT HARD AT ALL

## 2024-10-04 SDOH — ECONOMIC STABILITY: INCOME INSECURITY: IN THE PAST 12 MONTHS, HAS THE ELECTRIC, GAS, OIL, OR WATER COMPANY THREATENED TO SHUT OFF SERVICE IN YOUR HOME?: NO

## 2024-10-04 SDOH — HEALTH STABILITY: PHYSICAL HEALTH: ON AVERAGE, HOW MANY DAYS PER WEEK DO YOU ENGAGE IN MODERATE TO STRENUOUS EXERCISE (LIKE A BRISK WALK)?: 0 DAYS

## 2024-10-04 SDOH — HEALTH STABILITY: MENTAL HEALTH: HOW OFTEN DO YOU HAVE 6 OR MORE DRINKS ON ONE OCCASION?: NEVER

## 2024-10-04 SDOH — HEALTH STABILITY: MENTAL HEALTH: HOW MANY STANDARD DRINKS CONTAINING ALCOHOL DO YOU HAVE ON A TYPICAL DAY?: PATIENT DOES NOT DRINK

## 2024-10-04 SDOH — SOCIAL STABILITY: SOCIAL INSECURITY: WERE YOU ABLE TO COMPLETE ALL THE BEHAVIORAL HEALTH SCREENINGS?: YES

## 2024-10-04 SDOH — SOCIAL STABILITY: SOCIAL INSECURITY: ARE THERE ANY APPARENT SIGNS OF INJURIES/BEHAVIORS THAT COULD BE RELATED TO ABUSE/NEGLECT?: NO

## 2024-10-04 SDOH — SOCIAL STABILITY: SOCIAL INSECURITY: HAVE YOU HAD ANY THOUGHTS OF HARMING ANYONE ELSE?: NO

## 2024-10-04 SDOH — SOCIAL STABILITY: SOCIAL INSECURITY: DOES ANYONE TRY TO KEEP YOU FROM HAVING/CONTACTING OTHER FRIENDS OR DOING THINGS OUTSIDE YOUR HOME?: NO

## 2024-10-04 SDOH — SOCIAL STABILITY: SOCIAL INSECURITY: DO YOU FEEL UNSAFE GOING BACK TO THE PLACE WHERE YOU ARE LIVING?: NO

## 2024-10-04 SDOH — ECONOMIC STABILITY: HOUSING INSECURITY: IN THE PAST 12 MONTHS, HOW MANY TIMES HAVE YOU MOVED WHERE YOU WERE LIVING?: 1

## 2024-10-04 ASSESSMENT — PAIN SCALES - GENERAL
PAINLEVEL_OUTOF10: 0 - NO PAIN
PAINLEVEL_OUTOF10: 5 - MODERATE PAIN
PAINLEVEL_OUTOF10: 2
PAINLEVEL_OUTOF10: 0 - NO PAIN

## 2024-10-04 ASSESSMENT — ACTIVITIES OF DAILY LIVING (ADL)
ADEQUATE_TO_COMPLETE_ADL: YES
LACK_OF_TRANSPORTATION: NO
HEARING - RIGHT EAR: FUNCTIONAL
GROOMING: INDEPENDENT
WALKS IN HOME: NEEDS ASSISTANCE
HEARING - LEFT EAR: FUNCTIONAL
JUDGMENT_ADEQUATE_SAFELY_COMPLETE_DAILY_ACTIVITIES: YES
BATHING: INDEPENDENT
DRESSING YOURSELF: INDEPENDENT
FEEDING YOURSELF: INDEPENDENT
TOILETING: INDEPENDENT
PATIENT'S MEMORY ADEQUATE TO SAFELY COMPLETE DAILY ACTIVITIES?: YES
ASSISTIVE_DEVICE: WALKER

## 2024-10-04 ASSESSMENT — PAIN DESCRIPTION - LOCATION: LOCATION: BACK

## 2024-10-04 ASSESSMENT — LIFESTYLE VARIABLES
TOTAL SCORE: 0
HAVE PEOPLE ANNOYED YOU BY CRITICIZING YOUR DRINKING: NO
SKIP TO QUESTIONS 9-10: 1
AUDIT-C TOTAL SCORE: 0
EVER FELT BAD OR GUILTY ABOUT YOUR DRINKING: NO
EVER HAD A DRINK FIRST THING IN THE MORNING TO STEADY YOUR NERVES TO GET RID OF A HANGOVER: NO
HAVE YOU EVER FELT YOU SHOULD CUT DOWN ON YOUR DRINKING: NO

## 2024-10-04 ASSESSMENT — COGNITIVE AND FUNCTIONAL STATUS - GENERAL
DRESSING REGULAR LOWER BODY CLOTHING: A LITTLE
EATING MEALS: A LITTLE
PATIENT BASELINE BEDBOUND: NO
EATING MEALS: A LITTLE
HELP NEEDED FOR BATHING: A LITTLE
WALKING IN HOSPITAL ROOM: A LITTLE
PERSONAL GROOMING: A LITTLE
PATIENT BASELINE BEDBOUND: NO
WALKING IN HOSPITAL ROOM: A LITTLE
TOILETING: A LITTLE
TOILETING: A LITTLE
STANDING UP FROM CHAIR USING ARMS: A LITTLE
DRESSING REGULAR UPPER BODY CLOTHING: A LITTLE
MOVING TO AND FROM BED TO CHAIR: A LITTLE
TURNING FROM BACK TO SIDE WHILE IN FLAT BAD: A LITTLE
CLIMB 3 TO 5 STEPS WITH RAILING: A LITTLE
PERSONAL GROOMING: A LITTLE
TURNING FROM BACK TO SIDE WHILE IN FLAT BAD: A LITTLE
MOVING TO AND FROM BED TO CHAIR: A LITTLE
DRESSING REGULAR UPPER BODY CLOTHING: A LITTLE
STANDING UP FROM CHAIR USING ARMS: A LITTLE
MOBILITY SCORE: 18
DRESSING REGULAR LOWER BODY CLOTHING: A LITTLE
CLIMB 3 TO 5 STEPS WITH RAILING: A LITTLE
DAILY ACTIVITIY SCORE: 18
MOBILITY SCORE: 18
MOVING FROM LYING ON BACK TO SITTING ON SIDE OF FLAT BED WITH BEDRAILS: A LITTLE
MOVING FROM LYING ON BACK TO SITTING ON SIDE OF FLAT BED WITH BEDRAILS: A LITTLE

## 2024-10-04 ASSESSMENT — PAIN DESCRIPTION - PAIN TYPE: TYPE: ACUTE PAIN

## 2024-10-04 ASSESSMENT — COLUMBIA-SUICIDE SEVERITY RATING SCALE - C-SSRS
2. HAVE YOU ACTUALLY HAD ANY THOUGHTS OF KILLING YOURSELF?: NO
1. IN THE PAST MONTH, HAVE YOU WISHED YOU WERE DEAD OR WISHED YOU COULD GO TO SLEEP AND NOT WAKE UP?: NO
6. HAVE YOU EVER DONE ANYTHING, STARTED TO DO ANYTHING, OR PREPARED TO DO ANYTHING TO END YOUR LIFE?: NO

## 2024-10-04 ASSESSMENT — PAIN DESCRIPTION - ORIENTATION: ORIENTATION: LEFT

## 2024-10-04 ASSESSMENT — PAIN DESCRIPTION - DESCRIPTORS: DESCRIPTORS: ACHING

## 2024-10-04 ASSESSMENT — PAIN - FUNCTIONAL ASSESSMENT: PAIN_FUNCTIONAL_ASSESSMENT: 0-10

## 2024-10-04 NOTE — ED PROVIDER NOTES
CC: Flank Pain (Onset of left flank pain after lunch today with pain radiating to front.  Was recently treated for  a Kidney infection.)     HPI:  Patient is a 79-year-old female with a past medical history of DM, HTN, HLD, GERD, recurrent renal calculi, and recent admission from 9/4/24 through 9/8/2024 with acute pyelonephritis C/B C. difficile colitis who presented to the ED for left flank pain.  She noted that her left flank pain started suddenly at lunch today.  She notes that she has been taking oral vancomycin which she just completed for her C. difficile colitis.  Notes that her left flank pain feels like her previous pyelonephritis pain.  Patient does note nonbloody soft stools which are more solid than her previous diarrhea from C. difficile.  Denied chest pain, difficulty breathing, headache, trauma, falls, abdominal pain, and neck pain.    Limitations to history: None  Independent historian(s): Patient  Records Reviewed: Recent available ED and inpatient notes reviewed in EMR.    PMHx/PSHx:  Per HPI.   - has a past medical history of Aneurysm, splenic artery (CMS-HCC), Ankylosing spondylitis (Multi), Anxiety, Arthritis, Body mass index (BMI) 27.0-27.9, adult (12/07/2021), BPPV (benign paroxysmal positional vertigo), Cervical radiculitis, Chronic kidney disease, Depression, Diabetes mellitus (Multi), Effusion, left knee (02/11/2019), Exocrine pancreatic insufficiency ([facility]), GERD (gastroesophageal reflux disease), Hearing aid worn, HL (hearing loss), HLD (hyperlipidemia), Hypertension, Injury of tendon of rotator cuff (06/19/2024), Irritable bowel syndrome with diarrhea (01/16/2020), Kidney stone, Kidney stone (08/21/2023), Nephrolithiasis, Other conditions influencing health status (06/08/2020), Other hemorrhoids (06/28/2017), Other nonspecific abnormal finding of lung field (03/26/2019), Pain in left knee (04/30/2019), Pain in unspecified knee (02/11/2019), Personal history of other diseases of the  circulatory system (08/12/2017), Personal history of other diseases of the musculoskeletal system and connective tissue (05/21/2015), Personal history of other specified conditions (11/03/2020), Personal history of other specified conditions (05/04/2021), Personal history of other specified conditions (03/07/2014), Personal history of other specified conditions (01/31/2013), Skin cancer, Unspecified injury of muscle(s) and tendon(s) of the rotator cuff of right shoulder, initial encounter (10/11/2016), Urinary tract infection, and Wears glasses.  - has a past surgical history that includes Hand surgery (Left, 03/07/2014); Total shoulder arthroplasty (Bilateral, 07/11/2022); MR angio head wo IV contrast (04/15/2023); MR angio neck wo IV contrast (04/15/2023); Carpal tunnel release (Bilateral); Colonoscopy (04/11/2018); Breast biopsy (Right, 2004); Cystoscopy w/ ureteral stent placement (Left, 06/30/2024); and Total knee arthroplasty (Left, 02/26/2024).    Medications:  Reviewed in EMR. See EMR for complete list of medications and doses.    Allergies:  Fenofibrate and Sulfa (sulfonamide antibiotics)    Social History:  - Tobacco:  reports that she has never smoked. She has never used smokeless tobacco.   - Alcohol:  reports no history of alcohol use.   - Illicit Drugs:  reports no history of drug use.     ROS:  Per HPI.       ???????????????????????????????????????????????????????????????  Triage Vitals:  T 37.4 °C (99.3 °F)  HR 96  /75  RR 16  O2 95 %      Physical Exam  Vitals and nursing note reviewed.   Constitutional:       General: She is not in acute distress.  HENT:      Head: Normocephalic and atraumatic.      Nose: Nose normal.      Mouth/Throat:      Mouth: Mucous membranes are moist.   Eyes:      Conjunctiva/sclera: Conjunctivae normal.   Cardiovascular:      Rate and Rhythm: Normal rate and regular rhythm.      Pulses: Normal pulses.   Pulmonary:      Effort: Pulmonary effort is normal. No  respiratory distress.      Breath sounds: Normal breath sounds.   Abdominal:      Comments: Left lower quadrant TTP.   Musculoskeletal:      Comments: Left flank TTP.   Skin:     General: Skin is warm.   Neurological:      General: No focal deficit present.      Mental Status: She is alert.         ???????????????????????????????????????????????????????????????  Labs:   Labs Reviewed   C. DIFFICILE, PCR   CBC WITH AUTO DIFFERENTIAL   COMPREHENSIVE METABOLIC PANEL   URINALYSIS WITH REFLEX CULTURE AND MICROSCOPIC    Narrative:     The following orders were created for panel order Urinalysis with Reflex Culture and Microscopic.  Procedure                               Abnormality         Status                     ---------                               -----------         ------                     Urinalysis with Reflex C...[[id number]]                                                 Extra Urine Gray Tube[[id number]]                                                         Please view results for these tests on the individual orders.   URINALYSIS WITH REFLEX CULTURE AND MICROSCOPIC   EXTRA URINE GRAY TUBE   LIPASE   MAGNESIUM   SARS-COV-2 PCR   INFLUENZA A AND B PCR        Imaging:   CT abdomen pelvis w IV contrast    (Results Pending)        MDM:  Patient is a 79-year-old female with a past medical history of DM, HTN, HLD, GERD, recurrent renal calculi, and recent admission from 9/4/24 through 9/8/2024 with acute pyelonephritis C/B C. difficile colitis who presented to the ED for left flank pain.  Patient presented HDS.  Physical exam findings significant for left lower quadrant TTP and left flank TTP.  Local concern for SBO, mesenteric ischemia, and acute aortic pathology.  Patient administered antiemetic, analgesia, and IV fluids.  CBC significant for leukocytosis and chronic anemia.  COVID and flu testing was negative.  Metabolic panel was unremarkable.  Lactate was normal at 2.0.  UA displayed 5 concern for UTI.  CTAP displayed findings concerning for increasing pyelonephritis and pyelitis involving left kidney with moderate hydronephrosis.  Patient requested home Ativan and was administered.  Discussed ED findings and admission with patient.  Patient stated understanding and agreement with the plan.  All questions were answered.  Discussed patient presentation with the admitting team.  Patient admitted to medicine in stable condition.    ED Course:  ED Course as of 10/06/24 2010   Fri Oct 04, 2024   1818 EKG: Rate is 88, sinus rhythm, left axis deviation, no interval prolongation, no new st elevation or depression.  Patient has chronic ST depression in V5.  When compared to EKG on 6/29/24 review of EKG does not show any signs of STEMI, complete heart block, asystole, V-fib. [MH]      ED Course User Index  [MH] Hermann Henry MD         Diagnoses as of 10/06/24 2010   Pyelonephritis       Social Determinants Limiting Care:  None identified    Disposition:  Admission to medicine    Hermann Henry MD   Emergency Medicine PGY-3  [facility]    Comment: Please note this report has been produced using speech recognition software and may contain errors related to that system including errors in grammar, punctuation, and spelling as well as words and phrases that may be inappropriate.  If there are any questions or concerns please feel free to contact the dictating provider for clarification.    Procedures ? Smart[employer] last updated 10/4/2024 6:17 PM        Hermann Henry MD  Resident  10/06/24 2020

## 2024-10-04 NOTE — TELEPHONE ENCOUNTER
Pt wants to know if EAK wants her to keep taking the vancomycin, if yes, she will need more called in , [facility] mentor

## 2024-10-04 NOTE — PROGRESS NOTES
[facility] Call Note:   Spoke to patient, she reports thing are moving along smoothly. She denies any c/p, palpitations, denies N/V, abd pain, fever or chills.   States BM's are loose but not diarrhea. Still on Vanco and has 1-2 doses left.   Provider advised to reach out to PCP today to see if more is needed. PCP managing meds.   Denies flank pain, or issues with urination. Reports vitals, weight and BG.   She states things are going well. Next [facility] scheduled and discussed possible graduation at that time.     Pt Education: POC  Barriers: na  Topics for Daily Review: POC  Pt demonstrates clear understanding: Yes    Daily Weight:  Vitals:    10/04/24 0900   Weight: 60.8 kg (134 lb)      Last 3 Weights:  Wt Readings from Last 7 Encounters:   10/04/24 60.8 kg (134 lb)   10/03/24 60.6 kg (133 lb 8 oz)   10/02/24 59.9 kg (132 lb)   09/28/24 59.1 kg (130 lb 3.2 oz)   09/24/24 61 kg (134 lb 7.7 oz)   09/24/24 60.8 kg (134 lb)   09/23/24 60.2 kg (132 lb 12.8 oz)       Masimo Device: No   Masimo Clinical Impression: na    Virtual Visits--Scheduled (Most Recent Date at Top)  Follow up Appointments  Recent Visits  Date Type Provider Dept   09/17/24 Office Visit Yesi Lam MD [id number]   Showing recent visits within past 30 days and meeting all other requirements  Future Appointments  Date Type Provider Dept   10/11/24 Appointment Yesi Lam MD [id number]   Showing future appointments within next 90 days and meeting all other requirements       Frequency of RN Calls & Virtual Visits per Team Agreement: [facility] Frequency: Daily    Medication issues Addressed (what was done): na    Follow up appointments scheduled by [facility] Staff: na  Referrals made by [facility] staff: sanam

## 2024-10-05 LAB
ANION GAP SERPL CALC-SCNC: 14 MMOL/L (ref 10–20)
BUN SERPL-MCNC: 11 MG/DL (ref 6–23)
CALCIUM SERPL-MCNC: 8.4 MG/DL (ref 8.6–10.3)
CHLORIDE SERPL-SCNC: 104 MMOL/L (ref 98–107)
CO2 SERPL-SCNC: 28 MMOL/L (ref 21–32)
CREAT SERPL-MCNC: 0.77 MG/DL (ref 0.5–1.05)
EGFRCR SERPLBLD CKD-EPI 2021: 79 ML/MIN/1.73M*2
ERYTHROCYTE [DISTWIDTH] IN BLOOD BY AUTOMATED COUNT: 15.9 % (ref 11.5–14.5)
GLUCOSE SERPL-MCNC: 107 MG/DL (ref 74–99)
HCT VFR BLD AUTO: 26.4 % (ref 36–46)
HGB BLD-MCNC: 7.9 G/DL (ref 12–16)
HOLD SPECIMEN: NORMAL
MAGNESIUM SERPL-MCNC: 1.53 MG/DL (ref 1.6–2.4)
MCH RBC QN AUTO: 26.4 PG (ref 26–34)
MCHC RBC AUTO-ENTMCNC: 29.9 G/DL (ref 32–36)
MCV RBC AUTO: 88 FL (ref 80–100)
NRBC BLD-RTO: 0 /100 WBCS (ref 0–0)
PLATELET # BLD AUTO: 296 X10*3/UL (ref 150–450)
POTASSIUM SERPL-SCNC: 3.2 MMOL/L (ref 3.5–5.3)
RBC # BLD AUTO: 2.99 X10*6/UL (ref 4–5.2)
SODIUM SERPL-SCNC: 143 MMOL/L (ref 136–145)
WBC # BLD AUTO: 9.4 X10*3/UL (ref 4.4–11.3)

## 2024-10-05 PROCEDURE — 83735 ASSAY OF MAGNESIUM: CPT | Performed by: STUDENT IN AN ORGANIZED HEALTH CARE EDUCATION/TRAINING PROGRAM

## 2024-10-05 PROCEDURE — 2500000002 HC RX 250 W HCPCS SELF ADMINISTERED DRUGS (ALT 637 FOR MEDICARE OP, ALT 636 FOR OP/ED): Performed by: INTERNAL MEDICINE

## 2024-10-05 PROCEDURE — 99232 SBSQ HOSP IP/OBS MODERATE 35: CPT

## 2024-10-05 PROCEDURE — 2500000002 HC RX 250 W HCPCS SELF ADMINISTERED DRUGS (ALT 637 FOR MEDICARE OP, ALT 636 FOR OP/ED): Performed by: STUDENT IN AN ORGANIZED HEALTH CARE EDUCATION/TRAINING PROGRAM

## 2024-10-05 PROCEDURE — 99222 1ST HOSP IP/OBS MODERATE 55: CPT | Performed by: STUDENT IN AN ORGANIZED HEALTH CARE EDUCATION/TRAINING PROGRAM

## 2024-10-05 PROCEDURE — 2500000001 HC RX 250 WO HCPCS SELF ADMINISTERED DRUGS (ALT 637 FOR MEDICARE OP): Performed by: INTERNAL MEDICINE

## 2024-10-05 PROCEDURE — 85027 COMPLETE CBC AUTOMATED: CPT | Performed by: INTERNAL MEDICINE

## 2024-10-05 PROCEDURE — 2500000001 HC RX 250 WO HCPCS SELF ADMINISTERED DRUGS (ALT 637 FOR MEDICARE OP): Performed by: NURSE PRACTITIONER

## 2024-10-05 PROCEDURE — 2500000004 HC RX 250 GENERAL PHARMACY W/ HCPCS (ALT 636 FOR OP/ED): Performed by: INTERNAL MEDICINE

## 2024-10-05 PROCEDURE — 2060000001 HC INTERMEDIATE ICU ROOM DAILY

## 2024-10-05 PROCEDURE — 94760 N-INVAS EAR/PLS OXIMETRY 1: CPT

## 2024-10-05 PROCEDURE — 36415 COLL VENOUS BLD VENIPUNCTURE: CPT | Performed by: INTERNAL MEDICINE

## 2024-10-05 PROCEDURE — 2500000004 HC RX 250 GENERAL PHARMACY W/ HCPCS (ALT 636 FOR OP/ED)

## 2024-10-05 PROCEDURE — 82374 ASSAY BLOOD CARBON DIOXIDE: CPT | Performed by: INTERNAL MEDICINE

## 2024-10-05 RX ORDER — POTASSIUM CHLORIDE 20 MEQ/1
40 TABLET, EXTENDED RELEASE ORAL ONCE
Status: DISCONTINUED | OUTPATIENT
Start: 2024-10-05 | End: 2024-10-05

## 2024-10-05 RX ORDER — POTASSIUM CHLORIDE 20 MEQ/1
40 TABLET, EXTENDED RELEASE ORAL ONCE
Status: COMPLETED | OUTPATIENT
Start: 2024-10-05 | End: 2024-10-05

## 2024-10-05 RX ORDER — LANOLIN ALCOHOL/MO/W.PET/CERES
400 CREAM (GRAM) TOPICAL ONCE
Status: COMPLETED | OUTPATIENT
Start: 2024-10-05 | End: 2024-10-05

## 2024-10-05 RX ORDER — MIRTAZAPINE 15 MG/1
15 TABLET, FILM COATED ORAL NIGHTLY
Status: DISPENSED | OUTPATIENT
Start: 2024-10-05

## 2024-10-05 ASSESSMENT — COGNITIVE AND FUNCTIONAL STATUS - GENERAL
HELP NEEDED FOR BATHING: A LITTLE
MOBILITY SCORE: 20
STANDING UP FROM CHAIR USING ARMS: A LITTLE
EATING MEALS: A LITTLE
MOBILITY SCORE: 18
TURNING FROM BACK TO SIDE WHILE IN FLAT BAD: A LITTLE
MOVING TO AND FROM BED TO CHAIR: A LITTLE
CLIMB 3 TO 5 STEPS WITH RAILING: A LITTLE
WALKING IN HOSPITAL ROOM: A LITTLE
DRESSING REGULAR LOWER BODY CLOTHING: A LITTLE
CLIMB 3 TO 5 STEPS WITH RAILING: A LITTLE
DRESSING REGULAR LOWER BODY CLOTHING: A LITTLE
TOILETING: A LITTLE
MOVING TO AND FROM BED TO CHAIR: A LITTLE
WALKING IN HOSPITAL ROOM: A LITTLE
MOVING FROM LYING ON BACK TO SITTING ON SIDE OF FLAT BED WITH BEDRAILS: A LITTLE
DAILY ACTIVITIY SCORE: 21
STANDING UP FROM CHAIR USING ARMS: A LITTLE
DAILY ACTIVITIY SCORE: 18
DRESSING REGULAR UPPER BODY CLOTHING: A LITTLE
PERSONAL GROOMING: A LITTLE
DRESSING REGULAR UPPER BODY CLOTHING: A LITTLE
TOILETING: A LITTLE

## 2024-10-05 ASSESSMENT — ENCOUNTER SYMPTOMS
RESPIRATORY NEGATIVE: 1
ALLERGIC/IMMUNOLOGIC NEGATIVE: 1
EYES NEGATIVE: 1
ENDOCRINE NEGATIVE: 1
CARDIOVASCULAR NEGATIVE: 1
MUSCULOSKELETAL NEGATIVE: 1
NEUROLOGICAL NEGATIVE: 1
PSYCHIATRIC NEGATIVE: 1
HEMATOLOGIC/LYMPHATIC NEGATIVE: 1
ROS GI COMMENTS: SEE HPI

## 2024-10-05 ASSESSMENT — PAIN - FUNCTIONAL ASSESSMENT
PAIN_FUNCTIONAL_ASSESSMENT: 0-10
PAIN_FUNCTIONAL_ASSESSMENT: 0-10

## 2024-10-05 ASSESSMENT — PAIN SCALES - GENERAL
PAINLEVEL_OUTOF10: 2
PAINLEVEL_OUTOF10: 6
PAINLEVEL_OUTOF10: 0 - NO PAIN

## 2024-10-05 ASSESSMENT — PAIN DESCRIPTION - LOCATION
LOCATION: HEAD
LOCATION: HEAD

## 2024-10-05 ASSESSMENT — ACTIVITIES OF DAILY LIVING (ADL): LACK_OF_TRANSPORTATION: NO

## 2024-10-05 ASSESSMENT — PAIN DESCRIPTION - ORIENTATION: ORIENTATION: LEFT;RIGHT

## 2024-10-05 NOTE — PROGRESS NOTES
10/05/24 1223   Discharge Planning   Living Arrangements Spouse/significant other  (Home with spouse)   Support Systems Spouse/significant other;Children   Assistance Needed Patient is A&OX3, independent with ADLs, ambulates with a walker occassionally, no O2, CPAP or Bipap at baseline, currently enrolled with [facility]   Type of Residence Private residence   Number of Stairs to Enter Residence 3   Number of Stairs Within Residence 28  (1 flight upstairs and to basement)   Do you have animals or pets at home? No   Who is requesting discharge planning? Provider   Home or Post Acute Services None   Expected Discharge Disposition Home  (Home, no needs-denied need for HHC or resumption of [facility])   Does the patient need discharge transport arranged? No  (spouse to transport at [facility])   Financial Resource Strain   How hard is it for you to pay for the very basics like food, housing, medical care, and heating? Not hard   Housing Stability   In the last 12 months, was there a time when you were not able to pay the mortgage or rent on time? N   In the past 12 months, how many times have you moved where you were living? 1   At any time in the past 12 months, were you homeless or living in a shelter (including now)? N   Transportation Needs   In the past 12 months, has lack of transportation kept you from medical appointments or from getting medications? no   In the past 12 months, has lack of transportation kept you from meetings, work, or from getting things needed for daily living? No

## 2024-10-05 NOTE — CONSULTS
Inpatient consult to Urology  Consult performed by: Elena Leal MD  Consult ordered by: Margarito Lowry MD  Reason for consult: left pyelonephritis and hydronephrosis  Assessment/Recommendations: IV antibiotics  Consider suppressive antibiotics after completion of treatment course  No need for a procedure to place a stent          Reason For Consult  left pyelonephritis and hydronephrosis     History Of Present Illness  Sherri Najera is a 79 y.o. female presenting with left pyelonephritis, known to me from prior history of large left renal stone which was fragmented on 8/8/24 and a stent was placed, later removed in clinic on 8/29/24. Initial stent was placed on 6/30/24.  This is her third infection in the last few months.  Two days ago she developed pain in left flank with urinary frequency. She mentions having nausea, lower abdominal discomfort and chills but denies fever, vomiting, diarrhea, dysuria or hematuria   She was recently on admission for C diff colitis and then pyelonephritis.    CT a/p with contrast shows increasing, now marked pyelonephritis and pyelitis involving the left kidney, collecting system and ureter, with associated moderate hydronephrosis of the left renal collecting system.    After 1 day of antibiotics, her WBC and creatinine normalized and she feels her pain has significantly improved.     Past Medical History  She has a past medical history of Aneurysm, splenic artery ([facility]), Ankylosing spondylitis, Anxiety, Arthritis, Body mass index (BMI) 27.0-27.9, adult (12/07/2021), BPPV (benign paroxysmal positional vertigo), Cervical radiculitis, Chronic kidney disease, Depression, Diabetes mellitus (Multi), Effusion, left knee (02/11/2019), Exocrine pancreatic insufficiency ([facility]), GERD (gastroesophageal reflux disease), Hearing aid worn, HL (hearing loss), HLD (hyperlipidemia), Hypertension, Injury of tendon of rotator cuff (06/19/2024), Irritable bowel syndrome with diarrhea  (01/16/2020), Kidney stone, Kidney stone (08/21/2023), Nephrolithiasis, Other conditions influencing health status (06/08/2020), Other hemorrhoids (06/28/2017), Other nonspecific abnormal finding of lung field (03/26/2019), Pain in left knee (04/30/2019), Pain in unspecified knee (02/11/2019), Personal history of other diseases of the circulatory system (08/12/2017), Personal history of other diseases of the musculoskeletal system and connective tissue (05/21/2015), Personal history of other specified conditions (11/03/2020), Personal history of other specified conditions (05/04/2021), Personal history of other specified conditions (03/07/2014), Personal history of other specified conditions (01/31/2013), Skin cancer, Unspecified injury of muscle(s) and tendon(s) of the rotator cuff of right shoulder, initial encounter (10/11/2016), Urinary tract infection, and Wears glasses.    Surgical History  She has a past surgical history that includes Hand surgery (Left, 03/07/2014); Total shoulder arthroplasty (Bilateral, 07/11/2022); MR angio head wo IV contrast (04/15/2023); MR angio neck wo IV contrast (04/15/2023); Carpal tunnel release (Bilateral); Colonoscopy (04/11/2018); Breast biopsy (Right, 2004); Cystoscopy w/ ureteral stent placement (Left, 06/30/2024); and Total knee arthroplasty (Left, 02/26/2024).     Social History  She reports that she has never smoked. She has never used smokeless tobacco. She reports that she does not drink alcohol and does not use drugs.    Family History  Family History   Problem Relation Name Age of Onset    Diabetes Mother Sherri     Asthma Mother Sherri     Other (CRF) Mother Sherri     Heart failure Father          Allergies  Fenofibrate and Sulfa (sulfonamide antibiotics)    Review of Systems  A complete review of systems was performed. All systems are noted to be negative unless indicated in the history of present illness, impression, active problem list, or past histories.    Physical  "Exam  Constitutional:       Appearance: Normal appearance. She is not ill-appearing or toxic-appearing.   HENT:      Head: Normocephalic and atraumatic.   Abdominal:      General: Abdomen is flat. Bowel sounds are normal.      Palpations: Abdomen is soft.      Tenderness: There is no right CVA tenderness or left CVA tenderness.   Musculoskeletal:      Cervical back: Normal range of motion.   Neurological:      Mental Status: She is alert.          Last Recorded Vitals  Blood pressure 158/73, pulse 104, temperature 36.3 °C (97.3 °F), temperature source Temporal, resp. rate 16, height 1.6 m (5' 3\"), weight 63.5 kg (139 lb 15.9 oz), SpO2 93%.    Relevant Results  Results for orders placed or performed during the hospital encounter of 10/04/24 (from the past 96 hour(s))   CBC and Auto Differential   Result Value Ref Range    WBC 14.0 (H) 4.4 - 11.3 x10*3/uL    nRBC 0.0 0.0 - 0.0 /100 WBCs    RBC 3.50 (L) 4.00 - 5.20 x10*6/uL    Hemoglobin 9.4 (L) 12.0 - 16.0 g/dL    Hematocrit 30.7 (L) 36.0 - 46.0 %    MCV 88 80 - 100 fL    MCH 26.9 26.0 - 34.0 pg    MCHC 30.6 (L) 32.0 - 36.0 g/dL    RDW 15.7 (H) 11.5 - 14.5 %    Platelets 381 150 - 450 x10*3/uL    Neutrophils % 80.6 40.0 - 80.0 %    Immature Granulocytes %, Automated 0.3 0.0 - 0.9 %    Lymphocytes % 13.2 13.0 - 44.0 %    Monocytes % 5.0 2.0 - 10.0 %    Eosinophils % 0.7 0.0 - 6.0 %    Basophils % 0.2 0.0 - 2.0 %    Neutrophils Absolute 11.31 (H) 1.60 - 5.50 x10*3/uL    Immature Granulocytes Absolute, Automated 0.04 0.00 - 0.50 x10*3/uL    Lymphocytes Absolute 1.86 0.80 - 3.00 x10*3/uL    Monocytes Absolute 0.70 0.05 - 0.80 x10*3/uL    Eosinophils Absolute 0.10 0.00 - 0.40 x10*3/uL    Basophils Absolute 0.03 0.00 - 0.10 x10*3/uL   Comprehensive metabolic panel   Result Value Ref Range    Glucose 151 (H) 74 - 99 mg/dL    Sodium 144 136 - 145 mmol/L    Potassium 3.5 3.5 - 5.3 mmol/L    Chloride 101 98 - 107 mmol/L    Bicarbonate 27 21 - 32 mmol/L    Anion Gap 20 10 - 20 " mmol/L    Urea Nitrogen 12 6 - 23 mg/dL    Creatinine 0.86 0.50 - 1.05 mg/dL    eGFR 69 >60 mL/min/1.73m*2    Calcium 9.1 8.6 - 10.3 mg/dL    Albumin 3.3 (L) 3.4 - 5.0 g/dL    Alkaline Phosphatase 106 33 - 136 U/L    Total Protein 7.5 6.4 - 8.2 g/dL    AST 21 9 - 39 U/L    Bilirubin, Total 0.3 0.0 - 1.2 mg/dL    ALT 15 7 - 45 U/L   Lipase   Result Value Ref Range    Lipase 30 9 - 82 U/L   Magnesium   Result Value Ref Range    Magnesium 1.61 1.60 - 2.40 mg/dL   Sars-CoV-2 PCR   Result Value Ref Range    Coronavirus 2019, PCR Not Detected Not Detected   Influenza A, and B PCR   Result Value Ref Range    Flu A Result Not Detected Not Detected    Flu B Result Not Detected Not Detected   Urinalysis with Reflex Culture and Microscopic   Result Value Ref Range    Color, Urine Light-Orange (N) Light-Yellow, Yellow, Dark-Yellow    Appearance, Urine Ex.Turbid (N) Clear    Specific Gravity, Urine 1.041 (N) 1.005 - 1.035    pH, Urine 6.5 5.0, 5.5, 6.0, 6.5, 7.0, 7.5, 8.0    Protein, Urine 300 (3+) (A) NEGATIVE, 10 (TRACE), 20 (TRACE) mg/dL    Glucose, Urine Normal Normal mg/dL    Blood, Urine 0.5 (2+) (A) NEGATIVE    Ketones, Urine NEGATIVE NEGATIVE mg/dL    Bilirubin, Urine NEGATIVE NEGATIVE    Urobilinogen, Urine Normal Normal mg/dL    Nitrite, Urine NEGATIVE NEGATIVE    Leukocyte Esterase, Urine 500 Debra/µL (A) NEGATIVE   Extra Urine Gray Tube   Result Value Ref Range    Extra Tube Hold for add-ons.    Microscopic Only, Urine   Result Value Ref Range    WBC, Urine >50 (A) 1-5, NONE /HPF    RBC, Urine >20 (A) NONE, 1-2, 3-5 /HPF    Mucus, Urine FEW Reference range not established. /LPF   Urine Culture    Specimen: Clean Catch/Voided; Urine   Result Value Ref Range    Urine Culture No significant growth    Blood Culture    Specimen: Peripheral Venipuncture; Blood culture   Result Value Ref Range    Blood Culture No growth at 1 day    Blood Culture    Specimen: Peripheral Venipuncture; Blood culture   Result Value Ref Range     Blood Culture No growth at 1 day    Lactate   Result Value Ref Range    Lactate 2.0 0.4 - 2.0 mmol/L   Basic metabolic panel   Result Value Ref Range    Glucose 107 (H) 74 - 99 mg/dL    Sodium 143 136 - 145 mmol/L    Potassium 3.2 (L) 3.5 - 5.3 mmol/L    Chloride 104 98 - 107 mmol/L    Bicarbonate 28 21 - 32 mmol/L    Anion Gap 14 10 - 20 mmol/L    Urea Nitrogen 11 6 - 23 mg/dL    Creatinine 0.77 0.50 - 1.05 mg/dL    eGFR 79 >60 mL/min/1.73m*2    Calcium 8.4 (L) 8.6 - 10.3 mg/dL   CBC   Result Value Ref Range    WBC 9.4 4.4 - 11.3 x10*3/uL    nRBC 0.0 0.0 - 0.0 /100 WBCs    RBC 2.99 (L) 4.00 - 5.20 x10*6/uL    Hemoglobin 7.9 (L) 12.0 - 16.0 g/dL    Hematocrit 26.4 (L) 36.0 - 46.0 %    MCV 88 80 - 100 fL    MCH 26.4 26.0 - 34.0 pg    MCHC 29.9 (L) 32.0 - 36.0 g/dL    RDW 15.9 (H) 11.5 - 14.5 %    Platelets 296 150 - 450 x10*3/uL   Magnesium   Result Value Ref Range    Magnesium 1.53 (L) 1.60 - 2.40 mg/dL   Comprehensive Metabolic Panel   Result Value Ref Range    Glucose 94 74 - 99 mg/dL    Sodium 142 136 - 145 mmol/L    Potassium 3.9 3.5 - 5.3 mmol/L    Chloride 104 98 - 107 mmol/L    Bicarbonate 30 21 - 32 mmol/L    Anion Gap 12 10 - 20 mmol/L    Urea Nitrogen 12 6 - 23 mg/dL    Creatinine 0.94 0.50 - 1.05 mg/dL    eGFR 62 >60 mL/min/1.73m*2    Calcium 9.1 8.6 - 10.3 mg/dL    Albumin 2.8 (L) 3.4 - 5.0 g/dL    Alkaline Phosphatase 91 33 - 136 U/L    Total Protein 6.4 6.4 - 8.2 g/dL    AST 13 9 - 39 U/L    Bilirubin, Total 0.3 0.0 - 1.2 mg/dL    ALT 11 7 - 45 U/L   CBC and Auto Differential   Result Value Ref Range    WBC 8.1 4.4 - 11.3 x10*3/uL    nRBC 0.0 0.0 - 0.0 /100 WBCs    RBC 2.91 (L) 4.00 - 5.20 x10*6/uL    Hemoglobin 7.8 (L) 12.0 - 16.0 g/dL    Hematocrit 26.9 (L) 36.0 - 46.0 %    MCV 92 80 - 100 fL    MCH 26.8 26.0 - 34.0 pg    MCHC 29.0 (L) 32.0 - 36.0 g/dL    RDW 15.7 (H) 11.5 - 14.5 %    Platelets 269 150 - 450 x10*3/uL    Neutrophils % 66.1 40.0 - 80.0 %    Immature Granulocytes %, Automated 0.2 0.0 -  0.9 %    Lymphocytes % 22.8 13.0 - 44.0 %    Monocytes % 8.3 2.0 - 10.0 %    Eosinophils % 2.2 0.0 - 6.0 %    Basophils % 0.4 0.0 - 2.0 %    Neutrophils Absolute 5.32 1.60 - 5.50 x10*3/uL    Immature Granulocytes Absolute, Automated 0.02 0.00 - 0.50 x10*3/uL    Lymphocytes Absolute 1.84 0.80 - 3.00 x10*3/uL    Monocytes Absolute 0.67 0.05 - 0.80 x10*3/uL    Eosinophils Absolute 0.18 0.00 - 0.40 x10*3/uL    Basophils Absolute 0.03 0.00 - 0.10 x10*3/uL     CT abdomen pelvis w IV contrast    Result Date: 10/4/2024  STUDY: CT Abdomen and Pelvis with IV Contrast; 10/04/2024, 7:05 PM. INDICATION: Left lower quadrant tenderness. History of pyelonephritis. COMPARISON: XR abdomen: 09/06/24; CT AP: 09/04/24, 07/02/24. ACCESSION NUMBER(S): [id number] ORDERING CLINICIAN: GARRISON MOSLEY TECHNIQUE: CT of the abdomen and pelvis was performed.  Contiguous axial images were obtained at 3 mm slice thickness through the abdomen and pelvis. Coronal and sagittal reconstructions at 3 mm slice thickness were performed.  Omnipaque 350 75 mL was administered intravenously. FINDINGS: Abdomen: There is increasing, now marked pyelonephritis and pyelitis involving the left kidney, collecting system and ureter. Left kidney is minimally enlarged with patchy geographic areas of decreased enhancement, with increasing minimal perinephric fluid. There is now moderate hydronephrosis of the left renal collecting system. The uroepithelium is increasingly thickened and enhancing, extending down the left ureter to the level of the bladder base. Tiny nonobstructing calculus is again seen in the lower pole left kidney. There are some underlying cysts in the left mid kidney. There are minimally enlarged lymph nodes in the adjacent retroperitoneum, including several left periaortic lymph nodes largest at 1.3 cm. These have slightly increased. Renal veins and IVC remain patent. The right kidney is essentially normal except for a few tiny cysts. There is no  right-sided pyelonephritis or hydronephrosis. Liver, spleen, pancreas, adrenal glands, and biliary system are unremarkable. There is no bowel thickening or obstruction. There is moderate stool burden. There is no free air. Pelvis: The bladder is decompressed. Uterus is surgically absent. Deep pelvic veins are patent. There are no hernias. Skeletal: Minimal to moderate degenerative arthritis and disc disease is most pronounced in the mid and lower lumbar spine. Lower chest: There is minimal dependent lung atelectasis and/or scarring. There are no pleural effusions. Heart is normal size with minimal pericardial effusion. There is a small hiatal hernia.    1. Increasing, now marked pyelonephritis and pyelitis involving the left kidney, collecting system and ureter. 2. Associated moderate hydronephrosis of the left renal collecting system. 3. Minimal perinephric fluid; no discrete collection or abscess. 4. Several minimally enlarged retroperitoneal lymph nodes, slightly increasing since last exam. 5. Remainder as above. Signed by Erik Flores MD        Assessment/Plan   80 yo female with left pyelonephritis, recent history of kidney stone treated with ureteroscopy, significantly improved on antibiotics.    I personally reviewed the medical records of the patient including the note of the referring physician including the CT images as well as report.    I discussed with the patient that at this point there is no clear evidence of obstruction and as such no need to place a ureteral stent that would result in increased discomfort. However considering the repeated infections, we will consider a MAG3 scan with lasix to evaluate for any obstruction.    Plan:  Continue IV antibiotics  Consider suppressive low-dose antibiotics  MAG3 scan with lasix to evaluate for any obstruction

## 2024-10-05 NOTE — PROGRESS NOTES
"  Subjective    Patient seen and evaluated at bedside.  She is resting comfortably in bed, no acute distress.  Patient states she continues to have left-sided flank pain.  She denies any fevers, chills, nausea or vomiting.  No other acute complaints at this time.    ROS: 12 points review of system is negative except as stated in the HPI above.     Objective    Vitals  Visit Vitals  /78 (BP Location: Right arm, Patient Position: Lying)   Pulse 95   Temp 36.3 °C (97.3 °F) (Temporal)   Resp 23   Ht 1.6 m (5' 3\")   Wt 63.5 kg (139 lb 15.9 oz)   SpO2 93%   BMI 24.80 kg/m²   OB Status Postmenopausal   Smoking Status Never   BSA 1.68 m²       Physical Exam    --Vital signs reviewed in nursing triage note, EMR flow sheets, and at patient's bedside  Constitutional: Appears stated age. In NAD.   HEENT: NC/AT, EOMI, clear sclera, moist mucous membranes  CV: RRR, No M/R/G  PULM: CTAB, no coughing or wheezing  ABDOMEN: Soft, nondistended, mild tenderness to palpation left abdomen, no CVA tenderness.  SKIN: Normal Color, Warm, Dry, No Rashes   EXTREMITIES: Non-Tender, Full ROM, No Pedal Edema  NEURO: A&O x 4, nonfocal neurological exam.  PSYCH: Normal Mood & Behavior      IOs    Intake/Output Summary (Last 24 hours) at 10/5/2024 1321  Last data filed at 10/5/2024 1309  Gross per 24 hour   Intake 530 ml   Output 125 ml   Net 405 ml       Labs:   Results from last 72 hours   Lab Units 10/05/24  0531 10/04/24  1808   SODIUM mmol/L 143 144   POTASSIUM mmol/L 3.2* 3.5   CHLORIDE mmol/L 104 101   CO2 mmol/L 28 27   BUN mg/dL 11 12   CREATININE mg/dL 0.77 0.86   GLUCOSE mg/dL 107* 151*   CALCIUM mg/dL 8.4* 9.1   ANION GAP mmol/L 14 20   EGFR mL/min/1.73m*2 79 69      Results from last 72 hours   Lab Units 10/05/24  0531 10/04/24  1808   WBC AUTO x10*3/uL 9.4 14.0*   HEMOGLOBIN g/dL 7.9* 9.4*   HEMATOCRIT % 26.4* 30.7*   PLATELETS AUTO x10*3/uL 296 381   NEUTROS PCT AUTO %  --  80.6   LYMPHS PCT AUTO %  --  13.2   MONOS PCT AUTO %  " "--  5.0   EOS PCT AUTO %  --  0.7      Lab Results   Component Value Date    CALCIUM 8.4 (L) 10/05/2024    PHOS 4.0 07/03/2024      Lab Results   Component Value Date    CRP >70.00 (H) 09/03/2024      [unfilled]     Micro/ID:   Susceptibility data from last 90 days.  Collected Specimen Info Organism Amoxicillin/Clavulanate Ampicillin Ampicillin/Sulbactam Aztreonam Cefazolin Cefepime Ceftriaxone Cefuroxime Ciprofloxacin Ertapenem Gentamicin Levofloxacin Meropenem Nitrofurantoin   09/04/24 Urine from Clean Catch/Voided Klebsiella oxytoca/Raoultella species  S  R  I   R   S   S   S    S   09/04/24 Blood culture from Peripheral Venipuncture Klebsiella oxytoca/Raoultella species                 09/04/24 Blood culture from Peripheral Venipuncture Klebsiella oxytoca/Raoultella species  S  R  I   R   S  S  S   S  S     07/15/24 Urine from Clean Catch/Voided Citrobacter freundii complex  R  R  R  I  R  S    S  S  S   S  S     Collected Specimen Info Organism Piperacillin/Tazobactam Trimethoprim/Sulfamethoxazole   09/04/24 Urine from Clean Catch/Voided Klebsiella oxytoca/Raoultella species  S  S   09/04/24 Blood culture from Peripheral Venipuncture Klebsiella oxytoca/Raoultella species     09/04/24 Blood culture from Peripheral Venipuncture Klebsiella oxytoca/Raoultella species  S  S   07/15/24 Urine from Clean Catch/Voided Citrobacter freundii complex  S  S                    No lab exists for component: \"AGALPCRNB\"   .ID  Lab Results   Component Value Date    URINECULTURE No significant growth 09/18/2024    BLOODCULT Loaded on Instrument - Culture in progress 10/04/2024    BLOODCULT Loaded on Instrument - Culture in progress 10/04/2024       Images  CT abdomen pelvis w IV contrast  Narrative: STUDY:  CT Abdomen and Pelvis with IV Contrast; 10/04/2024, 7:05 PM.  INDICATION:  Left lower quadrant tenderness. History of pyelonephritis.  COMPARISON:  XR abdomen: 09/06/24; CT AP: 09/04/24, 07/02/24.  ACCESSION " NUMBER(S):  [id number]  ORDERING CLINICIAN:  GARRISON MOSLEY  TECHNIQUE:  CT of the abdomen and pelvis was performed.  Contiguous axial images  were obtained at 3 mm slice thickness through the abdomen and pelvis.   Coronal and sagittal reconstructions at 3 mm slice thickness were  performed.  Omnipaque 350 75 mL was administered intravenously.  FINDINGS:  Abdomen:  There is increasing, now marked pyelonephritis and pyelitis involving  the left kidney, collecting system and ureter.  Left kidney is minimally enlarged with patchy geographic areas of  decreased enhancement, with increasing minimal perinephric fluid.  There is now moderate hydronephrosis of the left renal collecting  system. The uroepithelium is increasingly thickened and enhancing,  extending down the left ureter to the level of the bladder base. Tiny  nonobstructing calculus is again seen in the lower pole left kidney.  There are some underlying cysts in the left mid kidney.  There are minimally enlarged lymph nodes in the adjacent  retroperitoneum, including several left periaortic lymph nodes largest  at 1.3 cm. These have slightly increased. Renal veins and IVC remain  patent.  The right kidney is essentially normal except for a few tiny cysts.  There is no right-sided pyelonephritis or hydronephrosis. Liver,  spleen, pancreas, adrenal glands, and biliary system are unremarkable.  There is no bowel thickening or obstruction. There is moderate stool  burden. There is no free air.  Pelvis:  The bladder is decompressed. Uterus is surgically absent. Deep pelvic  veins are patent. There are no hernias.  Skeletal:   Minimal to moderate degenerative arthritis and disc disease is most  pronounced in the mid and lower lumbar spine.  Lower chest:  There is minimal dependent lung atelectasis and/or scarring. There are  no pleural effusions. Heart is normal size with minimal pericardial  effusion. There is a small hiatal hernia.  Impression: 1. Increasing, now  marked pyelonephritis and pyelitis involving the  left kidney, collecting system and ureter.  2. Associated moderate hydronephrosis of the left renal collecting  system.  3. Minimal perinephric fluid; no discrete collection or abscess.  4. Several minimally enlarged retroperitoneal lymph nodes, slightly  increasing since last exam.  5. Remainder as above.  Signed by Erik Flores MD      Meds  Scheduled medications  atorvastatin, 80 mg, oral, Daily  docusate sodium, 100 mg, oral, BID  enoxaparin, 40 mg, subcutaneous, q24h  ferrous sulfate (325 mg ferrous sulfate), 65 mg of iron, oral, BID  lactobacillus acidophilus, 1 capsule, oral, BID  lisinopril, 30 mg, oral, Daily  pantoprazole, 40 mg, oral, Daily before breakfast  PARoxetine, 10 mg, oral, Daily  piperacillin-tazobactam, 3.375 g, intravenous, q6h      Continuous medications     PRN medications  PRN medications: acetaminophen **OR** acetaminophen **OR** acetaminophen, alum-mag hydroxide-simeth, calcium carbonate, dextromethorphan-guaifenesin, fluticasone, guaiFENesin, LORazepam, ondansetron **OR** ondansetron     Assessment and Plan    Sherri is a 79-year-old female with past medical history of anemia, HLD, HTN, anxiety, GERD who was admitted for left pyelonephritis.    # Left pyelonephritis  -Vital signs stable, leukocytosis downtrending, UA positive for leukocyte esterase.  -CT abdomen pelvis showed marked L pyelonephritis and pyelitis with moderate hydronephrosis  -Urine culture pending, blood culture pending  -Urology consulted, do not suspect she needs surgical intervention at this time.  -ID consulted, recommend Zosyn    Chronic conditions:  # HTN: Continue lisinopril  #HLD: Continue atorvastatin  #Anemia: Continue iron supplement  #Anxiety: Continue paroxetine  #GERD: Continue pantoprazole    DVT prophylaxis: Lovenox    Disposition: Continue inpatient monitoring, urine culture and blood cultures pending.  Urology consulted.  Continue IV antibiotics

## 2024-10-05 NOTE — CONSULTS
Consults  Referred by LARISSA Thomas MD: Yesi Lam MD    Reason For Consult  pyelonephritis    History Of Present Illness  Sherri Najera is a 79 y.o. female, hx of DM, hx of HTN, hx of GERD, hx of nephrolithiasis, hx of a recent lithotripsy / stent, the stent was removed 8/29, she was readmitted admitted pyelonephritis in early Sept, urine and BC with Klebsiella, treated with antibiotics, she was treated with oral Vancomycin for ? C.diff, now she is admitted for Lt flank pain, chills and nausea, the CT with Lt hydro / edema, stranding, the WBC are up, the UA with pyuria,     Past Medical History  She has a past medical history of Aneurysm, splenic artery (CMS-[facility]), Ankylosing spondylitis, Anxiety, Arthritis, Body mass index (BMI) 27.0-27.9, adult (12/07/2021), BPPV (benign paroxysmal positional vertigo), Cervical radiculitis, Chronic kidney disease, Depression, Diabetes mellitus (Multi), Effusion, left knee (02/11/2019), Exocrine pancreatic insufficiency ([facility]), GERD (gastroesophageal reflux disease), Hearing aid worn, HL (hearing loss), HLD (hyperlipidemia), Hypertension, Injury of tendon of rotator cuff (06/19/2024), Irritable bowel syndrome with diarrhea (01/16/2020), Kidney stone, Kidney stone (08/21/2023), Nephrolithiasis, Other conditions influencing health status (06/08/2020), Other hemorrhoids (06/28/2017), Other nonspecific abnormal finding of lung field (03/26/2019), Pain in left knee (04/30/2019), Pain in unspecified knee (02/11/2019), Personal history of other diseases of the circulatory system (08/12/2017), Personal history of other diseases of the musculoskeletal system and connective tissue (05/21/2015), Personal history of other specified conditions (11/03/2020), Personal history of other specified conditions (05/04/2021), Personal history of other specified conditions (03/07/2014), Personal history of other specified conditions (01/31/2013), Skin cancer, Unspecified injury of  muscle(s) and tendon(s) of the rotator cuff of right shoulder, initial encounter (10/11/2016), Urinary tract infection, and Wears glasses.    Surgical History  She has a past surgical history that includes Hand surgery (Left, 03/07/2014); Total shoulder arthroplasty (Bilateral, 07/11/2022); MR angio head wo IV contrast (04/15/2023); MR angio neck wo IV contrast (04/15/2023); Carpal tunnel release (Bilateral); Colonoscopy (04/11/2018); Breast biopsy (Right, 2004); Cystoscopy w/ ureteral stent placement (Left, 06/30/2024); and Total knee arthroplasty (Left, 02/26/2024).     Social History     Occupational History    Not on file   Tobacco Use    Smoking status: Never    Smokeless tobacco: Never   Vaping Use    Vaping status: Never Used   Substance and Sexual Activity    Alcohol use: Never    Drug use: Never    Sexual activity: Not Currently     Travel History   Travel since 09/05/24    No documented travel since 09/05/24          Family History  Family History   Problem Relation Name Age of Onset    Diabetes Mother Sherri     Asthma Mother Sherri     Other (CRF) Mother Sherri     Heart failure Father       Allergies  Fenofibrate and Sulfa (sulfonamide antibiotics)     Immunization History   Administered Date(s) Administered    Flu vaccine, quadrivalent, high-dose, preservative free, age 65y+ (FLUZONE) 10/15/2020, 11/22/2021, 09/26/2022    Flu vaccine, trivalent, preservative free, HIGH-DOSE, age 65y+ (Fluzone) 01/20/2014, 10/24/2017, 10/26/2018, 10/15/2020    Influenza, Unspecified 11/16/2009    Influenza, injectable, MDCK, quadrivalent 01/07/2020    Influenza, seasonal, injectable 11/16/2009    Pfizer Purple Cap SARS-CoV-2 02/19/2021, 03/19/2021, 10/26/2021    Pneumococcal conjugate vaccine, 13-valent (PREVNAR 13) 03/04/2020    Pneumococcal polysaccharide vaccine, 23-valent, age 2 years and older (PNEUMOVAX 23) 05/04/2021   Pneumonia vaccine is up to date, influenza vaccine is planned  Zhou fall risk 50, preventive  protocol was implemented  Depression screen is negative    Medications  Home medications:  Medications Prior to Admission   Medication Sig Dispense Refill Last Dose    atorvastatin (Lipitor) 80 mg tablet Take 1 tablet (80 mg) by mouth once daily. 90 tablet 1     ferrous sulfate, 325 mg ferrous sulfate, tablet Take 1 tablet (325 mg) by mouth 2 times a day. 60 tablet 0     fluticasone (Flonase Allergy Relief) 50 mcg/actuation nasal spray Administer 1 spray into each nostril 2 times a day as needed.       Lactobacillus acidophilus 1 billion cell tablet Take 1 tablet by mouth 2 times a day. 60 tablet 0     lisinopril 30 mg tablet Take 1 tablet (30 mg) by mouth once daily. 90 tablet 1     LORazepam (Ativan) 0.5 mg tablet Take 1 tablet (0.5 mg) by mouth 2 times a day as needed for anxiety. 30 tablet 0     mirtazapine (Remeron) 15 mg tablet Take 1 tablet (15 mg) by mouth once daily at bedtime. 30 tablet 1     MULTIVITAMIN ORAL Take 1 tablet by mouth once daily.       omeprazole (PriLOSEC) 40 mg DR capsule TAKE 1 CAPSULE BY MOUTH ONCE DAILY IN THE MORNING BEFORE A MEAL. DO NOT CRUSH OR CHEW. 30 capsule 0     PARoxetine (Paxil) 10 mg tablet Take 1 tablet (10 mg) by mouth once daily. 90 tablet 1     zinc gluconate 50 mg tablet Take 1 tablet (50 mg) by mouth once daily.        Current medications:  Scheduled medications  atorvastatin, 80 mg, oral, Daily  cefTRIAXone, 1 g, intravenous, q12h  docusate sodium, 100 mg, oral, BID  enoxaparin, 40 mg, subcutaneous, q24h  ferrous sulfate (325 mg ferrous sulfate), 65 mg of iron, oral, BID  lactobacillus acidophilus, 1 capsule, oral, BID  lisinopril, 30 mg, oral, Daily  pantoprazole, 40 mg, oral, Daily before breakfast  PARoxetine, 10 mg, oral, Daily      Continuous medications       PRN medications  PRN medications: acetaminophen **OR** acetaminophen **OR** acetaminophen, alum-mag hydroxide-simeth, calcium carbonate, dextromethorphan-guaifenesin, fluticasone, guaiFENesin, LORazepam,  "ondansetron **OR** ondansetron    Review of Systems   All other systems reviewed and are negative.       Objective  Range of Vitals (last 24 hours)  Heart Rate:  [88-96]   Temp:  [36.3 °C (97.3 °F)-37.4 °C (99.3 °F)]   Resp:  [16-23]   BP: (146-213)/(75-85)   Height:  [160 cm (5' 3\")]   Weight:  [60.8 kg (134 lb)-63.5 kg (139 lb 15.9 oz)]   SpO2:  [93 %-99 %]   Daily Weight  10/04/24 : 63.5 kg (139 lb 15.9 oz)    Body mass index is 24.8 kg/m².   Nutritional consult    Physical Exam  Constitutional:       Appearance: Normal appearance.   HENT:      Head: Normocephalic and atraumatic.      Mouth/Throat:      Mouth: Mucous membranes are moist.      Pharynx: Oropharynx is clear.   Eyes:      Pupils: Pupils are equal, round, and reactive to light.   Cardiovascular:      Rate and Rhythm: Normal rate and regular rhythm.      Heart sounds: Normal heart sounds.   Pulmonary:      Effort: Pulmonary effort is normal.      Breath sounds: Normal breath sounds.   Abdominal:      General: Abdomen is flat. Bowel sounds are normal.      Palpations: Abdomen is soft.      Comments: Lt flank tenderness   Musculoskeletal:      Cervical back: Normal range of motion.   Neurological:      Mental Status: She is alert.          Relevant Results  Outside Hospital Results  reviewed  Labs  Results from last 72 hours   Lab Units 10/05/24  0531 10/04/24  1808   WBC AUTO x10*3/uL 9.4 14.0*   HEMOGLOBIN g/dL 7.9* 9.4*   HEMATOCRIT % 26.4* 30.7*   PLATELETS AUTO x10*3/uL 296 381   NEUTROS PCT AUTO %  --  80.6   LYMPHS PCT AUTO %  --  13.2   MONOS PCT AUTO %  --  5.0   EOS PCT AUTO %  --  0.7     Results from last 72 hours   Lab Units 10/05/24  0531 10/04/24  1808   SODIUM mmol/L 143 144   POTASSIUM mmol/L 3.2* 3.5   CHLORIDE mmol/L 104 101   CO2 mmol/L 28 27   BUN mg/dL 11 12   CREATININE mg/dL 0.77 0.86   GLUCOSE mg/dL 107* 151*   CALCIUM mg/dL 8.4* 9.1   ANION GAP mmol/L 14 20   EGFR mL/min/1.73m*2 79 69     Results from last 72 hours   Lab Units " "10/04/24  1808   ALK PHOS U/L 106   BILIRUBIN TOTAL mg/dL 0.3   PROTEIN TOTAL g/dL 7.5   ALT U/L 15   AST U/L 21   ALBUMIN g/dL 3.3*     Estimated Creatinine Clearance: 53.1 mL/min (by C-G formula based on SCr of 0.77 mg/dL).  C-Reactive Protein   Date Value Ref Range Status   09/03/2024 >70.00 (H) 0.00 - 2.00 mg/dL Final     Comment:     Result rechecked     CRP   Date Value Ref Range Status   03/05/2020 0.59 mg/dL Final     Comment:     REF VALUE  < 1.00     03/11/2019 1.30 (A) mg/dL Final     Comment:     REF VALUE  < 1.00       Sedimentation Rate   Date Value Ref Range Status   09/03/2024 94 (H) 0 - 30 mm/h Final   03/05/2020 19 0 - 30 mm/h Final   03/11/2019 27 0 - 30 mm/h Final     No results found for: \"HIV1X2\", \"HIVCONF\", \"QSDHBG4XM\"  Hepatitis C Ab   Date Value Ref Range Status   09/12/2023 NONREACTIVE NONREACTIVE Final     Comment:      Results from patients taking biotin supplements or receiving   high-dose biotin therapy should be interpreted with caution   due to possible interference with this test. Providers may    contact their local laboratory for further information.       Microbiology  Susceptibility data from last 90 days.  Collected Specimen Info Organism Amoxicillin/Clavulanate Ampicillin Ampicillin/Sulbactam Aztreonam Cefazolin Cefepime Ceftriaxone Cefuroxime Ciprofloxacin Ertapenem Gentamicin Levofloxacin Meropenem Nitrofurantoin   09/04/24 Urine from Clean Catch/Voided Klebsiella oxytoca/Raoultella species  S  R  I   R   S   S   S    S   09/04/24 Blood culture from Peripheral Venipuncture Klebsiella oxytoca/Raoultella species                 09/04/24 Blood culture from Peripheral Venipuncture Klebsiella oxytoca/Raoultella species  S  R  I   R   S  S  S   S  S     07/15/24 Urine from Clean Catch/Voided Citrobacter freundii complex  R  R  R  I  R  S    S  S  S   S  S     Collected Specimen Info Organism Piperacillin/Tazobactam Trimethoprim/Sulfamethoxazole   09/04/24 Urine from Clean " Catch/Voided Klebsiella oxytoca/Raoultella species  S  S   09/04/24 Blood culture from Peripheral Venipuncture Klebsiella oxytoca/Raoultella species     09/04/24 Blood culture from Peripheral Venipuncture Klebsiella oxytoca/Raoultella species  S  S   07/15/24 Urine from Clean Catch/Voided Citrobacter freundii complex  S  S   Imaging  Reviewed      Assessment/Plan     Left pyelonephritis    Recommendations :  Zosyn pending the cultures   Cultures  Urology evaluation    I spent minutes in the professional and overall care of this patient.      Meek Dick MD

## 2024-10-05 NOTE — H&P
History Of Present Illness  Sherri Najera is a 79 y.o. female presenting with left flank pain. She says she was recently on admission for C diff colitis and then pyelonephritis. She says that today, out of nowhere, she developed pain in left flank with urinary frequency. She mentions having nausea, lower abdominal discomfort and chills but denies fever, vomiting, diarrhea, dysuria or hematuria. Her  brought her to the ED and her labs on presentation were notable mainly for a WBC of 14 and findings on UA consistent with a UTI. CT of the abdomen and pelvis revealed increasing, now marked pyelonephritis and pyelitis involving the left kidney, collecting system and ureter with associated moderate hydronephrosis.     Past Medical History  Past Medical History:   Diagnosis Date    Aneurysm, splenic artery (CMS-[facility])     Ankylosing spondylitis (Multi)     Anxiety     Arthritis     Body mass index (BMI) 27.0-27.9, adult 12/07/2021    BMI 27.0-27.9,adult    BPPV (benign paroxysmal positional vertigo)     Cervical radiculitis     Chronic kidney disease     Depression     Diabetes mellitus (Multi)     Effusion, left knee 02/11/2019    Effusion of left knee    Exocrine pancreatic insufficiency ([facility])     GERD (gastroesophageal reflux disease)     Hearing aid worn     HL (hearing loss)     HLD (hyperlipidemia)     Hypertension     Injury of tendon of rotator cuff 06/19/2024    Irritable bowel syndrome with diarrhea 01/16/2020    Irritable bowel syndrome with diarrhea    Kidney stone     Kidney stone 08/21/2023    Nephrolithiasis     Other conditions influencing health status 06/08/2020    History of chronic diarrhea    Other hemorrhoids 06/28/2017    Internal hemorrhoid    Other nonspecific abnormal finding of lung field 03/26/2019    Ground glass opacity present on imaging of lung    Pain in left knee 04/30/2019    Left knee pain    Pain in unspecified knee 02/11/2019    Knee pain    Personal history of other  diseases of the circulatory system 08/12/2017    History of hypertension    Personal history of other diseases of the musculoskeletal system and connective tissue 05/21/2015    History of low back pain    Personal history of other specified conditions 11/03/2020    History of fatigue    Personal history of other specified conditions 05/04/2021    History of diarrhea    Personal history of other specified conditions 03/07/2014    History of chest pain    Personal history of other specified conditions 01/31/2013    History of headache    Skin cancer     Unspecified injury of muscle(s) and tendon(s) of the rotator cuff of right shoulder, initial encounter 10/11/2016    Injury of right rotator cuff    Urinary tract infection     Wears glasses        Past Surgical History  Past Surgical History:   Procedure Laterality Date    BREAST BIOPSY Right 2004    benign    CARPAL TUNNEL RELEASE Bilateral     COLONOSCOPY  04/11/2018    no further needed-normal    CYSTOSCOPY W/ URETERAL STENT PLACEMENT Left 06/30/2024    HAND SURGERY Left 03/07/2014    Hand Surgery                                                                                                                                                          MR HEAD ANGIO WO IV CONTRAST  04/15/2023    MR HEAD ANGIO WO IV CONTRAST GEA MRI    MR NECK ANGIO WO IV CONTRAST  04/15/2023    MR NECK ANGIO WO IV CONTRAST GEA MRI    TOTAL KNEE ARTHROPLASTY Left 02/26/2024    TOTAL SHOULDER ARTHROPLASTY Bilateral 07/11/2022        Social History  She reports that she has never smoked. She has never used smokeless tobacco. She reports that she does not drink alcohol and does not use drugs.    Family History  Family History   Problem Relation Name Age of Onset    Diabetes Mother Sherri     Asthma Mother Sherri     Other (CRF) Mother Sherri     Heart failure Father          Allergies  Fenofibrate and Sulfa (sulfonamide antibiotics)    Review of Systems   Constitutional:         See HPI   HENT:  "Negative.     Eyes: Negative.    Respiratory: Negative.     Cardiovascular: Negative.    Gastrointestinal:         See HPI   Endocrine: Negative.    Genitourinary:         See HPI   Musculoskeletal: Negative.    Skin: Negative.    Allergic/Immunologic: Negative.    Neurological: Negative.    Hematological: Negative.    Psychiatric/Behavioral: Negative.          Physical Exam  Constitutional:       General: She is not in acute distress.     Appearance: Normal appearance. She is not ill-appearing, toxic-appearing or diaphoretic.   HENT:      Head: Normocephalic and atraumatic.      Nose: Nose normal.      Mouth/Throat:      Mouth: Mucous membranes are dry.      Pharynx: Oropharynx is clear. No oropharyngeal exudate or posterior oropharyngeal erythema.   Eyes:      General: No scleral icterus.        Right eye: No discharge.         Left eye: No discharge.   Cardiovascular:      Rate and Rhythm: Normal rate and regular rhythm.      Heart sounds: Murmur heard.   Pulmonary:      Breath sounds: No wheezing, rhonchi or rales.   Abdominal:      Palpations: Abdomen is soft. There is no mass.      Tenderness: There is no abdominal tenderness. There is no right CVA tenderness, left CVA tenderness or guarding.   Musculoskeletal:      Cervical back: Neck supple.      Right lower leg: No edema.      Left lower leg: No edema.   Lymphadenopathy:      Cervical: No cervical adenopathy.   Skin:     General: Skin is warm and dry.   Neurological:      General: No focal deficit present.      Mental Status: She is alert and oriented to person, place, and time.   Psychiatric:         Mood and Affect: Mood normal.         Behavior: Behavior normal.          Last Recorded Vitals  Blood pressure (!) 213/79, pulse 88, temperature 37.4 °C (99.3 °F), temperature source Skin, resp. rate 16, height 1.6 m (5' 3\"), weight 60.8 kg (134 lb), SpO2 99%.    Relevant Results   Latest Reference Range & Units 10/04/24 18:08 10/04/24 18:10 10/04/24 19:48 "   GLUCOSE 74 - 99 mg/dL 151 (H)     SODIUM 136 - 145 mmol/L 144     POTASSIUM 3.5 - 5.3 mmol/L 3.5     CHLORIDE 98 - 107 mmol/L 101     Bicarbonate 21 - 32 mmol/L 27     Anion Gap 10 - 20 mmol/L 20     Blood Urea Nitrogen 6 - 23 mg/dL 12     Creatinine 0.50 - 1.05 mg/dL 0.86     EGFR >60 mL/min/1.73m*2 69     Calcium 8.6 - 10.3 mg/dL 9.1     Albumin 3.4 - 5.0 g/dL 3.3 (L)     Alkaline Phosphatase 33 - 136 U/L 106     ALT 7 - 45 U/L 15     AST 9 - 39 U/L 21     Bilirubin Total 0.0 - 1.2 mg/dL 0.3     Total Protein 6.4 - 8.2 g/dL 7.5     MAGNESIUM 1.60 - 2.40 mg/dL 1.61     LIPASE 9 - 82 U/L 30     WBC 4.4 - 11.3 x10*3/uL 14.0 (H)     nRBC 0.0 - 0.0 /100 WBCs 0.0     RBC 4.00 - 5.20 x10*6/uL 3.50 (L)     HEMOGLOBIN 12.0 - 16.0 g/dL 9.4 (L)     HEMATOCRIT 36.0 - 46.0 % 30.7 (L)     MCV 80 - 100 fL 88     MCH 26.0 - 34.0 pg 26.9     MCHC 32.0 - 36.0 g/dL 30.6 (L)     RED CELL DISTRIBUTION WIDTH 11.5 - 14.5 % 15.7 (H)     Platelets 150 - 450 x10*3/uL 381     Neutrophils % 40.0 - 80.0 % 80.6     Immature Granulocytes %, Automated 0.0 - 0.9 % 0.3     Lymphocytes % 13.0 - 44.0 % 13.2     Monocytes % 2.0 - 10.0 % 5.0     Eosinophils % 0.0 - 6.0 % 0.7     Basophils % 0.0 - 2.0 % 0.2     Neutrophils Absolute 1.60 - 5.50 x10*3/uL 11.31 (H)     Immature Granulocytes Absolute, Automated 0.00 - 0.50 x10*3/uL 0.04     Lymphocytes Absolute 0.80 - 3.00 x10*3/uL 1.86     Monocytes Absolute 0.05 - 0.80 x10*3/uL 0.70     Eosinophils Absolute 0.00 - 0.40 x10*3/uL 0.10     Basophils Absolute 0.00 - 0.10 x10*3/uL 0.03     URINE CULTURE    Rpt (IP)   Flu A Result Not Detected   Not Detected    Flu B Result Not Detected   Not Detected    Coronavirus 2019, PCR Not Detected   Not Detected    Color, Urine Light-Yellow, Yellow, Dark-Yellow    Light-Orange !   Appearance, Urine Clear    Ex.Turbid !   Specific Gravity, Urine 1.005 - 1.035    1.041 !   pH, Urine 5.0, 5.5, 6.0, 6.5, 7.0, 7.5, 8.0    6.5   Protein, Urine NEGATIVE, 10 (TRACE), 20  (TRACE) mg/dL   300 (3+) !   Glucose, Urine Normal mg/dL   Normal   Blood, Urine NEGATIVE    0.5 (2+) !   Ketones, Urine NEGATIVE mg/dL   NEGATIVE   Bilirubin, Urine NEGATIVE    NEGATIVE   Urobilinogen, Urine Normal mg/dL   Normal   Nitrite, Urine NEGATIVE    NEGATIVE   Leukocyte Esterase, Urine NEGATIVE    500 Debra/µL !   Mucus, Urine Reference range not established. /LPF   FEW   RBC, Urine NONE, 1-2, 3-5 /HPF   >20 !   WBC, Urine 1-5, NONE /HPF   >50 !   (H): Data is abnormally high  (L): Data is abnormally low  !: Data is abnormal  (IP): In Process  Rpt: View report in Results Review for more information    CT ABD/PELVIS:      IMPRESSION:  1. Increasing, now marked pyelonephritis and pyelitis involving the  left kidney, collecting system and ureter.  2. Associated moderate hydronephrosis of the left renal collecting  system.  3. Minimal perinephric fluid; no discrete collection or abscess.  4. Several minimally enlarged retroperitoneal lymph nodes, slightly  increasing since last exam.     Assessment/Plan   Pyelonephritis and pyelitis  Admit to medical floor  Cultures  Ceftriaxone    Hydronephrosis  Urology consulted    I spent 70 minutes in the professional and overall care of this patient.      Margarito Lowry MD

## 2024-10-06 VITALS
RESPIRATION RATE: 17 BRPM | OXYGEN SATURATION: 93 % | SYSTOLIC BLOOD PRESSURE: 149 MMHG | WEIGHT: 139.99 LBS | BODY MASS INDEX: 24.8 KG/M2 | TEMPERATURE: 97.2 F | HEIGHT: 63 IN | DIASTOLIC BLOOD PRESSURE: 66 MMHG | HEART RATE: 77 BPM

## 2024-10-06 LAB
ALBUMIN SERPL BCP-MCNC: 2.8 G/DL (ref 3.4–5)
ALP SERPL-CCNC: 91 U/L (ref 33–136)
ALT SERPL W P-5'-P-CCNC: 11 U/L (ref 7–45)
ANION GAP SERPL CALC-SCNC: 12 MMOL/L (ref 10–20)
AST SERPL W P-5'-P-CCNC: 13 U/L (ref 9–39)
BACTERIA BLD CULT: NORMAL
BACTERIA BLD CULT: NORMAL
BACTERIA UR CULT: NORMAL
BASOPHILS # BLD AUTO: 0.03 X10*3/UL (ref 0–0.1)
BASOPHILS NFR BLD AUTO: 0.4 %
BILIRUB SERPL-MCNC: 0.3 MG/DL (ref 0–1.2)
BUN SERPL-MCNC: 12 MG/DL (ref 6–23)
CALCIUM SERPL-MCNC: 9.1 MG/DL (ref 8.6–10.3)
CHLORIDE SERPL-SCNC: 104 MMOL/L (ref 98–107)
CO2 SERPL-SCNC: 30 MMOL/L (ref 21–32)
CREAT SERPL-MCNC: 0.94 MG/DL (ref 0.5–1.05)
EGFRCR SERPLBLD CKD-EPI 2021: 62 ML/MIN/1.73M*2
EOSINOPHIL # BLD AUTO: 0.18 X10*3/UL (ref 0–0.4)
EOSINOPHIL NFR BLD AUTO: 2.2 %
ERYTHROCYTE [DISTWIDTH] IN BLOOD BY AUTOMATED COUNT: 15.7 % (ref 11.5–14.5)
GLUCOSE SERPL-MCNC: 94 MG/DL (ref 74–99)
HCT VFR BLD AUTO: 26.9 % (ref 36–46)
HGB BLD-MCNC: 7.8 G/DL (ref 12–16)
IMM GRANULOCYTES # BLD AUTO: 0.02 X10*3/UL (ref 0–0.5)
IMM GRANULOCYTES NFR BLD AUTO: 0.2 % (ref 0–0.9)
LYMPHOCYTES # BLD AUTO: 1.84 X10*3/UL (ref 0.8–3)
LYMPHOCYTES NFR BLD AUTO: 22.8 %
MCH RBC QN AUTO: 26.8 PG (ref 26–34)
MCHC RBC AUTO-ENTMCNC: 29 G/DL (ref 32–36)
MCV RBC AUTO: 92 FL (ref 80–100)
MONOCYTES # BLD AUTO: 0.67 X10*3/UL (ref 0.05–0.8)
MONOCYTES NFR BLD AUTO: 8.3 %
NEUTROPHILS # BLD AUTO: 5.32 X10*3/UL (ref 1.6–5.5)
NEUTROPHILS NFR BLD AUTO: 66.1 %
NRBC BLD-RTO: 0 /100 WBCS (ref 0–0)
PLATELET # BLD AUTO: 269 X10*3/UL (ref 150–450)
POTASSIUM SERPL-SCNC: 3.9 MMOL/L (ref 3.5–5.3)
PROT SERPL-MCNC: 6.4 G/DL (ref 6.4–8.2)
RBC # BLD AUTO: 2.91 X10*6/UL (ref 4–5.2)
SODIUM SERPL-SCNC: 142 MMOL/L (ref 136–145)
WBC # BLD AUTO: 8.1 X10*3/UL (ref 4.4–11.3)

## 2024-10-06 PROCEDURE — 99232 SBSQ HOSP IP/OBS MODERATE 35: CPT | Performed by: STUDENT IN AN ORGANIZED HEALTH CARE EDUCATION/TRAINING PROGRAM

## 2024-10-06 PROCEDURE — 2500000004 HC RX 250 GENERAL PHARMACY W/ HCPCS (ALT 636 FOR OP/ED): Performed by: INTERNAL MEDICINE

## 2024-10-06 PROCEDURE — 2500000001 HC RX 250 WO HCPCS SELF ADMINISTERED DRUGS (ALT 637 FOR MEDICARE OP): Performed by: INTERNAL MEDICINE

## 2024-10-06 PROCEDURE — 2500000001 HC RX 250 WO HCPCS SELF ADMINISTERED DRUGS (ALT 637 FOR MEDICARE OP): Performed by: NURSE PRACTITIONER

## 2024-10-06 PROCEDURE — 36415 COLL VENOUS BLD VENIPUNCTURE: CPT | Performed by: STUDENT IN AN ORGANIZED HEALTH CARE EDUCATION/TRAINING PROGRAM

## 2024-10-06 PROCEDURE — 2500000002 HC RX 250 W HCPCS SELF ADMINISTERED DRUGS (ALT 637 FOR MEDICARE OP, ALT 636 FOR OP/ED): Performed by: INTERNAL MEDICINE

## 2024-10-06 PROCEDURE — 2500000005 HC RX 250 GENERAL PHARMACY W/O HCPCS: Performed by: STUDENT IN AN ORGANIZED HEALTH CARE EDUCATION/TRAINING PROGRAM

## 2024-10-06 PROCEDURE — 80053 COMPREHEN METABOLIC PANEL: CPT | Performed by: STUDENT IN AN ORGANIZED HEALTH CARE EDUCATION/TRAINING PROGRAM

## 2024-10-06 PROCEDURE — 94760 N-INVAS EAR/PLS OXIMETRY 1: CPT

## 2024-10-06 PROCEDURE — 2060000001 HC INTERMEDIATE ICU ROOM DAILY

## 2024-10-06 PROCEDURE — 85025 COMPLETE CBC W/AUTO DIFF WBC: CPT | Performed by: STUDENT IN AN ORGANIZED HEALTH CARE EDUCATION/TRAINING PROGRAM

## 2024-10-06 ASSESSMENT — PAIN DESCRIPTION - LOCATION
LOCATION: HEAD

## 2024-10-06 ASSESSMENT — COGNITIVE AND FUNCTIONAL STATUS - GENERAL
TOILETING: A LITTLE
CLIMB 3 TO 5 STEPS WITH RAILING: A LITTLE
DAILY ACTIVITIY SCORE: 21
DRESSING REGULAR LOWER BODY CLOTHING: A LITTLE
WALKING IN HOSPITAL ROOM: A LITTLE
STANDING UP FROM CHAIR USING ARMS: A LITTLE
MOVING TO AND FROM BED TO CHAIR: A LITTLE
CLIMB 3 TO 5 STEPS WITH RAILING: A LITTLE
MOBILITY SCORE: 20
TOILETING: A LITTLE
DRESSING REGULAR LOWER BODY CLOTHING: A LITTLE
WALKING IN HOSPITAL ROOM: A LITTLE
DRESSING REGULAR UPPER BODY CLOTHING: A LITTLE
DAILY ACTIVITIY SCORE: 21
MOBILITY SCORE: 20
STANDING UP FROM CHAIR USING ARMS: A LITTLE
DRESSING REGULAR UPPER BODY CLOTHING: A LITTLE
MOVING TO AND FROM BED TO CHAIR: A LITTLE

## 2024-10-06 ASSESSMENT — PAIN - FUNCTIONAL ASSESSMENT
PAIN_FUNCTIONAL_ASSESSMENT: 0-10

## 2024-10-06 ASSESSMENT — PAIN SCALES - GENERAL
PAINLEVEL_OUTOF10: 0 - NO PAIN
PAINLEVEL_OUTOF10: 5 - MODERATE PAIN
PAINLEVEL_OUTOF10: 4
PAINLEVEL_OUTOF10: 4
PAINLEVEL_OUTOF10: 1
PAINLEVEL_OUTOF10: 1

## 2024-10-06 NOTE — PROGRESS NOTES
Sherri Najera is a 79 y.o. female on day 2 of admission presenting with Pyelonephritis.    Subjective   Interval History: no fever, less pain        Review of Systems    Objective   Range of Vitals (last 24 hours)  Heart Rate:  [68-89]   Temp:  [36 °C (96.8 °F)-36.2 °C (97.2 °F)]   Resp:  [15-24]   BP: (128-162)/(56-98)   SpO2:  [91 %-94 %]   Daily Weight  10/04/24 : 63.5 kg (139 lb 15.9 oz)    Body mass index is 24.8 kg/m².    Physical Exam  Constitutional:       Appearance: Normal appearance.   HENT:      Head: Normocephalic and atraumatic.      Mouth/Throat:      Mouth: Mucous membranes are moist.      Pharynx: Oropharynx is clear.   Eyes:      Pupils: Pupils are equal, round, and reactive to light.   Cardiovascular:      Rate and Rhythm: Normal rate and regular rhythm.      Heart sounds: Normal heart sounds.   Pulmonary:      Effort: Pulmonary effort is normal.      Breath sounds: Normal breath sounds.   Abdominal:      General: Abdomen is flat. Bowel sounds are normal.      Palpations: Abdomen is soft.      Comments: Less flank tenderness   Musculoskeletal:      Cervical back: Normal range of motion.   Neurological:      Mental Status: She is alert.         Antibiotics  piperacillin-tazobactam - 3.375 gram/50 mL    Relevant Results  Labs  Results from last 72 hours   Lab Units 10/06/24  0716 10/05/24  0531 10/04/24  1808   WBC AUTO x10*3/uL 8.1 9.4 14.0*   HEMOGLOBIN g/dL 7.8* 7.9* 9.4*   HEMATOCRIT % 26.9* 26.4* 30.7*   PLATELETS AUTO x10*3/uL 269 296 381   NEUTROS PCT AUTO % 66.1  --  80.6   LYMPHS PCT AUTO % 22.8  --  13.2   MONOS PCT AUTO % 8.3  --  5.0   EOS PCT AUTO % 2.2  --  0.7     Results from last 72 hours   Lab Units 10/06/24  0544 10/05/24  0531 10/04/24  1808   SODIUM mmol/L 142 143 144   POTASSIUM mmol/L 3.9 3.2* 3.5   CHLORIDE mmol/L 104 104 101   CO2 mmol/L 30 28 27   BUN mg/dL 12 11 12   CREATININE mg/dL 0.94 0.77 0.86   GLUCOSE mg/dL 94 107* 151*   CALCIUM mg/dL 9.1 8.4* 9.1   ANION GAP  mmol/L 12 14 20   EGFR mL/min/1.73m*2 62 79 69     Results from last 72 hours   Lab Units 10/06/24  0544 10/04/24  1808   ALK PHOS U/L 91 106   BILIRUBIN TOTAL mg/dL 0.3 0.3   PROTEIN TOTAL g/dL 6.4 7.5   ALT U/L 11 15   AST U/L 13 21   ALBUMIN g/dL 2.8* 3.3*     Estimated Creatinine Clearance: 43.5 mL/min (by C-G formula based on SCr of 0.94 mg/dL).  C-Reactive Protein   Date Value Ref Range Status   09/03/2024 >70.00 (H) 0.00 - 2.00 mg/dL Final     Comment:     Result rechecked     CRP   Date Value Ref Range Status   03/05/2020 0.59 mg/dL Final     Comment:     REF VALUE  < 1.00     03/11/2019 1.30 (A) mg/dL Final     Comment:     REF VALUE  < 1.00       Microbiology  Reviewed  Imaging  Reviewed        Assessment/Plan     Left pyelonephritis, negative urine culture     Recommendations :  Continue Zosyn   Discussed with the medical team     I spent minutes in the professional and overall care of this patient.      Meek Dick MD

## 2024-10-06 NOTE — PROGRESS NOTES
"Sherri Najera is a 79 y.o. female on day 2 of admission presenting with Pyelonephritis.    Subjective   Pt says her abdominal pain has improved.        Objective     Physical Exam  Constitutional: Awake, alert, NAD.  Eyes: PERRLA, EOMI. No erythema or exudate. No proptosis or lid lag.   ENMT: MMM, no nasal congestion, no oral lesions, oropharynx clear without tonsillar erythema or exudate.  Head/Neck: NCAT, neck supple. Full active ROM of the neck. No thyromegaly or mass. No JVP.  Respiratory/Thorax: CTAB, no increased work of breathing, no increased respiratory effort, no wheeze, rales, or rhonchi.  Cardiovascular: Regular rate and rhythm, normal S1 and S2, no murmurs, rubs, or gallops.  Gastrointestinal: Soft, nontender to palpation, nondistended, no guarding or rebound, normoactive bowel sounds  Musculoskeletal: Strength 5/5. MAEx4. No muscle wasting.  Extremities: 2+ RPs, no cyanosis or edema  Neurological: Aox3, some slow to respond, moves all extremities  Lymphatic: No lymphadenopathy.  Skin: Warm and well perfused.      Last Recorded Vitals:  /60   Pulse 68   Temp 36.1 °C (97 °F)   Resp 15   Ht 1.6 m (5' 3\")   Wt 63.5 kg (139 lb 15.9 oz)   SpO2 92%   BMI 24.80 kg/m²      Scheduled medications:  atorvastatin, 80 mg, oral, Daily  docusate sodium, 100 mg, oral, BID  enoxaparin, 40 mg, subcutaneous, q24h  ferrous sulfate (325 mg ferrous sulfate), 65 mg of iron, oral, BID  lactobacillus acidophilus, 1 capsule, oral, BID  lisinopril, 30 mg, oral, Daily  mirtazapine, 15 mg, oral, Nightly  pantoprazole, 40 mg, oral, Daily before breakfast  PARoxetine, 10 mg, oral, Daily  piperacillin-tazobactam, 3.375 g, intravenous, q6h      Continuous medications:     PRN medications:  PRN medications: acetaminophen **OR** acetaminophen **OR** acetaminophen, alum-mag hydroxide-simeth, calcium carbonate, dextromethorphan-guaifenesin, fluticasone, guaiFENesin, LORazepam, ondansetron **OR** ondansetron, oxygen "     Relevant Results:  Results for orders placed or performed during the hospital encounter of 10/04/24 (from the past 24 hour(s))   Comprehensive Metabolic Panel   Result Value Ref Range    Glucose 94 74 - 99 mg/dL    Sodium 142 136 - 145 mmol/L    Potassium 3.9 3.5 - 5.3 mmol/L    Chloride 104 98 - 107 mmol/L    Bicarbonate 30 21 - 32 mmol/L    Anion Gap 12 10 - 20 mmol/L    Urea Nitrogen 12 6 - 23 mg/dL    Creatinine 0.94 0.50 - 1.05 mg/dL    eGFR 62 >60 mL/min/1.73m*2    Calcium 9.1 8.6 - 10.3 mg/dL    Albumin 2.8 (L) 3.4 - 5.0 g/dL    Alkaline Phosphatase 91 33 - 136 U/L    Total Protein 6.4 6.4 - 8.2 g/dL    AST 13 9 - 39 U/L    Bilirubin, Total 0.3 0.0 - 1.2 mg/dL    ALT 11 7 - 45 U/L   CBC and Auto Differential   Result Value Ref Range    WBC 8.1 4.4 - 11.3 x10*3/uL    nRBC 0.0 0.0 - 0.0 /100 WBCs    RBC 2.91 (L) 4.00 - 5.20 x10*6/uL    Hemoglobin 7.8 (L) 12.0 - 16.0 g/dL    Hematocrit 26.9 (L) 36.0 - 46.0 %    MCV 92 80 - 100 fL    MCH 26.8 26.0 - 34.0 pg    MCHC 29.0 (L) 32.0 - 36.0 g/dL    RDW 15.7 (H) 11.5 - 14.5 %    Platelets 269 150 - 450 x10*3/uL    Neutrophils % 66.1 40.0 - 80.0 %    Immature Granulocytes %, Automated 0.2 0.0 - 0.9 %    Lymphocytes % 22.8 13.0 - 44.0 %    Monocytes % 8.3 2.0 - 10.0 %    Eosinophils % 2.2 0.0 - 6.0 %    Basophils % 0.4 0.0 - 2.0 %    Neutrophils Absolute 5.32 1.60 - 5.50 x10*3/uL    Immature Granulocytes Absolute, Automated 0.02 0.00 - 0.50 x10*3/uL    Lymphocytes Absolute 1.84 0.80 - 3.00 x10*3/uL    Monocytes Absolute 0.67 0.05 - 0.80 x10*3/uL    Eosinophils Absolute 0.18 0.00 - 0.40 x10*3/uL    Basophils Absolute 0.03 0.00 - 0.10 x10*3/uL       CT abdomen pelvis w IV contrast    Result Date: 10/4/2024  STUDY: CT Abdomen and Pelvis with IV Contrast; 10/04/2024, 7:05 PM. INDICATION: Left lower quadrant tenderness. History of pyelonephritis. COMPARISON: XR abdomen: 09/06/24; CT AP: 09/04/24, 07/02/24. ACCESSION NUMBER(S): [id number] ORDERING CLINICIAN: GARRISON MOSLEY  TECHNIQUE: CT of the abdomen and pelvis was performed.  Contiguous axial images were obtained at 3 mm slice thickness through the abdomen and pelvis. Coronal and sagittal reconstructions at 3 mm slice thickness were performed.  Omnipaque 350 75 mL was administered intravenously. FINDINGS: Abdomen: There is increasing, now marked pyelonephritis and pyelitis involving the left kidney, collecting system and ureter. Left kidney is minimally enlarged with patchy geographic areas of decreased enhancement, with increasing minimal perinephric fluid. There is now moderate hydronephrosis of the left renal collecting system. The uroepithelium is increasingly thickened and enhancing, extending down the left ureter to the level of the bladder base. Tiny nonobstructing calculus is again seen in the lower pole left kidney. There are some underlying cysts in the left mid kidney. There are minimally enlarged lymph nodes in the adjacent retroperitoneum, including several left periaortic lymph nodes largest at 1.3 cm. These have slightly increased. Renal veins and IVC remain patent. The right kidney is essentially normal except for a few tiny cysts. There is no right-sided pyelonephritis or hydronephrosis. Liver, spleen, pancreas, adrenal glands, and biliary system are unremarkable. There is no bowel thickening or obstruction. There is moderate stool burden. There is no free air. Pelvis: The bladder is decompressed. Uterus is surgically absent. Deep pelvic veins are patent. There are no hernias. Skeletal: Minimal to moderate degenerative arthritis and disc disease is most pronounced in the mid and lower lumbar spine. Lower chest: There is minimal dependent lung atelectasis and/or scarring. There are no pleural effusions. Heart is normal size with minimal pericardial effusion. There is a small hiatal hernia.    1. Increasing, now marked pyelonephritis and pyelitis involving the left kidney, collecting system and ureter. 2. Associated  moderate hydronephrosis of the left renal collecting system. 3. Minimal perinephric fluid; no discrete collection or abscess. 4. Several minimally enlarged retroperitoneal lymph nodes, slightly increasing since last exam. 5. Remainder as above. Signed by Erik Flores MD                    Assessment/Plan   Principal Problem:    Pyelonephritis    Left pyelonephritis  CT A/P shows hydronephrosis  Hx recurrent UTI  - ID following  - zosyn  - urology following: conservative management  - pain control  - nuclear renal scan    HTN  - lisinopril    HLD  - statin    GERD  - protonix    Anxiety  - paroxetine    DVT ppx: lovenox  Dispo: monitor clinically          Sarah Thomas MD  Hospitalist

## 2024-10-07 ENCOUNTER — PHARMACY VISIT (OUTPATIENT)
Dept: PHARMACY | Facility: CLINIC | Age: 79
End: 2024-10-07
Payer: MEDICARE

## 2024-10-07 ENCOUNTER — APPOINTMENT (OUTPATIENT)
Dept: RADIOLOGY | Facility: HOSPITAL | Age: 79
DRG: 690 | End: 2024-10-07
Payer: MEDICARE

## 2024-10-07 ENCOUNTER — APPOINTMENT (OUTPATIENT)
Dept: CARDIOLOGY | Facility: HOSPITAL | Age: 79
DRG: 690 | End: 2024-10-07
Payer: MEDICARE

## 2024-10-07 VITALS
DIASTOLIC BLOOD PRESSURE: 98 MMHG | HEART RATE: 69 BPM | OXYGEN SATURATION: 95 % | WEIGHT: 139.99 LBS | HEIGHT: 63 IN | TEMPERATURE: 99.1 F | RESPIRATION RATE: 15 BRPM | BODY MASS INDEX: 24.8 KG/M2 | SYSTOLIC BLOOD PRESSURE: 166 MMHG

## 2024-10-07 LAB
ALBUMIN SERPL BCP-MCNC: 2.6 G/DL (ref 3.4–5)
ALP SERPL-CCNC: 83 U/L (ref 33–136)
ALT SERPL W P-5'-P-CCNC: 10 U/L (ref 7–45)
ANION GAP SERPL CALC-SCNC: 10 MMOL/L (ref 10–20)
AST SERPL W P-5'-P-CCNC: 12 U/L (ref 9–39)
ATRIAL RATE: 72 BPM
ATRIAL RATE: 88 BPM
BASOPHILS # BLD AUTO: 0.02 X10*3/UL (ref 0–0.1)
BASOPHILS NFR BLD AUTO: 0.3 %
BILIRUB SERPL-MCNC: 0.2 MG/DL (ref 0–1.2)
BUN SERPL-MCNC: 11 MG/DL (ref 6–23)
CALCIUM SERPL-MCNC: 8.5 MG/DL (ref 8.6–10.3)
CHLORIDE SERPL-SCNC: 104 MMOL/L (ref 98–107)
CO2 SERPL-SCNC: 32 MMOL/L (ref 21–32)
CREAT SERPL-MCNC: 0.93 MG/DL (ref 0.5–1.05)
EGFRCR SERPLBLD CKD-EPI 2021: 63 ML/MIN/1.73M*2
EOSINOPHIL # BLD AUTO: 0.21 X10*3/UL (ref 0–0.4)
EOSINOPHIL NFR BLD AUTO: 3 %
ERYTHROCYTE [DISTWIDTH] IN BLOOD BY AUTOMATED COUNT: 15.4 % (ref 11.5–14.5)
GLUCOSE SERPL-MCNC: 94 MG/DL (ref 74–99)
HCT VFR BLD AUTO: 24.8 % (ref 36–46)
HGB BLD-MCNC: 7.3 G/DL (ref 12–16)
IMM GRANULOCYTES # BLD AUTO: 0.02 X10*3/UL (ref 0–0.5)
IMM GRANULOCYTES NFR BLD AUTO: 0.3 % (ref 0–0.9)
LYMPHOCYTES # BLD AUTO: 1.94 X10*3/UL (ref 0.8–3)
LYMPHOCYTES NFR BLD AUTO: 28 %
MAGNESIUM SERPL-MCNC: 1.67 MG/DL (ref 1.6–2.4)
MCH RBC QN AUTO: 25.9 PG (ref 26–34)
MCHC RBC AUTO-ENTMCNC: 29.4 G/DL (ref 32–36)
MCV RBC AUTO: 88 FL (ref 80–100)
MONOCYTES # BLD AUTO: 0.61 X10*3/UL (ref 0.05–0.8)
MONOCYTES NFR BLD AUTO: 8.8 %
NEUTROPHILS # BLD AUTO: 4.14 X10*3/UL (ref 1.6–5.5)
NEUTROPHILS NFR BLD AUTO: 59.6 %
NRBC BLD-RTO: 0 /100 WBCS (ref 0–0)
P AXIS: 39 DEGREES
P AXIS: 50 DEGREES
P OFFSET: 194 MS
P OFFSET: 195 MS
P ONSET: 141 MS
P ONSET: 146 MS
PLATELET # BLD AUTO: 274 X10*3/UL (ref 150–450)
POTASSIUM SERPL-SCNC: 3.5 MMOL/L (ref 3.5–5.3)
PR INTERVAL: 142 MS
PR INTERVAL: 152 MS
PROT SERPL-MCNC: 6 G/DL (ref 6.4–8.2)
Q ONSET: 217 MS
Q ONSET: 217 MS
QRS COUNT: 11 BEATS
QRS COUNT: 14 BEATS
QRS DURATION: 84 MS
QRS DURATION: 86 MS
QT INTERVAL: 390 MS
QT INTERVAL: 398 MS
QTC CALCULATION(BAZETT): 435 MS
QTC CALCULATION(BAZETT): 471 MS
QTC FREDERICIA: 423 MS
QTC FREDERICIA: 443 MS
R AXIS: -27 DEGREES
R AXIS: -32 DEGREES
RBC # BLD AUTO: 2.82 X10*6/UL (ref 4–5.2)
SODIUM SERPL-SCNC: 142 MMOL/L (ref 136–145)
T AXIS: 36 DEGREES
T AXIS: 58 DEGREES
T OFFSET: 412 MS
T OFFSET: 416 MS
VENTRICULAR RATE: 72 BPM
VENTRICULAR RATE: 88 BPM
WBC # BLD AUTO: 6.9 X10*3/UL (ref 4.4–11.3)

## 2024-10-07 PROCEDURE — 93005 ELECTROCARDIOGRAM TRACING: CPT

## 2024-10-07 PROCEDURE — 78708 K FLOW/FUNCT IMAGE W/DRUG: CPT | Performed by: STUDENT IN AN ORGANIZED HEALTH CARE EDUCATION/TRAINING PROGRAM

## 2024-10-07 PROCEDURE — 2500000002 HC RX 250 W HCPCS SELF ADMINISTERED DRUGS (ALT 637 FOR MEDICARE OP, ALT 636 FOR OP/ED): Performed by: INTERNAL MEDICINE

## 2024-10-07 PROCEDURE — 99239 HOSP IP/OBS DSCHRG MGMT >30: CPT | Performed by: STUDENT IN AN ORGANIZED HEALTH CARE EDUCATION/TRAINING PROGRAM

## 2024-10-07 PROCEDURE — 36415 COLL VENOUS BLD VENIPUNCTURE: CPT | Performed by: STUDENT IN AN ORGANIZED HEALTH CARE EDUCATION/TRAINING PROGRAM

## 2024-10-07 PROCEDURE — A9562 TC99M MERTIATIDE: HCPCS | Performed by: STUDENT IN AN ORGANIZED HEALTH CARE EDUCATION/TRAINING PROGRAM

## 2024-10-07 PROCEDURE — 2500000001 HC RX 250 WO HCPCS SELF ADMINISTERED DRUGS (ALT 637 FOR MEDICARE OP): Performed by: INTERNAL MEDICINE

## 2024-10-07 PROCEDURE — 83735 ASSAY OF MAGNESIUM: CPT | Performed by: STUDENT IN AN ORGANIZED HEALTH CARE EDUCATION/TRAINING PROGRAM

## 2024-10-07 PROCEDURE — 85025 COMPLETE CBC W/AUTO DIFF WBC: CPT | Performed by: STUDENT IN AN ORGANIZED HEALTH CARE EDUCATION/TRAINING PROGRAM

## 2024-10-07 PROCEDURE — 84075 ASSAY ALKALINE PHOSPHATASE: CPT | Performed by: STUDENT IN AN ORGANIZED HEALTH CARE EDUCATION/TRAINING PROGRAM

## 2024-10-07 PROCEDURE — RXMED WILLOW AMBULATORY MEDICATION CHARGE

## 2024-10-07 PROCEDURE — 2500000004 HC RX 250 GENERAL PHARMACY W/ HCPCS (ALT 636 FOR OP/ED): Performed by: INTERNAL MEDICINE

## 2024-10-07 PROCEDURE — 3430000001 HC RX 343 DIAGNOSTIC RADIOPHARMACEUTICALS: Performed by: STUDENT IN AN ORGANIZED HEALTH CARE EDUCATION/TRAINING PROGRAM

## 2024-10-07 PROCEDURE — 78708 K FLOW/FUNCT IMAGE W/DRUG: CPT

## 2024-10-07 PROCEDURE — 2500000004 HC RX 250 GENERAL PHARMACY W/ HCPCS (ALT 636 FOR OP/ED)

## 2024-10-07 RX ORDER — METOPROLOL TARTRATE 1 MG/ML
5 INJECTION, SOLUTION INTRAVENOUS ONCE
Status: DISCONTINUED | OUTPATIENT
Start: 2024-10-07 | End: 2024-10-07 | Stop reason: HOSPADM

## 2024-10-07 RX ORDER — FUROSEMIDE 10 MG/ML
30 INJECTION INTRAMUSCULAR; INTRAVENOUS ONCE
Status: DISCONTINUED | OUTPATIENT
Start: 2024-10-07 | End: 2024-10-07 | Stop reason: SDUPTHER

## 2024-10-07 RX ORDER — BETIATIDE 1 MG/1
11.3 INJECTION, POWDER, LYOPHILIZED, FOR SOLUTION INTRAVENOUS
Status: COMPLETED | OUTPATIENT
Start: 2024-10-07 | End: 2024-10-07

## 2024-10-07 RX ORDER — CEFDINIR 300 MG/1
300 CAPSULE ORAL 2 TIMES DAILY
Qty: 14 CAPSULE | Refills: 0 | Status: SHIPPED | OUTPATIENT
Start: 2024-10-07 | End: 2024-10-14

## 2024-10-07 RX ORDER — FUROSEMIDE 10 MG/ML
30 INJECTION INTRAMUSCULAR; INTRAVENOUS ONCE
Status: COMPLETED | OUTPATIENT
Start: 2024-10-07 | End: 2024-10-07

## 2024-10-07 RX ORDER — POTASSIUM CHLORIDE 20 MEQ/1
40 TABLET, EXTENDED RELEASE ORAL ONCE
Status: DISCONTINUED | OUTPATIENT
Start: 2024-10-07 | End: 2024-10-07

## 2024-10-07 RX ORDER — FUROSEMIDE 10 MG/ML
INJECTION INTRAMUSCULAR; INTRAVENOUS
Status: COMPLETED
Start: 2024-10-07 | End: 2024-10-07

## 2024-10-07 ASSESSMENT — COGNITIVE AND FUNCTIONAL STATUS - GENERAL
STANDING UP FROM CHAIR USING ARMS: A LITTLE
MOVING TO AND FROM BED TO CHAIR: A LITTLE
MOBILITY SCORE: 20
WALKING IN HOSPITAL ROOM: A LITTLE
CLIMB 3 TO 5 STEPS WITH RAILING: A LITTLE
DRESSING REGULAR LOWER BODY CLOTHING: A LITTLE
DRESSING REGULAR UPPER BODY CLOTHING: A LITTLE
TOILETING: A LITTLE
DAILY ACTIVITIY SCORE: 21

## 2024-10-07 ASSESSMENT — PAIN - FUNCTIONAL ASSESSMENT
PAIN_FUNCTIONAL_ASSESSMENT: 0-10
PAIN_FUNCTIONAL_ASSESSMENT: 0-10

## 2024-10-07 ASSESSMENT — PAIN SCALES - GENERAL
PAINLEVEL_OUTOF10: 1
PAINLEVEL_OUTOF10: 4

## 2024-10-07 NOTE — DISCHARGE SUMMARY
Discharge Diagnosis  Pyelonephritis    Issues Requiring Follow-Up  Post hospital follow up    Discharge Meds     Medication List      START taking these medications     cefdinir 300 mg capsule; Commonly known as: Omnicef; Take 1 capsule (300   mg) by mouth 2 times a day for 7 days.     CONTINUE taking these medications     atorvastatin 80 mg tablet; Commonly known as: Lipitor; Take 1 tablet (80   mg) by mouth once daily.   ferrous sulfate (325 mg ferrous sulfate) tablet; Take 1 tablet (325 mg)   by mouth 2 times a day.   Flonase Allergy Relief 50 mcg/actuation nasal spray; Generic drug:   fluticasone   Lactobacillus acidophilus 1 billion cell tablet; Take 1 tablet by mouth   2 times a day.   lisinopril 30 mg tablet; Take 1 tablet (30 mg) by mouth once daily.   LORazepam 0.5 mg tablet; Commonly known as: Ativan; Take 1 tablet (0.5   mg) by mouth 2 times a day as needed for anxiety.   mirtazapine 15 mg tablet; Commonly known as: Remeron; Take 1 tablet (15   mg) by mouth once daily at bedtime.   MULTIVITAMIN ORAL   omeprazole 40 mg DR capsule; Commonly known as: PriLOSEC; TAKE 1 CAPSULE   BY MOUTH ONCE DAILY IN THE MORNING BEFORE A MEAL. DO NOT CRUSH OR CHEW.   PARoxetine 10 mg tablet; Commonly known as: Paxil; Take 1 tablet (10 mg)   by mouth once daily.   zinc gluconate 50 mg tablet       Test Results Pending At Discharge  Pending Labs       Order Current Status    Blood Culture Preliminary result    Blood Culture Preliminary result            Hospital Course   Sherri Najera is a 79 y.o. year old female with PMH DM, HTN, HLD, GERD, Depression/Anxiety, Recurrent renal calculi presented to the ED with left flank pain. She is treated for left pyelonephritis with IV zosyn. ID was consulted. She will need course of cefdinir for home going. Urology was consulted for hydronephrosis and they recommended conservative management. Nuclear renal scan was done with results pending at time of dc. Pt tolerated PO intake. Pt is  hemodynamically stable for discharge at this time with close outpatient follow ups.     The patient was discharged in satisfactory condition.   Medications and side effect profile reviewed with patient.  More than 30 minutes were spent in coordinating patient discharge       Pertinent Physical Exam At Time of Discharge  Physical Exam  Constitutional: Awake, alert, NAD.  Eyes: PERRLA, EOMI. No erythema or exudate. No proptosis or lid lag.   ENMT: MMM, no nasal congestion, no oral lesions, oropharynx clear without tonsillar erythema or exudate.  Head/Neck: NCAT, neck supple. Full active ROM of the neck. No thyromegaly or mass. No JVP.  Respiratory/Thorax: CTAB, no increased work of breathing, no increased respiratory effort, no wheeze, rales, or rhonchi.  Cardiovascular: Regular rate and rhythm, normal S1 and S2, no murmurs, rubs, or gallops.  Gastrointestinal: Soft, nontender to palpation, nondistended, no guarding or rebound, normoactive bowel sounds  Musculoskeletal: Strength 5/5. MAEx4. No muscle wasting.  Extremities: 2+ RPs, no cyanosis or edema  Neurological: Aox3, some slow to respond, moves all extremities  Lymphatic: No lymphadenopathy.  Skin: Warm and well perfused.     Outpatient Follow-Up  Future Appointments   Date Time Provider Department Center   10/8/2024 11:00 AM [facility] RESEARCH PERSONNEL [id number] None   10/10/2024  9:00 AM Issa Xie MD [id number] [facility]   10/11/2024  8:30 AM Yesi Lam MD [id number] [facility]   11/1/2024  8:20 AM Sarahy LANGSTON MD [id number] [facility]         Sarah Thomas MD  Hospitalist

## 2024-10-07 NOTE — PROGRESS NOTES
"Sherri Najera is a 79 y.o. female on day 3 of admission presenting with Pyelonephritis.    Subjective   Pt says her abdominal pain has improved. She is wondering when she can go home.        Objective     Physical Exam  Constitutional: Awake, alert, NAD.  Eyes: PERRLA, EOMI. No erythema or exudate. No proptosis or lid lag.   ENMT: MMM, no nasal congestion, no oral lesions, oropharynx clear without tonsillar erythema or exudate.  Head/Neck: NCAT, neck supple. Full active ROM of the neck. No thyromegaly or mass. No JVP.  Respiratory/Thorax: CTAB, no increased work of breathing, no increased respiratory effort, no wheeze, rales, or rhonchi.  Cardiovascular: Regular rate and rhythm, normal S1 and S2, no murmurs, rubs, or gallops.  Gastrointestinal: Soft, nontender to palpation, nondistended, no guarding or rebound, normoactive bowel sounds  Musculoskeletal: Strength 5/5. MAEx4. No muscle wasting.  Extremities: 2+ RPs, no cyanosis or edema  Neurological: Aox3, some slow to respond, moves all extremities  Lymphatic: No lymphadenopathy.  Skin: Warm and well perfused.      Last Recorded Vitals:  /83 (BP Location: Right arm, Patient Position: Lying)   Pulse 83   Temp 36.2 °C (97.2 °F) (Temporal)   Resp 15   Ht 1.6 m (5' 3\")   Wt 63.5 kg (139 lb 15.9 oz)   SpO2 95%   BMI 24.80 kg/m²      Scheduled medications:  atorvastatin, 80 mg, oral, Daily  docusate sodium, 100 mg, oral, BID  enoxaparin, 40 mg, subcutaneous, q24h  ferrous sulfate (325 mg ferrous sulfate), 65 mg of iron, oral, BID  lactobacillus acidophilus, 1 capsule, oral, BID  lisinopril, 30 mg, oral, Daily  metoprolol, 5 mg, intravenous, Once  mirtazapine, 15 mg, oral, Nightly  pantoprazole, 40 mg, oral, Daily before breakfast  PARoxetine, 10 mg, oral, Daily  piperacillin-tazobactam, 3.375 g, intravenous, q6h  potassium chloride CR, 40 mEq, oral, Once      Continuous medications:     PRN medications:  PRN medications: acetaminophen **OR** acetaminophen " **OR** acetaminophen, alum-mag hydroxide-simeth, calcium carbonate, dextromethorphan-guaifenesin, fluticasone, guaiFENesin, LORazepam, ondansetron **OR** ondansetron, oxygen     Relevant Results:  Results for orders placed or performed during the hospital encounter of 10/04/24 (from the past 24 hour(s))   Comprehensive Metabolic Panel   Result Value Ref Range    Glucose 94 74 - 99 mg/dL    Sodium 142 136 - 145 mmol/L    Potassium 3.5 3.5 - 5.3 mmol/L    Chloride 104 98 - 107 mmol/L    Bicarbonate 32 21 - 32 mmol/L    Anion Gap 10 10 - 20 mmol/L    Urea Nitrogen 11 6 - 23 mg/dL    Creatinine 0.93 0.50 - 1.05 mg/dL    eGFR 63 >60 mL/min/1.73m*2    Calcium 8.5 (L) 8.6 - 10.3 mg/dL    Albumin 2.6 (L) 3.4 - 5.0 g/dL    Alkaline Phosphatase 83 33 - 136 U/L    Total Protein 6.0 (L) 6.4 - 8.2 g/dL    AST 12 9 - 39 U/L    Bilirubin, Total 0.2 0.0 - 1.2 mg/dL    ALT 10 7 - 45 U/L   CBC and Auto Differential   Result Value Ref Range    WBC 6.9 4.4 - 11.3 x10*3/uL    nRBC 0.0 0.0 - 0.0 /100 WBCs    RBC 2.82 (L) 4.00 - 5.20 x10*6/uL    Hemoglobin 7.3 (L) 12.0 - 16.0 g/dL    Hematocrit 24.8 (L) 36.0 - 46.0 %    MCV 88 80 - 100 fL    MCH 25.9 (L) 26.0 - 34.0 pg    MCHC 29.4 (L) 32.0 - 36.0 g/dL    RDW 15.4 (H) 11.5 - 14.5 %    Platelets 274 150 - 450 x10*3/uL    Neutrophils % 59.6 40.0 - 80.0 %    Immature Granulocytes %, Automated 0.3 0.0 - 0.9 %    Lymphocytes % 28.0 13.0 - 44.0 %    Monocytes % 8.8 2.0 - 10.0 %    Eosinophils % 3.0 0.0 - 6.0 %    Basophils % 0.3 0.0 - 2.0 %    Neutrophils Absolute 4.14 1.60 - 5.50 x10*3/uL    Immature Granulocytes Absolute, Automated 0.02 0.00 - 0.50 x10*3/uL    Lymphocytes Absolute 1.94 0.80 - 3.00 x10*3/uL    Monocytes Absolute 0.61 0.05 - 0.80 x10*3/uL    Eosinophils Absolute 0.21 0.00 - 0.40 x10*3/uL    Basophils Absolute 0.02 0.00 - 0.10 x10*3/uL       No results found.                   Assessment/Plan   Principal Problem:    Pyelonephritis    Left pyelonephritis  CT A/P shows  hydronephrosis  Hx recurrent UTI  - ID following  - zosyn  - urology following: conservative management  - pain control  - nuclear renal scan pending    Normocytic anemia  Hx iron deficiency  - Fe supplement    HTN  - lisinopril    HLD  - statin    GERD  - protonix    Anxiety  - paroxetine    DVT ppx: lovenox  Dispo: monitor clinically, nuclear renal scan today         Sarah Thomas MD  Hospitalist

## 2024-10-07 NOTE — CONSULTS
Nutrition Note:   Nutrition Assessment    Reason for Assessment: Admission nursing screening (MST=2; Unintentional weight loss)    Patient is a 79 y.o. female presenting with UTI & pyelonephritis.     Tentatively planned for discharge home this afternoon.    Chart reviewed, pt with down trending weights but no significant losses noted. Attempted to visit with pt who was in the bathroom at that time. Spoke to CNA, who reports pt with 100% intake at breakfast & 75% intake at lunch today. Appears to be eating well. No additional needs noted at this time. Will defer consult as further intervention is not indicated. Please re-consult should clinical status change.       Dietary Orders (From admission, onward)       Start     Ordered    10/07/24 1004  May Participate in Room Service  Once        Question:  .  Answer:  Yes    10/07/24 1003    10/05/24 1118  Adult diet Regular  Diet effective now        Question:  Diet type  Answer:  Regular    10/05/24 1118                 Time Spent (min): 30 minutes

## 2024-10-07 NOTE — PROGRESS NOTES
10/07/24 0942   Discharge Planning   Living Arrangements Spouse/significant other  (Home with spouse)   Support Systems Spouse/significant other;Children   Assistance Needed Patient is A&OX3, independent with ADLs, ambulates with a walker occassionally, no O2, CPAP or Bipap at baseline, currently enrolled with [facility]   Type of Residence Private residence   Number of Stairs to Enter Residence 3   Number of Stairs Within Residence 28  (1 flight upstairs, 1 flight to basement)   Do you have animals or pets at home? No   Who is requesting discharge planning? Provider   Home or Post Acute Services None   Expected Discharge Disposition Home  (Home, no needs-declined need for HHC and does not want to continue with [facility] upon dc.)   Does the patient need discharge transport arranged? No

## 2024-10-07 NOTE — PROGRESS NOTES
10/07/24 1023   Discharge Planning   Expected Discharge Disposition Home  (Home, patient declined need for HHC but would like to resume [facility] program upon discharge. MD aware and to place referral.)

## 2024-10-07 NOTE — PROGRESS NOTES
Sherri Najera is a 79 y.o. female on day 3 of admission presenting with Pyelonephritis.    Subjective   Interval History: no fever, no new complaints        Review of Systems    Objective   Range of Vitals (last 24 hours)  Heart Rate:  [69-83]   Temp:  [36 °C (96.8 °F)-37.3 °C (99.1 °F)]   Resp:  [15-17]   BP: (149-179)/(66-98)   SpO2:  [91 %-95 %]   Daily Weight  10/04/24 : 63.5 kg (139 lb 15.9 oz)    Body mass index is 24.8 kg/m².    Physical Exam  Constitutional:       Appearance: Normal appearance.   HENT:      Head: Normocephalic and atraumatic.      Mouth/Throat:      Mouth: Mucous membranes are moist.      Pharynx: Oropharynx is clear.   Eyes:      Pupils: Pupils are equal, round, and reactive to light.   Cardiovascular:      Rate and Rhythm: Normal rate and regular rhythm.      Heart sounds: Normal heart sounds.   Pulmonary:      Effort: Pulmonary effort is normal.      Breath sounds: Normal breath sounds.   Abdominal:      General: Abdomen is flat. Bowel sounds are normal.      Palpations: Abdomen is soft.      Comments: Less flank tenderness   Musculoskeletal:      Cervical back: Normal range of motion.   Neurological:      Mental Status: She is alert.         Antibiotics  cefdinir - 300 mg  piperacillin-tazobactam - 3.375 gram/50 mL    Relevant Results  Labs  Results from last 72 hours   Lab Units 10/07/24  0532 10/06/24  0716 10/05/24  0531 10/04/24  1808   WBC AUTO x10*3/uL 6.9 8.1 9.4 14.0*   HEMOGLOBIN g/dL 7.3* 7.8* 7.9* 9.4*   HEMATOCRIT % 24.8* 26.9* 26.4* 30.7*   PLATELETS AUTO x10*3/uL 274 269 296 381   NEUTROS PCT AUTO % 59.6 66.1  --  80.6   LYMPHS PCT AUTO % 28.0 22.8  --  13.2   MONOS PCT AUTO % 8.8 8.3  --  5.0   EOS PCT AUTO % 3.0 2.2  --  0.7     Results from last 72 hours   Lab Units 10/07/24  0532 10/06/24  0544 10/05/24  0531   SODIUM mmol/L 142 142 143   POTASSIUM mmol/L 3.5 3.9 3.2*   CHLORIDE mmol/L 104 104 104   CO2 mmol/L 32 30 28   BUN mg/dL 11 12 11   CREATININE mg/dL 0.93  0.94 0.77   GLUCOSE mg/dL 94 94 107*   CALCIUM mg/dL 8.5* 9.1 8.4*   ANION GAP mmol/L 10 12 14   EGFR mL/min/1.73m*2 63 62 79     Results from last 72 hours   Lab Units 10/07/24  0532 10/06/24  0544 10/04/24  1808   ALK PHOS U/L 83 91 106   BILIRUBIN TOTAL mg/dL 0.2 0.3 0.3   PROTEIN TOTAL g/dL 6.0* 6.4 7.5   ALT U/L 10 11 15   AST U/L 12 13 21   ALBUMIN g/dL 2.6* 2.8* 3.3*     Estimated Creatinine Clearance: 44 mL/min (by C-G formula based on SCr of 0.93 mg/dL).  C-Reactive Protein   Date Value Ref Range Status   09/03/2024 >70.00 (H) 0.00 - 2.00 mg/dL Final     Comment:     Result rechecked     CRP   Date Value Ref Range Status   03/05/2020 0.59 mg/dL Final     Comment:     REF VALUE  < 1.00     03/11/2019 1.30 (A) mg/dL Final     Comment:     REF VALUE  < 1.00       Microbiology  Reviewed  Imaging  Reviewed        Assessment/Plan     Left pyelonephritis, negative urine culture     Recommendations :  Continue Zosyn, plan on oral antibiotics with discharge  Discussed with the medical team     I spent minutes in the professional and overall care of this patient.      Meek Dick MD

## 2024-10-07 NOTE — CARE PLAN
The clinical goals for the shift include pt will remain hemodynamically stable        
  The clinical goals for the shift include pt will remain hemodynamically stable      
The patient's goals for the shift include      The clinical goals for the shift include have no pain    Over the shift, the patient did not make progress toward the following goals. Barriers to progression include patient currently has controlled pain. Recommendations to address these barriers include continue with plan of care.    
The patient's goals for the shift include  be able to be discharged.    The clinical goals for the shift include Patient will remain HDS through out the shift.    Over the shift, the patient did not make progress toward the following goals. Barriers to progression include acute illness/UTI. Recommendations to address these barriers include medications.    
144
Complex Repair And Flap Additional Text (Will Appearing After The Standard Complex Repair Text): The complex repair was not sufficient to completely close the primary defect. The remaining additional defect was repaired with the flap mentioned below.

## 2024-10-08 ENCOUNTER — APPOINTMENT (OUTPATIENT)
Dept: LAB | Facility: LAB | Age: 79
End: 2024-10-08
Payer: MEDICARE

## 2024-10-08 ENCOUNTER — DOCUMENTATION (OUTPATIENT)
Dept: PRIMARY CARE | Facility: CLINIC | Age: 79
End: 2024-10-08

## 2024-10-08 ENCOUNTER — PATIENT OUTREACH (OUTPATIENT)
Dept: PRIMARY CARE | Facility: CLINIC | Age: 79
End: 2024-10-08

## 2024-10-08 ENCOUNTER — LAB (OUTPATIENT)
Dept: LAB | Facility: LAB | Age: 79
End: 2024-10-08
Payer: MEDICARE

## 2024-10-08 DIAGNOSIS — E78.00 HYPERCHOLESTEROLEMIA: ICD-10-CM

## 2024-10-08 DIAGNOSIS — E11.9 CONTROLLED TYPE 2 DIABETES MELLITUS WITHOUT COMPLICATION, WITHOUT LONG-TERM CURRENT USE OF INSULIN (MULTI): ICD-10-CM

## 2024-10-08 DIAGNOSIS — I10 ESSENTIAL HYPERTENSION: ICD-10-CM

## 2024-10-08 LAB
ALBUMIN SERPL BCP-MCNC: 3.4 G/DL (ref 3.4–5)
ALP SERPL-CCNC: 110 U/L (ref 33–136)
ALT SERPL W P-5'-P-CCNC: 14 U/L (ref 7–45)
ANION GAP SERPL CALC-SCNC: 18 MMOL/L (ref 10–20)
APPEARANCE UR: ABNORMAL
AST SERPL W P-5'-P-CCNC: 17 U/L (ref 9–39)
BASOPHILS # BLD AUTO: 0.04 X10*3/UL (ref 0–0.1)
BASOPHILS NFR BLD AUTO: 0.5 %
BILIRUB SERPL-MCNC: 0.3 MG/DL (ref 0–1.2)
BILIRUB UR STRIP.AUTO-MCNC: NEGATIVE MG/DL
BUN SERPL-MCNC: 9 MG/DL (ref 6–23)
CALCIUM SERPL-MCNC: 9.6 MG/DL (ref 8.6–10.6)
CHLORIDE SERPL-SCNC: 100 MMOL/L (ref 98–107)
CHOLEST SERPL-MCNC: 162 MG/DL (ref 0–199)
CHOLESTEROL/HDL RATIO: 4
CO2 SERPL-SCNC: 32 MMOL/L (ref 21–32)
COLOR UR: ABNORMAL
CREAT SERPL-MCNC: 0.9 MG/DL (ref 0.5–1.05)
CREAT UR-MCNC: 46.9 MG/DL (ref 20–320)
EGFRCR SERPLBLD CKD-EPI 2021: 65 ML/MIN/1.73M*2
EOSINOPHIL # BLD AUTO: 0.26 X10*3/UL (ref 0–0.4)
EOSINOPHIL NFR BLD AUTO: 3 %
ERYTHROCYTE [DISTWIDTH] IN BLOOD BY AUTOMATED COUNT: 18.4 % (ref 11.5–14.5)
EST. AVERAGE GLUCOSE BLD GHB EST-MCNC: 105 MG/DL
GLUCOSE SERPL-MCNC: 112 MG/DL (ref 74–99)
GLUCOSE UR STRIP.AUTO-MCNC: NORMAL MG/DL
HBA1C MFR BLD: 5.3 %
HCT VFR BLD AUTO: 25.8 % (ref 36–46)
HDLC SERPL-MCNC: 40.3 MG/DL
HGB BLD-MCNC: 9.5 G/DL (ref 12–16)
IMM GRANULOCYTES # BLD AUTO: 0.03 X10*3/UL (ref 0–0.5)
IMM GRANULOCYTES NFR BLD AUTO: 0.3 % (ref 0–0.9)
KETONES UR STRIP.AUTO-MCNC: NEGATIVE MG/DL
LDLC SERPL CALC-MCNC: 73 MG/DL
LEUKOCYTE ESTERASE UR QL STRIP.AUTO: ABNORMAL
LYMPHOCYTES # BLD AUTO: 2.42 X10*3/UL (ref 0.8–3)
LYMPHOCYTES NFR BLD AUTO: 27.9 %
MCH RBC QN AUTO: 36.1 PG (ref 26–34)
MCHC RBC AUTO-ENTMCNC: 36.8 G/DL (ref 32–36)
MCV RBC AUTO: 98 FL (ref 80–100)
MICROALBUMIN UR-MCNC: 1110.5 MG/L
MICROALBUMIN/CREAT UR: 2367.8 UG/MG CREAT
MONOCYTES # BLD AUTO: 0.56 X10*3/UL (ref 0.05–0.8)
MONOCYTES NFR BLD AUTO: 6.5 %
MUCOUS THREADS #/AREA URNS AUTO: ABNORMAL /LPF
NEUTROPHILS # BLD AUTO: 5.36 X10*3/UL (ref 1.6–5.5)
NEUTROPHILS NFR BLD AUTO: 61.8 %
NITRITE UR QL STRIP.AUTO: NEGATIVE
NON HDL CHOLESTEROL: 122 MG/DL (ref 0–149)
NRBC BLD-RTO: 0 /100 WBCS (ref 0–0)
PH UR STRIP.AUTO: 6.5 [PH]
PLATELET # BLD AUTO: 381 X10*3/UL (ref 150–450)
POTASSIUM SERPL-SCNC: 3.7 MMOL/L (ref 3.5–5.3)
PROT SERPL-MCNC: 7.4 G/DL (ref 6.4–8.2)
PROT UR STRIP.AUTO-MCNC: ABNORMAL MG/DL
RBC # BLD AUTO: 2.63 X10*6/UL (ref 4–5.2)
RBC # UR STRIP.AUTO: ABNORMAL /UL
RBC #/AREA URNS AUTO: >20 /HPF
SODIUM SERPL-SCNC: 146 MMOL/L (ref 136–145)
SP GR UR STRIP.AUTO: 1.01
SQUAMOUS #/AREA URNS AUTO: ABNORMAL /HPF
TRIGL SERPL-MCNC: 245 MG/DL (ref 0–149)
UROBILINOGEN UR STRIP.AUTO-MCNC: NORMAL MG/DL
VLDL: 49 MG/DL (ref 0–40)
WBC # BLD AUTO: 8.7 X10*3/UL (ref 4.4–11.3)
WBC #/AREA URNS AUTO: >50 /HPF

## 2024-10-08 PROCEDURE — 85025 COMPLETE CBC W/AUTO DIFF WBC: CPT

## 2024-10-08 PROCEDURE — 82570 ASSAY OF URINE CREATININE: CPT

## 2024-10-08 PROCEDURE — 36415 COLL VENOUS BLD VENIPUNCTURE: CPT

## 2024-10-08 PROCEDURE — 83036 HEMOGLOBIN GLYCOSYLATED A1C: CPT

## 2024-10-08 PROCEDURE — 80053 COMPREHEN METABOLIC PANEL: CPT

## 2024-10-08 PROCEDURE — 80061 LIPID PANEL: CPT

## 2024-10-08 PROCEDURE — 81001 URINALYSIS AUTO W/SCOPE: CPT

## 2024-10-08 PROCEDURE — 82043 UR ALBUMIN QUANTITATIVE: CPT

## 2024-10-08 NOTE — PROGRESS NOTES
Discharge Facility: [facility]     Discharge Diagnosis:    Pyelonephritis     Issues Requiring Follow-Up  Post hospital follow up      Admission Date: 10/4/2024   Discharge Date:  10/7/2024     PCP Appointment Date: 10/11/2024     Specialist Appointment Date:     [facility]     Follow up with Elena Leal MD (Urology) in place    Hospital Encounter and Summary Linked: Yes    See discharge assessment below for further details     Engagement  Call Start Time: 0944 (10/8/2024  9:44 AM)    Medications  Medications reviewed with patient/caregiver?: Yes (10/8/2024  9:44 AM)  Is the patient having any side effects they believe may be caused by any medication additions or changes?: No (10/8/2024  9:44 AM)  Does the patient have all medications ordered at discharge?: Yes (10/8/2024  9:44 AM)  Care Management Interventions: No intervention needed (10/8/2024  9:44 AM)  Prescription Comments: New reviewed,START taking these medications     cefdinir 300 mg capsule; Commonly known as: Omnicef; Take 1 capsule (300   mg) by mouth 2 times a day for 7 days. (10/8/2024  9:44 AM)  Is the patient taking all medications as directed (includes completed medication regime)?: -- (Patient states has all meds) (10/8/2024  9:44 AM)  Medication Comments: no questions or concerns (10/8/2024  9:44 AM)    Appointments  Does the patient have a primary care provider?: Yes (10/8/2024  9:44 AM)  Care Management Interventions: Verified appointment date/time/provider (10/8/2024  9:44 AM)  Has the patient kept scheduled appointments due by today?: Yes (10/8/2024  9:44 AM)  Care Management Interventions: Advised patient to keep appointment (10/8/2024  9:44 AM)    Self Management  What is the home health agency?: NA (10/8/2024  9:44 AM)  What Durable Medical Equipment (DME) was ordered?: NA (10/8/2024  9:44 AM)    Patient Teaching  Does the patient have access to their discharge instructions?: Yes (10/8/2024  9:44 AM)  What is the patient's  perception of their health status since discharge?: Improving (10/8/2024  9:44 AM)  Is the patient/caregiver able to teach back the hierarchy of who to call/visit for symptoms/problems? PCP, Specialist, Home Health nurse, Urgent Care, ED, 911: Yes (10/8/2024  9:44 AM)  Patient/Caregiver Education Comments: Patient aware of possisble side effects ATB, encouraged to call providers for any questions concerns or change in condition. [facility] involved, patient has F/U appts [facility]. in place and has support of spouse in home. (10/8/2024  9:44 AM)

## 2024-10-09 ENCOUNTER — OFFICE VISIT (OUTPATIENT)
Dept: UROLOGY | Facility: CLINIC | Age: 79
End: 2024-10-09
Payer: MEDICARE

## 2024-10-09 ENCOUNTER — APPOINTMENT (OUTPATIENT)
Dept: HEMATOLOGY/ONCOLOGY | Facility: CLINIC | Age: 79
End: 2024-10-09
Payer: MEDICARE

## 2024-10-09 DIAGNOSIS — N13.30 HYDRONEPHROSIS, LEFT: ICD-10-CM

## 2024-10-09 DIAGNOSIS — N12 PYELONEPHRITIS OF LEFT KIDNEY: Primary | ICD-10-CM

## 2024-10-09 LAB
BACTERIA BLD CULT: NORMAL
BACTERIA BLD CULT: NORMAL

## 2024-10-09 PROCEDURE — G2211 COMPLEX E/M VISIT ADD ON: HCPCS | Performed by: STUDENT IN AN ORGANIZED HEALTH CARE EDUCATION/TRAINING PROGRAM

## 2024-10-09 PROCEDURE — 1123F ACP DISCUSS/DSCN MKR DOCD: CPT | Performed by: STUDENT IN AN ORGANIZED HEALTH CARE EDUCATION/TRAINING PROGRAM

## 2024-10-09 PROCEDURE — 99214 OFFICE O/P EST MOD 30 MIN: CPT | Performed by: STUDENT IN AN ORGANIZED HEALTH CARE EDUCATION/TRAINING PROGRAM

## 2024-10-09 PROCEDURE — 1111F DSCHRG MED/CURRENT MED MERGE: CPT | Performed by: STUDENT IN AN ORGANIZED HEALTH CARE EDUCATION/TRAINING PROGRAM

## 2024-10-09 RX ORDER — NITROFURANTOIN 25; 75 MG/1; MG/1
100 CAPSULE ORAL DAILY
Qty: 30 CAPSULE | Refills: 2 | Status: SHIPPED | OUTPATIENT
Start: 2024-10-09 | End: 2025-10-09

## 2024-10-09 NOTE — PROGRESS NOTES
Subjective   Patient ID: Sherri Najera is a 79 y.o. female.    HPI  80 y/o F s/p cystoscopy and insertion of ureteral stent, retrograde pyelogram for left renal stone 6/20/2024 s/p 8/8/24 Left ureteroscopy, laser lithotripsy and  stent exchange followed by 8/29/24 stent removal.     She was hospitalized  10/4/24 due a UTI which complicated to pyelonephritis. CT a/p with contrast shows increasing, now marked left pyelonephritis and pyelitis involving the left kidney, collecting system and ureter, with associated moderate hydronephrosis of the left renal collecting system.  After discharge patient states she continues to note lethargy and decreased energy levels. She was started on Cefdinir.      She has a history of C.diff with oral antibiotics.      She continue to note urinary urgency and incontinence complaints.     MAG3 scan with lasix   NM IMPRESSION:  1. Normal functioning right kidney without evidence of obstruction.  Radiotracer retention in the collecting system with good response to  Lasix, likely representing reservoir effect.  2. Impaired left renal function without response to Lasix.  3. Differential kidney function is 23% on the left and 77% on the Right    CT abdomen pelvis wo IV contrast  Result Date: 7/2/2024  1. There is a nonobstructing 17 mm stone in the left kidney. A left ureteral stent is in place. There is no hydronephrosis. 2. There are enlarged bilateral inguinal lymph nodes. The largest measures 1.7 x 1.4 cm in the left inguinal region. There are also prominent lymph nodes along the left periaortic region, left pelvic sidewall, and left iliac chain. These may be reactive, but I cannot exclude a neoplastic process.    Review of Systems  A complete review of systems was performed. All systems are noted to be negative unless indicated in the history of present illness, impression, active problem list, or past histories.     Objective   Physical Exam  General: Well developed, well nourished,  alert and cooperative, appears in no acute distress  Eyes: Non-injected conjunctiva, sclera clear, no proptosis  Cardiac: Extremities are warm and well perfused. No edema, cyanosis or pallor.   Lungs: Breathing is easy, non-labored. Speaking in clear and complete sentences. Normal diaphragmatic movement.  Abdomen: soft, non-tender. No CVA tenderness.    Assessment/Plan   80 y/o F s/p cystoscopy and insertion of ureteral stent, retrograde pyelogram for left renal stone 6/20/2024 s/p 8/8/24 Left ureteroscopy, laser lithotripsy and  stent exchange followed by 8/29/24 stent removal.     She was hospitalized  10/4/24 due a UTI which complicated to pyelonephritis. CT a/p with contrast shows increasing, now marked left pyelonephritis and pyelitis involving the left kidney, collecting system and ureter, with associated moderate hydronephrosis of the left renal collecting system.  After discharge patient states she continues to note lethargy.  She is currently on Cefdinir.       I reviewed her MAG3 scan with lasix  which showed Normal functioning right kidney without evidence of obstruction. Radiotracer retention in the collecting system with good response to  Lasix, likely representing reservoir effect. Impaired left renal function without response to Lasix.   Differential kidney function is 23% on the left and 77% on the Right    We discussed the results of her scan and based on that we discussed surveillance and waiting for her left kidney to heal and repeat scan in 3 months.     We also discussed starting a daily prophylactic antibiotic after completing her current course.     Plan:  Start nitrofurantoin 100 mg once daily for 3 months.    Repeat MAG3 scan  in January 2025.  FU in January 2025.     Diagnoses and all orders for this visit:  Pyelonephritis of left kidney  -     NM kidney flow function w diuretic; Future  Hydronephrosis, left      Scribe Attestation  By signing my name below, I, Ninfa Enrique   Scribe attest that this documentation has been prepared under the direction and in the presence of Elena Leal MD.

## 2024-10-09 NOTE — SIGNIFICANT EVENT
Follow Up Phone Call    Outgoing phone call    Spoke to: Sherri Najera Relationship:self   Phone number: [phone]      Outcome: contacted patient/ family   Chief Complaint   Patient presents with    Flank Pain     Onset of left flank pain after lunch today with pain radiating to front.  Was recently treated for  a Kidney infection.          Diagnosis:Not applicable    States she is feeling better. No further questions or concerns.

## 2024-10-10 ENCOUNTER — OFFICE VISIT (OUTPATIENT)
Dept: HEMATOLOGY/ONCOLOGY | Facility: CLINIC | Age: 79
End: 2024-10-10
Payer: MEDICARE

## 2024-10-10 ENCOUNTER — PATIENT OUTREACH (OUTPATIENT)
Dept: HOME HEALTH SERVICES | Age: 79
End: 2024-10-10

## 2024-10-10 ENCOUNTER — TELEMEDICINE CLINICAL SUPPORT (OUTPATIENT)
Dept: CARE COORDINATION | Age: 79
End: 2024-10-10
Payer: MEDICARE

## 2024-10-10 VITALS
SYSTOLIC BLOOD PRESSURE: 159 MMHG | HEART RATE: 91 BPM | OXYGEN SATURATION: 98 % | WEIGHT: 131.84 LBS | BODY MASS INDEX: 23.35 KG/M2 | RESPIRATION RATE: 17 BRPM | TEMPERATURE: 98.4 F | DIASTOLIC BLOOD PRESSURE: 83 MMHG

## 2024-10-10 DIAGNOSIS — N12 PYELONEPHRITIS: ICD-10-CM

## 2024-10-10 DIAGNOSIS — I10 ESSENTIAL HYPERTENSION: Primary | ICD-10-CM

## 2024-10-10 DIAGNOSIS — D64.9 ANEMIA, UNSPECIFIED TYPE: ICD-10-CM

## 2024-10-10 DIAGNOSIS — D72.819 LEUKOPENIA, UNSPECIFIED TYPE: ICD-10-CM

## 2024-10-10 DIAGNOSIS — D50.8 IRON DEFICIENCY ANEMIA SECONDARY TO INADEQUATE DIETARY IRON INTAKE: Primary | ICD-10-CM

## 2024-10-10 PROCEDURE — 1160F RVW MEDS BY RX/DR IN RCRD: CPT | Performed by: INTERNAL MEDICINE

## 2024-10-10 PROCEDURE — 3077F SYST BP >= 140 MM HG: CPT | Performed by: INTERNAL MEDICINE

## 2024-10-10 PROCEDURE — 99215 OFFICE O/P EST HI 40 MIN: CPT | Performed by: INTERNAL MEDICINE

## 2024-10-10 PROCEDURE — 3079F DIAST BP 80-89 MM HG: CPT | Performed by: INTERNAL MEDICINE

## 2024-10-10 PROCEDURE — 1126F AMNT PAIN NOTED NONE PRSNT: CPT | Performed by: INTERNAL MEDICINE

## 2024-10-10 PROCEDURE — 1123F ACP DISCUSS/DSCN MKR DOCD: CPT | Performed by: INTERNAL MEDICINE

## 2024-10-10 PROCEDURE — 1111F DSCHRG MED/CURRENT MED MERGE: CPT | Performed by: INTERNAL MEDICINE

## 2024-10-10 PROCEDURE — 1159F MED LIST DOCD IN RCRD: CPT | Performed by: INTERNAL MEDICINE

## 2024-10-10 RX ORDER — EPINEPHRINE 0.3 MG/.3ML
0.3 INJECTION SUBCUTANEOUS EVERY 5 MIN PRN
OUTPATIENT
Start: 2024-10-18

## 2024-10-10 RX ORDER — DIPHENHYDRAMINE HYDROCHLORIDE 50 MG/ML
50 INJECTION INTRAMUSCULAR; INTRAVENOUS AS NEEDED
OUTPATIENT
Start: 2024-10-18

## 2024-10-10 RX ORDER — FAMOTIDINE 10 MG/ML
20 INJECTION INTRAVENOUS ONCE AS NEEDED
OUTPATIENT
Start: 2024-10-18

## 2024-10-10 RX ORDER — HEPARIN SODIUM,PORCINE/PF 10 UNIT/ML
50 SYRINGE (ML) INTRAVENOUS AS NEEDED
OUTPATIENT
Start: 2024-10-18

## 2024-10-10 RX ORDER — ALBUTEROL SULFATE 0.83 MG/ML
3 SOLUTION RESPIRATORY (INHALATION) AS NEEDED
OUTPATIENT
Start: 2024-10-18

## 2024-10-10 RX ORDER — HEPARIN 100 UNIT/ML
500 SYRINGE INTRAVENOUS AS NEEDED
OUTPATIENT
Start: 2024-10-18

## 2024-10-10 SDOH — SOCIAL STABILITY: SOCIAL NETWORK
DO YOU BELONG TO ANY CLUBS OR ORGANIZATIONS SUCH AS CHURCH GROUPS, UNIONS, FRATERNAL OR ATHLETIC GROUPS, OR SCHOOL GROUPS?: YES

## 2024-10-10 SDOH — SOCIAL STABILITY: SOCIAL INSECURITY: ARE YOU MARRIED, WIDOWED, DIVORCED, SEPARATED, NEVER MARRIED, OR LIVING WITH A PARTNER?: MARRIED

## 2024-10-10 SDOH — SOCIAL STABILITY: SOCIAL NETWORK: IN A TYPICAL WEEK, HOW MANY TIMES DO YOU TALK ON THE PHONE WITH FAMILY, FRIENDS, OR NEIGHBORS?: THREE TIMES A WEEK

## 2024-10-10 SDOH — HEALTH STABILITY: MENTAL HEALTH: HOW MANY DRINKS CONTAINING ALCOHOL DO YOU HAVE ON A TYPICAL DAY WHEN YOU ARE DRINKING?: PATIENT DOES NOT DRINK

## 2024-10-10 SDOH — SOCIAL STABILITY: SOCIAL INSECURITY: WITHIN THE LAST YEAR, HAVE YOU BEEN AFRAID OF YOUR PARTNER OR EX-PARTNER?: NO

## 2024-10-10 SDOH — HEALTH STABILITY: PHYSICAL HEALTH: ON AVERAGE, HOW MANY DAYS PER WEEK DO YOU ENGAGE IN MODERATE TO STRENUOUS EXERCISE (LIKE A BRISK WALK)?: 0 DAYS

## 2024-10-10 SDOH — ECONOMIC STABILITY: FOOD INSECURITY: WITHIN THE PAST 12 MONTHS, YOU WORRIED THAT YOUR FOOD WOULD RUN OUT BEFORE YOU GOT THE MONEY TO BUY MORE.: NEVER TRUE

## 2024-10-10 SDOH — SOCIAL STABILITY: SOCIAL INSECURITY
WITHIN THE LAST YEAR, HAVE YOU BEEN RAPED OR FORCED TO HAVE ANY KIND OF SEXUAL ACTIVITY BY YOUR PARTNER OR EX-PARTNER?: NO

## 2024-10-10 SDOH — SOCIAL STABILITY: SOCIAL INSECURITY: WITHIN THE LAST YEAR, HAVE YOU BEEN HUMILIATED OR EMOTIONALLY ABUSED IN OTHER WAYS BY YOUR PARTNER OR EX-PARTNER?: NO

## 2024-10-10 SDOH — HEALTH STABILITY: MENTAL HEALTH: HOW OFTEN DO YOU HAVE A DRINK CONTAINING ALCOHOL?: NEVER

## 2024-10-10 SDOH — ECONOMIC STABILITY: HOUSING INSECURITY: IN THE LAST 12 MONTHS, WAS THERE A TIME WHEN YOU WERE NOT ABLE TO PAY THE MORTGAGE OR RENT ON TIME?: NO

## 2024-10-10 SDOH — HEALTH STABILITY: MENTAL HEALTH: HOW OFTEN DO YOU HAVE SIX OR MORE DRINKS ON ONE OCCASION?: NEVER

## 2024-10-10 SDOH — ECONOMIC STABILITY: TRANSPORTATION INSECURITY: IN THE PAST 12 MONTHS, HAS LACK OF TRANSPORTATION KEPT YOU FROM MEDICAL APPOINTMENTS OR FROM GETTING MEDICATIONS?: NO

## 2024-10-10 SDOH — ECONOMIC STABILITY: INCOME INSECURITY: IN THE PAST 12 MONTHS HAS THE ELECTRIC, GAS, OIL, OR WATER COMPANY THREATENED TO SHUT OFF SERVICES IN YOUR HOME?: NO

## 2024-10-10 SDOH — HEALTH STABILITY: PHYSICAL HEALTH
HOW OFTEN DO YOU NEED TO HAVE SOMEONE HELP YOU WHEN YOU READ INSTRUCTIONS, PAMPHLETS, OR OTHER WRITTEN MATERIAL FROM YOUR DOCTOR OR PHARMACY?: SOMETIMES

## 2024-10-10 SDOH — ECONOMIC STABILITY: HOUSING INSECURITY: AT ANY TIME IN THE PAST 12 MONTHS, WERE YOU HOMELESS OR LIVING IN A SHELTER (INCLUDING NOW)?: NO

## 2024-10-10 SDOH — ECONOMIC STABILITY: FOOD INSECURITY: WITHIN THE PAST 12 MONTHS, THE FOOD YOU BOUGHT JUST DIDN'T LAST AND YOU DIDN'T HAVE MONEY TO GET MORE.: NEVER TRUE

## 2024-10-10 SDOH — SOCIAL STABILITY: SOCIAL NETWORK: HOW OFTEN DO YOU GET TOGETHER WITH FRIENDS OR RELATIVES?: THREE TIMES A WEEK

## 2024-10-10 SDOH — SOCIAL STABILITY: SOCIAL NETWORK: HOW OFTEN DO YOU ATTEND CHURCH OR RELIGIOUS SERVICES?: MORE THAN 4 TIMES PER YEAR

## 2024-10-10 SDOH — HEALTH STABILITY: MENTAL HEALTH
DO YOU FEEL STRESS - TENSE, RESTLESS, NERVOUS, OR ANXIOUS, OR UNABLE TO SLEEP AT NIGHT BECAUSE YOUR MIND IS TROUBLED ALL THE TIME - THESE DAYS?: ONLY A LITTLE

## 2024-10-10 SDOH — SOCIAL STABILITY: SOCIAL NETWORK: HOW OFTEN DO YOU ATTEND MEETINGS OF THE CLUBS OR ORGANIZATIONS YOU BELONG TO?: MORE THAN 4 TIMES PER YEAR

## 2024-10-10 SDOH — HEALTH STABILITY: PHYSICAL HEALTH: ON AVERAGE, HOW MANY MINUTES DO YOU ENGAGE IN EXERCISE AT THIS LEVEL?: 0 MIN

## 2024-10-10 SDOH — ECONOMIC STABILITY: FOOD INSECURITY: HOW HARD IS IT FOR YOU TO PAY FOR THE VERY BASICS LIKE FOOD, HOUSING, MEDICAL CARE, AND HEATING?: NOT HARD AT ALL

## 2024-10-10 ASSESSMENT — ACTIVITIES OF DAILY LIVING (ADL): LACK_OF_TRANSPORTATION: NO

## 2024-10-10 ASSESSMENT — LIFESTYLE VARIABLES
SKIP TO QUESTIONS 9-10: 1
AUDIT-C TOTAL SCORE: 0

## 2024-10-10 ASSESSMENT — PAIN SCALES - GENERAL: PAINLEVEL: 0-NO PAIN

## 2024-10-10 NOTE — PROGRESS NOTES
Patient ID: Sherri Najera is a 79 y.o. female.  Referring Physician: Issa Xie MD  [id number]  [address] [place] [address]  Primary Care Provider: Yesi Lam MD  Referral Reason:     Subjective: anemia  Dry mouth and fatigue, has constipation from oral iron    Heme/Onc History:  Sherri Najera is a 79 y.o. year old female with PMH DM, HTN, HLD, GERD, Depression/Anxiety, Recurrent renal calculi. she was found to have acute pyelonephritis in 06/2024    Past Medical History:   Past Medical History:   Diagnosis Date    Aneurysm, splenic artery ([facility])     Ankylosing spondylitis     Anxiety     Arthritis     Body mass index (BMI) 27.0-27.9, adult 12/07/2021    BMI 27.0-27.9,adult    BPPV (benign paroxysmal positional vertigo)     Cervical radiculitis     Chronic kidney disease     Depression     Diabetes mellitus (Multi)     Effusion, left knee 02/11/2019    Effusion of left knee    Exocrine pancreatic insufficiency ([facility])     GERD (gastroesophageal reflux disease)     Hearing aid worn     HL (hearing loss)     HLD (hyperlipidemia)     Hypertension     Injury of tendon of rotator cuff 06/19/2024    Irritable bowel syndrome with diarrhea 01/16/2020    Irritable bowel syndrome with diarrhea    Kidney stone     Kidney stone 08/21/2023    Nephrolithiasis     Other conditions influencing health status 06/08/2020    History of chronic diarrhea    Other hemorrhoids 06/28/2017    Internal hemorrhoid    Other nonspecific abnormal finding of lung field 03/26/2019    Ground glass opacity present on imaging of lung    Pain in left knee 04/30/2019    Left knee pain    Pain in unspecified knee 02/11/2019    Knee pain    Personal history of other diseases of the circulatory system 08/12/2017    History of hypertension    Personal history of other diseases of the musculoskeletal system and connective tissue 05/21/2015    History of low back pain    Personal history of other specified conditions 11/03/2020     History of fatigue    Personal history of other specified conditions 05/04/2021    History of diarrhea    Personal history of other specified conditions 03/07/2014    History of chest pain    Personal history of other specified conditions 01/31/2013    History of headache    Skin cancer     Unspecified injury of muscle(s) and tendon(s) of the rotator cuff of right shoulder, initial encounter 10/11/2016    Injury of right rotator cuff    Urinary tract infection     Wears glasses      Social History:   Social History     Socioeconomic History    Marital status:      Spouse name: Not on file    Number of children: Not on file    Years of education: Not on file    Highest education level: Not on file   Occupational History    Not on file   Tobacco Use    Smoking status: Never    Smokeless tobacco: Never   Vaping Use    Vaping status: Never Used   Substance and Sexual Activity    Alcohol use: Never    Drug use: Never    Sexual activity: Not Currently   Other Topics Concern    Not on file   Social History Narrative    Not on file     Social Determinants of Health     Financial Resource Strain: Low Risk  (10/5/2024)    Overall Financial Resource Strain (CARDIA)     Difficulty of Paying Living Expenses: Not hard at all   Food Insecurity: No Food Insecurity (10/4/2024)    Hunger Vital Sign     Worried About Running Out of Food in the Last Year: Never true     Ran Out of Food in the Last Year: Never true   Transportation Needs: No Transportation Needs (10/5/2024)    PRAPARE - Transportation     Lack of Transportation (Medical): No     Lack of Transportation (Non-Medical): No   Physical Activity: Inactive (10/4/2024)    Exercise Vital Sign     Days of Exercise per Week: 0 days     Minutes of Exercise per Session: 0 min   Stress: No Stress Concern Present (9/24/2024)    [employer] [place] [employer] - Occupational Stress Questionnaire     Feeling of Stress : Only a little   Social Connections: Socially  Integrated (9/24/2024)    Social Connection and Isolation Panel [NHANES]     Frequency of Communication with Friends and Family: More than three times a week     Frequency of Social Gatherings with Friends and Family: More than three times a week     Attends Restorationism Services: More than 4 times per year     Active Member of Clubs or Organizations: Yes     Attends Club or Organization Meetings: More than 4 times per year     Marital Status:    Intimate Partner Violence: Not At Risk (10/4/2024)    Humiliation, Afraid, Rape, and Kick questionnaire     Fear of Current or Ex-Partner: No     Emotionally Abused: No     Physically Abused: No     Sexually Abused: No   Housing Stability: Low Risk  (10/5/2024)    Housing Stability Vital Sign     Unable to Pay for Housing in the Last Year: No     Number of Times Moved in the Last Year: 1     Homeless in the Last Year: No     Surgical History:   Past Surgical History:   Procedure Laterality Date    BREAST BIOPSY Right 2004    benign    CARPAL TUNNEL RELEASE Bilateral     COLONOSCOPY  04/11/2018    no further needed-normal    CYSTOSCOPY W/ URETERAL STENT PLACEMENT Left 06/30/2024    HAND SURGERY Left 03/07/2014    Hand Surgery                                                                                                                                                          MR HEAD ANGIO WO IV CONTRAST  04/15/2023    MR HEAD ANGIO WO IV CONTRAST [facility] MRI    MR NECK ANGIO WO IV CONTRAST  04/15/2023    MR NECK ANGIO WO IV CONTRAST [facility] MRI    TOTAL KNEE ARTHROPLASTY Left 02/26/2024    TOTAL SHOULDER ARTHROPLASTY Bilateral 07/11/2022     Family History:   Family History   Problem Relation Name Age of Onset    Diabetes Mother Sherri     Asthma Mother Sherri     Other (CRF) Mother Sherri     Heart failure Father        reports that she has never smoked. She has never used smokeless tobacco.  Oncology Family history: Cancer-related family history is not on file.    Review Of  Systems:  As stated per in HPI; otherwise all other 12 point ROS are negative    Physical Exam:  /83 (BP Location: Right arm, Patient Position: Sitting, BP Cuff Size: Adult)   Pulse 91   Temp 36.9 °C (98.4 °F) (Temporal)   Resp 17   Wt 59.8 kg (131 lb 13.4 oz)   SpO2 98%   BMI 23.35 kg/m²   BSA: 1.63 meters squared  General: awake/alert/oriented x3, no distress, alert and cooperative  Head: Short hair fully covering scalp. Symmetric facial expressions  Eyes: PERRL, EOMI, clear sclera, eyebrows present.  Ears/Nose/Mouth/Throat:  Oral mucous membranes moist. No oral ulcers. No palpable pre/post-auricular lymph nodes  Neck: No palpable cervical chain lymph nodes  Respiratory: unlabored breathing on room air, good chest expansion, thorax symmetric  Cardio: Regular rate and rhythm, normal S1 and S2, radial pulses symmetric  GI: Nondistended, soft, non-tender abdomen  Musculoskeletal: Normal muscle bulk and tone, ROM intact, no joint swelling.  Rises from chair and walks unassisted.  Extremities: No ankle swelling, no arm or leg wounds  Neuro: Alert, cognition intact, speech normal. Facial expressions symmetric.  No motor deficits noted. Sensation intact to touch and hot/cold.   Able to stand from seated position unassisted and walks around the room unassisted.  Psychological: Appropriate mood and behavior.  Skin: Warm and dry, no lesions, no rashes    Results:  Diagnostic Results   Lab Results   Component Value Date    WBC 8.7 10/08/2024    HGB 9.5 (L) 10/08/2024    HCT 25.8 (L) 10/08/2024    MCV 98 10/08/2024     10/08/2024     Lab Results   Component Value Date    CALCIUM 9.6 10/08/2024     (H) 10/08/2024    K 3.7 10/08/2024    CO2 32 10/08/2024     10/08/2024    BUN 9 10/08/2024    CREATININE 0.90 10/08/2024    ALT 14 10/08/2024    AST 17 10/08/2024       Current Outpatient Medications:     atorvastatin (Lipitor) 80 mg tablet, Take 1 tablet (80 mg) by mouth once daily., Disp: 90 tablet,  Rfl: 1    cefdinir (Omnicef) 300 mg capsule, Take 1 capsule (300 mg) by mouth 2 times a day for 7 days., Disp: 14 capsule, Rfl: 0    fluticasone (Flonase Allergy Relief) 50 mcg/actuation nasal spray, Administer 1 spray into each nostril 2 times a day as needed., Disp: , Rfl:     Lactobacillus acidophilus 1 billion cell tablet, Take 1 tablet by mouth 2 times a day., Disp: 60 tablet, Rfl: 0    lisinopril 30 mg tablet, Take 1 tablet (30 mg) by mouth once daily., Disp: 90 tablet, Rfl: 1    LORazepam (Ativan) 0.5 mg tablet, Take 1 tablet (0.5 mg) by mouth 2 times a day as needed for anxiety., Disp: 30 tablet, Rfl: 0    mirtazapine (Remeron) 15 mg tablet, Take 1 tablet (15 mg) by mouth once daily at bedtime., Disp: 30 tablet, Rfl: 1    MULTIVITAMIN ORAL, Take 1 tablet by mouth once daily., Disp: , Rfl:     nitrofurantoin, macrocrystal-monohydrate, (Macrobid) 100 mg capsule, Take 1 capsule (100 mg) by mouth once daily., Disp: 30 capsule, Rfl: 2    omeprazole (PriLOSEC) 40 mg DR capsule, TAKE 1 CAPSULE BY MOUTH ONCE DAILY IN THE MORNING BEFORE A MEAL. DO NOT CRUSH OR CHEW., Disp: 30 capsule, Rfl: 0    PARoxetine (Paxil) 10 mg tablet, Take 1 tablet (10 mg) by mouth once daily., Disp: 90 tablet, Rfl: 1    zinc gluconate 50 mg tablet, Take 1 tablet (50 mg) by mouth once daily., Disp: , Rfl:      Radiology:    Pathology:    Assessment/Plan:  ? Anemia:    Normocytic. Ngs is negative. 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 Her left kidney fxn is %23 of normal due to renal calculi and pyelonephritis. She also has underlying iron def. She was dc home wth oral iron but can not tolerate them due to constipation.    Venofer 300 mg x 3 ordered. EPO is 47 so I expect slow recovery of BM fxn within the next few months. If not, we will consider BMB    RTC in 3 months with labs    Diagnoses and all orders for this visit:  Leukopenia, unspecified type  -     Clinic Appointment Request       Performance Status: Symptomatic; fully  ambulatory    I spent more than 30 minutes for the patient today, including face-to-face conversation, pre-visit preparation, post-visit orders, and others.   Issa Xie MD

## 2024-10-10 NOTE — PROGRESS NOTES
Re enrollment call with Sarah Felix patient had recent hospitalization she has seen urology and hem onc today she was placed on a long term 3 month antibiotic in hopes of clearing up her infection she is also going to get infusions once a week for the next three weeks to help with the Anemia. Medications reviewed. There have been multiple medication changes since her hospitalization. Patient has an appointment with her PCP tomorrow. Patient has been monitoring her Vitals and her concern is that her BP is still running high 167/86 was today's BP she has had 1 over 200 and some that have been running in the 180s she will ask her PCP tomorrow about that she has been keeping good records that she can bring to her appointment. Dennys are agreeable to doing calls on T/Thurs between 9a-10a and [facility] and we can decide from there if they want to continue in the program Questions and concerns addressed    Duration Of Freeze Thaw-Cycle (Seconds): 5-10 Show Aperture Variable?: Yes Medical Necessity Information: It is in your best interest to select a reason for this procedure from the list below. All of these items fulfill various CMS LCD requirements except the new and changing color options. Consent: The patient's consent was obtained including but not limited to risks of crusting, scabbing, blistering, scarring, darker or lighter pigmentary change, recurrence, incomplete removal and infection. Number Of Freeze-Thaw Cycles: 1 freeze-thaw cycle Include Z78.9 (Other Specified Conditions Influencing Health Status) As An Associated Diagnosis?: No Medical Necessity Clause: This procedure was medically necessary because the lesions that were treated were: Detail Level: Detailed Post-Care Instructions: I reviewed with the patient in detail post-care instructions. Patient is to wear sunprotection, and avoid picking at any of the treated lesions. Pt may apply Vaseline or aquaphor to crusted or scabbing areas.\\nAvoid popping blisters unless extremely tense or uncomfortable.  If this occurs, use a sterilized needle and alcohol to the area.

## 2024-10-10 NOTE — DOCUMENTATION CLARIFICATION NOTE
"    PATIENT:               BAUTISTA RYAN  ACCT #:                  [id number]  MRN:                       [id number]  :                       1945  ADMIT DATE:       10/4/2024 5:27 PM  DISCH DATE:        10/7/2024 3:38 PM  RESPONDING PROVIDER #:        [id number]          PROVIDER RESPONSE TEXT:    Pyelonephritis and pyelitis unrelated to the previous stent or cystoscopy    CDI QUERY TEXT:    Clarification      Instruction:    Based on your assessment of the patient and the clinical information, please provide the requested documentation by clicking on the appropriate radio button and enter any additional information if prompted.    Question: Please further clarify the relationship between the Pyelonephritis and pyelitis and the urinary device or the previous cystoscopy    When answering this query, please exercise your independent professional judgment. The fact that a question is being asked, does not imply that any particular answer is desired or expected.    The patient's clinical indicators include:  Clinical Information: 80 y/o presented with left flank pain and found to have pyonephrosis-      Clinical Indicators:    Per [facility] 10/4/24 \"Pyelonephritis and pyelitis\"    Per consult not Kapil 10/5/24 \"history of large left renal stone which was fragmented on 24 and a stent was placed, later removed in clinic on 24. Initial stent was placed on 24 \"    Per UA  Leukocyte esterase 500  Protein 100 2+    Treatment: ID Consult, Rocephin 1g IV once then every 12 hours, Zosyn 3.375 g IV every 6 hours, LR bolus IV 1,000mL, UA, and supportive care    Risk Factors: 80 y/o with recent cystoscopy and stent presented with left flank pain and found to have Pyelonephritis and pyelitis  Options provided:  -- Pyelonephritis and pyelitis related to the previous procedure of cystoscopy  -- Pyelonephritis and pyelitis related to the previous stent  -- Pyelonephritis and pyelitis unrelated to the " previous stent or cystoscopy  -- Other - I will add my own diagnosis  -- Refer to Clinical Documentation Reviewer    Query created by: Blair Ruiz on 10/10/2024 9:04 AM      Electronically signed by:  TORSTEN WAYNE MD 10/10/2024 9:21 AM

## 2024-10-10 NOTE — PROGRESS NOTES
Pt seen in office today for a follow up visit with Dr. Issa Xie for management of her anemia.  She is  without complaints today and denies pain. She is accompanied to her visit today by her , Moose and her son, Miguel.    Medications, pharmacy preference and allergies were reviewed with patient and updated in the medical record by MD.     Per orders, lab results are to be reviewed today during visit by MD with patient and her family. She is to receive 3 weekly Venofer infusions, first to be next Friday, 10/18/24 at 9:30, chair 4. She will then return to clinic for a FUV with labs prior to visit in 3 months.    Our contact information was given to patient and they were encouraged to contact us with any questions or concerns.     Patient verbalized understanding and agreement regarding discussed information via verbal feedback. Pt escorted to scheduling.

## 2024-10-11 ENCOUNTER — APPOINTMENT (OUTPATIENT)
Dept: PRIMARY CARE | Facility: CLINIC | Age: 79
End: 2024-10-11
Payer: MEDICARE

## 2024-10-11 ENCOUNTER — TELEPHONE (OUTPATIENT)
Dept: PRIMARY CARE | Facility: CLINIC | Age: 79
End: 2024-10-11

## 2024-10-11 ENCOUNTER — OFFICE VISIT (OUTPATIENT)
Dept: PRIMARY CARE | Facility: CLINIC | Age: 79
End: 2024-10-11
Payer: MEDICARE

## 2024-10-11 ENCOUNTER — APPOINTMENT (OUTPATIENT)
Dept: CARE COORDINATION | Age: 79
End: 2024-10-11

## 2024-10-11 VITALS
WEIGHT: 132 LBS | HEART RATE: 79 BPM | OXYGEN SATURATION: 96 % | BODY MASS INDEX: 23.39 KG/M2 | SYSTOLIC BLOOD PRESSURE: 158 MMHG | DIASTOLIC BLOOD PRESSURE: 86 MMHG | HEIGHT: 63 IN

## 2024-10-11 DIAGNOSIS — R26.81 GAIT INSTABILITY: ICD-10-CM

## 2024-10-11 DIAGNOSIS — C44.90 SKIN CANCER: ICD-10-CM

## 2024-10-11 DIAGNOSIS — E78.00 HYPERCHOLESTEROLEMIA: ICD-10-CM

## 2024-10-11 DIAGNOSIS — E11.9 CONTROLLED TYPE 2 DIABETES MELLITUS WITHOUT COMPLICATION, WITHOUT LONG-TERM CURRENT USE OF INSULIN (MULTI): ICD-10-CM

## 2024-10-11 DIAGNOSIS — I10 BENIGN ESSENTIAL HTN: ICD-10-CM

## 2024-10-11 DIAGNOSIS — F51.01 PRIMARY INSOMNIA: ICD-10-CM

## 2024-10-11 DIAGNOSIS — F41.9 ANXIETY: ICD-10-CM

## 2024-10-11 DIAGNOSIS — Z00.00 WELL ADULT EXAM: Primary | ICD-10-CM

## 2024-10-11 RX ORDER — LISINOPRIL 30 MG/1
30 TABLET ORAL DAILY
Qty: 90 TABLET | Refills: 3 | Status: SHIPPED | OUTPATIENT
Start: 2024-10-11 | End: 2025-10-11

## 2024-10-11 RX ORDER — MIRTAZAPINE 15 MG/1
15 TABLET, FILM COATED ORAL NIGHTLY
Qty: 90 TABLET | Refills: 3 | Status: SHIPPED | OUTPATIENT
Start: 2024-10-11 | End: 2025-10-11

## 2024-10-11 ASSESSMENT — PAIN SCALES - GENERAL: PAINLEVEL: 0-NO PAIN

## 2024-10-11 ASSESSMENT — ACTIVITIES OF DAILY LIVING (ADL)
DRESSING: INDEPENDENT
BATHING: NEEDS ASSISTANCE
MANAGING_FINANCES: NEEDS ASSISTANCE
DOING_HOUSEWORK: NEEDS ASSISTANCE
GROCERY_SHOPPING: NEEDS ASSISTANCE
TAKING_MEDICATION: NEEDS ASSISTANCE

## 2024-10-11 ASSESSMENT — PATIENT HEALTH QUESTIONNAIRE - PHQ9
1. LITTLE INTEREST OR PLEASURE IN DOING THINGS: NOT AT ALL
2. FEELING DOWN, DEPRESSED OR HOPELESS: NOT AT ALL
SUM OF ALL RESPONSES TO PHQ9 QUESTIONS 1 AND 2: 0

## 2024-10-11 NOTE — TELEPHONE ENCOUNTER
Patient called Rx line and requested a refill for Mirtazapine 15 mg.  Pharmacy: [facility] [place]

## 2024-10-15 ENCOUNTER — PATIENT OUTREACH (OUTPATIENT)
Dept: HOME HEALTH SERVICES | Age: 79
End: 2024-10-15
Payer: MEDICARE

## 2024-10-15 VITALS
WEIGHT: 131.8 LBS | BODY MASS INDEX: 23.06 KG/M2 | SYSTOLIC BLOOD PRESSURE: 154 MMHG | HEART RATE: 84 BPM | OXYGEN SATURATION: 93 % | DIASTOLIC BLOOD PRESSURE: 85 MMHG

## 2024-10-15 DIAGNOSIS — D50.8 IRON DEFICIENCY ANEMIA SECONDARY TO INADEQUATE DIETARY IRON INTAKE: Primary | ICD-10-CM

## 2024-10-15 RX ORDER — ALBUTEROL SULFATE 0.83 MG/ML
3 SOLUTION RESPIRATORY (INHALATION) AS NEEDED
Status: CANCELLED | OUTPATIENT
Start: 2024-10-18

## 2024-10-15 RX ORDER — DIPHENHYDRAMINE HYDROCHLORIDE 50 MG/ML
50 INJECTION INTRAMUSCULAR; INTRAVENOUS AS NEEDED
Status: CANCELLED | OUTPATIENT
Start: 2024-10-18

## 2024-10-15 RX ORDER — EPINEPHRINE 0.3 MG/.3ML
0.3 INJECTION SUBCUTANEOUS EVERY 5 MIN PRN
Status: CANCELLED | OUTPATIENT
Start: 2024-10-18

## 2024-10-15 RX ORDER — FAMOTIDINE 10 MG/ML
20 INJECTION INTRAVENOUS ONCE AS NEEDED
Status: CANCELLED | OUTPATIENT
Start: 2024-10-18

## 2024-10-15 NOTE — PROGRESS NOTES
Spoke with Mr and Mrs Najera. Wt 131.8 lbs, Glucose earlier was 100, /85, HR 84 and 93% on room air. Pt states she is feeling well. They are aware [facility] 10/17 @ 1100. No questions or concerns at this time.         Patient's Address:   [address] [place] [address] [place] [address]  **  If this is not the address patient will receive services - alert team and address in EMR**       Patient Contacts:  Extended Emergency Contact Information  Primary Emergency Contact: Moose Najera  Home Phone: [phone]  Work Phone: [phone]  Relation: Spouse  Secondary Emergency Contact: RADHA NAJERA  Mobile Phone: [phone]  Relation: Son  Preferred language: English   needed? No                                Patient's Preferred Phone: [phone]  Patient's E-mail: [email or web address]

## 2024-10-16 DIAGNOSIS — E11.9 CONTROLLED TYPE 2 DIABETES MELLITUS WITHOUT COMPLICATION, WITHOUT LONG-TERM CURRENT USE OF INSULIN (MULTI): ICD-10-CM

## 2024-10-16 DIAGNOSIS — I10 ESSENTIAL HYPERTENSION: Primary | ICD-10-CM

## 2024-10-16 DIAGNOSIS — Z00.6 RESEARCH STUDY PATIENT: Primary | ICD-10-CM

## 2024-10-17 ENCOUNTER — TELEMEDICINE (OUTPATIENT)
Dept: PHARMACY | Facility: HOSPITAL | Age: 79
End: 2024-10-17
Payer: MEDICARE

## 2024-10-17 ENCOUNTER — PATIENT OUTREACH (OUTPATIENT)
Dept: HOME HEALTH SERVICES | Age: 79
End: 2024-10-17

## 2024-10-17 ENCOUNTER — APPOINTMENT (OUTPATIENT)
Dept: CARE COORDINATION | Age: 79
End: 2024-10-17
Payer: MEDICARE

## 2024-10-17 DIAGNOSIS — I10 ESSENTIAL HYPERTENSION: ICD-10-CM

## 2024-10-17 DIAGNOSIS — N12 PYELONEPHRITIS: Primary | ICD-10-CM

## 2024-10-17 DIAGNOSIS — E11.9 CONTROLLED TYPE 2 DIABETES MELLITUS WITHOUT COMPLICATION, WITHOUT LONG-TERM CURRENT USE OF INSULIN (MULTI): ICD-10-CM

## 2024-10-17 NOTE — PROGRESS NOTES
Daily Call Note:     [facility] call completed with the patient and her , Amilcar Ayala, and the Nurse.  Pt states she is doing okay.  She did see her Urologist back on 10/9 and will continue on Macrobid until her next scan which will be sometime in January.  Her BP was 172/89.  This is the first day she took her increased dose of Lisinopril from 20 mg to 30 mg.  Will call pt back for a repeat BP.  Pt's FBS was 111, HR 90, and Pox 95%.  Pt had no add'l questions or concerns.  Next [facility] appt 10/25 @ 1100.    ADDENDUM : called pt back and her /89, made provider aware.  No orders given.    Pt Education:   Barriers:   Topics for Daily Review:   Pt demonstrates clear understanding:     Daily Weight:  There were no vitals filed for this visit.   Last 3 Weights:  Wt Readings from Last 7 Encounters:   10/15/24 59.8 kg (131 lb 12.8 oz)   10/11/24 59.9 kg (132 lb)   10/10/24 59.8 kg (131 lb 13.4 oz)   10/04/24 63.5 kg (139 lb 15.9 oz)   10/04/24 60.8 kg (134 lb)   10/03/24 60.6 kg (133 lb 8 oz)   10/02/24 59.9 kg (132 lb)       Masimo Device:    Masimo Clinical Impression:     Virtual Visits--Scheduled (Most Recent Date at Top)  Follow up Appointments  Recent Visits  Date Type Provider Dept   10/11/24 Office Visit Yesi Lam MD [id number]   09/17/24 Office Visit Yesi Lam MD [id number]   Showing recent visits within past 30 days and meeting all other requirements  Future Appointments  No visits were found meeting these conditions.  Showing future appointments within next 90 days and meeting all other requirements       Frequency of RN Calls & Virtual Visits per Team Agreement:    Medication issues Addressed (what was done):     Follow up appointments scheduled by [facility] Staff:   Referrals made by [facility] staff:

## 2024-10-17 NOTE — PROGRESS NOTES
Healthy at Home [facility]    Sherri Najera is a 79 y.o. female was referred to Clinical Pharmacy Team to complete a post-discharge medication optimization and monitoring visit.  The patient was referred for multiple complaints while in [facility]. Today was our second visit of this re-enrollment.       Referring Provider: Isauro Carranza DO  PCP: Yesi Lam MD - last visit: 10/11, next visit: 1/23/2025      Subjective   Allergies   Allergen Reactions    Fenofibrate Unknown    Sulfa (Sulfonamide Antibiotics) Rash       [facility] [place] [address]  [address] [facility] [place] [facility] [address]  Phone: [phone] Fax: [phone]    [facility] - [place] - [facility] AT Portal to [facility]  [place] [address]  Phone: [phone] Fax: [phone]    [facility]  [address] [facility] [address]  Phone: [phone] Fax: [phone]      Medication System Management:  Affordability/Accessibility: no concerns   Adherence/Organization: no issues       Social History     Social History Narrative    Not on file          HPI  Diabetes  She presents for her initial diabetic visit. She has type 2 diabetes mellitus. Her disease course has been stable. There are no hypoglycemic associated symptoms. There are no hypoglycemic complications. Risk factors for coronary artery disease include diabetes mellitus and hypertension. Current diabetic treatment includes oral agent (monotherapy). She is compliant with treatment all of the time. Her overall blood glucose range is  mg/dl. An ACE inhibitor/angiotensin II receptor blocker is being taken.     Review of Systems        Objective     There were no vitals taken for this visit.   BP Readings from Last 4 Encounters:   10/15/24 154/85   10/11/24 158/86   10/10/24 159/83   10/07/24 (!) 166/98      There were no vitals filed for this visit.      LAB  Lab Results   Component Value Date    BILITOT 0.3 10/08/2024    CALCIUM 9.6 10/08/2024    CO2 32 10/08/2024     10/08/2024    CREATININE 0.90 10/08/2024    GLUCOSE 112 (H) 10/08/2024    ALKPHOS 110 10/08/2024    K 3.7 10/08/2024    PROT 7.4 10/08/2024     (H) 10/08/2024    AST 17 10/08/2024    ALT 14 10/08/2024    BUN 9 10/08/2024    ANIONGAP 18 10/08/2024    MG 1.67 10/07/2024    PHOS 4.0 07/03/2024     09/24/2024    ALBUMIN 3.4 10/08/2024    LIPASE 30 10/04/2024    GFRF 75 09/12/2023     Lab Results   Component Value Date    TRIG 245 (H) 10/08/2024    CHOL 162 10/08/2024    LDLCALC 73 10/08/2024    HDL 40.3 10/08/2024     Lab Results   Component Value Date    HGBA1C 5.3 10/08/2024         Current Outpatient Medications   Medication Instructions    atorvastatin (LIPITOR) 80 mg, oral, Daily    fluticasone (Flonase Allergy Relief) 50 mcg/actuation nasal spray 1 spray, Each Nostril, 2 times daily PRN    Lactobacillus acidophilus 1 billion cell tablet 1 tablet, oral, 2 times daily    lisinopril 30 mg, oral, Daily    LORazepam (ATIVAN) 0.5 mg, oral, 2 times daily PRN    mirtazapine (REMERON) 15 mg, oral, Nightly    MULTIVITAMIN ORAL 1 tablet, oral, Daily    nitrofurantoin, macrocrystal-monohydrate, (Macrobid) 100 mg capsule 100 mg, oral, Daily    PARoxetine (PAXIL) 10 mg, oral, Daily    zinc gluconate 50 mg tablet 1 tablet, oral, Daily          Assessment/Plan   Problem List Items Addressed This Visit       Essential hypertension    Controlled diabetes mellitus type II without complication (Multi)       HTN  BP today 172/89 (before meds)  DM  Most recent A1c was 5.4% from 9/3  She does check her sugars daily and keeps log   Fasting today was 111  She ranges from 's   UTI/C.Diff  Went back into the hospital for another UTI  Did have follow up with urology since then   No current symptoms today and actually starting to feel back to normal with more energy      Medications  Changes/Concerns:  -completed cefdinir course  -started the Macrobid once daily maintenance dose and will continue for 3 months     -started the increased dose of Lisinopril 30mg today     -no longer taking Metformin, so not on any therapies currently for her DM    Refills Needed:  -none needed today       Plan/To Do:  -going to call nursing back later today with BP reading after taking her medication  -continue to monitor BP's though and keep log for future visits   -nursing will continue with daily calls       [facility] PAP Status:  -n/a  -on all generic medications currently       Time Spent with Patient and [facility] Team - Dr. Carranza and Kalani CAMPBELL RN via phone call: 15 minutes      Follow Up: 1 week     Continue all meds under the continuation of care with the referring provider and clinical pharmacy team.    Amilcar Grewal PharmD     Verbal consent to manage patient's drug therapy was obtained from the patient and an individual authorized to act on behalf of a patient. They were informed they may decline to participate or withdraw from participation in pharmacy services at any time.

## 2024-10-18 ENCOUNTER — INFUSION (OUTPATIENT)
Dept: HEMATOLOGY/ONCOLOGY | Facility: CLINIC | Age: 79
End: 2024-10-18
Payer: MEDICARE

## 2024-10-18 VITALS
WEIGHT: 132.61 LBS | TEMPERATURE: 97.3 F | HEART RATE: 72 BPM | SYSTOLIC BLOOD PRESSURE: 138 MMHG | DIASTOLIC BLOOD PRESSURE: 66 MMHG | RESPIRATION RATE: 18 BRPM | BODY MASS INDEX: 23.21 KG/M2 | OXYGEN SATURATION: 96 %

## 2024-10-18 DIAGNOSIS — D50.8 IRON DEFICIENCY ANEMIA SECONDARY TO INADEQUATE DIETARY IRON INTAKE: ICD-10-CM

## 2024-10-18 PROCEDURE — 96374 THER/PROPH/DIAG INJ IV PUSH: CPT | Mod: INF

## 2024-10-18 PROCEDURE — 2500000004 HC RX 250 GENERAL PHARMACY W/ HCPCS (ALT 636 FOR OP/ED): Performed by: INTERNAL MEDICINE

## 2024-10-18 RX ORDER — FAMOTIDINE 10 MG/ML
20 INJECTION INTRAVENOUS ONCE AS NEEDED
OUTPATIENT
Start: 2024-10-21

## 2024-10-18 RX ORDER — ALBUTEROL SULFATE 0.83 MG/ML
3 SOLUTION RESPIRATORY (INHALATION) AS NEEDED
OUTPATIENT
Start: 2024-10-21

## 2024-10-18 RX ORDER — HEPARIN SODIUM,PORCINE/PF 10 UNIT/ML
50 SYRINGE (ML) INTRAVENOUS AS NEEDED
OUTPATIENT
Start: 2024-10-18

## 2024-10-18 RX ORDER — HEPARIN 100 UNIT/ML
500 SYRINGE INTRAVENOUS AS NEEDED
OUTPATIENT
Start: 2024-10-18

## 2024-10-18 RX ORDER — EPINEPHRINE 0.3 MG/.3ML
0.3 INJECTION SUBCUTANEOUS EVERY 5 MIN PRN
OUTPATIENT
Start: 2024-10-21

## 2024-10-18 RX ORDER — DIPHENHYDRAMINE HYDROCHLORIDE 50 MG/ML
50 INJECTION INTRAMUSCULAR; INTRAVENOUS AS NEEDED
Status: DISCONTINUED | OUTPATIENT
Start: 2024-10-18 | End: 2024-10-18 | Stop reason: HOSPADM

## 2024-10-18 RX ORDER — FAMOTIDINE 10 MG/ML
20 INJECTION INTRAVENOUS ONCE AS NEEDED
Status: DISCONTINUED | OUTPATIENT
Start: 2024-10-18 | End: 2024-10-18 | Stop reason: HOSPADM

## 2024-10-18 RX ORDER — EPINEPHRINE 0.3 MG/.3ML
0.3 INJECTION SUBCUTANEOUS EVERY 5 MIN PRN
Status: DISCONTINUED | OUTPATIENT
Start: 2024-10-18 | End: 2024-10-18 | Stop reason: HOSPADM

## 2024-10-18 RX ORDER — ALBUTEROL SULFATE 0.83 MG/ML
3 SOLUTION RESPIRATORY (INHALATION) AS NEEDED
Status: DISCONTINUED | OUTPATIENT
Start: 2024-10-18 | End: 2024-10-18 | Stop reason: HOSPADM

## 2024-10-18 RX ORDER — DIPHENHYDRAMINE HYDROCHLORIDE 50 MG/ML
50 INJECTION INTRAMUSCULAR; INTRAVENOUS AS NEEDED
OUTPATIENT
Start: 2024-10-21

## 2024-10-18 ASSESSMENT — PAIN SCALES - GENERAL: PAINLEVEL_OUTOF10: 3

## 2024-10-18 NOTE — PROGRESS NOTES
pt tolerated today's infusions without difficulty. pt aware of upcoming appt schedule and will call with any questions and/or concerns.

## 2024-10-21 ENCOUNTER — LAB (OUTPATIENT)
Dept: LAB | Facility: LAB | Age: 79
End: 2024-10-21
Payer: MEDICARE

## 2024-10-21 DIAGNOSIS — D50.8 IRON DEFICIENCY ANEMIA SECONDARY TO INADEQUATE DIETARY IRON INTAKE: ICD-10-CM

## 2024-10-21 DIAGNOSIS — Z00.6 RESEARCH STUDY PATIENT: ICD-10-CM

## 2024-10-21 LAB
CREAT UR-MCNC: 49.4 MG/DL (ref 20–320)
ERYTHROCYTE [DISTWIDTH] IN BLOOD BY AUTOMATED COUNT: 17 % (ref 11.5–14.5)
FERRITIN SERPL-MCNC: 221 NG/ML (ref 8–150)
HCT VFR BLD AUTO: 28.8 % (ref 36–46)
HGB BLD-MCNC: 9.1 G/DL (ref 12–16)
IRON SATN MFR SERPL: 11 % (ref 25–45)
IRON SERPL-MCNC: 30 UG/DL (ref 35–150)
MCH RBC QN AUTO: 29.1 PG (ref 26–34)
MCHC RBC AUTO-ENTMCNC: 31.6 G/DL (ref 32–36)
MCV RBC AUTO: 92 FL (ref 80–100)
NRBC BLD-RTO: 0 /100 WBCS (ref 0–0)
PLATELET # BLD AUTO: 305 X10*3/UL (ref 150–450)
RBC # BLD AUTO: 3.13 X10*6/UL (ref 4–5.2)
TIBC SERPL-MCNC: 267 UG/DL (ref 240–445)
UIBC SERPL-MCNC: 237 UG/DL (ref 110–370)
WBC # BLD AUTO: 8.5 X10*3/UL (ref 4.4–11.3)

## 2024-10-21 PROCEDURE — 85027 COMPLETE CBC AUTOMATED: CPT

## 2024-10-21 PROCEDURE — 82570 ASSAY OF URINE CREATININE: CPT

## 2024-10-21 PROCEDURE — 82728 ASSAY OF FERRITIN: CPT

## 2024-10-21 PROCEDURE — 83550 IRON BINDING TEST: CPT

## 2024-10-21 PROCEDURE — 83540 ASSAY OF IRON: CPT

## 2024-10-22 ENCOUNTER — OFFICE VISIT (OUTPATIENT)
Dept: PRIMARY CARE | Facility: CLINIC | Age: 79
End: 2024-10-22
Payer: MEDICARE

## 2024-10-22 ENCOUNTER — PATIENT OUTREACH (OUTPATIENT)
Dept: HOME HEALTH SERVICES | Age: 79
End: 2024-10-22

## 2024-10-22 VITALS
DIASTOLIC BLOOD PRESSURE: 100 MMHG | OXYGEN SATURATION: 96 % | HEART RATE: 74 BPM | BODY MASS INDEX: 23.1 KG/M2 | SYSTOLIC BLOOD PRESSURE: 170 MMHG | WEIGHT: 132 LBS

## 2024-10-22 VITALS — SYSTOLIC BLOOD PRESSURE: 145 MMHG | DIASTOLIC BLOOD PRESSURE: 80 MMHG

## 2024-10-22 DIAGNOSIS — R51.9 HEADACHE, CHRONIC DAILY: ICD-10-CM

## 2024-10-22 DIAGNOSIS — I10 ESSENTIAL HYPERTENSION: Primary | ICD-10-CM

## 2024-10-22 PROCEDURE — 1159F MED LIST DOCD IN RCRD: CPT | Performed by: FAMILY MEDICINE

## 2024-10-22 PROCEDURE — 3080F DIAST BP >= 90 MM HG: CPT | Performed by: FAMILY MEDICINE

## 2024-10-22 PROCEDURE — 1123F ACP DISCUSS/DSCN MKR DOCD: CPT | Performed by: FAMILY MEDICINE

## 2024-10-22 PROCEDURE — 1111F DSCHRG MED/CURRENT MED MERGE: CPT | Performed by: FAMILY MEDICINE

## 2024-10-22 PROCEDURE — 99213 OFFICE O/P EST LOW 20 MIN: CPT | Performed by: FAMILY MEDICINE

## 2024-10-22 PROCEDURE — 3077F SYST BP >= 140 MM HG: CPT | Performed by: FAMILY MEDICINE

## 2024-10-22 PROCEDURE — 1160F RVW MEDS BY RX/DR IN RCRD: CPT | Performed by: FAMILY MEDICINE

## 2024-10-22 PROCEDURE — 1126F AMNT PAIN NOTED NONE PRSNT: CPT | Performed by: FAMILY MEDICINE

## 2024-10-22 PROCEDURE — 1036F TOBACCO NON-USER: CPT | Performed by: FAMILY MEDICINE

## 2024-10-22 RX ORDER — HYDROCHLOROTHIAZIDE 12.5 MG/1
12.5 TABLET ORAL DAILY
Qty: 90 TABLET | Refills: 0 | Status: SHIPPED | OUTPATIENT
Start: 2024-10-22 | End: 2025-01-20

## 2024-10-22 ASSESSMENT — PAIN SCALES - GENERAL: PAINLEVEL_OUTOF10: 0-NO PAIN

## 2024-10-22 NOTE — ASSESSMENT & PLAN NOTE
She should take over-the-counter Zyrtec daily until the first heavy frost.  She should continue to use her Flonase.  For no improvement would consider sinus x-rays.  I prefer not to put her on prolonged antibiotics due to her recent history of C. difficile.  We could also consider an ENT referral.  She will follow-up as scheduled in January.        Occupational Therapy order received and chart reviewed. Spoke with patient, patient stated is at previous level of function. Discussed equipment needs, patient has a toilet riser, reacher and sock aide. Recommend using a shower chair vs standing. Wife was present. Plan to discontinue OT services, please refer if status changes.

## 2024-10-22 NOTE — PATIENT INSTRUCTIONS
Zyrtec 10 mg one daily for sinus headache  Hydrochlorothiazide one daily for blood pressure control   Take it in the morning with the Lisinopril

## 2024-10-22 NOTE — PROGRESS NOTES
Daily Call Note:   Spoke to patient, she reports feeling pretty good today. She had labs drawn yesterday for the Covid study she is in. She reports last 5 days of bp readings and still elevated since 10/17 lisinopril increase. She sees PCP today and will address sinus headaches she has been having for some months. She takes tylenol as needed, heat and steam to help relieve.   She has iron infusion this Thursday, so call changed.  Denies further questions, next call reviewed.     Pt Education: POC  Barriers: na  Topics for Daily Review: POC  Pt demonstrates clear understanding: Yes    Daily Weight:  There were no vitals filed for this visit.   Last 3 Weights:  Wt Readings from Last 7 Encounters:   10/18/24 60.2 kg (132 lb 9.7 oz)   10/15/24 59.8 kg (131 lb 12.8 oz)   10/11/24 59.9 kg (132 lb)   10/10/24 59.8 kg (131 lb 13.4 oz)   10/04/24 63.5 kg (139 lb 15.9 oz)   10/04/24 60.8 kg (134 lb)   10/03/24 60.6 kg (133 lb 8 oz)       Masimo Device: No   Masimo Clinical Impression: na    Virtual Visits--Scheduled (Most Recent Date at Top)  Follow up Appointments  Recent Visits  Date Type Provider Dept   10/11/24 Office Visit Yesi Lam MD Do [id number] [facility]   Showing recent visits within past 30 days and meeting all other requirements  Today's Visits  Date Type Provider Dept   10/22/24 Appointment Yesi Lam MD Do [id number] [facility]   Showing today's visits and meeting all other requirements  Future Appointments  No visits were found meeting these conditions.  Showing future appointments within next 90 days and meeting all other requirements       Frequency of RN Calls & Virtual Visits per Team Agreement: Healthy at Home Frequency: Bi-Weekly    Medication issues Addressed (what was done): na    Follow up appointments scheduled by [facility] Staff: na  Referrals made by [facility] staff: sanam              Mary Evener discharge to home/self care

## 2024-10-22 NOTE — ASSESSMENT & PLAN NOTE
Orders:    hydroCHLOROthiazide (Microzide) 12.5 mg tablet; Take 1 tablet (12.5 mg) by mouth once daily.   will add hydrochlorothiazide to her lisinopril 30 mg.

## 2024-10-22 NOTE — PROGRESS NOTES
Subjective   Patient ID: Sherri Najera is a 79 y.o. female who presents for Headache and Hypertension.    HPI   Sherri presents with a new onset concern of headaches.  She had a physical in the office about 10 to get days ago and there was no mention of headaches.  She is diagnoses as listed including diabetes not on any medications, hypertension, hypercholesterolemia, depression and chronic UTIs.  Neurology, hematology and dermatology.    She is on lisinopril 30 mg daily for her hypertension.     She states that she has chronically had headaches which have been sinus oriented for years.  The headache itself feels very similar to her past headaches but she feels like she is getting them more often.  Spring and fall are generally worse for her.  She does use Flonase twice every day.  She is not taking any antihistamines.  She describes a dull headache over her left eye that then radiates over her right eye.  It does not feel like a band around her head.  It does not wake her up at night.  She had a normal head CT done in 2023.  She takes Tylenol and it improves it but sometimes will come back later in the day.    Her blood pressure has been elevated which I can see in the chart for various visits that she has had.  She checks it at home and it generally is lower than today 150s over mid 80s.  Review of Systems    Objective   BP (!) 170/100 (BP Location: Left arm)   Pulse 74   Wt 59.9 kg (132 lb)   SpO2 96%   BMI 23.10 kg/m²     Physical Exam    She is alert and comfortable.    Ears TMs are clear.  Nose is congested with clear rhinorrhea.    Throat is noninjected and without exudate.    Neck is supple without adenopathy.    Lungs are clear to auscultation.    Neuro is intact and nonfocal.    Assessment/Plan   Assessment & Plan  Essential hypertension    Orders:    hydroCHLOROthiazide (Microzide) 12.5 mg tablet; Take 1 tablet (12.5 mg) by mouth once daily.   will add hydrochlorothiazide to her lisinopril 30  mg.  Headache, chronic daily    She should take over-the-counter Zyrtec daily until the first heavy frost.  She should continue to use her Flonase.  For no improvement would consider sinus x-rays.  I prefer not to put her on prolonged antibiotics due to her recent history of C. difficile.  We could also consider an ENT referral.  She will follow-up as scheduled in January.

## 2024-10-24 ENCOUNTER — INFUSION (OUTPATIENT)
Dept: HEMATOLOGY/ONCOLOGY | Facility: CLINIC | Age: 79
End: 2024-10-24
Payer: MEDICARE

## 2024-10-24 ENCOUNTER — PATIENT OUTREACH (OUTPATIENT)
Dept: HOME HEALTH SERVICES | Age: 79
End: 2024-10-24

## 2024-10-24 VITALS — OXYGEN SATURATION: 95 % | HEART RATE: 94 BPM | SYSTOLIC BLOOD PRESSURE: 156 MMHG | DIASTOLIC BLOOD PRESSURE: 84 MMHG

## 2024-10-24 VITALS
SYSTOLIC BLOOD PRESSURE: 157 MMHG | BODY MASS INDEX: 22.93 KG/M2 | HEART RATE: 88 BPM | RESPIRATION RATE: 18 BRPM | OXYGEN SATURATION: 97 % | WEIGHT: 131.06 LBS | TEMPERATURE: 97.7 F | DIASTOLIC BLOOD PRESSURE: 78 MMHG

## 2024-10-24 DIAGNOSIS — D50.8 IRON DEFICIENCY ANEMIA SECONDARY TO INADEQUATE DIETARY IRON INTAKE: ICD-10-CM

## 2024-10-24 PROCEDURE — 2500000004 HC RX 250 GENERAL PHARMACY W/ HCPCS (ALT 636 FOR OP/ED): Performed by: INTERNAL MEDICINE

## 2024-10-24 PROCEDURE — 96374 THER/PROPH/DIAG INJ IV PUSH: CPT | Mod: INF

## 2024-10-24 RX ORDER — ALBUTEROL SULFATE 0.83 MG/ML
3 SOLUTION RESPIRATORY (INHALATION) AS NEEDED
OUTPATIENT
Start: 2024-10-27

## 2024-10-24 RX ORDER — DIPHENHYDRAMINE HYDROCHLORIDE 50 MG/ML
50 INJECTION INTRAMUSCULAR; INTRAVENOUS AS NEEDED
OUTPATIENT
Start: 2024-10-27

## 2024-10-24 RX ORDER — FAMOTIDINE 10 MG/ML
20 INJECTION INTRAVENOUS ONCE AS NEEDED
OUTPATIENT
Start: 2024-10-27

## 2024-10-24 RX ORDER — EPINEPHRINE 0.3 MG/.3ML
0.3 INJECTION SUBCUTANEOUS EVERY 5 MIN PRN
OUTPATIENT
Start: 2024-10-27

## 2024-10-24 ASSESSMENT — PAIN SCALES - GENERAL: PAINLEVEL_OUTOF10: 0-NO PAIN

## 2024-10-24 NOTE — PROGRESS NOTES
Daily Call Note: Call complete. Patient states she is doing ok, she went to the grocery store today for the first time in months. She denies any new concerns. She reports blood sugar today was 114. Reviewed upcoming appointments. Next [facility] 10/2524 at 1100, verbalized understanding. No other questions at this time.     Pt Education: per POC  Barriers:   Topics for Daily Review:   Pt demonstrates clear understanding: Yes    Daily Weight:  There were no vitals filed for this visit.   Last 3 Weights:  Wt Readings from Last 7 Encounters:   10/24/24 59.5 kg (131 lb 1 oz)   10/22/24 59.9 kg (132 lb)   10/18/24 60.2 kg (132 lb 9.7 oz)   10/15/24 59.8 kg (131 lb 12.8 oz)   10/11/24 59.9 kg (132 lb)   10/10/24 59.8 kg (131 lb 13.4 oz)   10/04/24 63.5 kg (139 lb 15.9 oz)       Masimo Device: No   Masimo Clinical Impression: n/a    Virtual Visits--Scheduled (Most Recent Date at Top)  Follow up Appointments  Recent Visits  Date Type Provider Dept   10/22/24 Office Visit Yesi Lam MD Do [id number] [facility]   10/11/24 Office Visit Yesi Lam MD Do [id number] [facility]   Showing recent visits within past 30 days and meeting all other requirements  Future Appointments  No visits were found meeting these conditions.  Showing future appointments within next 90 days and meeting all other requirements       Frequency of RN Calls & Virtual Visits per Team Agreement: Healthy at Home Frequency: Bi-Weekly    Medication issues Addressed (what was done): none    Follow up appointments scheduled by [facility] Staff: none  Referrals made by [facility] staff: none

## 2024-10-25 ENCOUNTER — APPOINTMENT (OUTPATIENT)
Dept: PHARMACY | Facility: HOSPITAL | Age: 79
End: 2024-10-25
Payer: MEDICARE

## 2024-10-25 ENCOUNTER — PATIENT OUTREACH (OUTPATIENT)
Dept: HOME HEALTH SERVICES | Age: 79
End: 2024-10-25

## 2024-10-25 ENCOUNTER — APPOINTMENT (OUTPATIENT)
Dept: CARE COORDINATION | Age: 79
End: 2024-10-25
Payer: MEDICARE

## 2024-10-25 VITALS — DIASTOLIC BLOOD PRESSURE: 77 MMHG | SYSTOLIC BLOOD PRESSURE: 121 MMHG

## 2024-10-25 DIAGNOSIS — N12 PYELONEPHRITIS: ICD-10-CM

## 2024-10-25 DIAGNOSIS — E11.9 CONTROLLED TYPE 2 DIABETES MELLITUS WITHOUT COMPLICATION, WITHOUT LONG-TERM CURRENT USE OF INSULIN (MULTI): ICD-10-CM

## 2024-10-25 DIAGNOSIS — I10 ESSENTIAL HYPERTENSION: ICD-10-CM

## 2024-10-25 DIAGNOSIS — I10 ESSENTIAL HYPERTENSION: Primary | ICD-10-CM

## 2024-10-25 NOTE — PROGRESS NOTES
Daily Call Note:     ERROR     Pt Education:   Barriers:   Topics for Daily Review:   Pt demonstrates clear understanding:     Daily Weight:  There were no vitals filed for this visit.   Last 3 Weights:  Wt Readings from Last 7 Encounters:   10/24/24 59.5 kg (131 lb 1 oz)   10/22/24 59.9 kg (132 lb)   10/18/24 60.2 kg (132 lb 9.7 oz)   10/15/24 59.8 kg (131 lb 12.8 oz)   10/11/24 59.9 kg (132 lb)   10/10/24 59.8 kg (131 lb 13.4 oz)   10/04/24 63.5 kg (139 lb 15.9 oz)       Masimo Device:    Masimo Clinical Impression:     Virtual Visits--Scheduled (Most Recent Date at Top)  Follow up Appointments  Recent Visits  Date Type Provider Dept   10/22/24 Office Visit Yesi Lam MD Do [id number] [facility]   10/11/24 Office Visit Yesi Lam MD Do [id number] [facility]   Showing recent visits within past 30 days and meeting all other requirements  Future Appointments  Date Type Provider Dept   01/23/25 Appointment Yesi Lam MD Do [id number] [facility]   Showing future appointments within next 90 days and meeting all other requirements       Frequency of RN Calls & Virtual Visits per Team Agreement:     Medication issues Addressed (what was done):     Follow up appointments scheduled by [facility] Staff:   Referrals made by [facility] staff:

## 2024-10-25 NOTE — PROGRESS NOTES
Healthy at Home [facility]    Sherri Najera is a 79 y.o. female was referred to Clinical Pharmacy Team to complete a post-discharge medication optimization and monitoring visit.  The patient was referred for multiple concerns while in [facility]. Today was our third visit since being re-enrolled.       Referring Provider: Mery Gongora APRN*  PCP: Yesi Lam MD - last visit: 10/22, next visit: 1/23/2025      Subjective   Allergies   Allergen Reactions    Fenofibrate Unknown    Sulfa (Sulfonamide Antibiotics) Rash       [facility] - [place], [place] - [address] [place] [address]  [address] [facility] [place] [address]  Phone: [phone] Fax: [phone]    [facility] [place] - [facility] AT [facility] [address]  Phone: [phone] Fax: [phone]    [facility]  [address] [facility] [address]  Phone: [phone] Fax: [phone]      Medication System Management:  Affordability/Accessibility: no concerns   Adherence/Organization: no issues       Social History     Social History Narrative    Not on file          HPI  Diabetes  She presents for her initial diabetic visit. She has type 2 diabetes mellitus. Her disease course has been stable. There are no hypoglycemic associated symptoms. There are no hypoglycemic complications. Risk factors for coronary artery disease include diabetes mellitus and hypertension. Current diabetic treatment includes oral agent (monotherapy). She is compliant with treatment all of the time. Her overall blood glucose range is  mg/dl. An ACE inhibitor/angiotensin II receptor blocker is being taken.     Review of Systems        Objective     There were no vitals taken for this visit.   BP Readings from Last 4 Encounters:   10/24/24 156/84   10/24/24 157/78   10/22/24 (!) 170/100   10/22/24 145/80      There were no vitals filed for this visit.      LAB  Lab Results   Component Value Date    BILITOT 0.3 10/08/2024    CALCIUM 9.6 10/08/2024    CO2 32 10/08/2024     10/08/2024    CREATININE 0.90 10/08/2024    GLUCOSE 112 (H) 10/08/2024    ALKPHOS 110 10/08/2024    K 3.7 10/08/2024    PROT 7.4 10/08/2024     (H) 10/08/2024    AST 17 10/08/2024    ALT 14 10/08/2024    BUN 9 10/08/2024    ANIONGAP 18 10/08/2024    MG 1.67 10/07/2024    PHOS 4.0 07/03/2024     09/24/2024    ALBUMIN 3.4 10/08/2024    LIPASE 30 10/04/2024    GFRF 75 09/12/2023     Lab Results   Component Value Date    TRIG 245 (H) 10/08/2024    CHOL 162 10/08/2024    LDLCALC 73 10/08/2024    HDL 40.3 10/08/2024     Lab Results   Component Value Date    HGBA1C 5.3 10/08/2024         Current Outpatient Medications   Medication Instructions    atorvastatin (LIPITOR) 80 mg, oral, Daily    fluticasone (Flonase Allergy Relief) 50 mcg/actuation nasal spray 1 spray, 2 times daily PRN    hydroCHLOROthiazide (MICROZIDE) 12.5 mg, oral, Daily    Lactobacillus acidophilus 1 billion cell tablet 1 tablet, oral, 2 times daily    lisinopril 30 mg, oral, Daily    LORazepam (ATIVAN) 0.5 mg, oral, 2 times daily PRN    mirtazapine (REMERON) 15 mg, oral, Nightly    MULTIVITAMIN ORAL 1 tablet, Daily    nitrofurantoin, macrocrystal-monohydrate, (Macrobid) 100 mg capsule 100 mg, oral, Daily    PARoxetine (PAXIL) 10 mg, oral, Daily    zinc gluconate 50 mg tablet 1 tablet, Daily          Assessment/Plan   Problem List Items Addressed This Visit       Essential hypertension    Controlled diabetes mellitus type II without complication (Multi)       HTN  BP today 152/81 (before meds)  BP after meds right before this visit was 121/77  Has been having headaches and noticing it is happening when her pressures are more elevated   DM  Most recent A1c was 5.4% from 9/3  She does check her sugars daily and keeps log   UTI/C.Diff  Went back into the hospital for another UTI  Did have follow up with urology since then    No current symptoms today and actually starting to feel back to normal with more energy      Medications Changes/Concerns:  -started hydrochlorothiazide 12.5mg daily   -continuing with lisinopril 30mg daily too     -has been taking tylenol as needed for her headaches     -also continuing her Macrobid daily and has had no concerns with this    Refills Needed:  -none needed today       Plan/To Do:  -continue to keep log of her BP's and when having the headaches  -nursing will continue with calls   -seeing ID next week for the re-current UTI's       [facility] PAP Status:  -n/a  -on all generic medications currently       Time Spent with Patient and [facility] Team - Mery Gongora NP and ADAN Dillard via phone call: 10 minutes      Follow Up: 1 week     Continue all meds under the continuation of care with the referring provider and clinical pharmacy team.    Amilcar Grewal, PharmD     Verbal consent to manage patient's drug therapy was obtained from the patient and an individual authorized to act on behalf of a patient. They were informed they may decline to participate or withdraw from participation in pharmacy services at any time.

## 2024-10-25 NOTE — PROGRESS NOTES
Weekly hhvc completed with Amilcar Dill, pt, pts  and RN. Pt states she has been feeling a little bit stronger and doing okay. Pt had her PCP appt. Pt has started her hydrochlorothiazide. Pt denies fevers/chills. Pt states she has been keeping a log of her headaches and when she has been taking her tylenol. Recommended per PCP. Last headache was last night and BP was 204/103. She has not been prescribed anything else for her headaches at this time. Pt denies any further needs/questions/concerns at this time. Aware to call us over the weekend if any arise. Aware of upcoming appts. Hhvc scheduled 11/1 at 1230.    152/81 before meds  Repeat 121/77    Patient's Address:   [address] [place] [address]   [place] [address]  **  If this is not the address patient will receive services - alert team and address in EMR**       Patient Contacts:  Extended Emergency Contact Information  Primary Emergency Contact: ReinaldoMoose  Home Phone: [phone]  Work Phone: [phone]  Relation: Spouse  Secondary Emergency Contact: RADHA RYAN  Mobile Phone: [phone]  Relation: Son  Preferred language: English   needed? No                                Patient's Preferred Phone: [phone]  Patient's E-mail: [email or web address]

## 2024-10-28 ENCOUNTER — INFUSION (OUTPATIENT)
Dept: HEMATOLOGY/ONCOLOGY | Facility: CLINIC | Age: 79
End: 2024-10-28
Payer: MEDICARE

## 2024-10-28 VITALS
RESPIRATION RATE: 18 BRPM | SYSTOLIC BLOOD PRESSURE: 129 MMHG | OXYGEN SATURATION: 96 % | WEIGHT: 129.41 LBS | BODY MASS INDEX: 22.65 KG/M2 | HEART RATE: 99 BPM | TEMPERATURE: 97 F | DIASTOLIC BLOOD PRESSURE: 79 MMHG

## 2024-10-28 DIAGNOSIS — D50.8 IRON DEFICIENCY ANEMIA SECONDARY TO INADEQUATE DIETARY IRON INTAKE: ICD-10-CM

## 2024-10-28 PROCEDURE — 96374 THER/PROPH/DIAG INJ IV PUSH: CPT | Mod: INF

## 2024-10-28 PROCEDURE — 2500000004 HC RX 250 GENERAL PHARMACY W/ HCPCS (ALT 636 FOR OP/ED): Performed by: INTERNAL MEDICINE

## 2024-10-28 RX ORDER — DIPHENHYDRAMINE HYDROCHLORIDE 50 MG/ML
50 INJECTION INTRAMUSCULAR; INTRAVENOUS AS NEEDED
Status: CANCELLED | OUTPATIENT
Start: 2024-10-30

## 2024-10-28 RX ORDER — FAMOTIDINE 10 MG/ML
20 INJECTION INTRAVENOUS ONCE AS NEEDED
Status: CANCELLED | OUTPATIENT
Start: 2024-10-30

## 2024-10-28 RX ORDER — EPINEPHRINE 0.3 MG/.3ML
0.3 INJECTION SUBCUTANEOUS EVERY 5 MIN PRN
Status: DISCONTINUED | OUTPATIENT
Start: 2024-10-28 | End: 2024-10-28 | Stop reason: HOSPADM

## 2024-10-28 RX ORDER — EPINEPHRINE 0.3 MG/.3ML
0.3 INJECTION SUBCUTANEOUS EVERY 5 MIN PRN
Status: CANCELLED | OUTPATIENT
Start: 2024-10-30

## 2024-10-28 RX ORDER — HEPARIN SODIUM,PORCINE/PF 10 UNIT/ML
50 SYRINGE (ML) INTRAVENOUS AS NEEDED
Status: CANCELLED | OUTPATIENT
Start: 2024-10-28

## 2024-10-28 RX ORDER — HEPARIN 100 UNIT/ML
500 SYRINGE INTRAVENOUS AS NEEDED
Status: CANCELLED | OUTPATIENT
Start: 2024-10-28

## 2024-10-28 RX ORDER — FAMOTIDINE 10 MG/ML
20 INJECTION INTRAVENOUS ONCE AS NEEDED
Status: DISCONTINUED | OUTPATIENT
Start: 2024-10-28 | End: 2024-10-28 | Stop reason: HOSPADM

## 2024-10-28 RX ORDER — DIPHENHYDRAMINE HYDROCHLORIDE 50 MG/ML
50 INJECTION INTRAMUSCULAR; INTRAVENOUS AS NEEDED
Status: DISCONTINUED | OUTPATIENT
Start: 2024-10-28 | End: 2024-10-28 | Stop reason: HOSPADM

## 2024-10-28 RX ORDER — ALBUTEROL SULFATE 0.83 MG/ML
3 SOLUTION RESPIRATORY (INHALATION) AS NEEDED
Status: DISCONTINUED | OUTPATIENT
Start: 2024-10-28 | End: 2024-10-28 | Stop reason: HOSPADM

## 2024-10-28 RX ORDER — ALBUTEROL SULFATE 0.83 MG/ML
3 SOLUTION RESPIRATORY (INHALATION) AS NEEDED
Status: CANCELLED | OUTPATIENT
Start: 2024-10-30

## 2024-10-28 ASSESSMENT — PAIN SCALES - GENERAL: PAINLEVEL_OUTOF10: 0-NO PAIN

## 2024-10-29 ENCOUNTER — PATIENT OUTREACH (OUTPATIENT)
Dept: HOME HEALTH SERVICES | Age: 79
End: 2024-10-29
Payer: MEDICARE

## 2024-10-29 VITALS — DIASTOLIC BLOOD PRESSURE: 83 MMHG | SYSTOLIC BLOOD PRESSURE: 150 MMHG

## 2024-10-31 ENCOUNTER — PATIENT OUTREACH (OUTPATIENT)
Dept: HOME HEALTH SERVICES | Age: 79
End: 2024-10-31
Payer: MEDICARE

## 2024-11-01 ENCOUNTER — APPOINTMENT (OUTPATIENT)
Dept: INFECTIOUS DISEASES | Facility: CLINIC | Age: 79
End: 2024-11-01
Payer: MEDICARE

## 2024-11-01 ENCOUNTER — TELEMEDICINE (OUTPATIENT)
Dept: CARE COORDINATION | Age: 79
End: 2024-11-01
Payer: MEDICARE

## 2024-11-01 ENCOUNTER — PATIENT OUTREACH (OUTPATIENT)
Dept: HOME HEALTH SERVICES | Age: 79
End: 2024-11-01

## 2024-11-01 ENCOUNTER — TELEMEDICINE (OUTPATIENT)
Dept: PHARMACY | Facility: HOSPITAL | Age: 79
End: 2024-11-01
Payer: MEDICARE

## 2024-11-01 ENCOUNTER — INFUSION (OUTPATIENT)
Dept: HEMATOLOGY/ONCOLOGY | Facility: CLINIC | Age: 79
End: 2024-11-01
Payer: MEDICARE

## 2024-11-01 VITALS
SYSTOLIC BLOOD PRESSURE: 130 MMHG | WEIGHT: 128.8 LBS | BODY MASS INDEX: 22.54 KG/M2 | DIASTOLIC BLOOD PRESSURE: 91 MMHG | HEART RATE: 102 BPM | OXYGEN SATURATION: 96 %

## 2024-11-01 VITALS
HEART RATE: 97 BPM | OXYGEN SATURATION: 98 % | TEMPERATURE: 96.1 F | RESPIRATION RATE: 16 BRPM | DIASTOLIC BLOOD PRESSURE: 76 MMHG | SYSTOLIC BLOOD PRESSURE: 114 MMHG | WEIGHT: 128.97 LBS | BODY MASS INDEX: 22.57 KG/M2

## 2024-11-01 DIAGNOSIS — D50.8 IRON DEFICIENCY ANEMIA SECONDARY TO INADEQUATE DIETARY IRON INTAKE: ICD-10-CM

## 2024-11-01 DIAGNOSIS — E11.9 CONTROLLED TYPE 2 DIABETES MELLITUS WITHOUT COMPLICATION, WITHOUT LONG-TERM CURRENT USE OF INSULIN (MULTI): ICD-10-CM

## 2024-11-01 DIAGNOSIS — I10 ESSENTIAL HYPERTENSION: ICD-10-CM

## 2024-11-01 DIAGNOSIS — I10 ESSENTIAL HYPERTENSION: Primary | ICD-10-CM

## 2024-11-01 PROCEDURE — 2500000004 HC RX 250 GENERAL PHARMACY W/ HCPCS (ALT 636 FOR OP/ED): Performed by: INTERNAL MEDICINE

## 2024-11-01 PROCEDURE — 96374 THER/PROPH/DIAG INJ IV PUSH: CPT | Mod: INF

## 2024-11-01 RX ORDER — DIPHENHYDRAMINE HYDROCHLORIDE 50 MG/ML
50 INJECTION INTRAMUSCULAR; INTRAVENOUS AS NEEDED
Status: DISCONTINUED | OUTPATIENT
Start: 2024-11-01 | End: 2024-11-01 | Stop reason: HOSPADM

## 2024-11-01 RX ORDER — ALBUTEROL SULFATE 0.83 MG/ML
3 SOLUTION RESPIRATORY (INHALATION) AS NEEDED
OUTPATIENT
Start: 2024-11-03

## 2024-11-01 RX ORDER — EPINEPHRINE 0.3 MG/.3ML
0.3 INJECTION SUBCUTANEOUS EVERY 5 MIN PRN
Status: DISCONTINUED | OUTPATIENT
Start: 2024-11-01 | End: 2024-11-01 | Stop reason: HOSPADM

## 2024-11-01 RX ORDER — ALBUTEROL SULFATE 0.83 MG/ML
3 SOLUTION RESPIRATORY (INHALATION) AS NEEDED
Status: DISCONTINUED | OUTPATIENT
Start: 2024-11-01 | End: 2024-11-01 | Stop reason: HOSPADM

## 2024-11-01 RX ORDER — FAMOTIDINE 10 MG/ML
20 INJECTION INTRAVENOUS ONCE AS NEEDED
Status: DISCONTINUED | OUTPATIENT
Start: 2024-11-01 | End: 2024-11-01 | Stop reason: HOSPADM

## 2024-11-01 RX ORDER — FAMOTIDINE 10 MG/ML
20 INJECTION INTRAVENOUS ONCE AS NEEDED
OUTPATIENT
Start: 2024-11-03

## 2024-11-01 RX ORDER — HEPARIN 100 UNIT/ML
500 SYRINGE INTRAVENOUS AS NEEDED
OUTPATIENT
Start: 2024-11-01

## 2024-11-01 RX ORDER — HEPARIN SODIUM,PORCINE/PF 10 UNIT/ML
50 SYRINGE (ML) INTRAVENOUS AS NEEDED
OUTPATIENT
Start: 2024-11-01

## 2024-11-01 RX ORDER — DIPHENHYDRAMINE HYDROCHLORIDE 50 MG/ML
50 INJECTION INTRAMUSCULAR; INTRAVENOUS AS NEEDED
OUTPATIENT
Start: 2024-11-03

## 2024-11-01 RX ORDER — EPINEPHRINE 0.3 MG/.3ML
0.3 INJECTION SUBCUTANEOUS EVERY 5 MIN PRN
OUTPATIENT
Start: 2024-11-03

## 2024-11-01 ASSESSMENT — PAIN SCALES - GENERAL: PAINLEVEL_OUTOF10: 0-NO PAIN

## 2024-11-05 ENCOUNTER — INFUSION (OUTPATIENT)
Dept: HEMATOLOGY/ONCOLOGY | Facility: CLINIC | Age: 79
End: 2024-11-05
Payer: MEDICARE

## 2024-11-05 ENCOUNTER — PATIENT OUTREACH (OUTPATIENT)
Dept: PRIMARY CARE | Facility: CLINIC | Age: 79
End: 2024-11-05
Payer: MEDICARE

## 2024-11-05 VITALS
WEIGHT: 130.4 LBS | TEMPERATURE: 98.2 F | HEART RATE: 96 BPM | OXYGEN SATURATION: 98 % | SYSTOLIC BLOOD PRESSURE: 105 MMHG | DIASTOLIC BLOOD PRESSURE: 74 MMHG | BODY MASS INDEX: 22.82 KG/M2 | RESPIRATION RATE: 16 BRPM

## 2024-11-05 DIAGNOSIS — D50.8 IRON DEFICIENCY ANEMIA SECONDARY TO INADEQUATE DIETARY IRON INTAKE: ICD-10-CM

## 2024-11-05 PROCEDURE — 96374 THER/PROPH/DIAG INJ IV PUSH: CPT | Mod: INF

## 2024-11-05 PROCEDURE — 2500000004 HC RX 250 GENERAL PHARMACY W/ HCPCS (ALT 636 FOR OP/ED): Performed by: INTERNAL MEDICINE

## 2024-11-05 RX ORDER — FAMOTIDINE 10 MG/ML
20 INJECTION INTRAVENOUS ONCE AS NEEDED
OUTPATIENT
Start: 2024-11-06

## 2024-11-05 RX ORDER — FAMOTIDINE 10 MG/ML
20 INJECTION INTRAVENOUS ONCE AS NEEDED
Status: DISCONTINUED | OUTPATIENT
Start: 2024-11-05 | End: 2024-11-05 | Stop reason: HOSPADM

## 2024-11-05 RX ORDER — HEPARIN SODIUM,PORCINE/PF 10 UNIT/ML
50 SYRINGE (ML) INTRAVENOUS AS NEEDED
OUTPATIENT
Start: 2024-11-05

## 2024-11-05 RX ORDER — HEPARIN 100 UNIT/ML
500 SYRINGE INTRAVENOUS AS NEEDED
OUTPATIENT
Start: 2024-11-05

## 2024-11-05 RX ORDER — DIPHENHYDRAMINE HYDROCHLORIDE 50 MG/ML
50 INJECTION INTRAMUSCULAR; INTRAVENOUS AS NEEDED
OUTPATIENT
Start: 2024-11-06

## 2024-11-05 RX ORDER — ALBUTEROL SULFATE 0.83 MG/ML
3 SOLUTION RESPIRATORY (INHALATION) AS NEEDED
OUTPATIENT
Start: 2024-11-06

## 2024-11-05 RX ORDER — ALBUTEROL SULFATE 0.83 MG/ML
3 SOLUTION RESPIRATORY (INHALATION) AS NEEDED
Status: DISCONTINUED | OUTPATIENT
Start: 2024-11-05 | End: 2024-11-05 | Stop reason: HOSPADM

## 2024-11-05 RX ORDER — EPINEPHRINE 0.3 MG/.3ML
0.3 INJECTION SUBCUTANEOUS EVERY 5 MIN PRN
OUTPATIENT
Start: 2024-11-06

## 2024-11-05 RX ORDER — EPINEPHRINE 0.3 MG/.3ML
0.3 INJECTION SUBCUTANEOUS EVERY 5 MIN PRN
Status: DISCONTINUED | OUTPATIENT
Start: 2024-11-05 | End: 2024-11-05 | Stop reason: HOSPADM

## 2024-11-05 RX ORDER — DIPHENHYDRAMINE HYDROCHLORIDE 50 MG/ML
50 INJECTION INTRAMUSCULAR; INTRAVENOUS AS NEEDED
Status: DISCONTINUED | OUTPATIENT
Start: 2024-11-05 | End: 2024-11-05 | Stop reason: HOSPADM

## 2024-11-05 ASSESSMENT — PAIN SCALES - GENERAL: PAINLEVEL_OUTOF10: 2

## 2024-11-05 NOTE — PROGRESS NOTES
Call regarding  F/U , Healthy at home program has dis enrolled .     At time of outreach call the patient feels as if their condition has improved since last visit.    Patient encouraged to call  providers for any questions concerns or change in condition, questioned patient if she may be interested in CCM program reviewed benefits , patient states not at this time, feeling a little overwhelmed, she will update PCP on any changes that may occur. Maty has this nurse number for any non emergent needs or questions

## 2024-11-05 NOTE — PROGRESS NOTES
pt tolerated today's IVP venofer infusions without difficulty. VS stable post infusion and pt denies monitoring period. Reviewed S/S os HSR with pt and spouse. Pt instructed to seek emergency care if any symptoms occur. Verbalized understanding. pt to call with any questions and/or concerns.

## 2024-11-15 DIAGNOSIS — N12 PYELONEPHRITIS OF LEFT KIDNEY: ICD-10-CM

## 2024-11-15 RX ORDER — NITROFURANTOIN 25; 75 MG/1; MG/1
100 CAPSULE ORAL DAILY
Qty: 30 CAPSULE | Refills: 1 | Status: SHIPPED | OUTPATIENT
Start: 2024-11-15 | End: 2025-01-14

## 2024-12-20 ENCOUNTER — PATIENT OUTREACH (OUTPATIENT)
Dept: PRIMARY CARE | Facility: CLINIC | Age: 79
End: 2024-12-20
Payer: MEDICARE

## 2024-12-30 ENCOUNTER — HOSPITAL ENCOUNTER (OUTPATIENT)
Dept: RADIOLOGY | Facility: HOSPITAL | Age: 79
Discharge: HOME | End: 2024-12-30
Payer: MEDICARE

## 2024-12-30 DIAGNOSIS — N12 PYELONEPHRITIS OF LEFT KIDNEY: ICD-10-CM

## 2024-12-30 PROCEDURE — 78707 K FLOW/FUNCT IMAGE W/O DRUG: CPT | Performed by: NUCLEAR MEDICINE

## 2024-12-30 PROCEDURE — 3430000001 HC RX 343 DIAGNOSTIC RADIOPHARMACEUTICALS: Performed by: STUDENT IN AN ORGANIZED HEALTH CARE EDUCATION/TRAINING PROGRAM

## 2024-12-30 PROCEDURE — 78707 K FLOW/FUNCT IMAGE W/O DRUG: CPT

## 2024-12-30 PROCEDURE — A9562 TC99M MERTIATIDE: HCPCS | Performed by: STUDENT IN AN ORGANIZED HEALTH CARE EDUCATION/TRAINING PROGRAM

## 2024-12-30 RX ORDER — BETIATIDE 1 MG/1
10 INJECTION, POWDER, LYOPHILIZED, FOR SOLUTION INTRAVENOUS
Status: COMPLETED | OUTPATIENT
Start: 2024-12-30 | End: 2024-12-30

## 2024-12-30 RX ADMIN — TECHNESCAN TC 99M MERTIATIDE 10 MILLICURIE: 1 INJECTION, POWDER, LYOPHILIZED, FOR SOLUTION INTRAVENOUS at 11:23

## 2025-01-02 ENCOUNTER — TELEPHONE (OUTPATIENT)
Dept: PRIMARY CARE | Facility: CLINIC | Age: 80
End: 2025-01-02
Payer: MEDICARE

## 2025-01-03 ENCOUNTER — OFFICE VISIT (OUTPATIENT)
Dept: PRIMARY CARE | Facility: CLINIC | Age: 80
End: 2025-01-03
Payer: MEDICARE

## 2025-01-03 VITALS
WEIGHT: 129 LBS | SYSTOLIC BLOOD PRESSURE: 106 MMHG | BODY MASS INDEX: 22.57 KG/M2 | TEMPERATURE: 97.1 F | HEART RATE: 68 BPM | DIASTOLIC BLOOD PRESSURE: 64 MMHG | RESPIRATION RATE: 18 BRPM | OXYGEN SATURATION: 94 %

## 2025-01-03 DIAGNOSIS — R39.9 UTI SYMPTOMS: ICD-10-CM

## 2025-01-03 DIAGNOSIS — R53.83 OTHER FATIGUE: Primary | ICD-10-CM

## 2025-01-03 LAB
POC APPEARANCE, URINE: CLEAR
POC BILIRUBIN, URINE: NEGATIVE
POC BLOOD, URINE: NEGATIVE
POC COLOR, URINE: YELLOW
POC GLUCOSE, URINE: NEGATIVE MG/DL
POC KETONES, URINE: NEGATIVE MG/DL
POC LEUKOCYTES, URINE: ABNORMAL
POC NITRITE,URINE: NEGATIVE
POC PH, URINE: 5 PH
POC PROTEIN, URINE: NEGATIVE MG/DL
POC SPECIFIC GRAVITY, URINE: 1.02
POC UROBILINOGEN, URINE: 0.2 EU/DL

## 2025-01-03 ASSESSMENT — PAIN SCALES - GENERAL: PAINLEVEL_OUTOF10: 0-NO PAIN

## 2025-01-03 NOTE — PROGRESS NOTES
Subjective   Patient ID: Sherri Najera is a 79 y.o. female who presents for UTI (Patient is here for a UTI. Symptoms include: lower abd pain, fatigue, confusion, sinus congestion x 2 weeks).    HPI   She is here for a 2 to 3-week history of a constellation of symptoms.  She has had some mild chronic back pain that is little bit more flared.  She has had some left-sided sinus pressure/headache.  She has felt very tired over the past several weeks.  She has had some episodes of nausea but no vomiting.  Stools have been alternate between loose and sometimes constipated.  She has had no fever.  She has a history of pyelonephritis which she was admitted in 10/24.  She is being followed by urologist and is scheduled to see him within the next few days.  She also has a history of anemia for which she is being eval by hematologist.  She is scheduled to see her next week.  Last hemoglobin in the chart was from 10/24 when it was 9.5.  She denies any dysuria or urinary frequency.  Urinalysis today was not significant.  Review of Systems  Denies  blurred vision, chest pain, shortness of breath,vomiting, or leg pain or swelling.    Objective   /64 (BP Location: Left arm, Patient Position: Sitting, BP Cuff Size: Adult)   Pulse 68   Temp 36.2 °C (97.1 °F) (Temporal)   Resp 18   Wt 58.5 kg (129 lb)   SpO2 94%   BMI 22.57 kg/m²     Physical Exam  Vitals and nurse's notes reviewed  General: no acute distress. Appears somewhat pale  HEENT: Normal  Neck: Supple  Lungs: Clear  Cardio: RRR w/o murmur  Abdomen: Soft, nontender, no hepatosplenomegaly  Extremities: No edema, no calf tenderness  Neuro: Alert and oriented with no focal deficits    Assessment/Plan   Problem List Items Addressed This Visit             ICD-10-CM       Medium    Other fatigue - Primary R53.83     Her urinalysis and exam today do not point to a specific cause for her symptoms.  I reassured her that there was no evidence of a urinary tract infection  which was the source of her symptoms.  I suspect its multifactorial source of her symptoms. Will hold off on blood work since she will be seeing her hematologist within the week.  Encouraged her to drink fluids.  She can try ibuprofen intermittently to see if it works better than the acetaminophen for her sinus pressure and left-sided headache.  She felt reassured that there was no evidence of UTI.  She is to follow-up with her specialist and if her symptoms persist beyond that she is to return to this office.          Other Visit Diagnoses         Codes    UTI symptoms     R39.9    Relevant Orders    POCT UA Automated manually resulted (Completed)

## 2025-01-03 NOTE — ASSESSMENT & PLAN NOTE
Her urinalysis and exam today do not point to a specific cause for her symptoms.  I reassured her that there was no evidence of a urinary tract infection which was the source of her symptoms.  I suspect its multifactorial source of her symptoms. Will hold off on blood work since she will be seeing her hematologist within the week.  Encouraged her to drink fluids.  She can try ibuprofen intermittently to see if it works better than the acetaminophen for her sinus pressure and left-sided headache.  She felt reassured that there was no evidence of UTI.  She is to follow-up with her specialist and if her symptoms persist beyond that she is to return to this office.

## 2025-01-06 ENCOUNTER — TELEPHONE (OUTPATIENT)
Dept: PRIMARY CARE | Facility: CLINIC | Age: 80
End: 2025-01-06

## 2025-01-06 ENCOUNTER — LAB (OUTPATIENT)
Dept: LAB | Facility: LAB | Age: 80
End: 2025-01-06
Payer: MEDICARE

## 2025-01-06 DIAGNOSIS — D72.819 LEUKOPENIA, UNSPECIFIED TYPE: ICD-10-CM

## 2025-01-06 DIAGNOSIS — F41.9 ANXIETY: ICD-10-CM

## 2025-01-06 LAB
ALBUMIN SERPL BCP-MCNC: 4.3 G/DL (ref 3.4–5)
ALP SERPL-CCNC: 72 U/L (ref 33–136)
ALT SERPL W P-5'-P-CCNC: 16 U/L (ref 7–45)
ANION GAP SERPL CALCULATED.3IONS-SCNC: 11 MMOL/L (ref 10–20)
AST SERPL W P-5'-P-CCNC: 14 U/L (ref 9–39)
BASOPHILS # BLD AUTO: 0.05 X10*3/UL (ref 0–0.1)
BASOPHILS NFR BLD AUTO: 0.9 %
BILIRUB SERPL-MCNC: 0.3 MG/DL (ref 0–1.2)
BUN SERPL-MCNC: 41 MG/DL (ref 6–23)
CALCIUM SERPL-MCNC: 10.4 MG/DL (ref 8.6–10.3)
CHLORIDE SERPL-SCNC: 109 MMOL/L (ref 98–107)
CO2 SERPL-SCNC: 25 MMOL/L (ref 21–32)
CREAT SERPL-MCNC: 1.25 MG/DL (ref 0.5–1.05)
EGFRCR SERPLBLD CKD-EPI 2021: 44 ML/MIN/1.73M*2
EOSINOPHIL # BLD AUTO: 0.31 X10*3/UL (ref 0–0.4)
EOSINOPHIL NFR BLD AUTO: 5.3 %
ERYTHROCYTE [DISTWIDTH] IN BLOOD BY AUTOMATED COUNT: 14.2 % (ref 11.5–14.5)
FERRITIN SERPL-MCNC: 396 NG/ML (ref 8–150)
GLUCOSE SERPL-MCNC: 100 MG/DL (ref 74–99)
HCT VFR BLD AUTO: 37.1 % (ref 36–46)
HGB BLD-MCNC: 12.3 G/DL (ref 12–16)
IMM GRANULOCYTES # BLD AUTO: 0.01 X10*3/UL (ref 0–0.5)
IMM GRANULOCYTES NFR BLD AUTO: 0.2 % (ref 0–0.9)
IRON SATN MFR SERPL: 38 % (ref 25–45)
IRON SERPL-MCNC: 113 UG/DL (ref 35–150)
LYMPHOCYTES # BLD AUTO: 2.44 X10*3/UL (ref 0.8–3)
LYMPHOCYTES NFR BLD AUTO: 41.7 %
MCH RBC QN AUTO: 31.3 PG (ref 26–34)
MCHC RBC AUTO-ENTMCNC: 33.2 G/DL (ref 32–36)
MCV RBC AUTO: 94 FL (ref 80–100)
MONOCYTES # BLD AUTO: 0.42 X10*3/UL (ref 0.05–0.8)
MONOCYTES NFR BLD AUTO: 7.2 %
NEUTROPHILS # BLD AUTO: 2.62 X10*3/UL (ref 1.6–5.5)
NEUTROPHILS NFR BLD AUTO: 44.7 %
NRBC BLD-RTO: 0 /100 WBCS (ref 0–0)
PLATELET # BLD AUTO: 209 X10*3/UL (ref 150–450)
POTASSIUM SERPL-SCNC: 4.6 MMOL/L (ref 3.5–5.3)
PROT SERPL-MCNC: 7 G/DL (ref 6.4–8.2)
RBC # BLD AUTO: 3.93 X10*6/UL (ref 4–5.2)
SODIUM SERPL-SCNC: 140 MMOL/L (ref 136–145)
TIBC SERPL-MCNC: 300 UG/DL (ref 240–445)
UIBC SERPL-MCNC: 187 UG/DL (ref 110–370)
VIT B12 SERPL-MCNC: 408 PG/ML (ref 211–911)
WBC # BLD AUTO: 5.9 X10*3/UL (ref 4.4–11.3)

## 2025-01-06 PROCEDURE — 83550 IRON BINDING TEST: CPT

## 2025-01-06 PROCEDURE — 83540 ASSAY OF IRON: CPT

## 2025-01-06 PROCEDURE — 80053 COMPREHEN METABOLIC PANEL: CPT

## 2025-01-06 PROCEDURE — 82728 ASSAY OF FERRITIN: CPT

## 2025-01-06 PROCEDURE — 82607 VITAMIN B-12: CPT

## 2025-01-06 PROCEDURE — 85025 COMPLETE CBC W/AUTO DIFF WBC: CPT

## 2025-01-06 RX ORDER — LORAZEPAM 0.5 MG/1
0.5 TABLET ORAL DAILY PRN
Qty: 15 TABLET | Refills: 0 | Status: SHIPPED | OUTPATIENT
Start: 2025-01-06

## 2025-01-06 NOTE — TELEPHONE ENCOUNTER
Miguel is calling for mother , she is not sleeping, anxiety and depression are keeping her up.  She is miserable not sleeping.   Thought a benzo at night might help. Is there something EAK can do help?

## 2025-01-10 ENCOUNTER — OFFICE VISIT (OUTPATIENT)
Dept: HEMATOLOGY/ONCOLOGY | Facility: CLINIC | Age: 80
End: 2025-01-10
Payer: MEDICARE

## 2025-01-10 VITALS
TEMPERATURE: 97.5 F | RESPIRATION RATE: 17 BRPM | DIASTOLIC BLOOD PRESSURE: 77 MMHG | BODY MASS INDEX: 23.46 KG/M2 | WEIGHT: 134.04 LBS | HEART RATE: 86 BPM | SYSTOLIC BLOOD PRESSURE: 116 MMHG | OXYGEN SATURATION: 99 %

## 2025-01-10 DIAGNOSIS — D72.819 LEUKOPENIA, UNSPECIFIED TYPE: ICD-10-CM

## 2025-01-10 PROCEDURE — 99215 OFFICE O/P EST HI 40 MIN: CPT | Performed by: INTERNAL MEDICINE

## 2025-01-10 ASSESSMENT — PAIN SCALES - GENERAL: PAINLEVEL_OUTOF10: 4

## 2025-01-10 NOTE — PROGRESS NOTES
Pt seen in office today for a follow up visit with Dr. Issa Xie for management of her anemia.  She is without complaints today and denies pain.     Medications, pharmacy preference and allergies were reviewed with patient and updated in the medical record by MD.     Per orders, labs were obtained on 1/6/25 and results are to be reviewed in office today by MD with patient. She is to RTC in 6 months with labs prior to her visit.    Our contact information was given to patient and they were encouraged to contact us with any questions or concerns.     Patient verbalized understanding and agreement regarding discussed information via verbal feedback. Pt escorted to scheduling.

## 2025-01-10 NOTE — PROGRESS NOTES
Subjective   Patient ID: Sherri Najera is a 79 y.o. female.      HPI  79 y.o. female presents status post left ureteroscopy with laser lithotripsy on 8/8/24. Stent removal was preformed on 8/29/24. She is also status post cystoscopy and insertion of ureteral stent, retrograde pyelogram for left renal stone on 6/20/2024. She was hospitalized 10/4/2024 due to a UTI that lead to pyelonephritis.     The patient expresses that lately she has been experiencing an increased amount of fatigue. The patient was seen by Moose Zee MD who ordered a urinalysis.     Component      Latest Ref Rng 1/3/2025   POC Color, Urine      Straw, Yellow, Light-Yellow  Yellow    POC Appearance, Urine      Clear  Clear    POC Glucose, Urine      NEGATIVE mg/dl NEGATIVE    POC Bilirubin, Urine      NEGATIVE  NEGATIVE    POC Ketones, Urine      NEGATIVE mg/dl NEGATIVE    POC Specific Gravity, Urine      1.005 - 1.035  1.025    POC Blood, Urine      NEGATIVE  NEGATIVE    POC PH, Urine      No Reference Range Established PH 5.0    POC Protein, Urine      NEGATIVE mg/dl NEGATIVE    POC Urobilinogen, Urine      0.2, 1.0 EU/DL 0.2    Poc Nitrite, Urine      NEGATIVE  NEGATIVE    POC Leukocytes, Urine      NEGATIVE  SMALL (1+) !       Legend:  ! Abnormal    NM KIDNEY FLOW/FUNCTION WO PHARMACOLOGICAL INTERVENTION; 12/30/2024   IMPRESSION:  1. Reduced left renal cortical function. No evidence of mechanical  urinary obstruction in the left kidney.  2. Mild dilatation of the right renal pelvis without evidence of  mechanical urinary obstruction in the right kidney.  3. Split renal function is 21.3% in the right kidney and 78.7% in the  left kidney, similar to slightly decreased compared to prior study  from 10/07/2024.    Review of Systems  A complete review of systems was performed. All systems are noted to be negative unless indicated in the history of present illness, impression, active problem list, or past histories.     Objective   Physical  Exam    Assessment/Plan   There are no diagnoses linked to this encounter.    79 y.o. female presents status post left ureteroscopy with laser lithotripsy on 8/8/24. Stent removal was preformed on 8/29/24. She is also status post cystoscopy and insertion of ureteral stent, retrograde pyelogram for left renal stone on 6/20/2024. She was hospitalized 10/4/2024 due to a UTI that lead to pyelonephritis.     I personally reviewed the medical records of the patient including the lab values, the note of the referring physician and I reviewed the report and visualized the images performed focusing on ***    Plan:        Scribe Attestation   By signing my name below, I, Carlotta Badillo, Scribe attestation that this documentation has been prepared under the direction and in the presence of Elena Leal MD.

## 2025-01-10 NOTE — PROGRESS NOTES
Patient ID: Sherri Najera is a 79 y.o. female.  Referring Physician: Issa Xie MD  [address]  [address] [place] [address]  Primary Care Provider: Yesi Lam MD  Referral Reason:     Subjective: anemia  Fatigue. Will start PT soon    Heme/Onc History:  Sherri Najera is a 79 y.o. year old female with PMH DM, HTN, HLD, GERD, Depression/Anxiety, Recurrent renal calculi. she was found to have acute pyelonephritis in 06/2024    Past Medical History:   Past Medical History:   Diagnosis Date    Aneurysm, splenic artery ([facility])     Ankylosing spondylitis     Anxiety     Arthritis     Body mass index (BMI) 27.0-27.9, adult 12/07/2021    BMI 27.0-27.9,adult    BPPV (benign paroxysmal positional vertigo)     Cervical radiculitis     Chronic kidney disease     Depression     Diabetes mellitus ([facility])     Effusion, left knee 02/11/2019    Effusion of left knee    Exocrine pancreatic insufficiency ([facility])     GERD (gastroesophageal reflux disease)     Hearing aid worn     HL (hearing loss)     HLD (hyperlipidemia)     Hypertension     Injury of tendon of rotator cuff 06/19/2024    Irritable bowel syndrome with diarrhea 01/16/2020    Irritable bowel syndrome with diarrhea    Kidney stone     Kidney stone 08/21/2023    Nephrolithiasis     Other conditions influencing health status 06/08/2020    History of chronic diarrhea    Other hemorrhoids 06/28/2017    Internal hemorrhoid    Other nonspecific abnormal finding of lung field 03/26/2019    Ground glass opacity present on imaging of lung    Pain in left knee 04/30/2019    Left knee pain    Pain in unspecified knee 02/11/2019    Knee pain    Personal history of other diseases of the circulatory system 08/12/2017    History of hypertension    Personal history of other diseases of the musculoskeletal system and connective tissue 05/21/2015    History of low back pain    Personal history of other specified conditions 11/03/2020    History of fatigue     Personal history of other specified conditions 05/04/2021    History of diarrhea    Personal history of other specified conditions 03/07/2014    History of chest pain    Personal history of other specified conditions 01/31/2013    History of headache    Skin cancer     Unspecified injury of muscle(s) and tendon(s) of the rotator cuff of right shoulder, initial encounter 10/11/2016    Injury of right rotator cuff    Urinary tract infection     Wears glasses      Social History:   Social History     Socioeconomic History    Marital status:      Spouse name: Not on file    Number of children: Not on file    Years of education: Not on file    Highest education level: Not on file   Occupational History    Not on file   Tobacco Use    Smoking status: Never     Passive exposure: Never    Smokeless tobacco: Never   Vaping Use    Vaping status: Never Used   Substance and Sexual Activity    Alcohol use: Never    Drug use: Never    Sexual activity: Not Currently     Partners: Male     Birth control/protection: Diaphragm   Other Topics Concern    Not on file   Social History Narrative    Not on file     Social Drivers of Health     Financial Resource Strain: Low Risk  (10/10/2024)    Overall Financial Resource Strain (CARDIA)     Difficulty of Paying Living Expenses: Not hard at all   Food Insecurity: No Food Insecurity (10/10/2024)    Hunger Vital Sign     Worried About Running Out of Food in the Last Year: Never true     Ran Out of Food in the Last Year: Never true   Transportation Needs: No Transportation Needs (10/10/2024)    PRAPARE - Transportation     Lack of Transportation (Medical): No     Lack of Transportation (Non-Medical): No   Physical Activity: Inactive (10/10/2024)    Exercise Vital Sign     Days of Exercise per Week: 0 days     Minutes of Exercise per Session: 0 min   Stress: No Stress Concern Present (10/10/2024)    [employer] [place] [employer] - Occupational Stress Questionnaire     Feeling  of Stress : Only a little   Social Connections: Socially Integrated (10/10/2024)    Social Connection and Isolation Panel [NHANES]     Frequency of Communication with Friends and Family: Three times a week     Frequency of Social Gatherings with Friends and Family: Three times a week     Attends Zoroastrianism Services: More than 4 times per year     Active Member of Clubs or Organizations: Yes     Attends Club or Organization Meetings: More than 4 times per year     Marital Status:    Intimate Partner Violence: Not At Risk (10/10/2024)    Humiliation, Afraid, Rape, and Kick questionnaire     Fear of Current or Ex-Partner: No     Emotionally Abused: No     Physically Abused: No     Sexually Abused: No   Housing Stability: Low Risk  (10/10/2024)    Housing Stability Vital Sign     Unable to Pay for Housing in the Last Year: No     Number of Times Moved in the Last Year: 1     Homeless in the Last Year: No     Surgical History:   Past Surgical History:   Procedure Laterality Date    BREAST BIOPSY Right 2004    benign    CARPAL TUNNEL RELEASE Bilateral     COLONOSCOPY  04/11/2018    no further needed-normal    CYSTOSCOPY W/ URETERAL STENT PLACEMENT Left 06/30/2024    HAND SURGERY Left 03/07/2014    Hand Surgery                                                                                                                                                          MR HEAD ANGIO WO IV CONTRAST  04/15/2023    MR HEAD ANGIO WO IV CONTRAST GEA MRI    MR NECK ANGIO WO IV CONTRAST  04/15/2023    MR NECK ANGIO WO IV CONTRAST GEA MRI    TOTAL KNEE ARTHROPLASTY Left 02/26/2024    TOTAL SHOULDER ARTHROPLASTY Bilateral 07/11/2022     Family History:   Family History   Problem Relation Name Age of Onset    Diabetes Mother Sherri     Asthma Mother Sherri     Other (CRF) Mother Sherri     Heart failure Father        reports that she has never smoked. She has never been exposed to tobacco smoke. She has never used smokeless  tobacco.  Oncology Family history: Cancer-related family history is not on file.    Review Of Systems:  As stated per in HPI; otherwise all other 12 point ROS are negative    Physical Exam:  /77 (BP Location: Left arm, Patient Position: Sitting, BP Cuff Size: Adult)   Pulse 86   Temp 36.4 °C (97.5 °F) (Temporal)   Resp 17   Wt 60.8 kg (134 lb 0.6 oz)   SpO2 99%   BMI 23.46 kg/m²   BSA: 1.65 meters squared  General: awake/alert/oriented x3, no distress, alert and cooperative  Head: Short hair fully covering scalp. Symmetric facial expressions  Eyes: PERRL, EOMI, clear sclera, eyebrows present.  Ears/Nose/Mouth/Throat:  Oral mucous membranes moist. No oral ulcers. No palpable pre/post-auricular lymph nodes  Neck: No palpable cervical chain lymph nodes  Respiratory: unlabored breathing on room air, good chest expansion, thorax symmetric  Cardio: Regular rate and rhythm, normal S1 and S2, radial pulses symmetric  GI: Nondistended, soft, non-tender abdomen  Musculoskeletal: Normal muscle bulk and tone, ROM intact, no joint swelling.  Rises from chair and walks unassisted.  Extremities: No ankle swelling, no arm or leg wounds  Neuro: Alert, cognition intact, speech normal. Facial expressions symmetric.  No motor deficits noted. Sensation intact to touch and hot/cold.   Able to stand from seated position unassisted and walks around the room unassisted.  Psychological: Appropriate mood and behavior.  Skin: Warm and dry, no lesions, no rashes    Results:  Diagnostic Results   Lab Results   Component Value Date    WBC 5.9 01/06/2025    HGB 12.3 01/06/2025    HCT 37.1 01/06/2025    MCV 94 01/06/2025     01/06/2025     Lab Results   Component Value Date    CALCIUM 10.4 (H) 01/06/2025     01/06/2025    K 4.6 01/06/2025    CO2 25 01/06/2025     (H) 01/06/2025    BUN 41 (H) 01/06/2025    CREATININE 1.25 (H) 01/06/2025    ALT 16 01/06/2025    AST 14 01/06/2025       Current Outpatient Medications:      atorvastatin (Lipitor) 80 mg tablet, Take 1 tablet (80 mg) by mouth once daily., Disp: 90 tablet, Rfl: 1    fluticasone (Flonase Allergy Relief) 50 mcg/actuation nasal spray, Administer 1 spray into each nostril 2 times a day as needed., Disp: , Rfl:     hydroCHLOROthiazide (Microzide) 12.5 mg tablet, Take 1 tablet (12.5 mg) by mouth once daily., Disp: 90 tablet, Rfl: 0    Lactobacillus acidophilus 1 billion cell tablet, Take 1 tablet by mouth 2 times a day., Disp: 60 tablet, Rfl: 0    lisinopril 30 mg tablet, Take 1 tablet (30 mg) by mouth once daily., Disp: 90 tablet, Rfl: 3    LORazepam (Ativan) 0.5 mg tablet, Take 1 tablet (0.5 mg) by mouth once daily as needed for anxiety for up to 15 doses., Disp: 15 tablet, Rfl: 0    mirtazapine (Remeron) 15 mg tablet, Take 1 tablet (15 mg) by mouth once daily at bedtime., Disp: 90 tablet, Rfl: 3    MULTIVITAMIN ORAL, Take 1 tablet by mouth once daily., Disp: , Rfl:     nitrofurantoin, macrocrystal-monohydrate, (Macrobid) 100 mg capsule, Take 1 capsule (100 mg) by mouth once daily., Disp: 30 capsule, Rfl: 1    PARoxetine (Paxil) 10 mg tablet, Take 1 tablet (10 mg) by mouth once daily. (Patient not taking: Reported on 1/3/2025), Disp: 90 tablet, Rfl: 1    zinc gluconate 50 mg tablet, Take 1 tablet (50 mg) by mouth once daily., Disp: , Rfl:      Radiology:    Pathology:    Assessment/Plan:  ? Anemia:    Normocytic. Ngs is negative. 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 Her left kidney fxn is %23 of normal due to renal calculi and pyelonephritis. She also has underlying iron def. She was on oral iron but can not tolerate them due to constipation.    10/10/24: Venofer 300 mg x 3 ordered. EPO is 47 so I expect slow recovery of BM fxn within the next few months. If not, we will consider BMB    01/10/25: Venofer 1200 mg completed on 11/5/24. Hbg is 12.3 improved from 9.1. Iron panel is normal.    RTC in 6 months with labs    2- Muscle weakness: will start PT soon    3- Elevated  Cre: will meet Nephrology next week    Diagnoses and all orders for this visit:  Leukopenia, unspecified type  -     Clinic Appointment Request       Performance Status: Symptomatic; fully ambulatory    I spent more than 30 minutes for the patient today, including face-to-face conversation, pre-visit preparation, post-visit orders, and others.   Issa Xie MD

## 2025-01-14 DIAGNOSIS — I10 ESSENTIAL HYPERTENSION: ICD-10-CM

## 2025-01-14 RX ORDER — HYDROCHLOROTHIAZIDE 12.5 MG/1
12.5 TABLET ORAL DAILY
Qty: 90 TABLET | Refills: 0 | Status: SHIPPED | OUTPATIENT
Start: 2025-01-14

## 2025-01-16 ENCOUNTER — APPOINTMENT (OUTPATIENT)
Dept: UROLOGY | Facility: CLINIC | Age: 80
End: 2025-01-16
Payer: MEDICARE

## 2025-01-16 ENCOUNTER — EVALUATION (OUTPATIENT)
Dept: PHYSICAL THERAPY | Facility: CLINIC | Age: 80
End: 2025-01-16
Payer: MEDICARE

## 2025-01-16 DIAGNOSIS — M62.81 GENERALIZED MUSCLE WEAKNESS: ICD-10-CM

## 2025-01-16 DIAGNOSIS — R26.81 GAIT INSTABILITY: Primary | ICD-10-CM

## 2025-01-16 PROCEDURE — 97110 THERAPEUTIC EXERCISES: CPT | Mod: GP

## 2025-01-16 PROCEDURE — 97162 PT EVAL MOD COMPLEX 30 MIN: CPT | Mod: GP

## 2025-01-16 ASSESSMENT — PAIN SCALES - GENERAL: PAINLEVEL_OUTOF10: 0 - NO PAIN

## 2025-01-16 ASSESSMENT — ENCOUNTER SYMPTOMS
OCCASIONAL FEELINGS OF UNSTEADINESS: 1
LOSS OF SENSATION IN FEET: 0
DEPRESSION: 0

## 2025-01-16 ASSESSMENT — PAIN - FUNCTIONAL ASSESSMENT: PAIN_FUNCTIONAL_ASSESSMENT: 0-10

## 2025-01-16 ASSESSMENT — ACTIVITIES OF DAILY LIVING (ADL): ADL_ASSISTANCE: INDEPENDENT

## 2025-01-16 NOTE — PROGRESS NOTES
Physical Therapy  Physical Therapy Orthopedic Evaluation      Patient Name: Sherri Najera  MRN: [id number]  Today's Date: 1/16/2025  Time Calculation  Start Time: 1551  Stop Time: 1633  Time Calculation (min): 42 min  PT Evaluation Time Entry  PT Evaluation (Moderate) Time Entry: 25  PT Therapeutic Procedures Time Entry  Therapeutic Exercise Time Entry: 15       Insurance:  Episode Count: 1  Number of Treatments Authorized: 1/MN  Certification Period Start Date: 01/16/25     Insurance Type: Payor: [employer] / Plan: [employer]IC[employer] / Product Type: *No Product type* /     Current Problem  1. Gait instability  Follow Up In Physical Therapy      2. Generalized muscle weakness  Follow Up In Physical Therapy          Problem List Items Addressed This Visit             ICD-10-CM    Gait instability - Primary R26.81    Relevant Orders    Follow Up In Physical Therapy    Generalized muscle weakness M62.81    Relevant Orders    Follow Up In Physical Therapy        General:  Reason for Referral: Gait instability and generalized weakness following hospitalization  Referred By: Dr. eYsi Lam    Past Medical History  Past Medical History Relevant to Rehab: h/o L TKA (Feb 2024), DM, HTN (Reviewed with patient)    Precautions:   Precautions  STEADI Fall Risk Score (The score of 4 or more indicates an increased risk of falling): 8  Precautions Comment: High Fall Risk    Medical History Form: Reviewed (scanned into chart)    Subjective:   Subjective   General Comment: Patient presents to the PT clinic with chief complaint of generalized weakness and balance difficulties. She reports that from July to September 2024 she was hospitalized 3 times due to 3 kidney infections. She has since noticed extensive global weakness due to these hospitalizations. She also reports that she lost 25 pounds during this time, but has been slowly regaining weight. She has a f/u appoitnement with her MD to check up on her kidney  "function, she is not recieving dialysis care. She reports that her weakness feels equal bilaterally, but would like to focus on her LEs more at this time. She also reports 3 falls since her hospitalization, with the most recent being 2 weeks ago. She denies any injuries associated with her falls and reports they occur \"because my balance gives out\". She reports that she has difficulty with walking, stairs, and general household chores because of her fatigue. Her main goal for PT is to improve her overall strength (focus on LEs) and balance.    Patient's Goal(s) for Therapy: To increase her strength and improve her balance.    Onset Date: Onset Date: 07/01/24    Current Condition:   Better    Prior Functional Level: Prior Function Per Pt/Caregiver Report  Level of [place]: Independent with ADLs and functional transfers  ADL Assistance: Independent  Homemaking Assistance: Independent  Ambulatory Assistance: Independent  Vocational: Retired    Pain:  Pain Assessment: 0-10  0-10 (Numeric) Pain Score: 0 - No pain  Pain Type: Other (Comment) (Weakness > Pain)  Pain Location:  (Global muscle weakness, no pain)  Pain Radiating Towards: n/a  Aggravating Factors: Walking >10 minutes (utilizes SPC), standing >15 minutes, stair navigation (currently step-to patterning with hand rail), heavy household chores (vacuuming)  Alleviating Factors: Rest    Previous Interventions/Treatments/Previous Tests & Imaging: None  for current condition.    Patients Living Environment: Home Living Comment: 2-Story home, with multiple flights of stairs    Primary Language: English    Red Flags: Do you have any of the following?         Red Flags: Unexplained Muscle Weakness    Objective:  Objective   HIP  Observation  Observation Comment: Standing Alignment: B Femoral MR  Hip Palpation/Joint Mobility   Palpation / Joint Mobility Comment: Unremarkable  Lumbar AROM  Lumbar AROM WFL: yes  Hip AROM  Hip AROM WFL: yes  Hip PROM  Hip PROM WFL: " yes  Specific Lower Extremity MMT  R Iliopsoas: (5/5): 3+/5  L Iliopsoas: (/5): 3+/5  R Gluteals (sidelying): (5/5): 3/5  L Gluteals (sidelying): (5/5): 3/5  R Hip External Rotation: (/5): 3+/5  L Hip External Rotation: (/5): 3+/5  R knee flexion: (5): 4/5  L knee flexion: (5): 4/5  R knee extension: (): 4+/5  L knee extension: (5): 4+/5  DTR  DTR WFL: yes  Special Tests  Special Tests Negative: yes  Gait  Gait Comment: 3-point step through patterning with SPC; slow cadance      Shoulder  Observation  Cervical Posture: Forward head  Cervical AROM  Cervical AROM WFL: yes  Shoulder AROM  R Shoulder flexion: (180°): 150  L Shoulder flexion: (180°): 130  R shoulder abduction: (180°): 121  L Shoulder abduction: (180°): 120  Shoulder Strength  R shoulder flexion: (/): 3+/5  L shoulder flexion: (/5): 4/5  R shoulder abduction: (/5): 4+/5  L shoulder abduction: (/): 4+/5  R shoulder ER: (/): 4/5  L shoulder ER: (/5): 4/5  R shoulder IR: (/) : 4+/5  L shoulder IR: (/): 4+/5  Scapular MMT  R lower trapezius: (/5): 4/5  L lower trapezius: (5/5): 4/5  R middle trapezius: (/5): 4/5  L middle trapezius: (/5): 4/5  DTR   DTR WFL: yes    Balance (seconds):  Parallel EO: 30  Parallel EC: 30  Instep EO: 30  Instep EC: 25  Tandem EO: 6  Tandem EC: Unable  Single Leg EO (R/L): 6 / 2  Single Leg EC (R/L): Unable B    Outcome Measures:  Other Measures  5x Sit to Stand: 19  30 Second Sit to Stand: 7  Activities - Specific Balance Confidence Scale: 1140/1600 (71.3%)   TU.3 seconds    Treatment Performed:  Therapeutic Exercise  Therapeutic Exercise Activity 1: Supine Bridge, 2x10  Therapeutic Exercise Activity 2: Seated hip abduction, R TB, 2x10  Therapeutic Exercise Activity 3: Standing hip extension, R TB, 2x10  Therapeutic Exercise Activity 4: Standing hip abduction, R TB, 2x10  Therapeutic Exercise Activity 5: Education: Overview of HEP with patient demonstrating understanding of FITT  principles      Outpatient Education  Individual(s) Educated: Patient  Education Provided: Home Exercise Program, Fall Risk  Risk and Benefits Discussed with Patient/Caregiver/Other: yes  Patient/Caregiver Demonstrated Understanding: yes  Plan of Care Discussed and Agreed Upon: yes  Patient Response to Education: Patient/Caregiver Verbalized Understanding of Information  Education Comment: Access Code: [email or web address]  URL: [email or web address]  Date: 01/16/2025  Prepared by: Moises Hinkle    Exercises  - Supine Bridge  - 5 x weekly - 3 sets - 10 reps  - Seated Hip Abduction with Resistance  - 5 x weekly - 3 sets - 10 reps  - Seated Shoulder Horizontal Abduction with Resistance  - 5 x weekly - 3 sets - 10 reps  - Seated Shoulder Diagonal Pulls with Resistance  - 5 x weekly - 3 sets - 10 reps    Assessment:   Sherri Najera is a 79 y.o. who presents to physical therapy with signs and symptoms consistent with global muscle weakness and balance deficit secondary to recent hospitalization following kidney infections. They demonstrate LE (proximal>distal musculature) weakness, impaired functional strength (per 5xSTS, 30sec STS, and TUG scores), and impaired static balance (eyes closed>eyes open). These limitations put her at an increased risk of falls. Patient's limitations prevent them from participating in typical ADLs and performing desired functional activities. Patient would benefit from skilled physical therapy in order to address the stated deficits and return to daily tasks with reduced pain and improved function.    Personal Factors Affecting Care:   Diabetes    Stability: Stable and/or uncomplicated characteristics    PT Assessment Results: Decreased strength, Decreased range of motion, Impaired balance, Decreased mobility  Rehab Prognosis: Good    Plan:  Treatment/Interventions: Education/ Instruction, Gait training, Manual therapy, Neuromuscular re-education, Self care/ home management,  Taping techniques, Therapeutic activities, Therapeutic exercises  PT Plan: Skilled PT  PT Frequency: 1 time per week  Duration: 6 weeks  Onset Date: 07/01/24  Rehab Potential: Good    Goals: Set and discussed today  Active       PT Problem       PT Goal 1       Start:  01/16/25    Expected End:  02/13/25       Short Term Goals  1.Patient will demonstrate adherence to their HEP in order to maintain PT progress in 2 weeks.  2.Patient will demonstrate tandem, eyes closed, balance for >20 seconds, in order to improve their balance and reduce fall risk in 4 weeks.  3.Patient will be able to perform 5xSTS in less than 12 seconds to demonstrate greater functional mobility and reduced risk of falls in 4 weeks.  4.Patient will be able to walk for 30 minutes with LRAD to demonstrate progress to their PLOF in 4 weeks.         PT Goal 2       Start:  01/16/25    Expected End:  02/27/25       Long Term Goals:  1.Patient will report >85% on the Activities-Specific Balance Confidence Scale outcome measure in 6 weeks to demonstrate greater self-perceived balance confidence.  2.Patient will demonstrate 4+/5 MMT score, or greater, on B Hip flexors and gluteals to demonstrate increased strength needed for functional tasks in 6 weeks.  3.Patient will be able to ascend/descend 10 stairs with reciprocal patterning in order to navigate the community without pain in 6 weeks.             Plan of care was developed with input and agreement by the patient         Screening  Frequency  Date Last Completed   Spiritual and Cultural Beliefs   Screening  each visit or episode of care 10/10/2024   Falls Risk Screening  every ambulatory visit 1/16/2025  4:03 PM   Pain Screening  annually at primary care visit  1/10/2025   Domestic Violence screening  annually at primary care visit 10/10/2024   Elder Abuse Screening  annually at primary care visit    Depression Screening  annually in the primary care setting 1/3/2025   Suicide Risk Screening   annually in the primary care setting 10/10/2024   Nutrition and Food Insecurity   Screening  at least annually at primary care visit  10/10/2024   Key Learner  annually in the primary care setting 10/10/2024   Drug Screen  1/3/2025 11:08 AM   Alcohol Screen  1/3/2025 11:08 AM   Advance Directive  10/10/2024           Moises Hinkle, PT

## 2025-01-20 ENCOUNTER — LAB (OUTPATIENT)
Dept: LAB | Facility: LAB | Age: 80
End: 2025-01-20
Payer: MEDICARE

## 2025-01-20 ENCOUNTER — APPOINTMENT (OUTPATIENT)
Dept: UROLOGY | Facility: CLINIC | Age: 80
End: 2025-01-20
Payer: MEDICARE

## 2025-01-20 DIAGNOSIS — I10 BENIGN ESSENTIAL HTN: ICD-10-CM

## 2025-01-20 DIAGNOSIS — E11.9 CONTROLLED TYPE 2 DIABETES MELLITUS WITHOUT COMPLICATION, WITHOUT LONG-TERM CURRENT USE OF INSULIN (MULTI): ICD-10-CM

## 2025-01-20 LAB
ALBUMIN SERPL BCP-MCNC: 4.3 G/DL (ref 3.4–5)
ALP SERPL-CCNC: 62 U/L (ref 33–136)
ALT SERPL W P-5'-P-CCNC: 17 U/L (ref 7–45)
ANION GAP SERPL CALCULATED.3IONS-SCNC: 11 MMOL/L (ref 10–20)
AST SERPL W P-5'-P-CCNC: 16 U/L (ref 9–39)
BILIRUB SERPL-MCNC: 0.3 MG/DL (ref 0–1.2)
BUN SERPL-MCNC: 28 MG/DL (ref 6–23)
CALCIUM SERPL-MCNC: 10.4 MG/DL (ref 8.6–10.3)
CHLORIDE SERPL-SCNC: 107 MMOL/L (ref 98–107)
CO2 SERPL-SCNC: 29 MMOL/L (ref 21–32)
CREAT SERPL-MCNC: 1.14 MG/DL (ref 0.5–1.05)
CREAT UR-MCNC: 122.4 MG/DL (ref 20–320)
EGFRCR SERPLBLD CKD-EPI 2021: 49 ML/MIN/1.73M*2
EST. AVERAGE GLUCOSE BLD GHB EST-MCNC: 126 MG/DL
GLUCOSE SERPL-MCNC: 129 MG/DL (ref 74–99)
HBA1C MFR BLD: 6 %
MICROALBUMIN UR-MCNC: 7.9 MG/L
MICROALBUMIN/CREAT UR: 6.5 UG/MG CREAT
POTASSIUM SERPL-SCNC: 4.3 MMOL/L (ref 3.5–5.3)
PROT SERPL-MCNC: 6.8 G/DL (ref 6.4–8.2)
SODIUM SERPL-SCNC: 143 MMOL/L (ref 136–145)

## 2025-01-20 PROCEDURE — 82043 UR ALBUMIN QUANTITATIVE: CPT

## 2025-01-20 PROCEDURE — 80053 COMPREHEN METABOLIC PANEL: CPT

## 2025-01-20 PROCEDURE — 82570 ASSAY OF URINE CREATININE: CPT

## 2025-01-20 PROCEDURE — 83036 HEMOGLOBIN GLYCOSYLATED A1C: CPT

## 2025-01-21 NOTE — PROGRESS NOTES
Subjective   Patient ID: Sherri Najera is a 79 y.o. female who presents for Diabetes and Hypertension.    HPI   # 1. Sherri is seen for hypertension follow-up.  She is taking lisinopril 30 mg.  Hydrochlorothiazide 12.5 mg was added 3 months ago.  Electrolytes are normal and GFR is 49.  Blood pressure was elevated at that visit and she was complaining of headaches which she actually has chronically.  She has chronic sinus headaches and she was instructed to take Zyrtec as well as a nasal spray.  She does not feel the Zyrtec is helping.  # 2.  She has type 2 diabetes.  She is on no medications.   She is lost approximately 20 pounds this year.  Her fasting blood sugars at 129 with an A1c of 6.0.  # 3. She has hypercholesterolemia and takes atorvastatin 80 mg daily.       She is now going to physical therapy for gait instability and generalized weakness.  This is from her multiple hospitalizations through the summer.  He continues to follow with urology, hematology for anemia and dermatology.  She had a very stormy 2024.  She now feels like she is getting her energy back as of the start of the year.  She does not sleep well 2 days out of the week.  She does watch TV before she goes to bed at night.    Review of Systems  She denies  dizziness.  No chest tightness, orthopnea or pedal edema.  Shortness of breath or dyspnea on exertion.  She denies cough.  No abdominal pain or change in bowel habits.    Objective   /68 (BP Location: Left arm)   Pulse 67   Wt 60.8 kg (134 lb)   SpO2 95%   BMI 23.45 kg/m²     Physical Exam  She is alert and comfortable.  Neck is without JVD.  Lungs are clear to auscultation.  Heart is regular in rhythm and rate.  Normal S1 and S2 without murmurs or gallops.  Extremities are without edema.  Skin turgor is normal and mucous membranes are moist.    Assessment/Plan   Assessment & Plan  Essential hypertension    Orders:    hydroCHLOROthiazide (Microzide) 12.5 mg tablet; Take 1 tablet  (12.5 mg) by mouth once daily.    Controlled type 2 diabetes mellitus without complication, without long-term current use of insulin (Multi)         Hypercholesterolemia  She will continue her current medication.       Headache, chronic daily    Orders:    Referral to ENT; Future    She should follow-up in 6 months for recheck.  I recommend that she stop her Zyrtec.  She should avoid screen time before bed at night.

## 2025-01-23 ENCOUNTER — TELEPHONE (OUTPATIENT)
Dept: PRIMARY CARE | Facility: CLINIC | Age: 80
End: 2025-01-23

## 2025-01-23 ENCOUNTER — TREATMENT (OUTPATIENT)
Dept: PHYSICAL THERAPY | Facility: CLINIC | Age: 80
End: 2025-01-23
Payer: MEDICARE

## 2025-01-23 ENCOUNTER — APPOINTMENT (OUTPATIENT)
Dept: PRIMARY CARE | Facility: CLINIC | Age: 80
End: 2025-01-23
Payer: MEDICARE

## 2025-01-23 VITALS
BODY MASS INDEX: 23.45 KG/M2 | DIASTOLIC BLOOD PRESSURE: 68 MMHG | HEART RATE: 67 BPM | OXYGEN SATURATION: 95 % | SYSTOLIC BLOOD PRESSURE: 110 MMHG | WEIGHT: 134 LBS

## 2025-01-23 DIAGNOSIS — I10 ESSENTIAL HYPERTENSION: Primary | ICD-10-CM

## 2025-01-23 DIAGNOSIS — M62.81 GENERALIZED MUSCLE WEAKNESS: ICD-10-CM

## 2025-01-23 DIAGNOSIS — E11.9 CONTROLLED TYPE 2 DIABETES MELLITUS WITHOUT COMPLICATION, WITHOUT LONG-TERM CURRENT USE OF INSULIN (MULTI): ICD-10-CM

## 2025-01-23 DIAGNOSIS — E78.00 HYPERCHOLESTEROLEMIA: ICD-10-CM

## 2025-01-23 DIAGNOSIS — F32.1 CURRENT MODERATE EPISODE OF MAJOR DEPRESSIVE DISORDER, UNSPECIFIED WHETHER RECURRENT (MULTI): ICD-10-CM

## 2025-01-23 DIAGNOSIS — R51.9 HEADACHE, CHRONIC DAILY: ICD-10-CM

## 2025-01-23 DIAGNOSIS — I10 ESSENTIAL HYPERTENSION: ICD-10-CM

## 2025-01-23 DIAGNOSIS — F41.9 ANXIETY: ICD-10-CM

## 2025-01-23 DIAGNOSIS — R26.81 GAIT INSTABILITY: Primary | ICD-10-CM

## 2025-01-23 PROCEDURE — 97110 THERAPEUTIC EXERCISES: CPT | Mod: GP,CQ

## 2025-01-23 PROCEDURE — 1159F MED LIST DOCD IN RCRD: CPT | Performed by: FAMILY MEDICINE

## 2025-01-23 PROCEDURE — 97112 NEUROMUSCULAR REEDUCATION: CPT | Mod: GP,CQ

## 2025-01-23 PROCEDURE — 3074F SYST BP LT 130 MM HG: CPT | Performed by: FAMILY MEDICINE

## 2025-01-23 PROCEDURE — 1126F AMNT PAIN NOTED NONE PRSNT: CPT | Performed by: FAMILY MEDICINE

## 2025-01-23 PROCEDURE — 1123F ACP DISCUSS/DSCN MKR DOCD: CPT | Performed by: FAMILY MEDICINE

## 2025-01-23 PROCEDURE — 1036F TOBACCO NON-USER: CPT | Performed by: FAMILY MEDICINE

## 2025-01-23 PROCEDURE — 99214 OFFICE O/P EST MOD 30 MIN: CPT | Performed by: FAMILY MEDICINE

## 2025-01-23 PROCEDURE — 1160F RVW MEDS BY RX/DR IN RCRD: CPT | Performed by: FAMILY MEDICINE

## 2025-01-23 PROCEDURE — 3078F DIAST BP <80 MM HG: CPT | Performed by: FAMILY MEDICINE

## 2025-01-23 RX ORDER — PAROXETINE 10 MG/1
10 TABLET, FILM COATED ORAL DAILY
Qty: 90 TABLET | Refills: 1 | Status: SHIPPED | OUTPATIENT
Start: 2025-01-23 | End: 2025-07-22

## 2025-01-23 RX ORDER — HYDROCHLOROTHIAZIDE 12.5 MG/1
12.5 TABLET ORAL DAILY
Qty: 90 TABLET | Refills: 2 | Status: SHIPPED | OUTPATIENT
Start: 2025-01-23

## 2025-01-23 ASSESSMENT — PAIN - FUNCTIONAL ASSESSMENT: PAIN_FUNCTIONAL_ASSESSMENT: 0-10

## 2025-01-23 ASSESSMENT — PAIN SCALES - GENERAL
PAINLEVEL_OUTOF10: 0-NO PAIN
PAINLEVEL_OUTOF10: 0 - NO PAIN

## 2025-01-23 NOTE — PATIENT INSTRUCTIONS
It has been a pleasure to be your physician for this past year.  I am so glad to see you looking better and feeling better!  Optimistic that this trend will continue!

## 2025-01-23 NOTE — ASSESSMENT & PLAN NOTE
Orders:    hydroCHLOROthiazide (Microzide) 12.5 mg tablet; Take 1 tablet (12.5 mg) by mouth once daily.

## 2025-01-23 NOTE — PROGRESS NOTES
Physical Therapy Treatment    Patient Name: Sherri Najera  MRN: [id number]  Today's Date: 1/23/2025  Time Calculation  Start Time: 1509  Stop Time: 1600  Time Calculation (min): 51 min  PT Therapeutic Procedures Time Entry  Manual Therapy Time Entry: 8  Neuromuscular Re-Education Time Entry: 15  Therapeutic Exercise Time Entry: 28,      Current Problem  Problem List Items Addressed This Visit             ICD-10-CM    Gait instability - Primary R26.81    Generalized muscle weakness M62.81       Insurance:  Number of Treatments Authorized: 2/MN  Certification Period Start Date: 01/16/25         Subjective   General  Reason for Referral: Gait instability and generalized weakness following hospitalization  Referred By: Dr. Yesi Lam  Past Medical History Relevant to Rehab: h/o L TKA (Feb 2024), DM, HTN (Reviewed with patient)  General Comment: Patient states she is starting to feel better.  She is looking forward to regaining her strength and stamina.    Performing HEP?: Yes    Precautions  Precautions  STEADI Fall Risk Score (The score of 4 or more indicates an increased risk of falling): 8  Precautions Comment: High Fall Risk  Pain  Pain Assessment: 0-10  0-10 (Numeric) Pain Score: 0 - No pain    Objective       Treatments:    Therapeutic Exercise  Therapeutic Exercise Activity 1: SciFit Man L2 x 5 min  Therapeutic Exercise Activity 2: standing alt hip ABD x 1 min  Therapeutic Exercise Activity 3: standing hip diagonals alt R/L x 1 min  Therapeutic Exercise Activity 4: standing alt hip ext R/L x 1 min  Therapeutic Exercise Activity 5: sitting on 75cm SB with CGA - pelvic tilts A/P and S/S x 1 min ea  Therapeutic Exercise Activity 6: sitting on 75cm SB Matrix rows 7.5# 2 x 10  Therapeutic Exercise Activity 7: sit to stand from standard chair 2 x 5  Therapeutic Exercise Activity 8: supine R/L SLR x 10 ea  Therapeutic Exercise Activity 9: HL RTB R/L iso clam x 10 ea    Balance/Neuromuscular  "Re-Education  Balance/Neuromuscular Re-Education Activity 1: on firm surface - alt foot tap to 6\" step without UE (CGA) 2 x 1 min  Balance/Neuromuscular Re-Education Activity 2: DLB on airex pad with R/L weight shifting x 1 min - SBA  Balance/Neuromuscular Re-Education Activity 3: NBOS on airex x 1 min - SBA  Balance/Neuromuscular Re-Education Activity 4: NBOS on airex with R/L cervical rotation x 1 min - CGA    Manual Therapy  Manual Therapy Activity 1: manual stretch R/L gluts, piriformis, HS, ADD, HF, quad                     OP EDUCATION:  Outpatient Education  Education Comment: sit to stand from chair 2-3x/day 5 to 10 reps as harjit. consider supportive shoes.    Assessment:  PT Assessment  Assessment Comment: Today's session focused on increasing LQ strength and stabilization.  Discussed how footwear choice can impact her stability.  Challanged with SB and balance skills, but able to perform with SBA/CGA.  LEs fatigue easily.  She did well without any pain.  Warned post-exercise soreness.    Plan:  OP PT Plan  Treatment/Interventions: Education/ Instruction, Gait training, Manual therapy, Neuromuscular re-education, Self care/ home management, Taping techniques, Therapeutic activities, Therapeutic exercises  PT Plan: Skilled PT  PT Frequency: 1 time per week  Duration: 6 weeks  Onset Date: 07/01/24  Certification Period Start Date: 01/16/25  Number of Treatments Authorized: 2/MN  Rehab Potential: Good  Plan of Care Agreement: Patient    Goals:  Active       PT Problem       PT Goal 1       Start:  01/16/25    Expected End:  02/13/25       Short Term Goals  1.Patient will demonstrate adherence to their HEP in order to maintain PT progress in 2 weeks.  2.Patient will demonstrate tandem, eyes closed, balance for >20 seconds, in order to improve their balance and reduce fall risk in 4 weeks.  3.Patient will be able to perform 5xSTS in less than 12 seconds to demonstrate greater functional mobility and reduced risk " of falls in 4 weeks.  4.Patient will be able to walk for 30 minutes with LRAD to demonstrate progress to their PLOF in 4 weeks.         PT Goal 2       Start:  01/16/25    Expected End:  02/27/25       Long Term Goals:  1.Patient will report >85% on the Activities-Specific Balance Confidence Scale outcome measure in 6 weeks to demonstrate greater self-perceived balance confidence.  2.Patient will demonstrate 4+/5 MMT score, or greater, on B Hip flexors and gluteals to demonstrate increased strength needed for functional tasks in 6 weeks.  3.Patient will be able to ascend/descend 10 stairs with reciprocal patterning in order to navigate the community without pain in 6 weeks.              Rola Francis, PTA

## 2025-01-23 NOTE — TELEPHONE ENCOUNTER
Med refill on Paroxetine  Send to [facility]  Last seen on 01/23/2025

## 2025-01-24 ENCOUNTER — APPOINTMENT (OUTPATIENT)
Dept: PHYSICAL THERAPY | Facility: CLINIC | Age: 80
End: 2025-01-24
Payer: MEDICARE

## 2025-01-31 ENCOUNTER — APPOINTMENT (OUTPATIENT)
Dept: PHYSICAL THERAPY | Facility: CLINIC | Age: 80
End: 2025-01-31
Payer: MEDICARE

## 2025-01-31 DIAGNOSIS — M62.81 GENERALIZED MUSCLE WEAKNESS: ICD-10-CM

## 2025-01-31 DIAGNOSIS — R26.81 GAIT INSTABILITY: Primary | ICD-10-CM

## 2025-02-06 ENCOUNTER — APPOINTMENT (OUTPATIENT)
Dept: PHYSICAL THERAPY | Facility: CLINIC | Age: 80
End: 2025-02-06
Payer: MEDICARE

## 2025-02-06 DIAGNOSIS — M62.81 GENERALIZED MUSCLE WEAKNESS: ICD-10-CM

## 2025-02-06 DIAGNOSIS — R26.81 GAIT INSTABILITY: Primary | ICD-10-CM

## 2025-02-11 ENCOUNTER — TELEPHONE (OUTPATIENT)
Dept: PRIMARY CARE | Facility: CLINIC | Age: 80
End: 2025-02-11
Payer: MEDICARE

## 2025-02-11 NOTE — TELEPHONE ENCOUNTER
Patient would like to know if she should continue to take Hydrochlorothiazide 12.5 MG?    Please advise    Patient can be reached at [phone]

## 2025-02-13 ENCOUNTER — APPOINTMENT (OUTPATIENT)
Dept: PHYSICAL THERAPY | Facility: CLINIC | Age: 80
End: 2025-02-13
Payer: MEDICARE

## 2025-02-13 DIAGNOSIS — R26.81 GAIT INSTABILITY: Primary | ICD-10-CM

## 2025-02-13 DIAGNOSIS — M62.81 GENERALIZED MUSCLE WEAKNESS: ICD-10-CM

## 2025-02-17 ENCOUNTER — TELEPHONE (OUTPATIENT)
Dept: HEMATOLOGY/ONCOLOGY | Facility: CLINIC | Age: 80
End: 2025-02-17
Payer: MEDICARE

## 2025-02-17 ENCOUNTER — TELEPHONE (OUTPATIENT)
Dept: PRIMARY CARE | Facility: CLINIC | Age: 80
End: 2025-02-17
Payer: MEDICARE

## 2025-02-17 DIAGNOSIS — I10 ESSENTIAL HYPERTENSION: ICD-10-CM

## 2025-02-17 DIAGNOSIS — E78.00 HYPERCHOLESTEROLEMIA: ICD-10-CM

## 2025-02-17 DIAGNOSIS — E11.9 CONTROLLED TYPE 2 DIABETES MELLITUS WITHOUT COMPLICATION, WITHOUT LONG-TERM CURRENT USE OF INSULIN (MULTI): ICD-10-CM

## 2025-02-17 NOTE — TELEPHONE ENCOUNTER
From Dr. Parsons    01/10/25: Venofer 1200 mg completed on 11/5/24. Hbg is 12.3 improved from 9.1. Iron panel is normal.     RTC in 6 months with lab    Left detailed VM for Sherri Hanson that She is correct.  I will cancel the Iron infusion appts and she should keep her 6 month lab and follow up appt.

## 2025-02-17 NOTE — TELEPHONE ENCOUNTER
Patient called and stated [facility] has agreed to take Patient as a Patient after [facility] retires. Is it OK to schedule with [facility]?    Please advise    Patient can be reached at [phone]

## 2025-02-20 ENCOUNTER — APPOINTMENT (OUTPATIENT)
Dept: PHYSICAL THERAPY | Facility: CLINIC | Age: 80
End: 2025-02-20
Payer: MEDICARE

## 2025-02-20 DIAGNOSIS — R26.81 GAIT INSTABILITY: Primary | ICD-10-CM

## 2025-02-20 DIAGNOSIS — M62.81 GENERALIZED MUSCLE WEAKNESS: ICD-10-CM

## 2025-03-07 ENCOUNTER — TELEPHONE (OUTPATIENT)
Dept: PRIMARY CARE | Facility: CLINIC | Age: 80
End: 2025-03-07
Payer: MEDICARE

## 2025-03-07 DIAGNOSIS — E78.5 HYPERLIPIDEMIA, UNSPECIFIED HYPERLIPIDEMIA TYPE: ICD-10-CM

## 2025-03-07 DIAGNOSIS — F32.1 CURRENT MODERATE EPISODE OF MAJOR DEPRESSIVE DISORDER, UNSPECIFIED WHETHER RECURRENT (MULTI): ICD-10-CM

## 2025-03-07 DIAGNOSIS — F41.9 ANXIETY: ICD-10-CM

## 2025-03-07 RX ORDER — ATORVASTATIN CALCIUM 80 MG/1
80 TABLET, FILM COATED ORAL DAILY
Qty: 90 TABLET | Refills: 0 | Status: SHIPPED | OUTPATIENT
Start: 2025-03-07

## 2025-03-07 RX ORDER — PAROXETINE 10 MG/1
10 TABLET, FILM COATED ORAL DAILY
Qty: 90 TABLET | Refills: 1 | Status: CANCELLED | OUTPATIENT
Start: 2025-03-07 | End: 2025-09-03

## 2025-03-07 NOTE — TELEPHONE ENCOUNTER
Patient is calling in for a refill on Atorvastatin 80 mg tabs and Paxil 10 mg tabs.  Last seen 01/23/25 for DM/Cholesterol check.  Please send to [facility] mail ordered.

## 2025-03-08 NOTE — PROGRESS NOTES
Subjective   Patient ID: Sherri Najera is a 79 y.o. female.      HPI  79 y.o. female presents for a follow up visit. She is status post  cystoscopy with insertion of ureteral stent, retrograde pyelogram for left renal stone on 6/20/2024. She is also status post left ureteroscopy, laser lithotripsy and stent exchanged on 8/8/2024.     She has a history of C.diff with oral antibiotics.  She was to start nitrofurantoin 100 mg once daily for 3 months after her last visit.     She has not had any other infections. She is no longer on any antibiotics. She states she is almost 100% back to normal.      NM KIDNEY FLOW/FUNCTION WO PHARMACOLOGICAL INTERVENTION; 12/30/2024   IMPRESSION:  1. Reduced left renal cortical function. No evidence of mechanical  urinary obstruction in the left kidney.  2. Mild dilatation of the right renal pelvis without evidence of  mechanical urinary obstruction in the right kidney.  3. Split renal function is 21.3% in the right kidney and 78.7% in the  left kidney, similar to slightly decreased compared to prior study  from 10/07/2024.    Review of Systems  A complete review of systems was performed. All systems are noted to be negative unless indicated in the history of present illness, impression, active problem list, or past histories.     Objective   Physical Exam    Assessment/Plan   There are no diagnoses linked to this encounter.    79 y.o. female presents for a follow up visit. She is status post  cystoscopy with insertion of ureteral stent, retrograde pyelogram for left renal stone on 6/20/2024. She is also status post left ureteroscopy, laser lithotripsy and stent exchanged on 8/8/2024.     I personally reviewed the MAG 3 scan that was completed on 12/30/2024. The patient's report reveals the left kidney is not functioning well. However, it is functioning some but the right kidney is compensating. No stones were found on the imaging in either kidney. Her eFGR is 49. She is not near the  threshold for dialysis.     I reviewed with the patient to stay healthy, drink water, avoid delayed urination, avoid constipation, in relation to her kidney function and recurrent infections.    Overall, the patient is doing well. At this point, I do not need to follow up with the patient. I am happy to assist her in the future if required.     Plan:  FUV as needed      Scribe Attestation   By signing my name below, I, Carlotta Badillo, Scribe attestation that this documentation has been prepared under the direction and in the presence of Elena Leal MD.

## 2025-03-10 ENCOUNTER — APPOINTMENT (OUTPATIENT)
Dept: UROLOGY | Facility: CLINIC | Age: 80
End: 2025-03-10
Payer: MEDICARE

## 2025-03-10 DIAGNOSIS — N39.0 RECURRENT UTI: ICD-10-CM

## 2025-03-10 DIAGNOSIS — N26.1 LEFT RENAL ATROPHY: Primary | ICD-10-CM

## 2025-03-10 PROCEDURE — G2211 COMPLEX E/M VISIT ADD ON: HCPCS | Performed by: STUDENT IN AN ORGANIZED HEALTH CARE EDUCATION/TRAINING PROGRAM

## 2025-03-10 PROCEDURE — 1123F ACP DISCUSS/DSCN MKR DOCD: CPT | Performed by: STUDENT IN AN ORGANIZED HEALTH CARE EDUCATION/TRAINING PROGRAM

## 2025-03-10 PROCEDURE — 1159F MED LIST DOCD IN RCRD: CPT | Performed by: STUDENT IN AN ORGANIZED HEALTH CARE EDUCATION/TRAINING PROGRAM

## 2025-03-10 PROCEDURE — 99214 OFFICE O/P EST MOD 30 MIN: CPT | Performed by: STUDENT IN AN ORGANIZED HEALTH CARE EDUCATION/TRAINING PROGRAM

## 2025-05-05 ENCOUNTER — APPOINTMENT (OUTPATIENT)
Dept: OTOLARYNGOLOGY | Facility: CLINIC | Age: 80
End: 2025-05-05
Payer: MEDICARE

## 2025-05-05 ENCOUNTER — APPOINTMENT (OUTPATIENT)
Dept: AUDIOLOGY | Facility: CLINIC | Age: 80
End: 2025-05-05
Payer: MEDICARE

## 2025-05-05 VITALS — HEIGHT: 63 IN | BODY MASS INDEX: 24.45 KG/M2 | WEIGHT: 138 LBS

## 2025-05-05 DIAGNOSIS — H90.3 SENSORINEURAL HEARING LOSS (SNHL) OF BOTH EARS: Primary | ICD-10-CM

## 2025-05-05 DIAGNOSIS — E78.00 HYPERCHOLESTEROLEMIA: ICD-10-CM

## 2025-05-05 DIAGNOSIS — J31.0 CHRONIC RHINITIS: Primary | ICD-10-CM

## 2025-05-05 DIAGNOSIS — E11.9 CONTROLLED TYPE 2 DIABETES MELLITUS WITHOUT COMPLICATION, WITHOUT LONG-TERM CURRENT USE OF INSULIN: ICD-10-CM

## 2025-05-05 DIAGNOSIS — R51.9 NONINTRACTABLE HEADACHE, UNSPECIFIED CHRONICITY PATTERN, UNSPECIFIED HEADACHE TYPE: ICD-10-CM

## 2025-05-05 DIAGNOSIS — I10 ESSENTIAL HYPERTENSION: ICD-10-CM

## 2025-05-05 PROCEDURE — 99203 OFFICE O/P NEW LOW 30 MIN: CPT | Performed by: OTOLARYNGOLOGY

## 2025-05-05 PROCEDURE — 92557 COMPREHENSIVE HEARING TEST: CPT | Performed by: AUDIOLOGIST

## 2025-05-05 PROCEDURE — 1123F ACP DISCUSS/DSCN MKR DOCD: CPT | Performed by: OTOLARYNGOLOGY

## 2025-05-05 PROCEDURE — 1159F MED LIST DOCD IN RCRD: CPT | Performed by: OTOLARYNGOLOGY

## 2025-05-05 PROCEDURE — 1160F RVW MEDS BY RX/DR IN RCRD: CPT | Performed by: OTOLARYNGOLOGY

## 2025-05-05 PROCEDURE — 1036F TOBACCO NON-USER: CPT | Performed by: OTOLARYNGOLOGY

## 2025-05-05 PROCEDURE — 92550 TYMPANOMETRY & REFLEX THRESH: CPT | Performed by: AUDIOLOGIST

## 2025-05-05 PROCEDURE — 31231 NASAL ENDOSCOPY DX: CPT | Performed by: OTOLARYNGOLOGY

## 2025-05-05 RX ORDER — AZELASTINE 1 MG/ML
SPRAY, METERED NASAL
Qty: 30 ML | Refills: 11 | Status: SHIPPED | OUTPATIENT
Start: 2025-05-05

## 2025-05-05 RX ORDER — BENZOCAINE .13; .15; .5; 2 G/100G; G/100G; G/100G; G/100G
GEL ORAL
Qty: 8.6 G | Refills: 11 | Status: SHIPPED | OUTPATIENT
Start: 2025-05-05

## 2025-05-05 NOTE — PROGRESS NOTES
AUDIOLOGY ADULT AUDIOMETRIC EVALUATION    Name:  Sherri Najera  :  1945  Age:  79 y.o.  Date of Evaluation:  May 5, 2025    Reason for visit: Sherri Hanson is seen in the clinic today at the request of otolaryngology for an audiologic evaluation.     HISTORY  Patient reports history of sinus issues.    For the past year she reports she has had headache every day; above left eye and left side of face.   She reports she was fit with hearing aids 2 years ago at [employer].    She reports she wore them every day for about a year.   With not going out much, she stopped wearing them this past year.      EVALUATION  See scanned audiogram: “Media” > “Audiology Report”.      RESULTS  Otoscopic Evaluation:  Right Ear: clear ear canal  Left Ear: clear ear canal    Immittance Measures:  Tympanometry:  Right Ear: Type A, normal tympanic membrane mobility with normal middle ear pressure  Left Ear: Type Ad, hypercompliant tympanic membrane mobility with normal middle ear pressure    Acoustic Reflexes:  Ipsilateral Right Ear: Absent  Ipsilateral Left Ear: Absent  Contralateral Right Ear: did not evaluate  Contralateral Left Ear: did not evaluate    Distortion Product Otoacoustic Emissions (DPOAEs):  Right Ear: Refer at all frequencies 1KHz-8KHz   Left Ear: Refer at all frequencies 1KHz-8KHz     Audiometry:  Test Technique and Reliability:   Standard audiometry via supra-aural headphones. Reliability is good.    Pure tone air and bone conduction audiometry:  Mild sloping to severe sensorineural hearing loss bilaterally    Speech Audiometry (Word Recognition Scores):   Right Ear: Excellent at most comfortable listening level of loudness of 80 dB HL  Left Ear:  Excellent at most comfortable listening level of loudness of 80 dB HL    IMPRESSIONS    Mild sloping to severe sensorineural hearing loss bilaterally    RECOMMENDATIONS  - Follow up with otolaryngology today as scheduled.  - Annual audiologic evaluation, sooner if an  acute change is noted.  - Discussed audiogram at length; importance of wearing hearing aids daily; all day        *gave patient copy of audiogram to take to hearing aid provider for adjustment    PATIENT EDUCATION  Discussed results, impressions and recommendations with the patient. Questions were addressed and the patient was encouraged to contact our office should concerns arise.    Time for this encounter: 1010/1034    Merry Moon M.A., CCC/A   Licensed Audiologist

## 2025-05-05 NOTE — PROGRESS NOTES
"HPI  Sherri Najera is a 79 y.o. female with a long history of headaches that have been worse over the last year or 2.  Seem to be primarily in the left midface distribution and also a bilateral frontal distribution.  She has a history of an MRI of the brain 2023 which demonstrated some right sided maxillary sinus mucosal thickening but the ethmoids looked fairly aerated and the remainder of the paranasal sinuses looked good as well.  She has not had a lot of nasal symptoms specifically but it is mostly headache.  She has not been on any Flonase in months and did not feel like this helped her headaches very much.  This is the only nasal medication that she has been on.  No history of CT sinus.  She was on a number of courses of antibiotics and developed some consequences and side effects from this but did not feel like this was relieving of her headaches either.      Medical History[1]         Medications:   Current Medications[2]     Allergies:  Allergies[3]     Physical Exam:  Last Recorded Vitals  Height 1.6 m (5' 3\"), weight 62.6 kg (138 lb).  General:     General appearance: Well-developed, well-nourished in no acute distress.       Voice:  normal       Head/face: Normal appearance; nontender to palpation     Facial nerve function: Normal and symmetric bilaterally.    Oral/oropharynx:     Oral vestibule: Normal labial and gingival mucosa     Tongue/floor of mouth: Normal without lesion     Oropharynx: Clear.  No lesions present of the hard/soft palate, posterior pharynx    Neck:     Neck: Normal appearance, trachea midline     Salivary glands: Normal to palpation bilaterally     Lymph nodes: No cervical lymphadenopathy to palpation     Thyroid: No thyromegaly.  No palpable nodules     Range of motion: Normal    Neurological:     Cortical functions: Alert and oriented x3, appropriate affect       Larynx/hypopharynx:     Laryngeal findings: Mirror exam inadequate or limited secondary to enlarged base of tongue " and/or excessive gagging    Ear:     Ear canal: Normal bilaterally     Tympanic membrane: Intact and mobile bilaterally     Pinna: Normal bilaterally     Hearing:  Gross hearing assessment normal by voice    Nose:     Visualized using: Flexible nasal endoscopy utilized secondary to inadequate anterior rhinoscopy  Nasopharynx: Inadequate mirror examination as above, endoscopy demonstrates normal nasopharynx without obstruction or mass     Nasal dorsum: Nontraumatic midline appearance     Septum: Mild deviation     Inferior turbinates: Normally sized     Mucosa: Bilateral, pink, normal appearing.  No pus or polyps      ASSESSMENT/PLAN:  Nasal endoscopy is without obvious signs of infection and no evidence of nasal polyposis or other source of pain.  I have recommended a trial of budesonide and azelastine for a few weeks and she will call with an update at the end of the month.  If she continues to have symptoms, I recommended CT sinus        Lane Almanzar MD         [1]   Past Medical History:  Diagnosis Date    Aneurysm, splenic artery (CMS-HCC)     Ankylosing spondylitis     Anxiety     Arthritis     Body mass index (BMI) 27.0-27.9, adult 12/07/2021    BMI 27.0-27.9,adult    BPPV (benign paroxysmal positional vertigo)     Cervical radiculitis     Chronic kidney disease     Depression     Diabetes mellitus (Multi)     Effusion, left knee 02/11/2019    Effusion of left knee    Exocrine pancreatic insufficiency ([facility])     GERD (gastroesophageal reflux disease)     Hearing aid worn     HL (hearing loss)     HLD (hyperlipidemia)     Hypertension     Injury of tendon of rotator cuff 06/19/2024    Irritable bowel syndrome with diarrhea 01/16/2020    Irritable bowel syndrome with diarrhea    Kidney stone     Kidney stone 08/21/2023    Nephrolithiasis     Other conditions influencing health status 06/08/2020    History of chronic diarrhea    Other hemorrhoids 06/28/2017    Internal hemorrhoid    Other nonspecific  abnormal finding of lung field 03/26/2019    Ground glass opacity present on imaging of lung    Pain in left knee 04/30/2019    Left knee pain    Pain in unspecified knee 02/11/2019    Knee pain    Personal history of other diseases of the circulatory system 08/12/2017    History of hypertension    Personal history of other diseases of the musculoskeletal system and connective tissue 05/21/2015    History of low back pain    Personal history of other specified conditions 11/03/2020    History of fatigue    Personal history of other specified conditions 05/04/2021    History of diarrhea    Personal history of other specified conditions 03/07/2014    History of chest pain    Personal history of other specified conditions 01/31/2013    History of headache    Skin cancer     Unspecified injury of muscle(s) and tendon(s) of the rotator cuff of right shoulder, initial encounter 10/11/2016    Injury of right rotator cuff    Urinary tract infection     Wears glasses    [2]   Current Outpatient Medications:     atorvastatin (Lipitor) 80 mg tablet, Take 1 tablet (80 mg) by mouth once daily., Disp: 90 tablet, Rfl: 0    fluticasone (Flonase Allergy Relief) 50 mcg/actuation nasal spray, Administer 1 spray into each nostril 2 times a day as needed., Disp: , Rfl:     hydroCHLOROthiazide (Microzide) 12.5 mg tablet, Take 1 tablet (12.5 mg) by mouth once daily., Disp: 90 tablet, Rfl: 2    Lactobacillus acidophilus 1 billion cell tablet, Take 1 tablet by mouth 2 times a day., Disp: 60 tablet, Rfl: 0    lisinopril 30 mg tablet, Take 1 tablet (30 mg) by mouth once daily., Disp: 90 tablet, Rfl: 3    LORazepam (Ativan) 0.5 mg tablet, Take 1 tablet (0.5 mg) by mouth once daily as needed for anxiety for up to 15 doses., Disp: 15 tablet, Rfl: 0    mirtazapine (Remeron) 15 mg tablet, Take 1 tablet (15 mg) by mouth once daily at bedtime., Disp: 90 tablet, Rfl: 3    MULTIVITAMIN ORAL, Take 1 tablet by mouth once daily., Disp: , Rfl:      PARoxetine (Paxil) 10 mg tablet, Take 1 tablet (10 mg) by mouth once daily., Disp: 90 tablet, Rfl: 1    zinc gluconate 50 mg tablet, Take 1 tablet (50 mg) by mouth once daily., Disp: , Rfl:   [3]   Allergies  Allergen Reactions    Fenofibrate Unknown    Sulfa (Sulfonamide Antibiotics) Rash

## 2025-05-19 ENCOUNTER — TELEPHONE (OUTPATIENT)
Dept: OTOLARYNGOLOGY | Facility: CLINIC | Age: 80
End: 2025-05-19
Payer: MEDICARE

## 2025-05-19 NOTE — TELEPHONE ENCOUNTER
LOV 5/5/2025 for chronic rhinitis and headaches. She began budesonide and azelastine after that appt. She said the headaches had improved, but she had a nosebleed that lasted about 10-15 minutes last evening. I asked her to use the azelastine just once daily until we get back to her with your recommendation.

## 2025-05-21 ENCOUNTER — OFFICE VISIT (OUTPATIENT)
Dept: PRIMARY CARE | Facility: CLINIC | Age: 80
End: 2025-05-21
Payer: MEDICARE

## 2025-05-21 VITALS
OXYGEN SATURATION: 98 % | TEMPERATURE: 97.3 F | WEIGHT: 142 LBS | DIASTOLIC BLOOD PRESSURE: 80 MMHG | SYSTOLIC BLOOD PRESSURE: 160 MMHG | BODY MASS INDEX: 25.16 KG/M2 | HEIGHT: 63 IN | HEART RATE: 62 BPM

## 2025-05-21 DIAGNOSIS — F32.1 MAJOR DEPRESSIVE DISORDER, SINGLE EPISODE, MODERATE (MULTI): ICD-10-CM

## 2025-05-21 DIAGNOSIS — M45.0 ANKYLOSING SPONDYLITIS OF MULTIPLE SITES IN SPINE (MULTI): ICD-10-CM

## 2025-05-21 DIAGNOSIS — G89.29 CHRONIC RIGHT-SIDED LOW BACK PAIN WITH RIGHT-SIDED SCIATICA: Primary | ICD-10-CM

## 2025-05-21 DIAGNOSIS — E11.22 TYPE 2 DIABETES MELLITUS WITH STAGE 3 CHRONIC KIDNEY DISEASE, UNSPECIFIED WHETHER LONG TERM INSULIN USE, UNSPECIFIED WHETHER STAGE 3A OR 3B CKD (MULTI): ICD-10-CM

## 2025-05-21 DIAGNOSIS — I48.91 ATRIAL FIBRILLATION, UNSPECIFIED TYPE (MULTI): ICD-10-CM

## 2025-05-21 DIAGNOSIS — M45.9 ANKYLOSING SPONDYLITIS, UNSPECIFIED SITE OF SPINE (MULTI): ICD-10-CM

## 2025-05-21 DIAGNOSIS — N18.30 TYPE 2 DIABETES MELLITUS WITH STAGE 3 CHRONIC KIDNEY DISEASE, UNSPECIFIED WHETHER LONG TERM INSULIN USE, UNSPECIFIED WHETHER STAGE 3A OR 3B CKD (MULTI): ICD-10-CM

## 2025-05-21 DIAGNOSIS — Z79.4 LONG TERM (CURRENT) USE OF INSULIN (MULTI): ICD-10-CM

## 2025-05-21 DIAGNOSIS — M54.41 CHRONIC RIGHT-SIDED LOW BACK PAIN WITH RIGHT-SIDED SCIATICA: Primary | ICD-10-CM

## 2025-05-21 DIAGNOSIS — J96.01 ACUTE RESPIRATORY FAILURE WITH HYPOXIA: ICD-10-CM

## 2025-05-21 PROCEDURE — 3077F SYST BP >= 140 MM HG: CPT | Performed by: FAMILY MEDICINE

## 2025-05-21 PROCEDURE — 1159F MED LIST DOCD IN RCRD: CPT | Performed by: FAMILY MEDICINE

## 2025-05-21 PROCEDURE — 99213 OFFICE O/P EST LOW 20 MIN: CPT | Performed by: FAMILY MEDICINE

## 2025-05-21 PROCEDURE — 1125F AMNT PAIN NOTED PAIN PRSNT: CPT | Performed by: FAMILY MEDICINE

## 2025-05-21 PROCEDURE — 3079F DIAST BP 80-89 MM HG: CPT | Performed by: FAMILY MEDICINE

## 2025-05-21 PROCEDURE — 1036F TOBACCO NON-USER: CPT | Performed by: FAMILY MEDICINE

## 2025-05-21 RX ORDER — IBUPROFEN 600 MG/1
600 TABLET, FILM COATED ORAL 3 TIMES DAILY
Qty: 30 TABLET | Refills: 0 | Status: SHIPPED | OUTPATIENT
Start: 2025-05-21 | End: 2025-05-31

## 2025-05-21 RX ORDER — OXYCODONE AND ACETAMINOPHEN 5; 325 MG/1; MG/1
1 TABLET ORAL EVERY 6 HOURS PRN
Qty: 28 TABLET | Refills: 0 | Status: SHIPPED | OUTPATIENT
Start: 2025-05-21 | End: 2025-05-28

## 2025-05-21 ASSESSMENT — PAIN SCALES - GENERAL: PAINLEVEL_OUTOF10: 9

## 2025-05-21 NOTE — PROGRESS NOTES
"Subjective   Patient ID: Sherri Najera is a 79 y.o. female who presents for Leg Pain (Patient is here today with low right back pain that radiates down the hip, buttock and left thigh.  No numbness or tingling.//Symptoms started 2-3 days ago and worsened yesterday.  No injury.).    HPI here with a 3-day history of back pain.  Patient states that she considers his pain to be 9 of 10 in severity.  She has not had anything like this before.  The pain comes in waves and causes some nausea.  The pain originates in the superior aspect of the right gluteus and wraps around the right thigh toward the knee.  She has pain when she sits and stands.  Pain is diminished but not resolved when patient is supine.    Review of Systems  Constitutional: Patient is negative for fever, fatigue, weight change.  HEENT: Patient is negative for change in vision, hearing, swallow.  Cardio: Patient is negative for chest pain, lower extremity edema.  Pulmonary: Patient is negative for cough, shortness of breath.  Musculoskeletal: Patient is positive for right sided back pain radiating into leg.    Objective   /80 (BP Location: Left arm, Patient Position: Sitting)   Pulse 62   Temp 36.3 °C (97.3 °F)   Ht 1.6 m (5' 3\")   Wt 64.4 kg (142 lb)   SpO2 98%   BMI 25.15 kg/m²     Physical Exam  General: Awake and alert no apparent distress.  HEENT: Moist oral mucosa no cervical lymphadenopathy.  Cardio: Heart S1-S2 no murmur rub or gallop.  Pulmonary: Lungs clear to auscultation bilaterally.  Musculoskeletal: Patient with diminished leg raise on the right.  Patient has pain from the superior aspect of the right gluteus wrapping around the lateral aspect of the thigh.  Assessment/Plan   Problem List Items Addressed This Visit    None  Visit Diagnoses         Codes      Chronic right-sided low back pain with right-sided sciatica   needs better control.  Begin ibuprofen 600 mg 3 times daily.  Will give patient pain medication to be used as " needed.  She will follow-up in 10 days if no better.-  Primary M54.41, G89.29

## 2025-06-03 LAB
ALBUMIN SERPL-MCNC: 4.6 G/DL (ref 3.6–5.1)
ALP SERPL-CCNC: 87 U/L (ref 37–153)
ALT SERPL-CCNC: 48 U/L (ref 6–29)
ANION GAP SERPL CALCULATED.4IONS-SCNC: 10 MMOL/L (CALC) (ref 7–17)
APPEARANCE UR: CLEAR
AST SERPL-CCNC: 31 U/L (ref 10–35)
BACTERIA #/AREA URNS HPF: ABNORMAL /HPF
BILIRUB SERPL-MCNC: 0.4 MG/DL (ref 0.2–1.2)
BILIRUB UR QL STRIP: NEGATIVE
BUN SERPL-MCNC: 42 MG/DL (ref 7–25)
CALCIUM SERPL-MCNC: 10.1 MG/DL (ref 8.6–10.4)
CHLORIDE SERPL-SCNC: 109 MMOL/L (ref 98–110)
CHOLEST SERPL-MCNC: 221 MG/DL
CHOLEST/HDLC SERPL: 6 (CALC)
CO2 SERPL-SCNC: 24 MMOL/L (ref 20–32)
COLOR UR: YELLOW
CREAT SERPL-MCNC: 1.52 MG/DL (ref 0.6–1)
EGFRCR SERPLBLD CKD-EPI 2021: 35 ML/MIN/1.73M2
EST. AVERAGE GLUCOSE BLD GHB EST-MCNC: 140 MG/DL
EST. AVERAGE GLUCOSE BLD GHB EST-SCNC: 7.7 MMOL/L
GLUCOSE SERPL-MCNC: 116 MG/DL (ref 65–99)
GLUCOSE UR QL STRIP: NEGATIVE
HBA1C MFR BLD: 6.5 %
HDLC SERPL-MCNC: 37 MG/DL
HGB UR QL STRIP: ABNORMAL
HYALINE CASTS #/AREA URNS LPF: ABNORMAL /LPF
KETONES UR QL STRIP: NEGATIVE
LDLC SERPL CALC-MCNC: 135 MG/DL (CALC)
LEUKOCYTE ESTERASE UR QL STRIP: ABNORMAL
NITRITE UR QL STRIP: NEGATIVE
NONHDLC SERPL-MCNC: 184 MG/DL (CALC)
PH UR STRIP: ABNORMAL [PH] (ref 5–8)
POTASSIUM SERPL-SCNC: 4.4 MMOL/L (ref 3.5–5.3)
PROT SERPL-MCNC: 7.4 G/DL (ref 6.1–8.1)
PROT UR QL STRIP: NEGATIVE
RBC #/AREA URNS HPF: ABNORMAL /HPF
SERVICE CMNT-IMP: ABNORMAL
SODIUM SERPL-SCNC: 143 MMOL/L (ref 135–146)
SP GR UR STRIP: 1.02 (ref 1–1.03)
SQUAMOUS #/AREA URNS HPF: ABNORMAL /HPF
TRIGL SERPL-MCNC: 345 MG/DL
URATE CRY #/AREA URNS HPF: ABNORMAL /HPF
WBC #/AREA URNS HPF: ABNORMAL /HPF

## 2025-06-05 ENCOUNTER — TELEPHONE (OUTPATIENT)
Dept: PRIMARY CARE | Facility: CLINIC | Age: 80
End: 2025-06-05

## 2025-06-05 ENCOUNTER — APPOINTMENT (OUTPATIENT)
Dept: PRIMARY CARE | Facility: CLINIC | Age: 80
End: 2025-06-05
Payer: MEDICARE

## 2025-06-05 VITALS
OXYGEN SATURATION: 98 % | WEIGHT: 138 LBS | RESPIRATION RATE: 19 BRPM | SYSTOLIC BLOOD PRESSURE: 108 MMHG | BODY MASS INDEX: 24.45 KG/M2 | DIASTOLIC BLOOD PRESSURE: 76 MMHG | HEART RATE: 88 BPM

## 2025-06-05 DIAGNOSIS — N18.30 TYPE 2 DIABETES MELLITUS WITH STAGE 3 CHRONIC KIDNEY DISEASE, UNSPECIFIED WHETHER LONG TERM INSULIN USE, UNSPECIFIED WHETHER STAGE 3A OR 3B CKD (MULTI): ICD-10-CM

## 2025-06-05 DIAGNOSIS — I10 ESSENTIAL HYPERTENSION: ICD-10-CM

## 2025-06-05 DIAGNOSIS — E78.00 HYPERCHOLESTEROLEMIA: ICD-10-CM

## 2025-06-05 DIAGNOSIS — E11.22 TYPE 2 DIABETES MELLITUS WITH STAGE 3 CHRONIC KIDNEY DISEASE, UNSPECIFIED WHETHER LONG TERM INSULIN USE, UNSPECIFIED WHETHER STAGE 3A OR 3B CKD (MULTI): ICD-10-CM

## 2025-06-05 DIAGNOSIS — M54.41 CHRONIC RIGHT-SIDED LOW BACK PAIN WITH RIGHT-SIDED SCIATICA: ICD-10-CM

## 2025-06-05 DIAGNOSIS — G89.29 CHRONIC RIGHT-SIDED LOW BACK PAIN WITH RIGHT-SIDED SCIATICA: ICD-10-CM

## 2025-06-05 DIAGNOSIS — N18.31 TYPE 2 DIABETES MELLITUS WITH STAGE 3A CHRONIC KIDNEY DISEASE, WITHOUT LONG-TERM CURRENT USE OF INSULIN (MULTI): Primary | ICD-10-CM

## 2025-06-05 DIAGNOSIS — E11.22 TYPE 2 DIABETES MELLITUS WITH STAGE 3A CHRONIC KIDNEY DISEASE, WITHOUT LONG-TERM CURRENT USE OF INSULIN (MULTI): Primary | ICD-10-CM

## 2025-06-05 RX ORDER — IBUPROFEN 600 MG/1
600 TABLET, FILM COATED ORAL EVERY 8 HOURS PRN
Qty: 30 TABLET | Refills: 0 | Status: SHIPPED | OUTPATIENT
Start: 2025-06-05 | End: 2025-06-15

## 2025-06-05 RX ORDER — ATORVASTATIN CALCIUM 80 MG/1
80 TABLET, FILM COATED ORAL DAILY
Qty: 90 TABLET | Refills: 1 | Status: SHIPPED | OUTPATIENT
Start: 2025-06-05

## 2025-06-05 RX ORDER — IBUPROFEN 600 MG/1
600 TABLET, FILM COATED ORAL EVERY 8 HOURS PRN
Qty: 30 TABLET | Refills: 0 | Status: SHIPPED | OUTPATIENT
Start: 2025-06-05 | End: 2025-06-05

## 2025-06-05 ASSESSMENT — ENCOUNTER SYMPTOMS
PALPITATIONS: 0
COUGH: 0
UNEXPECTED WEIGHT CHANGE: 0
POLYPHAGIA: 0
FEVER: 0
ABDOMINAL PAIN: 0
SHORTNESS OF BREATH: 0
POLYDIPSIA: 0

## 2025-06-05 ASSESSMENT — PAIN SCALES - GENERAL: PAINLEVEL_OUTOF10: 6

## 2025-06-05 ASSESSMENT — COLUMBIA-SUICIDE SEVERITY RATING SCALE - C-SSRS: 1. IN THE PAST MONTH, HAVE YOU WISHED YOU WERE DEAD OR WISHED YOU COULD GO TO SLEEP AND NOT WAKE UP?: NO

## 2025-06-05 NOTE — ASSESSMENT & PLAN NOTE
Lab Results   Component Value Date    LDLCALC 135 (H) 06/02/2025    LDLCALC 73 10/08/2024    LDLCALC 38 (L) 09/03/2024   Slight increase in LDL and triglycerides.  Could be lab difference.  Continue with atorvastatin 80 mg nightly.  Orders:    atorvastatin (Lipitor) 80 mg tablet; Take 1 tablet (80 mg) by mouth once daily.

## 2025-06-05 NOTE — ASSESSMENT & PLAN NOTE
Lab Results   Component Value Date    HGBA1C 6.5 (H) 06/02/2025    HGBA1C 6.0 (H) 01/20/2025    HGBA1C 5.3 10/08/2024   Well-controlled with dietary adjustments.  Continue with current management

## 2025-06-05 NOTE — PROGRESS NOTES
Subjective     Patient ID: Sherri Najera is a 79 y.o. female who presents for Follow-up (Pt is in to follow up on DM/HTN/HLD) and Back Pain (Pt c/o persistent sciatic pain that has gotten worse x few pain).    HPI  Sherri Najera is seen for her chronic issues.    DM - diet controlled  Hypercholesterolemia - taking atorvastatin 80 mg daily,   HTN - takes lisinopril without issues, started in hydrochlorothiazide in 10/2024,   Back pain - chronic with sciatica, saw MJM 2 weeks ago and given ibuprofen, helped initially but it has flared again,     Review of Systems   Constitutional:  Negative for fever and unexpected weight change.   Eyes:  Negative for visual disturbance.   Respiratory:  Negative for cough and shortness of breath.    Cardiovascular:  Negative for chest pain and palpitations.   Gastrointestinal:  Negative for abdominal pain.   Endocrine: Negative for polydipsia, polyphagia and polyuria.   Skin:  Negative for rash.       Objective  Vitals:  /76 (BP Location: Left arm, Patient Position: Sitting)   Pulse 88   Resp 19   Wt 62.6 kg (138 lb)   SpO2 98%   BMI 24.45 kg/m²   Wt Readings from Last 3 Encounters:   06/05/25 62.6 kg (138 lb)   05/21/25 64.4 kg (142 lb)   05/05/25 62.6 kg (138 lb)     Physical Exam  Vitals and nursing note reviewed.   Constitutional:       Appearance: Normal appearance.   Eyes:      Extraocular Movements: Extraocular movements intact.      Pupils: Pupils are equal, round, and reactive to light.   Cardiovascular:      Rate and Rhythm: Normal rate and regular rhythm.      Pulses:           Dorsalis pedis pulses are 2+ on the right side and 2+ on the left side.   Pulmonary:      Breath sounds: Normal breath sounds.   Musculoskeletal:      Cervical back: Normal range of motion.   Lymphadenopathy:      Cervical: No cervical adenopathy.   Skin:     Findings: No rash.   Neurological:      Mental Status: She is alert and oriented to person, place, and time.   Psychiatric:          Mood and Affect: Mood normal.         Assessment/Plan   Assessment & Plan  Type 2 diabetes mellitus with stage 3a chronic kidney disease, without long-term current use of insulin (Multi)  Lab Results   Component Value Date    HGBA1C 6.5 (H) 06/02/2025    HGBA1C 6.0 (H) 01/20/2025    HGBA1C 5.3 10/08/2024   Well-controlled with dietary adjustments.  Continue with current management       Hypercholesterolemia  Lab Results   Component Value Date    LDLCALC 135 (H) 06/02/2025    LDLCALC 73 10/08/2024    LDLCALC 38 (L) 09/03/2024   Slight increase in LDL and triglycerides.  Could be lab difference.  Continue with atorvastatin 80 mg nightly.  Orders:    atorvastatin (Lipitor) 80 mg tablet; Take 1 tablet (80 mg) by mouth once daily.    Essential hypertension  Controlled.  Continue current medication as prescribed.  DASH diet. Exercise at least 4 times per week.          Chronic right-sided low back pain with right-sided sciatica  Stretching exercises given.  Ibuprofen refilled as needed.  If no improvement in 2 weeks she will let us know and we will refer for physical therapy as  Orders:    ibuprofen 600 mg tablet; Take 1 tablet (600 mg) by mouth every 8 hours if needed for mild pain (1 - 3) for up to 10 days.      No orders of the defined types were placed in this encounter.    Follow up 6 Months, sooner with any problems or concerns.

## 2025-06-05 NOTE — ASSESSMENT & PLAN NOTE
Controlled.  Continue current medication as prescribed.  DASH diet. Exercise at least 4 times per week.

## 2025-06-25 ENCOUNTER — TELEPHONE (OUTPATIENT)
Dept: HEMATOLOGY/ONCOLOGY | Facility: CLINIC | Age: 80
End: 2025-06-25
Payer: MEDICARE

## 2025-06-25 NOTE — TELEPHONE ENCOUNTER
Called and spoke with patient and she will come to [facility] for labs 06/27/2025 for her follow up with Dr. Parsons 07/02/2025 - patient is aware I sent a mychart on 06/18/2025 but says she has a hard time managing that.

## 2025-06-27 ENCOUNTER — LAB (OUTPATIENT)
Dept: LAB | Facility: CLINIC | Age: 80
End: 2025-06-27
Payer: MEDICARE

## 2025-06-27 DIAGNOSIS — D72.819 LEUKOPENIA, UNSPECIFIED TYPE: ICD-10-CM

## 2025-06-27 LAB
ALBUMIN SERPL BCP-MCNC: 4.2 G/DL (ref 3.4–5)
ALP SERPL-CCNC: 78 U/L (ref 33–136)
ALT SERPL W P-5'-P-CCNC: 50 U/L (ref 7–45)
ANION GAP SERPL CALC-SCNC: 12 MMOL/L (ref 10–20)
AST SERPL W P-5'-P-CCNC: 31 U/L (ref 9–39)
BASOPHILS # BLD AUTO: 0.02 X10*3/UL (ref 0–0.1)
BASOPHILS NFR BLD AUTO: 0.4 %
BILIRUB SERPL-MCNC: 0.4 MG/DL (ref 0–1.2)
BUN SERPL-MCNC: 23 MG/DL (ref 6–23)
CALCIUM SERPL-MCNC: 9.6 MG/DL (ref 8.6–10.3)
CHLORIDE SERPL-SCNC: 107 MMOL/L (ref 98–107)
CO2 SERPL-SCNC: 28 MMOL/L (ref 21–32)
CREAT SERPL-MCNC: 1.3 MG/DL (ref 0.5–1.05)
EGFRCR SERPLBLD CKD-EPI 2021: 42 ML/MIN/1.73M*2
EOSINOPHIL # BLD AUTO: 0.26 X10*3/UL (ref 0–0.4)
EOSINOPHIL NFR BLD AUTO: 5.5 %
ERYTHROCYTE [DISTWIDTH] IN BLOOD BY AUTOMATED COUNT: 12.1 % (ref 11.5–14.5)
FERRITIN SERPL-MCNC: 267 NG/ML (ref 8–150)
GLUCOSE SERPL-MCNC: 117 MG/DL (ref 74–99)
HCT VFR BLD AUTO: 32 % (ref 36–46)
HGB BLD-MCNC: 10.7 G/DL (ref 12–16)
IMM GRANULOCYTES # BLD AUTO: 0.01 X10*3/UL (ref 0–0.5)
IMM GRANULOCYTES NFR BLD AUTO: 0.2 % (ref 0–0.9)
IRON SATN MFR SERPL: 29 % (ref 25–45)
IRON SERPL-MCNC: 83 UG/DL (ref 35–150)
LYMPHOCYTES # BLD AUTO: 1.41 X10*3/UL (ref 0.8–3)
LYMPHOCYTES NFR BLD AUTO: 29.8 %
MCH RBC QN AUTO: 30.7 PG (ref 26–34)
MCHC RBC AUTO-ENTMCNC: 33.4 G/DL (ref 32–36)
MCV RBC AUTO: 92 FL (ref 80–100)
MONOCYTES # BLD AUTO: 0.34 X10*3/UL (ref 0.05–0.8)
MONOCYTES NFR BLD AUTO: 7.2 %
NEUTROPHILS # BLD AUTO: 2.69 X10*3/UL (ref 1.6–5.5)
NEUTROPHILS NFR BLD AUTO: 56.9 %
NRBC BLD-RTO: 0 /100 WBCS (ref 0–0)
PLATELET # BLD AUTO: 172 X10*3/UL (ref 150–450)
POTASSIUM SERPL-SCNC: 4.2 MMOL/L (ref 3.5–5.3)
PROT SERPL-MCNC: 6.7 G/DL (ref 6.4–8.2)
RBC # BLD AUTO: 3.48 X10*6/UL (ref 4–5.2)
SODIUM SERPL-SCNC: 143 MMOL/L (ref 136–145)
TIBC SERPL-MCNC: 285 UG/DL (ref 240–445)
UIBC SERPL-MCNC: 202 UG/DL (ref 110–370)
VIT B12 SERPL-MCNC: 387 PG/ML (ref 211–911)
WBC # BLD AUTO: 4.7 X10*3/UL (ref 4.4–11.3)

## 2025-06-27 PROCEDURE — 82728 ASSAY OF FERRITIN: CPT

## 2025-06-27 PROCEDURE — 80053 COMPREHEN METABOLIC PANEL: CPT

## 2025-06-27 PROCEDURE — 82607 VITAMIN B-12: CPT

## 2025-06-27 PROCEDURE — 85025 COMPLETE CBC W/AUTO DIFF WBC: CPT

## 2025-06-27 PROCEDURE — 36415 COLL VENOUS BLD VENIPUNCTURE: CPT

## 2025-06-27 PROCEDURE — 83540 ASSAY OF IRON: CPT

## 2025-07-02 ENCOUNTER — OFFICE VISIT (OUTPATIENT)
Dept: HEMATOLOGY/ONCOLOGY | Facility: CLINIC | Age: 80
End: 2025-07-02
Payer: MEDICARE

## 2025-07-02 VITALS
HEART RATE: 86 BPM | OXYGEN SATURATION: 97 % | DIASTOLIC BLOOD PRESSURE: 74 MMHG | SYSTOLIC BLOOD PRESSURE: 129 MMHG | WEIGHT: 140.98 LBS | RESPIRATION RATE: 16 BRPM | TEMPERATURE: 97.2 F | BODY MASS INDEX: 24.97 KG/M2

## 2025-07-02 DIAGNOSIS — D72.819 LEUKOPENIA, UNSPECIFIED TYPE: ICD-10-CM

## 2025-07-02 PROCEDURE — 99215 OFFICE O/P EST HI 40 MIN: CPT | Performed by: INTERNAL MEDICINE

## 2025-07-02 ASSESSMENT — PAIN SCALES - GENERAL: PAINLEVEL_OUTOF10: 0-NO PAIN

## 2025-07-02 NOTE — PROGRESS NOTES
Patient ID: Sherri Najera is a 80 y.o. female.  Referring Physician: Issa Xie MD  [id number]  [address] [place] [address]  Primary Care Provider: Geneva Sandoval MD  Referral Reason:     Subjective: anemia  Fatigue. Will start PT soon    Heme/Onc History:  Sherri Najera is a 79 y.o. year old female with PMH DM, HTN, HLD, GERD, Depression/Anxiety, Recurrent renal calculi. she was found to have acute pyelonephritis in 06/2024    Past Medical History:   Past Medical History:   Diagnosis Date    Aneurysm, splenic artery     Ankylosing spondylitis     Anxiety     Arthritis     Body mass index (BMI) 27.0-27.9, adult 12/07/2021    BMI 27.0-27.9,adult    BPPV (benign paroxysmal positional vertigo)     Cervical radiculitis     Chronic kidney disease     Depression     Diabetes mellitus (Multi)     Effusion, left knee 02/11/2019    Effusion of left knee    Exocrine pancreatic insufficiency ([facility])     GERD (gastroesophageal reflux disease)     Hearing aid worn     HL (hearing loss)     HLD (hyperlipidemia)     Hypertension     Injury of tendon of rotator cuff 06/19/2024    Irritable bowel syndrome with diarrhea 01/16/2020    Irritable bowel syndrome with diarrhea    Kidney stone     Kidney stone 08/21/2023    Nephrolithiasis     Other conditions influencing health status 06/08/2020    History of chronic diarrhea    Other hemorrhoids 06/28/2017    Internal hemorrhoid    Other nonspecific abnormal finding of lung field 03/26/2019    Ground glass opacity present on imaging of lung    Pain in left knee 04/30/2019    Left knee pain    Pain in unspecified knee 02/11/2019    Knee pain    Personal history of other diseases of the circulatory system 08/12/2017    History of hypertension    Personal history of other diseases of the musculoskeletal system and connective tissue 05/21/2015    History of low back pain    Personal history of other specified conditions 11/03/2020    History of fatigue    Personal history  of other specified conditions 05/04/2021    History of diarrhea    Personal history of other specified conditions 03/07/2014    History of chest pain    Personal history of other specified conditions 01/31/2013    History of headache    Skin cancer     Unspecified injury of muscle(s) and tendon(s) of the rotator cuff of right shoulder, initial encounter 10/11/2016    Injury of right rotator cuff    Urinary tract infection     Wears glasses      Social History:   Social History     Socioeconomic History    Marital status:      Spouse name: Not on file    Number of children: Not on file    Years of education: Not on file    Highest education level: Not on file   Occupational History    Not on file   Tobacco Use    Smoking status: Never     Passive exposure: Never    Smokeless tobacco: Never   Vaping Use    Vaping status: Never Used   Substance and Sexual Activity    Alcohol use: Never    Drug use: Never    Sexual activity: Not Currently     Partners: Male     Birth control/protection: Diaphragm   Other Topics Concern    Not on file   Social History Narrative    Not on file     Social Drivers of Health     Financial Resource Strain: Low Risk  (10/10/2024)    Overall Financial Resource Strain (CARDIA)     Difficulty of Paying Living Expenses: Not hard at all   Food Insecurity: No Food Insecurity (10/10/2024)    Hunger Vital Sign     Worried About Running Out of Food in the Last Year: Never true     Ran Out of Food in the Last Year: Never true   Transportation Needs: No Transportation Needs (10/10/2024)    PRAPARE - Transportation     Lack of Transportation (Medical): No     Lack of Transportation (Non-Medical): No   Physical Activity: Inactive (10/10/2024)    Exercise Vital Sign     Days of Exercise per Week: 0 days     Minutes of Exercise per Session: 0 min   Stress: No Stress Concern Present (10/10/2024)    [employer] [place] [employer] - Occupational Stress Questionnaire     Feeling of Stress : Only  a little   Social Connections: Socially Integrated (10/10/2024)    Social Connection and Isolation Panel [NHANES]     Frequency of Communication with Friends and Family: Three times a week     Frequency of Social Gatherings with Friends and Family: Three times a week     Attends Spiritism Services: More than 4 times per year     Active Member of Clubs or Organizations: Yes     Attends Club or Organization Meetings: More than 4 times per year     Marital Status:    Intimate Partner Violence: Not At Risk (10/10/2024)    Humiliation, Afraid, Rape, and Kick questionnaire     Fear of Current or Ex-Partner: No     Emotionally Abused: No     Physically Abused: No     Sexually Abused: No   Housing Stability: Low Risk  (10/10/2024)    Housing Stability Vital Sign     Unable to Pay for Housing in the Last Year: No     Number of Times Moved in the Last Year: 1     Homeless in the Last Year: No     Surgical History:   Past Surgical History:   Procedure Laterality Date    BREAST BIOPSY Right 2004    benign    CARPAL TUNNEL RELEASE Bilateral     COLONOSCOPY  04/11/2018    no further needed-normal    CYSTOSCOPY W/ URETERAL STENT PLACEMENT Left 06/30/2024    HAND SURGERY Left 03/07/2014    Hand Surgery                                                                                                                                                          MR HEAD ANGIO WO IV CONTRAST  04/15/2023    MR HEAD ANGIO WO IV CONTRAST GEA MRI    MR NECK ANGIO WO IV CONTRAST  04/15/2023    MR NECK ANGIO WO IV CONTRAST GEA MRI    TOTAL KNEE ARTHROPLASTY Left 02/26/2024    TOTAL SHOULDER ARTHROPLASTY Bilateral 07/11/2022     Family History:   Family History   Problem Relation Name Age of Onset    Diabetes Mother Sherri     Asthma Mother Sherri     Other (CRF) Mother Sherri     Heart failure Father        reports that she has never smoked. She has never been exposed to tobacco smoke. She has never used smokeless tobacco.  Oncology Family  history: Cancer-related family history is not on file.    Review Of Systems:  As stated per in HPI; otherwise all other 12 point ROS are negative    Physical Exam:  /74 (BP Location: Left arm, Patient Position: Sitting, BP Cuff Size: Adult long)   Pulse 86   Temp 36.2 °C (97.2 °F) (Temporal)   Resp 16   Wt 64 kg (140 lb 15.8 oz)   SpO2 97%   BMI 24.97 kg/m²   BSA: 1.69 meters squared  General: awake/alert/oriented x3, no distress, alert and cooperative  Head: Short hair fully covering scalp. Symmetric facial expressions  Eyes: PERRL, EOMI, clear sclera, eyebrows present.  Ears/Nose/Mouth/Throat:  Oral mucous membranes moist. No oral ulcers. No palpable pre/post-auricular lymph nodes  Neck: No palpable cervical chain lymph nodes  Respiratory: unlabored breathing on room air, good chest expansion, thorax symmetric  Cardio: Regular rate and rhythm, normal S1 and S2, radial pulses symmetric  GI: Nondistended, soft, non-tender abdomen  Musculoskeletal: Normal muscle bulk and tone, ROM intact, no joint swelling.  Rises from chair and walks unassisted.  Extremities: No ankle swelling, no arm or leg wounds  Neuro: Alert, cognition intact, speech normal. Facial expressions symmetric.  No motor deficits noted. Sensation intact to touch and hot/cold.   Able to stand from seated position unassisted and walks around the room unassisted.  Psychological: Appropriate mood and behavior.  Skin: Warm and dry, no lesions, no rashes    Results:  Diagnostic Results   Lab Results   Component Value Date    WBC 4.7 06/27/2025    HGB 10.7 (L) 06/27/2025    HCT 32.0 (L) 06/27/2025    MCV 92 06/27/2025     06/27/2025     Lab Results   Component Value Date    CALCIUM 9.6 06/27/2025     06/27/2025    K 4.2 06/27/2025    CO2 28 06/27/2025     06/27/2025    BUN 23 06/27/2025    CREATININE 1.30 (H) 06/27/2025    ALT 50 (H) 06/27/2025    AST 31 06/27/2025       Current Outpatient Medications:     atorvastatin (Lipitor)  80 mg tablet, Take 1 tablet (80 mg) by mouth once daily., Disp: 90 tablet, Rfl: 1    azelastine (Astelin) 137 mcg (0.1 %) nasal spray, Use 2 sprays in each nostril twice daily, Disp: 30 mL, Rfl: 11    budesonide (Rhinocort AQ) 32 mcg/actuation nasal spray, 2 sprays to each nostril daily, Disp: 8.6 g, Rfl: 11    fluticasone (Flonase Allergy Relief) 50 mcg/actuation nasal spray, Administer 1 spray into each nostril 2 times a day as needed., Disp: , Rfl:     hydroCHLOROthiazide (Microzide) 12.5 mg tablet, Take 1 tablet (12.5 mg) by mouth once daily., Disp: 90 tablet, Rfl: 2    Lactobacillus acidophilus 1 billion cell tablet, Take 1 tablet by mouth 2 times a day., Disp: 60 tablet, Rfl: 0    lisinopril 30 mg tablet, Take 1 tablet (30 mg) by mouth once daily., Disp: 90 tablet, Rfl: 3    LORazepam (Ativan) 0.5 mg tablet, Take 1 tablet (0.5 mg) by mouth once daily as needed for anxiety for up to 15 doses., Disp: 15 tablet, Rfl: 0    mirtazapine (Remeron) 15 mg tablet, Take 1 tablet (15 mg) by mouth once daily at bedtime., Disp: 90 tablet, Rfl: 3    MULTIVITAMIN ORAL, Take 1 tablet by mouth once daily., Disp: , Rfl:     PARoxetine (Paxil) 10 mg tablet, Take 1 tablet (10 mg) by mouth once daily., Disp: 90 tablet, Rfl: 1    zinc gluconate 50 mg tablet, Take 1 tablet by mouth once daily., Disp: , Rfl:      Assessment/Plan:  ? Anemia:    Normocytic. Ngs is negative. 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 Her left kidney fxn is %23 of normal due to renal calculi and pyelonephritis. She also has underlying iron def. She was on oral iron but can not tolerate them due to constipation.    10/10/24: Venofer 300 mg x 3 ordered. EPO is 47 so I expect slow recovery of BM fxn within the next few months. If not, we will consider BMB    01/10/25: Venofer 1200 mg completed on 11/5/24. Hbg is 12.3 improved from 9.1. Iron panel is normal.    07/2/25: hbg 10.7. asymptomatic. Hse has MGUS. We will check MGUS labs in 3 months. No vit def. She  likely has ACD due to CKD.    RTC in 3 months with labs    2- Muscle weakness: completed PT    3- Elevated Cre: follows with Nephrology    Diagnoses and all orders for this visit:  Leukopenia, unspecified type  -     Clinic Appointment Request       Performance Status: Symptomatic; fully ambulatory    I spent more than 30 minutes for the patient today, including face-to-face conversation, pre-visit preparation, post-visit orders, and others.   Issa Xie MD

## 2025-07-03 ENCOUNTER — OFFICE VISIT (OUTPATIENT)
Dept: PRIMARY CARE | Facility: CLINIC | Age: 80
End: 2025-07-03
Payer: MEDICARE

## 2025-07-03 VITALS
HEART RATE: 84 BPM | BODY MASS INDEX: 24.8 KG/M2 | HEIGHT: 63 IN | SYSTOLIC BLOOD PRESSURE: 120 MMHG | WEIGHT: 140 LBS | DIASTOLIC BLOOD PRESSURE: 64 MMHG | OXYGEN SATURATION: 98 %

## 2025-07-03 DIAGNOSIS — J02.9 SORE THROAT: ICD-10-CM

## 2025-07-03 DIAGNOSIS — J00 ACUTE NASOPHARYNGITIS: Primary | ICD-10-CM

## 2025-07-03 LAB
POC BINAX EXPIRATION: NORMAL
POC BINAX NOW COVID SERIAL NUMBER: NORMAL
POC SARS-COV-2 AG BINAX: NORMAL
POC STREP A RESULT: NEGATIVE

## 2025-07-03 ASSESSMENT — COLUMBIA-SUICIDE SEVERITY RATING SCALE - C-SSRS: 1. IN THE PAST MONTH, HAVE YOU WISHED YOU WERE DEAD OR WISHED YOU COULD GO TO SLEEP AND NOT WAKE UP?: NO

## 2025-07-03 ASSESSMENT — PAIN SCALES - GENERAL: PAINLEVEL_OUTOF10: 7

## 2025-07-03 NOTE — PROGRESS NOTES
"Subjective     Patient ID: Sherri Najera is a 80 y.o. female who presents for Sore Throat (Patient states she is very congested and has a sore throat that makes it hard to swollow for the past two days . Will run a covid and strep test today .).      HPI  Sherri Hanson presents for concerns of sinus congestion, tenderness sinuses, runny nose, ear pressure- more right than left, sore throat, PND, slight cough. No fever. Chills yesterday.  More fatigue.  No body aching. Denies SOB or chest pain.  Symptoms began 2 days ago.   ill with similar symptoms.  Took ibuprofen yesterday will no improvement.     Patient's recent visit notes, medication and allergy lists, past medical surgical social hx, immunization, vitals, problem list, recent tests were reviewed by me for pertinence to this visit.        Review of Systems  All other systems have been reviewed and are negative except as noted in the HPI.         Objective   /64   Pulse 84   Ht 1.6 m (5' 3\")   Wt 63.5 kg (140 lb)   SpO2 98%   BMI 24.80 kg/m²       Physical Exam  Vitals and nursing note reviewed.   Constitutional:       General: She is not in acute distress.     Appearance: Normal appearance.   HENT:      Head: Normocephalic.      Right Ear: Hearing, ear canal and external ear normal. Tympanic membrane is injected.      Left Ear: Hearing, tympanic membrane, ear canal and external ear normal.      Nose: Mucosal edema, congestion and rhinorrhea present. Rhinorrhea is clear.      Right Sinus: No maxillary sinus tenderness or frontal sinus tenderness.      Left Sinus: No maxillary sinus tenderness or frontal sinus tenderness.      Mouth/Throat:      Lips: Pink.      Mouth: Mucous membranes are moist.      Pharynx: Oropharynx is clear. Uvula midline. Posterior oropharyngeal erythema and postnasal drip present. No uvula swelling.   Cardiovascular:      Rate and Rhythm: Normal rate and regular rhythm.      Heart sounds: Normal heart sounds, S1 normal " and S2 normal.   Pulmonary:      Effort: Pulmonary effort is normal.      Breath sounds: Normal breath sounds and air entry. No decreased air movement. No decreased breath sounds, rhonchi or rales.   Musculoskeletal:      Right lower leg: No edema.      Left lower leg: No edema.   Skin:     General: Skin is warm and dry.   Neurological:      General: No focal deficit present.      Mental Status: She is alert.   Psychiatric:         Behavior: Behavior is cooperative.           Assessment & Plan  Acute nasopharyngitis  Acute.  Likely viral. No red flags on exam.  Negative for strep and negative for COVID.  Discussed supportive care interventions as included in patient instructions.   -maintain rest/hydration  -Discussed OTC medication for symptoms management including nasal decongestant, sinus decongestants, fever/pain relief interventions  -Discussed non-pharmacological interventions such as humidification, nasal lavage, salt water gargles  Follow up if symptoms persist greater than 7 days.  Seek emergency medical care if fever above 104F, difficulty swallowing, or difficulty breathing occurs.  Patient states understanding.          Sore throat    Orders:    POCT BinaxNOW Covid-19 Ag Card manually resulted    POCT NOW STREP A manually resulted              Anay Rosario, ANJANA-CNP

## 2025-07-07 DIAGNOSIS — Z00.6 RESEARCH EXAM: Primary | ICD-10-CM

## 2025-07-08 ENCOUNTER — APPOINTMENT (OUTPATIENT)
Dept: LAB | Facility: LAB | Age: 80
End: 2025-07-08
Payer: MEDICARE

## 2025-07-08 DIAGNOSIS — Z00.6 RESEARCH STUDY PATIENT: ICD-10-CM

## 2025-07-08 DIAGNOSIS — Z00.6 RESEARCH EXAM: ICD-10-CM

## 2025-07-08 LAB
ALBUMIN SERPL BCP-MCNC: 4.4 G/DL (ref 3.4–5)
ALP SERPL-CCNC: 105 U/L (ref 33–136)
ALT SERPL W P-5'-P-CCNC: 28 U/L (ref 7–45)
ANION GAP SERPL CALC-SCNC: 15 MMOL/L (ref 10–20)
APPEARANCE UR: CLEAR
AST SERPL W P-5'-P-CCNC: 24 U/L (ref 9–39)
BASOPHILS # BLD AUTO: 0.04 X10*3/UL (ref 0–0.1)
BASOPHILS NFR BLD AUTO: 0.7 %
BILIRUB SERPL-MCNC: 0.5 MG/DL (ref 0–1.2)
BILIRUB UR STRIP.AUTO-MCNC: NEGATIVE MG/DL
BUN SERPL-MCNC: 30 MG/DL (ref 6–23)
CALCIUM SERPL-MCNC: 10.2 MG/DL (ref 8.6–10.6)
CHLORIDE SERPL-SCNC: 102 MMOL/L (ref 98–107)
CHOLEST SERPL-MCNC: 231 MG/DL (ref 0–199)
CHOLESTEROL/HDL RATIO: 5.9
CO2 SERPL-SCNC: 30 MMOL/L (ref 21–32)
COLOR UR: ABNORMAL
CREAT SERPL-MCNC: 1.34 MG/DL (ref 0.5–1.05)
CREAT UR-MCNC: 79.4 MG/DL (ref 20–320)
CRP SERPL HS-MCNC: 9.9 MG/L
D DIMER PPP FEU-MCNC: 6604 NG/ML FEU
EGFRCR SERPLBLD CKD-EPI 2021: 40 ML/MIN/1.73M*2
EOSINOPHIL # BLD AUTO: 0.33 X10*3/UL (ref 0–0.4)
EOSINOPHIL NFR BLD AUTO: 5.5 %
ERYTHROCYTE [DISTWIDTH] IN BLOOD BY AUTOMATED COUNT: 12.4 % (ref 11.5–14.5)
EST. AVERAGE GLUCOSE BLD GHB EST-MCNC: 126 MG/DL
GLUCOSE SERPL-MCNC: 128 MG/DL (ref 74–99)
GLUCOSE UR STRIP.AUTO-MCNC: NORMAL MG/DL
HBA1C MFR BLD: 6 % (ref ?–5.7)
HCT VFR BLD AUTO: 37.3 % (ref 36–46)
HDLC SERPL-MCNC: 38.9 MG/DL
HGB BLD-MCNC: 11.9 G/DL (ref 12–16)
HYALINE CASTS #/AREA URNS AUTO: ABNORMAL /LPF
IMM GRANULOCYTES # BLD AUTO: 0.03 X10*3/UL (ref 0–0.5)
IMM GRANULOCYTES NFR BLD AUTO: 0.5 % (ref 0–0.9)
KETONES UR STRIP.AUTO-MCNC: NEGATIVE MG/DL
LDLC SERPL CALC-MCNC: 113 MG/DL
LEUKOCYTE ESTERASE UR QL STRIP.AUTO: ABNORMAL
LYMPHOCYTES # BLD AUTO: 2.02 X10*3/UL (ref 0.8–3)
LYMPHOCYTES NFR BLD AUTO: 33.8 %
MCH RBC QN AUTO: 30.7 PG (ref 26–34)
MCHC RBC AUTO-ENTMCNC: 31.9 G/DL (ref 32–36)
MCV RBC AUTO: 96 FL (ref 80–100)
MICROALBUMIN UR-MCNC: 8.8 MG/L
MICROALBUMIN/CREAT UR: 11.1 UG/MG CREAT
MONOCYTES # BLD AUTO: 0.41 X10*3/UL (ref 0.05–0.8)
MONOCYTES NFR BLD AUTO: 6.9 %
MUCOUS THREADS #/AREA URNS AUTO: ABNORMAL /LPF
NEUTROPHILS # BLD AUTO: 3.14 X10*3/UL (ref 1.6–5.5)
NEUTROPHILS NFR BLD AUTO: 52.6 %
NITRITE UR QL STRIP.AUTO: NEGATIVE
NON HDL CHOLESTEROL: 192 MG/DL (ref 0–149)
NRBC BLD-RTO: 0 /100 WBCS (ref 0–0)
PH UR STRIP.AUTO: 5.5 [PH]
PLATELET # BLD AUTO: 210 X10*3/UL (ref 150–450)
POTASSIUM SERPL-SCNC: 4.9 MMOL/L (ref 3.5–5.3)
PROT SERPL-MCNC: 7.2 G/DL (ref 6.4–8.2)
PROT UR STRIP.AUTO-MCNC: NEGATIVE MG/DL
RBC # BLD AUTO: 3.88 X10*6/UL (ref 4–5.2)
RBC # UR STRIP.AUTO: NEGATIVE MG/DL
RBC #/AREA URNS AUTO: ABNORMAL /HPF
SODIUM SERPL-SCNC: 142 MMOL/L (ref 136–145)
SP GR UR STRIP.AUTO: 1.01
SQUAMOUS #/AREA URNS AUTO: ABNORMAL /HPF
TRIGL SERPL-MCNC: 395 MG/DL (ref 0–149)
UROBILINOGEN UR STRIP.AUTO-MCNC: NORMAL MG/DL
VLDL: 79 MG/DL (ref 0–40)
WBC # BLD AUTO: 6 X10*3/UL (ref 4.4–11.3)
WBC #/AREA URNS AUTO: ABNORMAL /HPF

## 2025-07-08 PROCEDURE — 82043 UR ALBUMIN QUANTITATIVE: CPT

## 2025-07-08 PROCEDURE — 80053 COMPREHEN METABOLIC PANEL: CPT

## 2025-07-08 PROCEDURE — 86141 C-REACTIVE PROTEIN HS: CPT

## 2025-07-08 PROCEDURE — 36415 COLL VENOUS BLD VENIPUNCTURE: CPT

## 2025-07-08 PROCEDURE — 83036 HEMOGLOBIN GLYCOSYLATED A1C: CPT

## 2025-07-08 PROCEDURE — 85379 FIBRIN DEGRADATION QUANT: CPT

## 2025-07-08 PROCEDURE — 82570 ASSAY OF URINE CREATININE: CPT

## 2025-07-08 PROCEDURE — 85025 COMPLETE CBC W/AUTO DIFF WBC: CPT

## 2025-07-08 PROCEDURE — 81001 URINALYSIS AUTO W/SCOPE: CPT

## 2025-07-08 PROCEDURE — 80061 LIPID PANEL: CPT

## 2025-07-11 ENCOUNTER — APPOINTMENT (OUTPATIENT)
Dept: CARDIOLOGY | Facility: HOSPITAL | Age: 80
End: 2025-07-11
Payer: MEDICARE

## 2025-07-11 ENCOUNTER — HOSPITAL ENCOUNTER (EMERGENCY)
Facility: HOSPITAL | Age: 80
Discharge: HOME | End: 2025-07-11
Payer: MEDICARE

## 2025-07-11 ENCOUNTER — APPOINTMENT (OUTPATIENT)
Dept: RADIOLOGY | Facility: HOSPITAL | Age: 80
End: 2025-07-11
Payer: MEDICARE

## 2025-07-11 VITALS
SYSTOLIC BLOOD PRESSURE: 137 MMHG | OXYGEN SATURATION: 97 % | DIASTOLIC BLOOD PRESSURE: 70 MMHG | HEIGHT: 63 IN | BODY MASS INDEX: 24.8 KG/M2 | WEIGHT: 140 LBS | HEART RATE: 67 BPM | RESPIRATION RATE: 13 BRPM | TEMPERATURE: 99.1 F

## 2025-07-11 DIAGNOSIS — N30.00 ACUTE CYSTITIS WITHOUT HEMATURIA: Primary | ICD-10-CM

## 2025-07-11 DIAGNOSIS — R79.89 ELEVATED D-DIMER: ICD-10-CM

## 2025-07-11 LAB
ALBUMIN SERPL BCP-MCNC: 4.3 G/DL (ref 3.4–5)
ALP SERPL-CCNC: 86 U/L (ref 33–136)
ALT SERPL W P-5'-P-CCNC: 21 U/L (ref 7–45)
ANION GAP SERPL CALC-SCNC: 12 MMOL/L (ref 10–20)
APPEARANCE UR: CLEAR
AST SERPL W P-5'-P-CCNC: 17 U/L (ref 9–39)
BACTERIA #/AREA URNS AUTO: ABNORMAL /HPF
BASOPHILS # BLD AUTO: 0.04 X10*3/UL (ref 0–0.1)
BASOPHILS NFR BLD AUTO: 0.5 %
BILIRUB SERPL-MCNC: 0.3 MG/DL (ref 0–1.2)
BILIRUB UR STRIP.AUTO-MCNC: NEGATIVE MG/DL
BNP SERPL-MCNC: 47 PG/ML (ref 0–99)
BUN SERPL-MCNC: 25 MG/DL (ref 6–23)
CALCIUM SERPL-MCNC: 10.1 MG/DL (ref 8.6–10.3)
CARDIAC TROPONIN I PNL SERPL HS: 4 NG/L (ref 0–13)
CARDIAC TROPONIN I PNL SERPL HS: 4 NG/L (ref 0–13)
CHLORIDE SERPL-SCNC: 105 MMOL/L (ref 98–107)
CO2 SERPL-SCNC: 28 MMOL/L (ref 21–32)
COLOR UR: ABNORMAL
CREAT SERPL-MCNC: 1.24 MG/DL (ref 0.5–1.05)
D DIMER PPP FEU-MCNC: ABNORMAL NG/ML FEU
EGFRCR SERPLBLD CKD-EPI 2021: 44 ML/MIN/1.73M*2
EOSINOPHIL # BLD AUTO: 0.31 X10*3/UL (ref 0–0.4)
EOSINOPHIL NFR BLD AUTO: 4.1 %
ERYTHROCYTE [DISTWIDTH] IN BLOOD BY AUTOMATED COUNT: 12.4 % (ref 11.5–14.5)
GLUCOSE SERPL-MCNC: 108 MG/DL (ref 74–99)
GLUCOSE UR STRIP.AUTO-MCNC: NORMAL MG/DL
HCT VFR BLD AUTO: 34 % (ref 36–46)
HGB BLD-MCNC: 11.4 G/DL (ref 12–16)
IMM GRANULOCYTES # BLD AUTO: 0.02 X10*3/UL (ref 0–0.5)
IMM GRANULOCYTES NFR BLD AUTO: 0.3 % (ref 0–0.9)
INR PPP: 0.9 (ref 0.9–1.1)
KETONES UR STRIP.AUTO-MCNC: NEGATIVE MG/DL
LEUKOCYTE ESTERASE UR QL STRIP.AUTO: ABNORMAL
LYMPHOCYTES # BLD AUTO: 2.29 X10*3/UL (ref 0.8–3)
LYMPHOCYTES NFR BLD AUTO: 30.5 %
MAGNESIUM SERPL-MCNC: 1.97 MG/DL (ref 1.6–2.4)
MCH RBC QN AUTO: 31.2 PG (ref 26–34)
MCHC RBC AUTO-ENTMCNC: 33.5 G/DL (ref 32–36)
MCV RBC AUTO: 93 FL (ref 80–100)
MONOCYTES # BLD AUTO: 0.46 X10*3/UL (ref 0.05–0.8)
MONOCYTES NFR BLD AUTO: 6.1 %
NEUTROPHILS # BLD AUTO: 4.38 X10*3/UL (ref 1.6–5.5)
NEUTROPHILS NFR BLD AUTO: 58.5 %
NITRITE UR QL STRIP.AUTO: NEGATIVE
NRBC BLD-RTO: 0 /100 WBCS (ref 0–0)
PH UR STRIP.AUTO: 5.5 [PH]
PLATELET # BLD AUTO: 197 X10*3/UL (ref 150–450)
POTASSIUM SERPL-SCNC: 4.3 MMOL/L (ref 3.5–5.3)
PROT SERPL-MCNC: 7.4 G/DL (ref 6.4–8.2)
PROT UR STRIP.AUTO-MCNC: NEGATIVE MG/DL
PROTHROMBIN TIME: 10.1 SECONDS (ref 9.8–12.4)
RBC # BLD AUTO: 3.65 X10*6/UL (ref 4–5.2)
RBC # UR STRIP.AUTO: NEGATIVE MG/DL
RBC #/AREA URNS AUTO: ABNORMAL /HPF
SODIUM SERPL-SCNC: 141 MMOL/L (ref 136–145)
SP GR UR STRIP.AUTO: 1.02
SQUAMOUS #/AREA URNS AUTO: ABNORMAL /HPF
UROBILINOGEN UR STRIP.AUTO-MCNC: NORMAL MG/DL
WBC # BLD AUTO: 7.5 X10*3/UL (ref 4.4–11.3)
WBC #/AREA URNS AUTO: ABNORMAL /HPF

## 2025-07-11 PROCEDURE — 71275 CT ANGIOGRAPHY CHEST: CPT | Performed by: STUDENT IN AN ORGANIZED HEALTH CARE EDUCATION/TRAINING PROGRAM

## 2025-07-11 PROCEDURE — 85379 FIBRIN DEGRADATION QUANT: CPT | Performed by: HEALTH CARE PROVIDER

## 2025-07-11 PROCEDURE — 80053 COMPREHEN METABOLIC PANEL: CPT | Performed by: HEALTH CARE PROVIDER

## 2025-07-11 PROCEDURE — 96361 HYDRATE IV INFUSION ADD-ON: CPT

## 2025-07-11 PROCEDURE — 2500000004 HC RX 250 GENERAL PHARMACY W/ HCPCS (ALT 636 FOR OP/ED): Performed by: HEALTH CARE PROVIDER

## 2025-07-11 PROCEDURE — 85610 PROTHROMBIN TIME: CPT | Performed by: HEALTH CARE PROVIDER

## 2025-07-11 PROCEDURE — 99285 EMERGENCY DEPT VISIT HI MDM: CPT | Mod: 25

## 2025-07-11 PROCEDURE — 83880 ASSAY OF NATRIURETIC PEPTIDE: CPT | Performed by: HEALTH CARE PROVIDER

## 2025-07-11 PROCEDURE — 85025 COMPLETE CBC W/AUTO DIFF WBC: CPT | Performed by: HEALTH CARE PROVIDER

## 2025-07-11 PROCEDURE — 83735 ASSAY OF MAGNESIUM: CPT | Performed by: HEALTH CARE PROVIDER

## 2025-07-11 PROCEDURE — 2550000001 HC RX 255 CONTRASTS: Performed by: HEALTH CARE PROVIDER

## 2025-07-11 PROCEDURE — 84484 ASSAY OF TROPONIN QUANT: CPT | Performed by: HEALTH CARE PROVIDER

## 2025-07-11 PROCEDURE — 36415 COLL VENOUS BLD VENIPUNCTURE: CPT | Performed by: HEALTH CARE PROVIDER

## 2025-07-11 PROCEDURE — 93005 ELECTROCARDIOGRAM TRACING: CPT

## 2025-07-11 PROCEDURE — 71275 CT ANGIOGRAPHY CHEST: CPT

## 2025-07-11 PROCEDURE — 87086 URINE CULTURE/COLONY COUNT: CPT | Mod: GEALAB | Performed by: HEALTH CARE PROVIDER

## 2025-07-11 PROCEDURE — 81001 URINALYSIS AUTO W/SCOPE: CPT | Performed by: HEALTH CARE PROVIDER

## 2025-07-11 PROCEDURE — 96374 THER/PROPH/DIAG INJ IV PUSH: CPT

## 2025-07-11 RX ORDER — CEPHALEXIN 500 MG/1
500 CAPSULE ORAL 2 TIMES DAILY
Qty: 14 CAPSULE | Refills: 0 | Status: SHIPPED | OUTPATIENT
Start: 2025-07-11 | End: 2025-07-18

## 2025-07-11 RX ORDER — CEFTRIAXONE 1 G/50ML
1 INJECTION, SOLUTION INTRAVENOUS ONCE
Status: COMPLETED | OUTPATIENT
Start: 2025-07-11 | End: 2025-07-11

## 2025-07-11 RX ADMIN — SODIUM CHLORIDE, SODIUM LACTATE, POTASSIUM CHLORIDE, AND CALCIUM CHLORIDE 1000 ML: .6; .31; .03; .02 INJECTION, SOLUTION INTRAVENOUS at 18:35

## 2025-07-11 RX ADMIN — IOHEXOL 74 ML: 350 INJECTION, SOLUTION INTRAVENOUS at 18:44

## 2025-07-11 RX ADMIN — CEFTRIAXONE 1 G: 1 INJECTION, SOLUTION INTRAVENOUS at 18:37

## 2025-07-11 ASSESSMENT — PAIN - FUNCTIONAL ASSESSMENT: PAIN_FUNCTIONAL_ASSESSMENT: 0-10

## 2025-07-11 ASSESSMENT — PAIN SCALES - GENERAL: PAINLEVEL_OUTOF10: 0 - NO PAIN

## 2025-07-11 NOTE — ED TRIAGE NOTES
Pt presents ambulatory via POV through triage/EMS from  for c/o abnormal labs that were drawn on Tuesday. Pt states the blood work was taken because she is part of a COVID study. Pt denies any CP/SOB/BLE pain or swelling.

## 2025-07-11 NOTE — ED PROVIDER NOTES
HPI   Chief Complaint   Patient presents with   • Abnormal labs       CC: Abnormal labs  HPI:   80-year-old female presents ED for elevated D-dimer patient reported having outpatient labs done by her PCP earlier this week that showed elevated D-dimer over 6000.  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  She denies any recent travel denies any lower extremity pain swelling erythema, denies any history of DVTs, denies any recent illnesses no fever, chills, denies having any abdominal pain no nausea vomiting or diarrhea.    Additional Limitations to History:   External Records Reviewed: I reviewed recent and relevant outside records including   History Obtained From:     Past Medical History: Per HPI  Medications: Reviewed in EMR and with patient  Allergies:  Reviewed in EMR  Past Surgical History:   Social History:     ------------------------------------------------------------------------------------------------------  Physical Exam:  --Vital signs reviewed in nursing triage note, EMR flow sheets, and at patient's bedside  GEN:  A&Ox3, no acute distress, appears comfortable.  Conversational and appropriate.  No confusion or gross mental status changes.  EYES: EOMI, non-injected sclera.  ENT: Moist mucous membranes, no apparent injuries or lesions.   CARDIO: Normal rate and regular rhythm. No murmurs, rubs, or gallops.  2+ equal pulses of the distal extremities.   PULM: Clear to auscultation bilaterally. No rales, rhonchi, or wheezes. Good symmetric chest expansion.  GI: Soft, non-tender, non-distended. No rebound tenderness or guarding.  SKIN: Warm and dry, no rashes or lesions.  MSK: ROM intact the extremities without contractures.   EXT: No peripheral edema, contusions,  or wounds.   NEURO: Cranial nerves II-XII grossly intact. Sensation to light touch intact and equal bilaterally in upper and lower extremities.  Symmetric 5/5 strength in upper and lower extremities.  PSYCH: Appropriate mood and behavior, converses and responds appropriately during exam.  -------------------------------------------------------------------------------------------------------       Differential Diagnoses Considered:   Chronic Medical Conditions Significantly Affecting Care:   Diagnostic testing considered: [PERC, D-Dimer, PECARN, etc.]    - EKG interpreted by myself   - I independently interpreted: [CXR, CT, POCUS, etc. including your interpretation]  - Labs notable for     Escalation of Care: Appropriate for  Social Determinants of Health Significantly Affecting Care: [Homelessness, lacking transportation, uninsured, unable to afford medications]  Prescription Drug Consideration: [Antibiotics, antivirals, pain medications, etc.]  Discussion of Management with Other Providers:  I discussed the patient/results with: [admitting team, consultant, radiologist, social work, EPAT, case management, PT/OT, RT, PCP, etc.]      Fred Mejia PA-C              Patient History   Medical History[1]  Surgical History[2]  Family History[3]  Social History[4]    Physical Exam   ED Triage Vitals [07/11/25 1647]   Temperature Heart Rate Respirations BP   37.3 °C (99.1 °F) 82 16 121/64      Pulse Ox Temp src Heart Rate Source Patient Position   96 % -- -- --      BP Location FiO2 (%)     -- --       Physical Exam      ED Course & MDM   Diagnoses as of 07/11/25 2012   Acute cystitis without hematuria   Elevated d-dimer                 No data recorded                                 Medical Decision Making      Procedure  Procedures       Fred Mejia PA-C  07/11/25 1737         [1]  Past Medical History:  Diagnosis Date   • Aneurysm, splenic artery    • Ankylosing spondylitis    • Anxiety    • Arthritis    • Body  mass index (BMI) 27.0-27.9, adult 12/07/2021    BMI 27.0-27.9,adult   • BPPV (benign paroxysmal positional vertigo)    • Cervical radiculitis    • Chronic kidney disease    • Depression    • Diabetes mellitus (Multi)    • Effusion, left knee 02/11/2019    Effusion of left knee   • Exocrine pancreatic insufficiency ([facility])    • GERD (gastroesophageal reflux disease)    • Hearing aid worn    • HL (hearing loss)    • HLD (hyperlipidemia)    • Hypertension    • Injury of tendon of rotator cuff 06/19/2024   • Irritable bowel syndrome with diarrhea 01/16/2020    Irritable bowel syndrome with diarrhea   • Kidney stone    • Kidney stone 08/21/2023   • Nephrolithiasis    • Other conditions influencing health status 06/08/2020    History of chronic diarrhea   • Other hemorrhoids 06/28/2017    Internal hemorrhoid   • Other nonspecific abnormal finding of lung field 03/26/2019    Ground glass opacity present on imaging of lung   • Pain in left knee 04/30/2019    Left knee pain   • Pain in unspecified knee 02/11/2019    Knee pain   • Personal history of other diseases of the circulatory system 08/12/2017    History of hypertension   • Personal history of other diseases of the musculoskeletal system and connective tissue 05/21/2015    History of low back pain   • Personal history of other specified conditions 11/03/2020    History of fatigue   • Personal history of other specified conditions 05/04/2021    History of diarrhea   • Personal history of other specified conditions 03/07/2014    History of chest pain   • Personal history of other specified conditions 01/31/2013    History of headache   • Skin cancer    • Unspecified injury of muscle(s) and tendon(s) of the rotator cuff of right shoulder, initial encounter 10/11/2016    Injury of right rotator cuff   • Urinary tract infection    • Wears glasses    [2]  Past Surgical History:  Procedure Laterality Date   • BREAST BIOPSY Right 2004    benign   • CARPAL TUNNEL RELEASE  Bilateral    • COLONOSCOPY  04/11/2018    no further needed-normal   • CYSTOSCOPY W/ URETERAL STENT PLACEMENT Left 06/30/2024   • HAND SURGERY Left 03/07/2014    Hand Surgery                                                                                                                                                         • MR HEAD ANGIO WO IV CONTRAST  04/15/2023    MR HEAD ANGIO WO IV CONTRAST GEA MRI   • MR NECK ANGIO WO IV CONTRAST  04/15/2023    MR NECK ANGIO WO IV CONTRAST GEA MRI   • TOTAL KNEE ARTHROPLASTY Left 02/26/2024   • TOTAL SHOULDER ARTHROPLASTY Bilateral 07/11/2022   [3]  Family History  Problem Relation Name Age of Onset   • Diabetes Mother Sherri    • Asthma Mother Sherri    • Other (CRF) Mother Sherri    • Heart failure Father     [4]  Social History  Tobacco Use   • Smoking status: Never     Passive exposure: Never   • Smokeless tobacco: Never   Vaping Use   • Vaping status: Never Used   Substance Use Topics   • Alcohol use: Never   • Drug use: Never      Fred Mejia PA-C  07/11/25 2012

## 2025-07-13 LAB — BACTERIA UR CULT: NORMAL

## 2025-07-14 LAB
ATRIAL RATE: 64 BPM
HOLD SPECIMEN: NORMAL
P AXIS: 49 DEGREES
P OFFSET: 182 MS
P ONSET: 127 MS
PR INTERVAL: 176 MS
Q ONSET: 215 MS
QRS COUNT: 11 BEATS
QRS DURATION: 92 MS
QT INTERVAL: 412 MS
QTC CALCULATION(BAZETT): 425 MS
QTC FREDERICIA: 420 MS
R AXIS: -21 DEGREES
T AXIS: 26 DEGREES
T OFFSET: 421 MS
VENTRICULAR RATE: 64 BPM

## 2025-08-01 ENCOUNTER — APPOINTMENT (OUTPATIENT)
Dept: PRIMARY CARE | Facility: CLINIC | Age: 80
End: 2025-08-01
Payer: MEDICARE

## 2025-08-01 VITALS
RESPIRATION RATE: 18 BRPM | WEIGHT: 143 LBS | OXYGEN SATURATION: 96 % | SYSTOLIC BLOOD PRESSURE: 128 MMHG | HEART RATE: 79 BPM | DIASTOLIC BLOOD PRESSURE: 76 MMHG | TEMPERATURE: 98.6 F | BODY MASS INDEX: 25.33 KG/M2

## 2025-08-01 DIAGNOSIS — N30.00 ACUTE CYSTITIS WITHOUT HEMATURIA: Primary | ICD-10-CM

## 2025-08-01 DIAGNOSIS — N18.31 STAGE 3A CHRONIC KIDNEY DISEASE (MULTI): ICD-10-CM

## 2025-08-01 DIAGNOSIS — H66.92 ACUTE LEFT OTITIS MEDIA: ICD-10-CM

## 2025-08-01 LAB
POC APPEARANCE, URINE: CLEAR
POC BILIRUBIN, URINE: NEGATIVE
POC BLOOD, URINE: ABNORMAL
POC COLOR, URINE: YELLOW
POC GLUCOSE, URINE: NEGATIVE MG/DL
POC KETONES, URINE: NEGATIVE MG/DL
POC LEUKOCYTES, URINE: ABNORMAL
POC NITRITE,URINE: NEGATIVE
POC PH, URINE: 5.5 PH
POC PROTEIN, URINE: ABNORMAL MG/DL
POC SPECIFIC GRAVITY, URINE: 1.02
POC UROBILINOGEN, URINE: 0.2 EU/DL

## 2025-08-01 PROCEDURE — 99214 OFFICE O/P EST MOD 30 MIN: CPT | Performed by: FAMILY MEDICINE

## 2025-08-01 PROCEDURE — 1126F AMNT PAIN NOTED NONE PRSNT: CPT | Performed by: FAMILY MEDICINE

## 2025-08-01 PROCEDURE — 3078F DIAST BP <80 MM HG: CPT | Performed by: FAMILY MEDICINE

## 2025-08-01 PROCEDURE — 1160F RVW MEDS BY RX/DR IN RCRD: CPT | Performed by: FAMILY MEDICINE

## 2025-08-01 PROCEDURE — 3074F SYST BP LT 130 MM HG: CPT | Performed by: FAMILY MEDICINE

## 2025-08-01 PROCEDURE — 1159F MED LIST DOCD IN RCRD: CPT | Performed by: FAMILY MEDICINE

## 2025-08-01 PROCEDURE — 81003 URINALYSIS AUTO W/O SCOPE: CPT | Performed by: FAMILY MEDICINE

## 2025-08-01 PROCEDURE — G2211 COMPLEX E/M VISIT ADD ON: HCPCS | Performed by: FAMILY MEDICINE

## 2025-08-01 RX ORDER — AMOXICILLIN 875 MG/1
875 TABLET, COATED ORAL 2 TIMES DAILY
Qty: 14 TABLET | Refills: 0 | Status: SHIPPED | OUTPATIENT
Start: 2025-08-01 | End: 2025-08-08

## 2025-08-01 ASSESSMENT — PATIENT HEALTH QUESTIONNAIRE - PHQ9
SUM OF ALL RESPONSES TO PHQ9 QUESTIONS 1 AND 2: 0
1. LITTLE INTEREST OR PLEASURE IN DOING THINGS: NOT AT ALL
2. FEELING DOWN, DEPRESSED OR HOPELESS: NOT AT ALL

## 2025-08-01 ASSESSMENT — COLUMBIA-SUICIDE SEVERITY RATING SCALE - C-SSRS: 1. IN THE PAST MONTH, HAVE YOU WISHED YOU WERE DEAD OR WISHED YOU COULD GO TO SLEEP AND NOT WAKE UP?: NO

## 2025-08-01 ASSESSMENT — PAIN SCALES - GENERAL: PAINLEVEL_OUTOF10: 0-NO PAIN

## 2025-08-07 LAB
ATRIAL RATE: 64 BPM
P AXIS: 49 DEGREES
P OFFSET: 182 MS
P ONSET: 127 MS
PR INTERVAL: 176 MS
Q ONSET: 215 MS
QRS COUNT: 11 BEATS
QRS DURATION: 92 MS
QT INTERVAL: 412 MS
QTC CALCULATION(BAZETT): 425 MS
QTC FREDERICIA: 420 MS
R AXIS: -21 DEGREES
T AXIS: 26 DEGREES
T OFFSET: 421 MS
VENTRICULAR RATE: 64 BPM

## 2025-08-11 ENCOUNTER — APPOINTMENT (OUTPATIENT)
Dept: PRIMARY CARE | Facility: CLINIC | Age: 80
End: 2025-08-11
Payer: MEDICARE

## 2025-09-02 ENCOUNTER — TELEPHONE (OUTPATIENT)
Dept: PRIMARY CARE | Facility: CLINIC | Age: 80
End: 2025-09-02
Payer: MEDICARE

## 2025-09-02 DIAGNOSIS — F32.1 CURRENT MODERATE EPISODE OF MAJOR DEPRESSIVE DISORDER, UNSPECIFIED WHETHER RECURRENT (MULTI): ICD-10-CM

## 2025-09-02 DIAGNOSIS — F41.9 ANXIETY: ICD-10-CM

## 2025-09-03 RX ORDER — PAROXETINE 10 MG/1
10 TABLET, FILM COATED ORAL DAILY
Qty: 90 TABLET | Refills: 1 | Status: SHIPPED | OUTPATIENT
Start: 2025-09-03 | End: 2026-03-02

## 2025-12-09 ENCOUNTER — APPOINTMENT (OUTPATIENT)
Dept: PRIMARY CARE | Facility: CLINIC | Age: 80
End: 2025-12-09
Payer: MEDICARE

## (undated) DEVICE — SYRINGE, 60 CC, LUER LOCK, MONOJECT, W/O CAP, LF

## (undated) DEVICE — SUTURE, CTD, VICRYL, 2-0, UND, BR, CT-2

## (undated) DEVICE — NEEDLE, SPINAL, QUINCKE, 18 G X 3.5 IN, PINK HUB

## (undated) DEVICE — BLADE, RECIPROCATOR 12.5 X 76 X 0.9MM

## (undated) DEVICE — STOPCOCK, SAN ANTONIO, W/MODIFIED FITTING

## (undated) DEVICE — BAG, PRESSURE INFUSER, 3000 CC, LF

## (undated) DEVICE — TIP, SUCTION, YANKAUER, FLEXIBLE

## (undated) DEVICE — SYRINGE, 30 CC, LUER LOCK

## (undated) DEVICE — SUTURE, VICRYL, 1, 24 IN, CTD, UNDYED

## (undated) DEVICE — GUIDEWIRE, ULTRA TRACK, HYBRID, 0.035 IN X 150CM

## (undated) DEVICE — PREP TRAY, SKIN, DRY, W/GLOVES

## (undated) DEVICE — Device

## (undated) DEVICE — BLADE, OSCILLATOR 19.5 X 86 X 1.27MM

## (undated) DEVICE — TOWEL PACK, STERILE, 4/PACK, BLUE

## (undated) DEVICE — SUTURE, V-LOC, 4-0, 23IN, P-12, 90 ABS

## (undated) DEVICE — SUTURE, QUILL, BARBED, PDO, 2, 24 X 24CM, T8 36MM TAPER POINT, 1/2 CIRCLE

## (undated) DEVICE — COVER, TABLE, 44X90

## (undated) DEVICE — HOLMIUM 365 FORTEC FIBER

## (undated) DEVICE — MARKER, SKIN, DUAL TIP INK W/9 LABEL AND REMOVABLE TIME OUT SLEEVE

## (undated) DEVICE — CATHETER TRAY, SURESTEP, 14FR, PRECONNECTED DRAIN BAG

## (undated) DEVICE — IRRIGATION SET, CYSTOSCOPY, TURP, Y, CONTINUOUS, 81 IN

## (undated) DEVICE — SOLUTION, IRRIGATION, USP, SODIUM CHLORIDE 0.9%, 3000 ML, BAG

## (undated) DEVICE — GLOVE, SURGICAL, PROTEXIS PI , 8.0, PF, LF

## (undated) DEVICE — TUBING, SUCTION, CONNECTING, NON-CONDUCTIVE, SURE GRIP CONNECTORS, 3/16 IN X 10 FT

## (undated) DEVICE — GUIDEWIRE, ULTRA TRACK, HYBRID, 0.038 IN STRAIGHT TIP

## (undated) DEVICE — IRRIGATION SYSTEM, PUMP, SINGLE ACTION, CONTINUOS FLOW

## (undated) DEVICE — DRAPE PACK, TOTAL KNEE, CUSTOM, GEAUGA

## (undated) DEVICE — PAD, KNEE PATIENT, DEMAYO, DISP, STERILE

## (undated) DEVICE — GLOVE, SURGEON, PREMIERPRO PI, SZ-7.0, PF, WH

## (undated) DEVICE — BASKET, STONE, RETRIEVAL, NITINOL (4WIRE, 1.9 0 TIP)

## (undated) DEVICE — DRAPE, SHEET, 17 X 23 IN

## (undated) DEVICE — CATHETER, URETERAL, POLLACK, OPEN END, 5.5 FR, 70 CM

## (undated) DEVICE — BANDAGE, ELASTIC,  6 IN X 11 YDS, STERILE, LF

## (undated) DEVICE — SKIN CLOSURE SYS, PREMIERPRO EXOFIN, 1-4CM X 22CM, 1.75G TUBE

## (undated) DEVICE — HOOD, STERISHIELD T4 SYSTEM

## (undated) DEVICE — APPLICATOR, CHLORAPREP, W/ORANGE TINT, 26ML

## (undated) DEVICE — COLLECTION BAG, FLUID, NON-STERILE

## (undated) DEVICE — KIT, MINOR, DOUBLE BASIN

## (undated) DEVICE — DRESSING, GAUZE, SPONGE, KERLIX, SUPER, 6 X 6.75 IN, STERILE 10PK